# Patient Record
Sex: MALE | Race: BLACK OR AFRICAN AMERICAN | Employment: OTHER | URBAN - METROPOLITAN AREA
[De-identification: names, ages, dates, MRNs, and addresses within clinical notes are randomized per-mention and may not be internally consistent; named-entity substitution may affect disease eponyms.]

---

## 2019-08-23 ENCOUNTER — HOSPITAL ENCOUNTER (EMERGENCY)
Age: 34
Discharge: HOME | End: 2019-08-23

## 2019-08-23 VITALS
WEIGHT: 250 LBS | HEART RATE: 73 BPM | TEMPERATURE: 97.3 F | SYSTOLIC BLOOD PRESSURE: 191 MMHG | OXYGEN SATURATION: 96 % | RESPIRATION RATE: 16 BRPM | HEIGHT: 70 IN | DIASTOLIC BLOOD PRESSURE: 100 MMHG | BODY MASS INDEX: 35.79 KG/M2

## 2019-08-23 DIAGNOSIS — Z20.2 POSSIBLE EXPOSURE TO STD: Primary | ICD-10-CM

## 2019-08-23 LAB
BACTERIA: ABNORMAL /HPF
BILIRUBIN URINE: ABNORMAL
BLOOD, URINE: ABNORMAL
CLARITY: CLEAR
COLOR: YELLOW
GLUCOSE URINE: NEGATIVE MG/DL
KETONES, URINE: NEGATIVE MG/DL
LEUKOCYTE ESTERASE, URINE: NEGATIVE
NITRITE, URINE: NEGATIVE
PH UA: 5.5 (ref 5–9)
PROTEIN UA: NEGATIVE MG/DL
RBC UA: ABNORMAL /HPF (ref 0–2)
SPECIFIC GRAVITY UA: 1.02 (ref 1–1.03)
UROBILINOGEN, URINE: 0.2 E.U./DL
WBC UA: ABNORMAL /HPF (ref 0–5)

## 2019-08-23 PROCEDURE — 96372 THER/PROPH/DIAG INJ SC/IM: CPT

## 2019-08-23 PROCEDURE — 6360000002 HC RX W HCPCS: Performed by: PHYSICIAN ASSISTANT

## 2019-08-23 PROCEDURE — 6370000000 HC RX 637 (ALT 250 FOR IP): Performed by: PHYSICIAN ASSISTANT

## 2019-08-23 PROCEDURE — 87491 CHLMYD TRACH DNA AMP PROBE: CPT

## 2019-08-23 PROCEDURE — 99283 EMERGENCY DEPT VISIT LOW MDM: CPT

## 2019-08-23 PROCEDURE — 81001 URINALYSIS AUTO W/SCOPE: CPT

## 2019-08-23 PROCEDURE — 87591 N.GONORRHOEAE DNA AMP PROB: CPT

## 2019-08-23 RX ORDER — METRONIDAZOLE 500 MG/1
2000 TABLET ORAL ONCE
Status: COMPLETED | OUTPATIENT
Start: 2019-08-23 | End: 2019-08-23

## 2019-08-23 RX ORDER — AZITHROMYCIN 250 MG/1
1000 TABLET, FILM COATED ORAL ONCE
Status: COMPLETED | OUTPATIENT
Start: 2019-08-23 | End: 2019-08-23

## 2019-08-23 RX ORDER — ACYCLOVIR 400 MG/1
400 TABLET ORAL
Qty: 50 TABLET | Refills: 0 | Status: SHIPPED | OUTPATIENT
Start: 2019-08-23 | End: 2019-09-02

## 2019-08-23 RX ORDER — CEFTRIAXONE SODIUM 250 MG/1
250 INJECTION, POWDER, FOR SOLUTION INTRAMUSCULAR; INTRAVENOUS ONCE
Status: COMPLETED | OUTPATIENT
Start: 2019-08-23 | End: 2019-08-23

## 2019-08-23 RX ADMIN — METRONIDAZOLE 2000 MG: 500 TABLET, FILM COATED ORAL at 12:21

## 2019-08-23 RX ADMIN — CEFTRIAXONE SODIUM 250 MG: 250 INJECTION, POWDER, FOR SOLUTION INTRAMUSCULAR; INTRAVENOUS at 12:21

## 2019-08-23 RX ADMIN — AZITHROMYCIN 1000 MG: 250 TABLET, FILM COATED ORAL at 12:20

## 2019-08-23 NOTE — ED PROVIDER NOTES
ectopy, gallops, or rubs. · GI:  AbdomenSoft, nontender, good bowel sounds. No firm or pulsatile mass. · :          Penis: non focal circumcised and lesion erthematous rash noted around the glands penis. Vesicular appearance to one area. .            Scrotum: normal.           Testicle: normal without masses bilateral.  · Integument:  Normal turgor. Warm, dry, without visible rash, unless noted elsewhere. Lymphatics: No lymphangitis or adenopathy noted. · Neurological:  Orientation age-appropriate. Motor functions intact. Lab / Imaging Results   (All laboratory and radiology results have been personally reviewed by myself)  Labs:  Results for orders placed or performed during the hospital encounter of 08/23/19   C.trachomatis N.gonorrhoeae DNA, Urine   Result Value Ref Range    Source Urine    Urinalysis   Result Value Ref Range    Color, UA Yellow Straw/Yellow    Clarity, UA Clear Clear    Glucose, Ur Negative Negative mg/dL    Bilirubin Urine SMALL (A) Negative    Ketones, Urine Negative Negative mg/dL    Specific Gravity, UA 1.025 1.005 - 1.030    Blood, Urine MODERATE (A) Negative    pH, UA 5.5 5.0 - 9.0    Protein, UA Negative Negative mg/dL    Urobilinogen, Urine 0.2 <2.0 E.U./dL    Nitrite, Urine Negative Negative    Leukocyte Esterase, Urine Negative Negative   Microscopic Urinalysis   Result Value Ref Range    WBC, UA 0-1 0 - 5 /HPF    RBC, UA 1-3 0 - 2 /HPF    Bacteria, UA RARE (A) /HPF     Imaging: All Radiology results interpreted by Radiologist unless otherwise noted.   No orders to display     ED Course / Medical Decision Making     Medications   cefTRIAXone (ROCEPHIN) injection 250 mg (250 mg Intramuscular Given 8/23/19 1221)   azithromycin (ZITHROMAX) tablet 1,000 mg (1,000 mg Oral Given 8/23/19 1220)   metroNIDAZOLE (FLAGYL) tablet 2,000 mg (2,000 mg Oral Given 8/23/19 1221)        Consult(s):   None    Procedure(s):   none    Medical Decision Making:    *Plan to obtain cultures and

## 2019-08-28 LAB
C. TRACHOMATIS DNA ,URINE: NEGATIVE
N. GONORRHOEAE DNA, URINE: NEGATIVE
SOURCE: NORMAL

## 2019-10-28 ENCOUNTER — HOSPITAL ENCOUNTER (EMERGENCY)
Age: 34
Discharge: HOME | End: 2019-10-28

## 2019-10-28 VITALS
HEIGHT: 70 IN | DIASTOLIC BLOOD PRESSURE: 100 MMHG | RESPIRATION RATE: 18 BRPM | BODY MASS INDEX: 35.79 KG/M2 | TEMPERATURE: 98 F | OXYGEN SATURATION: 99 % | WEIGHT: 250 LBS | SYSTOLIC BLOOD PRESSURE: 177 MMHG | HEART RATE: 97 BPM

## 2019-10-28 DIAGNOSIS — B35.6 JOCK ITCH: ICD-10-CM

## 2019-10-28 DIAGNOSIS — L30.9 DERMATITIS: Primary | ICD-10-CM

## 2019-10-28 PROCEDURE — 96372 THER/PROPH/DIAG INJ SC/IM: CPT

## 2019-10-28 PROCEDURE — 6370000000 HC RX 637 (ALT 250 FOR IP): Performed by: NURSE PRACTITIONER

## 2019-10-28 PROCEDURE — 99282 EMERGENCY DEPT VISIT SF MDM: CPT

## 2019-10-28 PROCEDURE — 6360000002 HC RX W HCPCS: Performed by: NURSE PRACTITIONER

## 2019-10-28 RX ORDER — METHYLPREDNISOLONE ACETATE 80 MG/ML
80 INJECTION, SUSPENSION INTRA-ARTICULAR; INTRALESIONAL; INTRAMUSCULAR; SOFT TISSUE ONCE
Status: COMPLETED | OUTPATIENT
Start: 2019-10-28 | End: 2019-10-28

## 2019-10-28 RX ORDER — HYDROXYZINE 50 MG/1
25 TABLET, FILM COATED ORAL 3 TIMES DAILY PRN
Qty: 10 TABLET | Refills: 0 | Status: SHIPPED | OUTPATIENT
Start: 2019-10-28 | End: 2019-11-07

## 2019-10-28 RX ORDER — TRIAMCINOLONE ACETONIDE 1 MG/G
CREAM TOPICAL ONCE
Status: COMPLETED | OUTPATIENT
Start: 2019-10-28 | End: 2019-10-28

## 2019-10-28 RX ORDER — NYSTATIN 100000 [USP'U]/G
POWDER TOPICAL
Qty: 30 G | Refills: 0 | Status: SHIPPED | OUTPATIENT
Start: 2019-10-28 | End: 2020-12-23

## 2019-10-28 RX ORDER — HYDROXYZINE HYDROCHLORIDE 50 MG/ML
50 INJECTION, SOLUTION INTRAMUSCULAR ONCE
Status: COMPLETED | OUTPATIENT
Start: 2019-10-28 | End: 2019-10-28

## 2019-10-28 RX ORDER — TRIAMCINOLONE ACETONIDE 5 MG/G
CREAM TOPICAL
Qty: 454 G | Refills: 0 | Status: SHIPPED | OUTPATIENT
Start: 2019-10-28 | End: 2019-11-04

## 2019-10-28 RX ADMIN — HYDROXYZINE HYDROCHLORIDE 50 MG: 50 INJECTION, SOLUTION INTRAMUSCULAR at 22:43

## 2019-10-28 RX ADMIN — TRIAMCINOLONE ACETONIDE: 1 CREAM TOPICAL at 23:14

## 2019-10-28 RX ADMIN — METHYLPREDNISOLONE ACETATE 80 MG: 80 INJECTION, SUSPENSION INTRA-ARTICULAR; INTRALESIONAL; INTRAMUSCULAR; SOFT TISSUE at 23:13

## 2020-03-09 ENCOUNTER — APPOINTMENT (OUTPATIENT)
Dept: CT IMAGING | Age: 35
End: 2020-03-09

## 2020-03-09 ENCOUNTER — HOSPITAL ENCOUNTER (EMERGENCY)
Age: 35
Discharge: HOME | End: 2020-03-09
Attending: EMERGENCY MEDICINE

## 2020-03-09 ENCOUNTER — APPOINTMENT (OUTPATIENT)
Dept: GENERAL RADIOLOGY | Age: 35
End: 2020-03-09

## 2020-03-09 VITALS
HEIGHT: 70 IN | HEART RATE: 91 BPM | RESPIRATION RATE: 18 BRPM | DIASTOLIC BLOOD PRESSURE: 110 MMHG | SYSTOLIC BLOOD PRESSURE: 158 MMHG | BODY MASS INDEX: 35.07 KG/M2 | OXYGEN SATURATION: 97 % | TEMPERATURE: 99.8 F | WEIGHT: 245 LBS

## 2020-03-09 LAB
ALBUMIN SERPL-MCNC: 3.4 G/DL (ref 3.5–5.2)
ALP BLD-CCNC: 87 U/L (ref 40–129)
ALT SERPL-CCNC: 21 U/L (ref 0–40)
ANION GAP SERPL CALCULATED.3IONS-SCNC: 14 MMOL/L (ref 7–16)
AST SERPL-CCNC: 22 U/L (ref 0–39)
BILIRUB SERPL-MCNC: 1 MG/DL (ref 0–1.2)
BUN BLDV-MCNC: 9 MG/DL (ref 6–20)
CALCIUM SERPL-MCNC: 8.8 MG/DL (ref 8.6–10.2)
CHLORIDE BLD-SCNC: 98 MMOL/L (ref 98–107)
CO2: 24 MMOL/L (ref 22–29)
CREAT SERPL-MCNC: 0.8 MG/DL (ref 0.7–1.2)
D DIMER: 620 NG/ML DDU
EKG ATRIAL RATE: 100 BPM
EKG P AXIS: 67 DEGREES
EKG P-R INTERVAL: 142 MS
EKG Q-T INTERVAL: 378 MS
EKG QRS DURATION: 90 MS
EKG QTC CALCULATION (BAZETT): 487 MS
EKG R AXIS: 68 DEGREES
EKG T AXIS: 119 DEGREES
EKG VENTRICULAR RATE: 100 BPM
GFR AFRICAN AMERICAN: >60
GFR NON-AFRICAN AMERICAN: >60 ML/MIN/1.73
GLUCOSE BLD-MCNC: 145 MG/DL (ref 74–99)
HCT VFR BLD CALC: 37.3 % (ref 37–54)
HEMOGLOBIN: 12.4 G/DL (ref 12.5–16.5)
INFLUENZA A BY PCR: NOT DETECTED
INFLUENZA B BY PCR: NOT DETECTED
MCH RBC QN AUTO: 30 PG (ref 26–35)
MCHC RBC AUTO-ENTMCNC: 33.2 % (ref 32–34.5)
MCV RBC AUTO: 90.3 FL (ref 80–99.9)
PDW BLD-RTO: 12.7 FL (ref 11.5–15)
PLATELET # BLD: 377 E9/L (ref 130–450)
PMV BLD AUTO: 9.7 FL (ref 7–12)
POTASSIUM SERPL-SCNC: 3.2 MMOL/L (ref 3.5–5)
PROCALCITONIN: 0.11 NG/ML (ref 0–0.08)
RBC # BLD: 4.13 E12/L (ref 3.8–5.8)
SODIUM BLD-SCNC: 136 MMOL/L (ref 132–146)
TOTAL PROTEIN: 7.8 G/DL (ref 6.4–8.3)
TROPONIN: 0.01 NG/ML (ref 0–0.03)
WBC # BLD: 16 E9/L (ref 4.5–11.5)

## 2020-03-09 PROCEDURE — 99284 EMERGENCY DEPT VISIT MOD MDM: CPT

## 2020-03-09 PROCEDURE — 71275 CT ANGIOGRAPHY CHEST: CPT

## 2020-03-09 PROCEDURE — 2580000003 HC RX 258: Performed by: RADIOLOGY

## 2020-03-09 PROCEDURE — 6360000002 HC RX W HCPCS: Performed by: PHYSICAL MEDICINE & REHABILITATION

## 2020-03-09 PROCEDURE — 93010 ELECTROCARDIOGRAM REPORT: CPT | Performed by: INTERNAL MEDICINE

## 2020-03-09 PROCEDURE — 6370000000 HC RX 637 (ALT 250 FOR IP): Performed by: PHYSICAL MEDICINE & REHABILITATION

## 2020-03-09 PROCEDURE — 6360000004 HC RX CONTRAST MEDICATION: Performed by: RADIOLOGY

## 2020-03-09 PROCEDURE — 87502 INFLUENZA DNA AMP PROBE: CPT

## 2020-03-09 PROCEDURE — 93005 ELECTROCARDIOGRAM TRACING: CPT | Performed by: NURSE PRACTITIONER

## 2020-03-09 PROCEDURE — 85027 COMPLETE CBC AUTOMATED: CPT

## 2020-03-09 PROCEDURE — 85378 FIBRIN DEGRADE SEMIQUANT: CPT

## 2020-03-09 PROCEDURE — 2580000003 HC RX 258: Performed by: PHYSICAL MEDICINE & REHABILITATION

## 2020-03-09 PROCEDURE — 84484 ASSAY OF TROPONIN QUANT: CPT

## 2020-03-09 PROCEDURE — 96365 THER/PROPH/DIAG IV INF INIT: CPT

## 2020-03-09 PROCEDURE — 80053 COMPREHEN METABOLIC PANEL: CPT

## 2020-03-09 PROCEDURE — 84145 PROCALCITONIN (PCT): CPT

## 2020-03-09 PROCEDURE — 96366 THER/PROPH/DIAG IV INF ADDON: CPT

## 2020-03-09 PROCEDURE — 71046 X-RAY EXAM CHEST 2 VIEWS: CPT

## 2020-03-09 PROCEDURE — 6370000000 HC RX 637 (ALT 250 FOR IP): Performed by: NURSE PRACTITIONER

## 2020-03-09 RX ORDER — ALBUTEROL SULFATE 2.5 MG/3ML
2.5 SOLUTION RESPIRATORY (INHALATION) EVERY 4 HOURS PRN
Status: DISCONTINUED | OUTPATIENT
Start: 2020-03-09 | End: 2020-03-09 | Stop reason: HOSPADM

## 2020-03-09 RX ORDER — CEFDINIR 300 MG/1
300 CAPSULE ORAL 2 TIMES DAILY
Qty: 10 CAPSULE | Refills: 0 | Status: SHIPPED | OUTPATIENT
Start: 2020-03-09 | End: 2020-03-14

## 2020-03-09 RX ORDER — SODIUM CHLORIDE 9 MG/ML
INJECTION, SOLUTION INTRAVENOUS ONCE
Status: COMPLETED | OUTPATIENT
Start: 2020-03-09 | End: 2020-03-09

## 2020-03-09 RX ORDER — ACETAMINOPHEN 500 MG
1000 TABLET ORAL ONCE
Status: COMPLETED | OUTPATIENT
Start: 2020-03-09 | End: 2020-03-09

## 2020-03-09 RX ORDER — SODIUM CHLORIDE 0.9 % (FLUSH) 0.9 %
10 SYRINGE (ML) INJECTION ONCE
Status: COMPLETED | OUTPATIENT
Start: 2020-03-09 | End: 2020-03-09

## 2020-03-09 RX ORDER — DOXYCYCLINE HYCLATE 100 MG/1
100 CAPSULE ORAL EVERY 12 HOURS SCHEDULED
Status: DISCONTINUED | OUTPATIENT
Start: 2020-03-09 | End: 2020-03-09 | Stop reason: HOSPADM

## 2020-03-09 RX ORDER — AZITHROMYCIN 500 MG/1
500 TABLET, FILM COATED ORAL DAILY
Qty: 3 TABLET | Refills: 0 | Status: SHIPPED | OUTPATIENT
Start: 2020-03-09 | End: 2020-03-14

## 2020-03-09 RX ORDER — ACETAMINOPHEN 325 MG/1
650 TABLET ORAL ONCE
Status: COMPLETED | OUTPATIENT
Start: 2020-03-09 | End: 2020-03-09

## 2020-03-09 RX ADMIN — ACETAMINOPHEN 650 MG: 325 TABLET ORAL at 16:39

## 2020-03-09 RX ADMIN — CEFTRIAXONE SODIUM 1 G: 1 INJECTION, POWDER, FOR SOLUTION INTRAMUSCULAR; INTRAVENOUS at 17:03

## 2020-03-09 RX ADMIN — Medication 10 ML: at 15:28

## 2020-03-09 RX ADMIN — ACETAMINOPHEN 1000 MG: 500 TABLET ORAL at 13:31

## 2020-03-09 RX ADMIN — SODIUM CHLORIDE: 9 INJECTION, SOLUTION INTRAVENOUS at 16:39

## 2020-03-09 RX ADMIN — IPRATROPIUM BROMIDE 0.5 MG: 0.5 SOLUTION RESPIRATORY (INHALATION) at 17:55

## 2020-03-09 RX ADMIN — IOPAMIDOL 75 ML: 755 INJECTION, SOLUTION INTRAVENOUS at 15:28

## 2020-03-09 ASSESSMENT — ENCOUNTER SYMPTOMS
NAUSEA: 0
BLOOD IN STOOL: 0
VOMITING: 0
ANAL BLEEDING: 0
ABDOMINAL PAIN: 0
DIARRHEA: 1
CONSTIPATION: 0
RESPIRATORY NEGATIVE: 1
RECTAL PAIN: 0
EYES NEGATIVE: 1
FACIAL SWELLING: 0
ABDOMINAL DISTENTION: 0

## 2020-03-09 ASSESSMENT — PAIN DESCRIPTION - ORIENTATION: ORIENTATION: LEFT

## 2020-03-09 ASSESSMENT — PAIN SCALES - GENERAL
PAINLEVEL_OUTOF10: 5
PAINLEVEL_OUTOF10: 5

## 2020-03-09 ASSESSMENT — PAIN DESCRIPTION - LOCATION: LOCATION: BACK

## 2020-03-09 NOTE — LETTER
[facility] Emergency Department  [address]  [place] [address]  Phone: [phone]               March 9, 2020    Patient: Tod Redman   YOB: 1985   Date of Visit: 3/9/2020       To Whom It May Concern:    Anurag Cardona was seen and treated in our emergency department on 3/9/2020. He ma return to work on 3-.       Sincerely,       Lala Orta RN         Signature:__________________________________

## 2020-03-09 NOTE — ED PROVIDER NOTES
Patient presents to the ED with complaints of cough with hemoptysis. Patient stated that he has had a cough for the past 6-8 months. He states that his cough has worsened over the past week and noticed blood-streaked sputum over the past 1-2 days. He does admit to subjective fever and chills for the past 1-2 days. He also admits to having diarrhea. Symptoms not remitted with Nyquil or Excedrin. He denies sick contacts, recent travel, or new pets. He does admit to smoking 1 cigar per day and drinking 2-3 beers per day. He denies previous history of related symptoms. Denies lightheadedness, dizziness, focal motor changes, chest pain, SOB/LOCKE, abdominal pain, n/v, or loss of appetite. The history is provided by the patient and a significant other. No  was used. Review of Systems   Constitutional: Positive for chills, diaphoresis and fever. Negative for activity change, appetite change, fatigue and unexpected weight change. HENT: Negative. Negative for congestion, dental problem, drooling, ear discharge, ear pain, facial swelling and hearing loss. Eyes: Negative. Respiratory: Negative. Cardiovascular: Negative. Gastrointestinal: Positive for diarrhea. Negative for abdominal distention, abdominal pain, anal bleeding, blood in stool, constipation, nausea, rectal pain and vomiting. Endocrine: Negative. Genitourinary: Negative. Musculoskeletal: Negative. Skin: Negative. Neurological: Negative. Hematological: Negative. Psychiatric/Behavioral: Negative. Physical Exam  Constitutional:       Appearance: Normal appearance. He is normal weight. HENT:      Head: Normocephalic and atraumatic. Nose: Nose normal.      Mouth/Throat:      Mouth: Mucous membranes are moist.      Pharynx: Oropharynx is clear. No oropharyngeal exudate or posterior oropharyngeal erythema. Eyes:      Extraocular Movements: Extraocular movements intact. Conjunctiva/sclera: Conjunctivae normal.      Pupils: Pupils are equal, round, and reactive to light. Neck:      Musculoskeletal: Normal range of motion. Cardiovascular:      Rate and Rhythm: Normal rate and regular rhythm. Pulses: Normal pulses. Heart sounds: Normal heart sounds. Pulmonary:      Effort: Pulmonary effort is normal.      Breath sounds: Normal breath sounds. Abdominal:      General: Bowel sounds are normal.      Palpations: Abdomen is soft. There is no mass. Tenderness: There is no abdominal tenderness. There is no guarding or rebound. Hernia: No hernia is present. Musculoskeletal: Normal range of motion. Skin:     General: Skin is warm and dry. Neurological:      General: No focal deficit present. Mental Status: He is alert and oriented to person, place, and time. Psychiatric:         Mood and Affect: Mood normal.         Behavior: Behavior normal.         Thought Content: Thought content normal.         Judgment: Judgment normal.          Procedures     MDM     Symptoms consistent with community-acquired pneumonia based on clinical presentation and laboratory/radiographic findings. CURB-65 score 0. Patient stable for discharge and will send home on empiric antibiotics for CAP coverage. --------------------------------------------- PAST HISTORY ---------------------------------------------  Past Medical History:  has no past medical history on file. Past Surgical History:  has a past surgical history that includes [place] tooth extraction; Abscess Drainage; and ECHO Compl W Dop Color Flow (5/5/2015). Social History:  reports that he has been smoking cigarettes. He has a 2.00 pack-year smoking history. He has never used smokeless tobacco. He reports current alcohol use. He reports that he does not use drugs.     Family History: family history includes Heart Attack in his paternal grandfather; High Blood Pressure in his father and paternal grandmother. The patients home medications have been reviewed. Allergies: Patient has no known allergies.     -------------------------------------------------- RESULTS -------------------------------------------------  Labs:  Results for orders placed or performed during the hospital encounter of 03/09/20   Rapid influenza A/B antigens   Result Value Ref Range    Influenza A by PCR Not Detected Not Detected    Influenza B by PCR Not Detected Not Detected   CBC   Result Value Ref Range    WBC 16.0 (H) 4.5 - 11.5 E9/L    RBC 4.13 3.80 - 5.80 E12/L    Hemoglobin 12.4 (L) 12.5 - 16.5 g/dL    Hematocrit 37.3 37.0 - 54.0 %    MCV 90.3 80.0 - 99.9 fL    MCH 30.0 26.0 - 35.0 pg    MCHC 33.2 32.0 - 34.5 %    RDW 12.7 11.5 - 15.0 fL    Platelets 519 370 - 601 E9/L    MPV 9.7 7.0 - 12.0 fL   Comprehensive Metabolic Panel   Result Value Ref Range    Sodium 136 132 - 146 mmol/L    Potassium 3.2 (L) 3.5 - 5.0 mmol/L    Chloride 98 98 - 107 mmol/L    CO2 24 22 - 29 mmol/L    Anion Gap 14 7 - 16 mmol/L    Glucose 145 (H) 74 - 99 mg/dL    BUN 9 6 - 20 mg/dL    CREATININE 0.8 0.7 - 1.2 mg/dL    GFR Non-African American >60 >=60 mL/min/1.73    GFR African American >60     Calcium 8.8 8.6 - 10.2 mg/dL    Total Protein 7.8 6.4 - 8.3 g/dL    Alb 3.4 (L) 3.5 - 5.2 g/dL    Total Bilirubin 1.0 0.0 - 1.2 mg/dL    Alkaline Phosphatase 87 40 - 129 U/L    ALT 21 0 - 40 U/L    AST 22 0 - 39 U/L   Troponin   Result Value Ref Range    Troponin 0.01 0.00 - 0.03 ng/mL   D-Dimer, Quantitative   Result Value Ref Range    D-Dimer, Quant 620 ng/mL DDU   Procalcitonin   Result Value Ref Range    Procalcitonin 0.11 (H) 0.00 - 0.08 ng/mL   EKG 12 Lead   Result Value Ref Range    Ventricular Rate 100 BPM    Atrial Rate 100 BPM    P-R Interval 142 ms    QRS Duration 90 ms    Q-T Interval 378 ms    QTc Calculation (Bazett) 487 ms    P Axis 67 degrees    R Axis 68 degrees    T Axis 119 degrees       Radiology:  CTA PULMONARY W CONTRAST   Final Result No central pulmonary embolism or aortic dissection. Patchy infiltrates in the left lower lobe with  diffuse groundglass   opacities likely infectious inflammatory process like pneumonia with   reactive mediastinal and hilar adenopathy. 4 mm nonspecific right upper lobe nodule. Consider surveillance   according to Fleischner Society guidelines. XR CHEST STANDARD (2 VW)   Final Result      Findings suggest pneumonia involving the peripheral aspect of left   lower lobe. Short-term follow-up recommended to ensure resolution.          ------------------------- NURSING NOTES AND VITALS REVIEWED ---------------------------  Date / Time Roomed:  3/9/2020  2:12 PM  ED Bed Assignment:  [place] D/    The nursing notes within the ED encounter and vital signs as below have been reviewed. BP (!) 158/110   Pulse 91   Temp 99.8 °F (37.7 °C) (Oral)   Resp 18   Ht 5' 10\" (1.778 m)   Wt 245 lb (111.1 kg)   SpO2 97%   BMI 35.15 kg/m²   Oxygen Saturation Interpretation: Normal      ------------------------------------------ PROGRESS NOTES ------------------------------------------  8:27 PM EDT  I have spoken with the patient and discussed todays results, in addition to providing specific details for the plan of care and counseling regarding the diagnosis and prognosis. Their questions are answered at this time and they are agreeable with the plan. I discussed at length with them reasons for immediate return here for re evaluation. They will followup with their primary care physician by calling their office on Monday.      --------------------------------- ADDITIONAL PROVIDER NOTES ---------------------------------  At this time the patient is without objective evidence of an acute process requiring hospitalization or inpatient management. They have remained hemodynamically stable throughout their entire ED visit and are stable for discharge with outpatient follow-up.      The plan has been discussed in detail and they are aware of the specific conditions for emergent return, as well as the importance of follow-up. Discharge Medication List as of 3/9/2020  5:39 PM      START taking these medications    Details   cefdinir (OMNICEF) 300 MG capsule Take 1 capsule by mouth 2 times daily for 5 days, Disp-10 capsule, R-0Print      azithromycin (ZITHROMAX) 500 MG tablet Take 1 tablet by mouth daily for 5 days, Disp-3 tablet, R-0Print             Diagnosis:  1. Community acquired pneumonia of left lower lobe of lung ([facility] [address]        Disposition:  Patient's disposition: Discharge to home  Patient's condition is stable.           Eladio Echevarria DO  Resident  03/09/20 2028

## 2020-05-26 ENCOUNTER — APPOINTMENT (OUTPATIENT)
Dept: GENERAL RADIOLOGY | Age: 35
End: 2020-05-26

## 2020-05-26 ENCOUNTER — HOSPITAL ENCOUNTER (EMERGENCY)
Age: 35
Discharge: HOME | End: 2020-05-26

## 2020-05-26 VITALS
TEMPERATURE: 97.6 F | HEIGHT: 70 IN | HEART RATE: 110 BPM | BODY MASS INDEX: 35.79 KG/M2 | DIASTOLIC BLOOD PRESSURE: 127 MMHG | WEIGHT: 250 LBS | RESPIRATION RATE: 16 BRPM | OXYGEN SATURATION: 95 % | SYSTOLIC BLOOD PRESSURE: 172 MMHG

## 2020-05-26 PROCEDURE — 99283 EMERGENCY DEPT VISIT LOW MDM: CPT

## 2020-05-26 PROCEDURE — 71101 X-RAY EXAM UNILAT RIBS/CHEST: CPT

## 2020-05-26 RX ORDER — GUAIFENESIN AND CODEINE PHOSPHATE 100; 10 MG/5ML; MG/5ML
5 SOLUTION ORAL 3 TIMES DAILY PRN
Qty: 45 ML | Refills: 0 | Status: SHIPPED | OUTPATIENT
Start: 2020-05-26 | End: 2020-05-29

## 2020-05-26 RX ORDER — PREDNISONE 10 MG/1
TABLET ORAL
Qty: 30 TABLET | Refills: 0 | Status: SHIPPED | OUTPATIENT
Start: 2020-05-26 | End: 2020-06-05

## 2020-05-27 ENCOUNTER — CARE COORDINATION (OUTPATIENT)
Dept: CARE COORDINATION | Age: 35
End: 2020-05-27

## 2020-05-27 NOTE — ED PROVIDER NOTES
[facility]     Department of Emergency Medicine   ED  Provider Note  Admit Date/RoomTime: 5/26/2020  1:40 PM  ED Room: [address] [place] [address]   Chief Complaint   Cough (feels like he \"pulled something\" during a coughing spell a few days ago, left rib area) and Rib Pain (left side)    History of Present Illness   Source of history provided by:  patient. History/Exam Limitations: none. Natasha Orourke is a 37 y.o. old male with a past medical history of: History reviewed. No pertinent past medical history. presents to the emergency department by private vehicle, for sharp left lateral chest pain which occured 3 day(s) prior to arrival.  The complaint occurred as a result of no trauma. Previous injury: no.  Since onset the symptoms have been mild in degree. His symptoms are associated with cough. His pain is aggraveated by chest wall motion and relieved by nothing. He denies any head injury, loss of consciousness, neck pain, chest pain, abdominal pain, extremity injury, numbness or weakness. Tetanus Status: up to date. Patient stated that he had a, \"coughing spell\" three days ago and felt something pop on his left side. He has been having a cough but denies any fevers, fatigue or chills at this time. ROS    Pertinent positives and negatives are stated within HPI, all other systems reviewed and are negative. Past Surgical History:  has a past surgical history that includes [place] tooth extraction; Abscess Drainage; and ECHO Compl W Dop Color Flow (5/5/2015). Social History:  reports that he has been smoking cigarettes. He has a 2.00 pack-year smoking history. He has never used smokeless tobacco. He reports current alcohol use. He reports that he does not use drugs. Family History: family history includes Heart Attack in his paternal grandfather; High Blood Pressure in his father and paternal grandmother. Allergies: Patient has no known allergies.      Physical Exam           ED Triage Vitals   BP Temp Temp src

## 2020-05-27 NOTE — CARE COORDINATION
Patient contacted regarding Mery Moe. Discussed COVID-19 related testing which was not done at this time. Test results were not done. Patient informed of results, if available? n/a    Care Transition Nurse/ Ambulatory Care Manager contacted the patient by telephone to perform post discharge assessment. Verified name and  with patient as identifiers. Provided introduction to self, and explanation of the CTN/ACM role, and reason for call due to risk factors for infection and/or exposure to COVID-19. Symptoms reviewed with patient who verbalized the following symptoms: cough. Called and spoke with pt this morning for ED f/u on 2020 for cough, L rib pain. Pt reports that cough is better, but continues to have L rib pain. Pt states that he did  his new scripts. Pt does not have a pcp and was agreeable for RN to help with a f/u for ED. Via 3-way call with Caroline Abdalla at preservice, pt was set up for an ED f/u visit at [facility] on  at 10:30 am with PO Domínguez. Per Caroline Abdalla, she states after that appointment, they can set him up with a new pcp to establish with. Pt agreeable and appreciative of help. [employer] account activation pending. Pt agreeable to enroll in Loop. Due to no new or worsening symptoms encounter was not routed to provider for escalation. Patient has following risk factors of: asthma and diabetes. CTN/ACM reviewed discharge instructions, medical action plan and red flags such as increased shortness of breath, increasing fever and signs of decompensation with patient who verbalized understanding. Discussed exposure protocols and quarantine with CDC Guidelines What to do if you are sick with coronavirus disease .  Patient was given an opportunity for questions and concerns. The patient agrees to contact the [employer] exposure line [phone], local [facility] department [place] [facility]: ([phone]) and PCP office for questions related to their healthcare.  CTN/ACM provided contact information for future needs. Reviewed and educated patient on any new and changed medications related to discharge diagnosis     Patient/family/caregiver given information for [employer] Loop and agrees to enroll yes  Patient's preferred e-mail: n/a   Patient's preferred phone number: [phone]  Based on Loop alert triggers, patient will be contacted by nurse care manager for worsening symptoms. Pt will be further monitored by COVID Loop Team based on severity of symptoms and risk factors.

## 2020-06-02 ENCOUNTER — CARE COORDINATION (OUTPATIENT)
Dept: CARE COORDINATION | Age: 35
End: 2020-06-02

## 2020-12-22 ENCOUNTER — APPOINTMENT (OUTPATIENT)
Dept: GENERAL RADIOLOGY | Age: 35
DRG: 194 | End: 2020-12-22
Payer: MEDICAID

## 2020-12-22 ENCOUNTER — APPOINTMENT (OUTPATIENT)
Dept: CT IMAGING | Age: 35
DRG: 194 | End: 2020-12-22
Payer: MEDICAID

## 2020-12-22 ENCOUNTER — HOSPITAL ENCOUNTER (INPATIENT)
Age: 35
LOS: 1 days | Discharge: HOME | DRG: 194 | End: 2020-12-24
Attending: EMERGENCY MEDICINE | Admitting: INTERNAL MEDICINE
Payer: MEDICAID

## 2020-12-22 LAB
ALBUMIN SERPL-MCNC: 3.4 G/DL (ref 3.5–5.2)
ALP BLD-CCNC: 89 U/L (ref 40–129)
ALT SERPL-CCNC: 12 U/L (ref 0–40)
ANION GAP SERPL CALCULATED.3IONS-SCNC: 10 MMOL/L (ref 7–16)
AST SERPL-CCNC: 22 U/L (ref 0–39)
BASOPHILS ABSOLUTE: 0.05 E9/L (ref 0–0.2)
BASOPHILS RELATIVE PERCENT: 0.4 % (ref 0–2)
BILIRUB SERPL-MCNC: 0.4 MG/DL (ref 0–1.2)
BILIRUBIN URINE: NEGATIVE
BLOOD, URINE: NORMAL
BUN BLDV-MCNC: 10 MG/DL (ref 6–20)
CALCIUM SERPL-MCNC: 8.6 MG/DL (ref 8.6–10.2)
CHLORIDE BLD-SCNC: 100 MMOL/L (ref 98–107)
CLARITY: CLEAR
CO2: 26 MMOL/L (ref 22–29)
COLOR: YELLOW
CREAT SERPL-MCNC: 1.2 MG/DL (ref 0.7–1.2)
EOSINOPHILS ABSOLUTE: 0.18 E9/L (ref 0.05–0.5)
EOSINOPHILS RELATIVE PERCENT: 1.3 % (ref 0–6)
GFR AFRICAN AMERICAN: >60
GFR NON-AFRICAN AMERICAN: >60 ML/MIN/1.73
GLUCOSE BLD-MCNC: 126 MG/DL (ref 74–99)
GLUCOSE URINE: NEGATIVE MG/DL
HCT VFR BLD CALC: 36.4 % (ref 37–54)
HEMOGLOBIN: 12.2 G/DL (ref 12.5–16.5)
IMMATURE GRANULOCYTES #: 0.03 E9/L
IMMATURE GRANULOCYTES %: 0.2 % (ref 0–5)
KETONES, URINE: NEGATIVE MG/DL
LACTIC ACID: 1.2 MMOL/L (ref 0.5–2.2)
LEUKOCYTE ESTERASE, URINE: NEGATIVE
LYMPHOCYTES ABSOLUTE: 2.53 E9/L (ref 1.5–4)
LYMPHOCYTES RELATIVE PERCENT: 18.6 % (ref 20–42)
MCH RBC QN AUTO: 30.4 PG (ref 26–35)
MCHC RBC AUTO-ENTMCNC: 33.5 % (ref 32–34.5)
MCV RBC AUTO: 90.8 FL (ref 80–99.9)
MONOCYTES ABSOLUTE: 0.73 E9/L (ref 0.1–0.95)
MONOCYTES RELATIVE PERCENT: 5.4 % (ref 2–12)
NEUTROPHILS ABSOLUTE: 10.06 E9/L (ref 1.8–7.3)
NEUTROPHILS RELATIVE PERCENT: 74.1 % (ref 43–80)
NITRITE, URINE: NEGATIVE
PDW BLD-RTO: 16.7 FL (ref 11.5–15)
PH UA: 6.5 (ref 5–9)
PLATELET # BLD: 296 E9/L (ref 130–450)
PMV BLD AUTO: 9.4 FL (ref 7–12)
POTASSIUM REFLEX MAGNESIUM: 3.9 MMOL/L (ref 3.5–5)
PRO-BNP: 3449 PG/ML (ref 0–125)
PROTEIN UA: NEGATIVE MG/DL
RBC # BLD: 4.01 E12/L (ref 3.8–5.8)
SODIUM BLD-SCNC: 136 MMOL/L (ref 132–146)
SPECIFIC GRAVITY UA: 1.01 (ref 1–1.03)
TOTAL PROTEIN: 7.4 G/DL (ref 6.4–8.3)
TROPONIN: <0.01 NG/ML (ref 0–0.03)
UROBILINOGEN, URINE: 0.2 E.U./DL
WBC # BLD: 13.6 E9/L (ref 4.5–11.5)

## 2020-12-22 PROCEDURE — 6360000004 HC RX CONTRAST MEDICATION: Performed by: RADIOLOGY

## 2020-12-22 PROCEDURE — 83605 ASSAY OF LACTIC ACID: CPT

## 2020-12-22 PROCEDURE — 71046 X-RAY EXAM CHEST 2 VIEWS: CPT

## 2020-12-22 PROCEDURE — 71275 CT ANGIOGRAPHY CHEST: CPT

## 2020-12-22 PROCEDURE — 81001 URINALYSIS AUTO W/SCOPE: CPT

## 2020-12-22 PROCEDURE — 6360000002 HC RX W HCPCS: Performed by: NURSE PRACTITIONER

## 2020-12-22 PROCEDURE — 96374 THER/PROPH/DIAG INJ IV PUSH: CPT

## 2020-12-22 PROCEDURE — 83880 ASSAY OF NATRIURETIC PEPTIDE: CPT

## 2020-12-22 PROCEDURE — 96375 TX/PRO/DX INJ NEW DRUG ADDON: CPT

## 2020-12-22 PROCEDURE — 80053 COMPREHEN METABOLIC PANEL: CPT

## 2020-12-22 PROCEDURE — 2500000003 HC RX 250 WO HCPCS: Performed by: NURSE PRACTITIONER

## 2020-12-22 PROCEDURE — 99284 EMERGENCY DEPT VISIT MOD MDM: CPT

## 2020-12-22 PROCEDURE — 2580000003 HC RX 258: Performed by: NURSE PRACTITIONER

## 2020-12-22 PROCEDURE — 84484 ASSAY OF TROPONIN QUANT: CPT

## 2020-12-22 PROCEDURE — 85025 COMPLETE CBC W/AUTO DIFF WBC: CPT

## 2020-12-22 PROCEDURE — 93005 ELECTROCARDIOGRAM TRACING: CPT | Performed by: PHYSICIAN ASSISTANT

## 2020-12-22 RX ORDER — 0.9 % SODIUM CHLORIDE 0.9 %
500 INTRAVENOUS SOLUTION INTRAVENOUS ONCE
Status: COMPLETED | OUTPATIENT
Start: 2020-12-22 | End: 2020-12-22

## 2020-12-22 RX ORDER — BUMETANIDE 0.25 MG/ML
1 INJECTION, SOLUTION INTRAMUSCULAR; INTRAVENOUS ONCE
Status: COMPLETED | OUTPATIENT
Start: 2020-12-22 | End: 2020-12-22

## 2020-12-22 RX ORDER — HYDRALAZINE HYDROCHLORIDE 20 MG/ML
10 INJECTION INTRAMUSCULAR; INTRAVENOUS ONCE
Status: COMPLETED | OUTPATIENT
Start: 2020-12-22 | End: 2020-12-22

## 2020-12-22 RX ADMIN — IOPAMIDOL 75 ML: 755 INJECTION, SOLUTION INTRAVENOUS at 22:45

## 2020-12-22 RX ADMIN — HYDRALAZINE HYDROCHLORIDE 10 MG: 20 INJECTION INTRAMUSCULAR; INTRAVENOUS at 21:52

## 2020-12-22 RX ADMIN — BUMETANIDE 1 MG: 0.25 INJECTION, SOLUTION INTRAMUSCULAR; INTRAVENOUS at 21:52

## 2020-12-22 RX ADMIN — SODIUM CHLORIDE 500 ML: 9 INJECTION, SOLUTION INTRAVENOUS at 21:55

## 2020-12-23 PROBLEM — I50.23 ACUTE ON CHRONIC SYSTOLIC HEART FAILURE (HCC): Status: ACTIVE | Noted: 2020-12-23

## 2020-12-23 LAB
ADENOVIRUS BY PCR: NOT DETECTED
BACTERIA: ABNORMAL /HPF
BORDETELLA PARAPERTUSSIS BY PCR: NOT DETECTED
BORDETELLA PERTUSSIS BY PCR: NOT DETECTED
CHLAMYDOPHILIA PNEUMONIAE BY PCR: NOT DETECTED
CORONAVIRUS 229E BY PCR: NOT DETECTED
CORONAVIRUS HKU1 BY PCR: NOT DETECTED
CORONAVIRUS NL63 BY PCR: NOT DETECTED
CORONAVIRUS OC43 BY PCR: NOT DETECTED
EKG ATRIAL RATE: 109 BPM
EKG P AXIS: 61 DEGREES
EKG P-R INTERVAL: 142 MS
EKG Q-T INTERVAL: 358 MS
EKG QRS DURATION: 90 MS
EKG QTC CALCULATION (BAZETT): 482 MS
EKG R AXIS: 81 DEGREES
EKG T AXIS: 75 DEGREES
EKG VENTRICULAR RATE: 109 BPM
HUMAN METAPNEUMOVIRUS BY PCR: NOT DETECTED
HUMAN RHINOVIRUS/ENTEROVIRUS BY PCR: NOT DETECTED
INFLUENZA A BY PCR: NOT DETECTED
INFLUENZA B BY PCR: NOT DETECTED
LV EF: 33 %
LVEF MODALITY: NORMAL
MYCOPLASMA PNEUMONIAE BY PCR: NOT DETECTED
PARAINFLUENZA VIRUS 1 BY PCR: NOT DETECTED
PARAINFLUENZA VIRUS 2 BY PCR: NOT DETECTED
PARAINFLUENZA VIRUS 3 BY PCR: NOT DETECTED
PARAINFLUENZA VIRUS 4 BY PCR: NOT DETECTED
RBC UA: ABNORMAL /HPF (ref 0–2)
RESPIRATORY SYNCYTIAL VIRUS BY PCR: NOT DETECTED
SARS-COV-2, PCR: NOT DETECTED
WBC UA: ABNORMAL /HPF (ref 0–5)

## 2020-12-23 PROCEDURE — 2060000000 HC ICU INTERMEDIATE R&B

## 2020-12-23 PROCEDURE — 2580000003 HC RX 258: Performed by: INTERNAL MEDICINE

## 2020-12-23 PROCEDURE — 6370000000 HC RX 637 (ALT 250 FOR IP): Performed by: INTERNAL MEDICINE

## 2020-12-23 PROCEDURE — 93306 TTE W/DOPPLER COMPLETE: CPT

## 2020-12-23 PROCEDURE — 2500000003 HC RX 250 WO HCPCS: Performed by: INTERNAL MEDICINE

## 2020-12-23 PROCEDURE — 6370000000 HC RX 637 (ALT 250 FOR IP): Performed by: STUDENT IN AN ORGANIZED HEALTH CARE EDUCATION/TRAINING PROGRAM

## 2020-12-23 PROCEDURE — 6360000002 HC RX W HCPCS: Performed by: INTERNAL MEDICINE

## 2020-12-23 PROCEDURE — 0202U NFCT DS 22 TRGT SARS-COV-2: CPT

## 2020-12-23 PROCEDURE — 94640 AIRWAY INHALATION TREATMENT: CPT

## 2020-12-23 PROCEDURE — 99222 1ST HOSP IP/OBS MODERATE 55: CPT | Performed by: INTERNAL MEDICINE

## 2020-12-23 PROCEDURE — 94664 DEMO&/EVAL PT USE INHALER: CPT

## 2020-12-23 PROCEDURE — 6370000000 HC RX 637 (ALT 250 FOR IP): Performed by: NURSE PRACTITIONER

## 2020-12-23 RX ORDER — ACETAMINOPHEN 650 MG/1
650 SUPPOSITORY RECTAL EVERY 6 HOURS PRN
Status: DISCONTINUED | OUTPATIENT
Start: 2020-12-23 | End: 2020-12-24 | Stop reason: HOSPADM

## 2020-12-23 RX ORDER — HYDRALAZINE HYDROCHLORIDE 20 MG/ML
10 INJECTION INTRAMUSCULAR; INTRAVENOUS EVERY 4 HOURS PRN
Status: CANCELLED | OUTPATIENT
Start: 2020-12-23

## 2020-12-23 RX ORDER — PROMETHAZINE HYDROCHLORIDE 25 MG/1
12.5 TABLET ORAL EVERY 6 HOURS PRN
Status: DISCONTINUED | OUTPATIENT
Start: 2020-12-23 | End: 2020-12-24 | Stop reason: HOSPADM

## 2020-12-23 RX ORDER — ISOSORBIDE MONONITRATE 30 MG/1
60 TABLET, EXTENDED RELEASE ORAL DAILY
Status: DISCONTINUED | OUTPATIENT
Start: 2020-12-23 | End: 2020-12-23

## 2020-12-23 RX ORDER — LOSARTAN POTASSIUM 25 MG/1
25 TABLET ORAL DAILY
Status: DISCONTINUED | OUTPATIENT
Start: 2020-12-23 | End: 2020-12-23

## 2020-12-23 RX ORDER — METOPROLOL SUCCINATE 25 MG/1
25 TABLET, EXTENDED RELEASE ORAL DAILY
Status: DISCONTINUED | OUTPATIENT
Start: 2020-12-23 | End: 2020-12-24

## 2020-12-23 RX ORDER — ONDANSETRON 2 MG/ML
4 INJECTION INTRAMUSCULAR; INTRAVENOUS EVERY 6 HOURS PRN
Status: DISCONTINUED | OUTPATIENT
Start: 2020-12-23 | End: 2020-12-24 | Stop reason: HOSPADM

## 2020-12-23 RX ORDER — HYDRALAZINE HYDROCHLORIDE 20 MG/ML
10 INJECTION INTRAMUSCULAR; INTRAVENOUS EVERY 4 HOURS PRN
Status: DISCONTINUED | OUTPATIENT
Start: 2020-12-23 | End: 2020-12-24 | Stop reason: HOSPADM

## 2020-12-23 RX ORDER — IPRATROPIUM BROMIDE AND ALBUTEROL SULFATE 2.5; .5 MG/3ML; MG/3ML
1 SOLUTION RESPIRATORY (INHALATION) 4 TIMES DAILY
Status: DISCONTINUED | OUTPATIENT
Start: 2020-12-23 | End: 2020-12-24 | Stop reason: HOSPADM

## 2020-12-23 RX ORDER — ACETAMINOPHEN 325 MG/1
650 TABLET ORAL EVERY 6 HOURS PRN
Status: DISCONTINUED | OUTPATIENT
Start: 2020-12-23 | End: 2020-12-24 | Stop reason: HOSPADM

## 2020-12-23 RX ORDER — BUMETANIDE 0.25 MG/ML
2 INJECTION, SOLUTION INTRAMUSCULAR; INTRAVENOUS 2 TIMES DAILY
Status: DISCONTINUED | OUTPATIENT
Start: 2020-12-23 | End: 2020-12-24

## 2020-12-23 RX ORDER — AMLODIPINE BESYLATE 5 MG/1
10 TABLET ORAL DAILY
Status: DISCONTINUED | OUTPATIENT
Start: 2020-12-23 | End: 2020-12-23

## 2020-12-23 RX ORDER — LABETALOL HYDROCHLORIDE 5 MG/ML
10 INJECTION, SOLUTION INTRAVENOUS EVERY 4 HOURS PRN
Status: DISCONTINUED | OUTPATIENT
Start: 2020-12-23 | End: 2020-12-23

## 2020-12-23 RX ORDER — SODIUM CHLORIDE 0.9 % (FLUSH) 0.9 %
10 SYRINGE (ML) INJECTION EVERY 12 HOURS SCHEDULED
Status: DISCONTINUED | OUTPATIENT
Start: 2020-12-23 | End: 2020-12-24 | Stop reason: HOSPADM

## 2020-12-23 RX ORDER — PANTOPRAZOLE SODIUM 40 MG/1
40 TABLET, DELAYED RELEASE ORAL
Status: DISCONTINUED | OUTPATIENT
Start: 2020-12-23 | End: 2020-12-24 | Stop reason: HOSPADM

## 2020-12-23 RX ORDER — CLONIDINE HYDROCHLORIDE 0.1 MG/1
0.1 TABLET ORAL ONCE
Status: COMPLETED | OUTPATIENT
Start: 2020-12-23 | End: 2020-12-23

## 2020-12-23 RX ORDER — SACUBITRIL AND VALSARTAN 24; 26 MG/1; MG/1
1 TABLET, FILM COATED ORAL 2 TIMES DAILY
Status: ON HOLD | COMMUNITY
End: 2020-12-24 | Stop reason: HOSPADM

## 2020-12-23 RX ORDER — DIAPER,BRIEF,INFANT-TODD,DISP
EACH MISCELLANEOUS 2 TIMES DAILY
Status: DISCONTINUED | OUTPATIENT
Start: 2020-12-23 | End: 2020-12-24 | Stop reason: HOSPADM

## 2020-12-23 RX ORDER — METOPROLOL SUCCINATE 50 MG/1
50 TABLET, EXTENDED RELEASE ORAL DAILY
Status: ON HOLD | COMMUNITY
End: 2020-12-24 | Stop reason: HOSPADM

## 2020-12-23 RX ORDER — SODIUM CHLORIDE 0.9 % (FLUSH) 0.9 %
10 SYRINGE (ML) INJECTION PRN
Status: DISCONTINUED | OUTPATIENT
Start: 2020-12-23 | End: 2020-12-24 | Stop reason: HOSPADM

## 2020-12-23 RX ADMIN — BUMETANIDE 2 MG: 0.25 INJECTION, SOLUTION INTRAMUSCULAR; INTRAVENOUS at 20:20

## 2020-12-23 RX ADMIN — SACUBITRIL AND VALSARTAN 1 TABLET: 24; 26 TABLET, FILM COATED ORAL at 22:34

## 2020-12-23 RX ADMIN — CLONIDINE HYDROCHLORIDE 0.1 MG: 0.1 TABLET ORAL at 01:41

## 2020-12-23 RX ADMIN — BUMETANIDE 2 MG: 0.25 INJECTION, SOLUTION INTRAMUSCULAR; INTRAVENOUS at 04:03

## 2020-12-23 RX ADMIN — IPRATROPIUM BROMIDE AND ALBUTEROL SULFATE 1 AMPULE: .5; 3 SOLUTION RESPIRATORY (INHALATION) at 21:03

## 2020-12-23 RX ADMIN — BUMETANIDE 2 MG: 0.25 INJECTION, SOLUTION INTRAMUSCULAR; INTRAVENOUS at 13:01

## 2020-12-23 RX ADMIN — METOPROLOL SUCCINATE 25 MG: 25 TABLET, EXTENDED RELEASE ORAL at 13:01

## 2020-12-23 RX ADMIN — ENOXAPARIN SODIUM 40 MG: 40 INJECTION SUBCUTANEOUS at 13:00

## 2020-12-23 RX ADMIN — HYDRALAZINE HYDROCHLORIDE 10 MG: 20 INJECTION, SOLUTION INTRAMUSCULAR; INTRAVENOUS at 04:14

## 2020-12-23 RX ADMIN — Medication 10 ML: at 21:42

## 2020-12-23 RX ADMIN — PANTOPRAZOLE SODIUM 40 MG: 40 TABLET, DELAYED RELEASE ORAL at 13:01

## 2020-12-23 RX ADMIN — SACUBITRIL AND VALSARTAN 1 TABLET: 24; 26 TABLET, FILM COATED ORAL at 13:01

## 2020-12-23 RX ADMIN — IPRATROPIUM BROMIDE AND ALBUTEROL SULFATE 1 AMPULE: .5; 3 SOLUTION RESPIRATORY (INHALATION) at 09:38

## 2020-12-23 ASSESSMENT — PAIN SCALES - GENERAL: PAINLEVEL_OUTOF10: 0

## 2020-12-23 NOTE — ED NOTES
FIRST PROVIDER CONTACT ASSESSMENT NOTE      Department of Emergency Medicine   ED  First Provider Note   12/22/20  7:45 PM EST    Chief Complaint: Shortness of Breath (Has a history of CHF, has not taken his bumex in months, has been SOB for 2 weeks now. )      History of Present Illness:    Roxane Suero is a 38 y.o. male who presents to the ED by private car for shortness of breath. Patient has known history of CHF and has not been taking his Bumex for the last month. Patient was in [place] and given the medication that has run out since being here the last few weeks. Patient has had worsening shortness of breath for the last 2 weeks. Patient states he has been taking his blood pressure medication and did take it this morning but his blood pressure is elevated today. Focused Screening Exam:  Constitutional:  Alert, appears stated age and is in no distress. *ALLERGIES*     Patient has no known allergies.      ED Triage Vitals [12/22/20 1928]   BP Temp Temp src Pulse Resp SpO2 Height Weight   -- 96 °F (35.6 °C) -- 118 14 95 % -- --        Initial Plan of Care:  Initiate Treatment-Testing, Proceed toTreatment Area When Bed Available for ED Attending/MLP to Continue Care    -----------------END OF FIRST PROVIDER CONTACT ASSESSMENT NOTE--------------  Electronically signed by Carlos Broderick PA-C   DD: 12/22/20       Carlos Broderick PA-C  12/22/20 1946

## 2020-12-23 NOTE — H&P
File       Allergies:  Patient has no known allergies. Social History:   TOBACCO:   reports that he has been smoking cigarettes. He has a 2.00 pack-year smoking history. He has never used smokeless tobacco.  ETOH:   reports current alcohol use. Family History:       Problem Relation Age of Onset    High Blood Pressure Father     High Blood Pressure Paternal Grandmother     Heart Attack Paternal Grandfather        REVIEW OF SYSTEMS:    · Constitutional: No fever, no chills, no change in weight; good appetite  · HEENT: No blurred vision, no ear problems, no sore throat, no rhinorrhea. · Respiratory: cough, no sputum production, no pleuritic chest pain, shortness of breath  · Cardiology: No angina, dyspnea on exertion, no paroxysmal nocturnal dyspnea, on/off orthopnea, no palpitation, on/off leg swelling. · Gastroenterology: No dysphagia, no reflux; no abdominal pain, no nausea or vomiting; no constipation or diarrhea.  No hematochezia   · Genitourinary: No dysuria, no frequency, hesitancy; no hematuria  · Musculoskeletal: no joint pain, no myalgia, no change in range of movement  · Neurology: no focal weakness in extremities, no slurred speech, no double vision, no tingling or numbness sensation  · Endocrinology: no temperature intolerance, no polyphagia, polydipsia or polyuria  · Hematology: no increased bleeding, no bruising, no lymphadenopathy  · Skin: no skin changes noticed by patient  · Psychology: no depressed mood, no suicidal ideation    Physical Exam   · Vitals: BP (!) 192/124   Pulse 95   Temp 97.7 °F (36.5 °C)   Resp 23   Ht 5' 10\" (1.778 m)   Wt 235 lb (106.6 kg)   SpO2 94%   BMI 33.72 kg/m²     · General Appearance: alert and oriented to person, place and time, well developed and well- nourished, in no acute distress  · Skin: warm and dry, no rash or erythema  · Head: normocephalic and atraumatic  · Eyes: pupils equal, round, and reactive to light, extraocular eye movements intact, conjunctivae normal  · ENT: tympanic membrane, external ear and ear canal normal bilaterally, nose without deformity, nasal mucosa and turbinates normal without polyps  · Neck: supple and non-tender without mass, no thyromegaly or thyroid nodules, no cervical lymphadenopathy  · Pulmonary/Chest: clear to auscultation bilaterally- no wheezes, rales or rhonchi, normal air movement, no respiratory distress  · Cardiovascular: normal rate, regular rhythm, normal S1 and S2, no murmurs, rubs, clicks, or gallops, distal pulses intact, no carotid bruits  · Abdomen: soft, non-tender, non-distended, normal bowel sounds, no masses or organomegaly  · Extremities: no cyanosis, clubbing or edema  · Musculoskeletal: normal range of motion, no joint swelling, deformity or tenderness  · Neurologic: reflexes normal and symmetric, no cranial nerve deficit, gait, coordination and speech normal   Labs and Imaging Studies   Basic Labs  Recent Labs     12/22/20 1950      K 3.9      CO2 26   BUN 10   CREATININE 1.2   GLUCOSE 126*   CALCIUM 8.6       Recent Labs     12/22/20 1950   WBC 13.6*   RBC 4.01   HGB 12.2*   HCT 36.4*   MCV 90.8   MCH 30.4   MCHC 33.5   RDW 16.7*      MPV 9.4       CBC:   Lab Results   Component Value Date    WBC 13.6 12/22/2020    RBC 4.01 12/22/2020    HGB 12.2 12/22/2020    HCT 36.4 12/22/2020    MCV 90.8 12/22/2020    RDW 16.7 12/22/2020     12/22/2020     BMP:    Lab Results   Component Value Date     12/22/2020    K 3.9 12/22/2020     12/22/2020    CO2 26 12/22/2020    BUN 10 12/22/2020     HFP:    Lab Results   Component Value Date    PROT 7.4 12/22/2020     Imaging Studies:     Xr Chest (2 Vw)    Result Date: 12/22/2020  EXAMINATION: TWO XRAY VIEWS OF THE CHEST 12/22/2020 8:31 pm COMPARISON: Chest series from March 9, 2020 HISTORY: ORDERING SYSTEM PROVIDED HISTORY: SOB out of water pills , known CHF TECHNOLOGIST PROVIDED HISTORY: Reason for exam:->SOB out of water pills , known CHF What reading provider will be dictating this exam?->CRC FINDINGS: Adequate and symmetric aeration of the lungs. There are no formed consolidations, pleural effusions, or pneumothoraces. Trachea and central mainstem bronchi appear clear. Cardiac silhouette is prominent. New interstitial edema. Osseous and thoracic soft tissue structures demonstrate no acute findings. Cardiomegaly and bilateral interstitial edema. Infection could potentially have this appearance in the right clinical setting RECOMMENDATION: (Recommend upright PA and lateral chest radiographs 6-8 weeks after resolution of patient's symptoms to ensure complete resolution of radiographic findings.)    Cta Chest W Contrast    Result Date: 12/22/2020  EXAMINATION: CTA OF THE CHEST 12/22/2020 10:42 pm TECHNIQUE: CTA of the chest was performed after the administration of intravenous contrast.  Multiplanar reformatted images are provided for review. MIP images are provided for review. Dose modulation, iterative reconstruction, and/or weight based adjustment of the mA/kV was utilized to reduce the radiation dose to as low as reasonably achievable. COMPARISON: None. HISTORY: ORDERING SYSTEM PROVIDED HISTORY: r/o PE TECHNOLOGIST PROVIDED HISTORY: Reason for exam:->r/o PE What reading provider will be dictating this exam?->CRC FINDINGS: Pulmonary Arteries: Pulmonary arteries are adequately opacified for evaluation. No evidence of intraluminal filling defect to suggest pulmonary embolism. Main pulmonary artery is normal in caliber. Mediastinum: Mild mediastinal and bilateral hilar lymphadenopathy. The heart and pericardium demonstrate no acute abnormality. There is no acute abnormality of the thoracic aorta. Lungs/pleura: Mild pulmonary edema. No focal consolidation. No evidence of pleural effusion or pneumothorax. Upper Abdomen: Limited images of the upper abdomen are unremarkable.  Soft Tissues/Bones: No acute bone or soft tissue

## 2020-12-23 NOTE — ED NOTES
Bed: 05  Expected date:   Expected time:   Means of arrival:   Comments:  triage     Tomas Bolden RN  12/22/20 2031

## 2020-12-23 NOTE — ED PROVIDER NOTES
ED Physician   HPI:  12/22/20, Time: 8:54 PM JOY Bose is a 38 y.o. male presenting to the ED for increasing shortness of breath over the last week. Patient reports that he was living in [place] for the last year primarily due to work. In October and November 2020 he was hospitalized with congestive heart failure as well as having an MI. He reports that when he was discharged he was sent with prescriptions and informed to follow-up for continued medical management. He states that he ran out of his meds that he was sent home with from [place] and admits that he never made a follow-up appointment. Patient reports that he should be on Coreg, Bumex, potassium. He states that he has not had any of his meds for the last 3 weeks and now has developed increasing shortness of breath over the last week. Patient reports shortness of breath is more noticeable with ambulation as well as even at rest.  He denies any associated chest pain with this. Denies any abdominal pain as well as no noted nausea, vomiting or diarrhea. Patient also without any back or flank pain. Patient also denies any fevers or illness as well as no unusual lower extremity swelling. Per our medical records patient's last documented 2D echo was from May 2015 which showed a EF of 65% there is also moderate left ventricular hypertrophy noted. And Doppler evidence of stage II diastolic dysfunction. Review of Systems:   A complete review of systems was performed and pertinent positives and negatives are stated within HPI, all other systems reviewed and are negative.          --------------------------------------------- PAST HISTORY ---------------------------------------------  Past Medical History:  has a past medical history of CHF (congestive heart failure) ([address] and Hypertension. Past Surgical History:  has a past surgical history that includes Topping tooth extraction;  Abscess Drainage; and ECHO Compl W Dop Color Flow (5/5/2015). Social History:  reports that he has been smoking cigarettes. He has a 2.00 pack-year smoking history. He has never used smokeless tobacco. He reports current alcohol use. He reports that he does not use drugs. Family History: family history includes Heart Attack in his paternal grandfather; High Blood Pressure in his father and paternal grandmother. The patients home medications have been reviewed. Allergies: Patient has no known allergies.     -------------------------------------------------- RESULTS -------------------------------------------------  All laboratory and radiology results have been personally reviewed by myself   LABS:  Results for orders placed or performed during the hospital encounter of 12/22/20   CBC auto differential   Result Value Ref Range    WBC 13.6 (H) 4.5 - 11.5 E9/L    RBC 4.01 3.80 - 5.80 E12/L    Hemoglobin 12.2 (L) 12.5 - 16.5 g/dL    Hematocrit 36.4 (L) 37.0 - 54.0 %    MCV 90.8 80.0 - 99.9 fL    MCH 30.4 26.0 - 35.0 pg    MCHC 33.5 32.0 - 34.5 %    RDW 16.7 (H) 11.5 - 15.0 fL    Platelets 062 786 - 687 E9/L    MPV 9.4 7.0 - 12.0 fL    Neutrophils % 74.1 43.0 - 80.0 %    Immature Granulocytes % 0.2 0.0 - 5.0 %    Lymphocytes % 18.6 (L) 20.0 - 42.0 %    Monocytes % 5.4 2.0 - 12.0 %    Eosinophils % 1.3 0.0 - 6.0 %    Basophils % 0.4 0.0 - 2.0 %    Neutrophils Absolute 10.06 (H) 1.80 - 7.30 E9/L    Immature Granulocytes # 0.03 E9/L    Lymphocytes Absolute 2.53 1.50 - 4.00 E9/L    Monocytes Absolute 0.73 0.10 - 0.95 E9/L    Eosinophils Absolute 0.18 0.05 - 0.50 E9/L    Basophils Absolute 0.05 0.00 - 0.20 E9/L   Comprehensive Metabolic Panel w/ Reflex to MG   Result Value Ref Range    Sodium 136 132 - 146 mmol/L    Potassium reflex Magnesium 3.9 3.5 - 5.0 mmol/L    Chloride 100 98 - 107 mmol/L    CO2 26 22 - 29 mmol/L    Anion Gap 10 7 - 16 mmol/L    Glucose 126 (H) 74 - 99 mg/dL    BUN 10 6 - 20 mg/dL    CREATININE 1.2 0.7 - 1.2 mg/dL    GFR Non- American >60 >=60 mL/min/1.73    GFR African American >60     Calcium 8.6 8.6 - 10.2 mg/dL    Total Protein 7.4 6.4 - 8.3 g/dL    Alb 3.4 (L) 3.5 - 5.2 g/dL    Total Bilirubin 0.4 0.0 - 1.2 mg/dL    Alkaline Phosphatase 89 40 - 129 U/L    ALT 12 0 - 40 U/L    AST 22 0 - 39 U/L   Brain Natriuretic Peptide   Result Value Ref Range    Pro-BNP 3,449 (H) 0 - 125 pg/mL   Lactic Acid, Plasma   Result Value Ref Range    Lactic Acid 1.2 0.5 - 2.2 mmol/L   Troponin   Result Value Ref Range    Troponin <0.01 0.00 - 0.03 ng/mL   Urinalysis   Result Value Ref Range    Color, UA Yellow Straw/Yellow    Clarity, UA Clear Clear    Glucose, Ur Negative Negative mg/dL    Bilirubin Urine Negative Negative    Ketones, Urine Negative Negative mg/dL    Specific Gravity, UA 1.010 1.005 - 1.030    Blood, Urine TRACE-INTACT Negative    pH, UA 6.5 5.0 - 9.0    Protein, UA Negative Negative mg/dL    Urobilinogen, Urine 0.2 <2.0 E.U./dL    Nitrite, Urine Negative Negative    Leukocyte Esterase, Urine Negative Negative   Microscopic Urinalysis   Result Value Ref Range    WBC, UA NONE 0 - 5 /HPF    RBC, UA 0-1 0 - 2 /HPF    Bacteria, UA RARE (A) None Seen /HPF       RADIOLOGY:  Interpreted by Radiologist.  CTA CHEST W CONTRAST   Final Result   No evidence of pulmonary embolism. Mild pulmonary edema. Mild mediastinal and bilateral hilar lymphadenopathy, nonspecific. XR CHEST (2 VW)   Final Result   Cardiomegaly and bilateral interstitial edema. Infection could potentially   have this appearance in the right clinical setting   RECOMMENDATION:   (Recommend upright PA and lateral chest radiographs 6-8 weeks after   resolution of patient's symptoms to ensure complete resolution of   radiographic findings. )          ------------------------- NURSING NOTES AND VITALS REVIEWED ---------------------------   The nursing notes within the ED encounter and vital signs as below have been reviewed.    BP (!) 182/142   Pulse 112   Temp 96 °F (35.6 °C) Resp 18   Ht 5' 10\" (1.778 m)   Wt 235 lb (106.6 kg)   SpO2 98%   BMI 33.72 kg/m²   Oxygen Saturation Interpretation: Normal      ---------------------------------------------------PHYSICAL EXAM--------------------------------------      Constitutional/General: Alert and oriented x3,  Head: Normocephalic and atraumatic  Eyes: PERRL, EOMI  Mouth: Oropharynx clear, handling secretions, no trismus  Neck: Supple, full ROM,   Pulmonary: Lungs clear to auscultation bilaterally, no wheezes, rales, or rhonchi. Not in respiratory distress  Cardiovascular:  Regular rate and rhythm, no murmurs, gallops, or rubs. 2+ distal pulses  Abdomen: Soft, non tender, softly distended,   Extremities: Moves all extremities x 4. Warm and well perfused  Skin: warm and dry without rash  Neurologic: GCS 15, cranial nerves II through XII grossly intact. No acute neurovascular deficit noted. Speech clear and coherent strength strong and equal bilaterally  Psych: Normal Affect      ------------------------------ ED COURSE/MEDICAL DECISION MAKING----------------------  Medications   cloNIDine (CATAPRES) tablet 0.1 mg (has no administration in time range)   0.9 % sodium chloride bolus (0 mLs Intravenous Stopped 12/22/20 2312)   bumetanide (BUMEX) injection 1 mg (1 mg Intravenous Given 12/22/20 2152)   hydrALAZINE (APRESOLINE) injection 10 mg (10 mg Intravenous Given 12/22/20 2152)   iopamidol (ISOVUE-370) 76 % injection 75 mL (75 mLs Intravenous Given 12/22/20 2245)         ED COURSE:       Medical Decision Making:    Plan will be for labs will also obtain imaging, patient is tachycardic heart rate 110-115, with recent travel will obtain CT of the chest to rule out pulmonary embolism. Twelve-lead EKG interpreted showed heart rate of 109, sinus tachycardia with right atrial enlargement. No acute ST elevation or depression noted. Right axis deviation.   Labs resulted CBC with a white blood cell count of 13.6, will look for source of infection versus inflammatory or stress response. Chemistry panel resulted within unremarkable. proBNP elevated at 3449. Will provide patient with Bumex 1 mg IV x1 dose. Patient also noted to be hypertensive 182/142 will provide him with IV hydralazine 10 mg. Lactic acid level negative, troponin negative. Chest x-ray resulted showing cardiomegaly and bilateral interstitial edema infection could potentially have this appearance. Urinalysis negative. Patient with CAT scan that is negative for pulmonary embolism, it is showing mild pulmonary edema and mild mediastinal and bilateral hilar lymphadenopathy which is nonspecific. Patient was made aware of results and plan for admission. Patient does remain tachycardic, heart rate 105, awaiting repeat blood pressure. Plan will be for admission to telemetry. I did speak with [place] med covering physician as well as the medicine resident they are both agreeable to accept patient to telemetry inpatient. Patient resting comfortably. He is not at all hypoxic. Pulse ox 98% on room air. He did express understanding for admission. Awaiting bed assignment. Counseling: The emergency provider has spoken with the patient and discussed todays results, in addition to providing specific details for the plan of care and counseling regarding the diagnosis and prognosis. Questions are answered at this time and they are agreeable with the plan.      --------------------------------- IMPRESSION AND DISPOSITION ---------------------------------    IMPRESSION  1. Acute on chronic diastolic heart failure ([address]    2. Essential hypertension    3. Acute pulmonary edema (HCC)        DISPOSITION  Disposition: Admit to telemetry  Patient condition is good      NOTE: This report was transcribed using voice recognition software.  Every effort was made to ensure accuracy; however, inadvertent computerized transcription errors may be present     CRIS Veliz CNP  12/23/20 [address]

## 2020-12-23 NOTE — ED NOTES
Messaged pharmacy for bumex which is not in [facility]      Delay in labs and medication administration pt is currently sleeping     Theodore Bell, FARHANA  12/23/20 6725

## 2020-12-24 VITALS
SYSTOLIC BLOOD PRESSURE: 144 MMHG | WEIGHT: 235 LBS | RESPIRATION RATE: 12 BRPM | DIASTOLIC BLOOD PRESSURE: 82 MMHG | HEART RATE: 89 BPM | TEMPERATURE: 97.2 F | OXYGEN SATURATION: 95 % | HEIGHT: 70 IN | BODY MASS INDEX: 33.64 KG/M2

## 2020-12-24 LAB
ANION GAP SERPL CALCULATED.3IONS-SCNC: 13 MMOL/L (ref 7–16)
APTT: 32.4 SEC (ref 24.5–35.1)
BASOPHILS ABSOLUTE: 0.05 E9/L (ref 0–0.2)
BASOPHILS RELATIVE PERCENT: 0.4 % (ref 0–2)
BUN BLDV-MCNC: 12 MG/DL (ref 6–20)
CALCIUM SERPL-MCNC: 9 MG/DL (ref 8.6–10.2)
CHLORIDE BLD-SCNC: 95 MMOL/L (ref 98–107)
CHLORIDE URINE RANDOM: 56 MMOL/L
CHOLESTEROL, TOTAL: 157 MG/DL (ref 0–199)
CO2: 28 MMOL/L (ref 22–29)
CREAT SERPL-MCNC: 1 MG/DL (ref 0.7–1.2)
CREATININE URINE: 86 MG/DL (ref 40–278)
EOSINOPHILS ABSOLUTE: 0.42 E9/L (ref 0.05–0.5)
EOSINOPHILS RELATIVE PERCENT: 3.2 % (ref 0–6)
GFR AFRICAN AMERICAN: >60
GFR NON-AFRICAN AMERICAN: >60 ML/MIN/1.73
GLUCOSE BLD-MCNC: 238 MG/DL (ref 74–99)
HBA1C MFR BLD: 6.8 % (ref 4–5.6)
HCT VFR BLD CALC: 46.5 % (ref 37–54)
HDLC SERPL-MCNC: 32 MG/DL
HEMOGLOBIN: 15.8 G/DL (ref 12.5–16.5)
IMMATURE GRANULOCYTES #: 0.06 E9/L
IMMATURE GRANULOCYTES %: 0.5 % (ref 0–5)
INR BLD: 1.3
LDL CHOLESTEROL CALCULATED: 91 MG/DL (ref 0–99)
LYMPHOCYTES ABSOLUTE: 2.19 E9/L (ref 1.5–4)
LYMPHOCYTES RELATIVE PERCENT: 16.8 % (ref 20–42)
MAGNESIUM: 1.7 MG/DL (ref 1.6–2.6)
MCH RBC QN AUTO: 30.2 PG (ref 26–35)
MCHC RBC AUTO-ENTMCNC: 34 % (ref 32–34.5)
MCV RBC AUTO: 88.7 FL (ref 80–99.9)
METER GLUCOSE: 114 MG/DL (ref 74–99)
MONOCYTES ABSOLUTE: 0.6 E9/L (ref 0.1–0.95)
MONOCYTES RELATIVE PERCENT: 4.6 % (ref 2–12)
NEUTROPHILS ABSOLUTE: 9.74 E9/L (ref 1.8–7.3)
NEUTROPHILS RELATIVE PERCENT: 74.5 % (ref 43–80)
OSMOLALITY URINE: 341 MOSM/KG (ref 300–900)
PDW BLD-RTO: 17.2 FL (ref 11.5–15)
PHOSPHORUS: 3.4 MG/DL (ref 2.5–4.5)
PLATELET # BLD: 348 E9/L (ref 130–450)
PMV BLD AUTO: 9.5 FL (ref 7–12)
POTASSIUM SERPL-SCNC: 3.2 MMOL/L (ref 3.5–5)
POTASSIUM, UR: 28.4 MMOL/L
PRO-BNP: 2043 PG/ML (ref 0–125)
PROTHROMBIN TIME: 14.2 SEC (ref 9.3–12.4)
RBC # BLD: 5.24 E12/L (ref 3.8–5.8)
SODIUM BLD-SCNC: 136 MMOL/L (ref 132–146)
SODIUM URINE: 72 MMOL/L
TRIGL SERPL-MCNC: 170 MG/DL (ref 0–149)
UREA NITROGEN, UR: 377 MG/DL (ref 800–1666)
VLDLC SERPL CALC-MCNC: 34 MG/DL
WBC # BLD: 13.1 E9/L (ref 4.5–11.5)

## 2020-12-24 PROCEDURE — 6360000002 HC RX W HCPCS: Performed by: INTERNAL MEDICINE

## 2020-12-24 PROCEDURE — 84300 ASSAY OF URINE SODIUM: CPT

## 2020-12-24 PROCEDURE — 6370000000 HC RX 637 (ALT 250 FOR IP): Performed by: INTERNAL MEDICINE

## 2020-12-24 PROCEDURE — 2580000003 HC RX 258: Performed by: INTERNAL MEDICINE

## 2020-12-24 PROCEDURE — 80048 BASIC METABOLIC PNL TOTAL CA: CPT

## 2020-12-24 PROCEDURE — 94640 AIRWAY INHALATION TREATMENT: CPT

## 2020-12-24 PROCEDURE — 82962 GLUCOSE BLOOD TEST: CPT

## 2020-12-24 PROCEDURE — 83735 ASSAY OF MAGNESIUM: CPT

## 2020-12-24 PROCEDURE — 36415 COLL VENOUS BLD VENIPUNCTURE: CPT

## 2020-12-24 PROCEDURE — 99232 SBSQ HOSP IP/OBS MODERATE 35: CPT | Performed by: INTERNAL MEDICINE

## 2020-12-24 PROCEDURE — 83036 HEMOGLOBIN GLYCOSYLATED A1C: CPT

## 2020-12-24 PROCEDURE — 6370000000 HC RX 637 (ALT 250 FOR IP): Performed by: STUDENT IN AN ORGANIZED HEALTH CARE EDUCATION/TRAINING PROGRAM

## 2020-12-24 PROCEDURE — 84540 ASSAY OF URINE/UREA-N: CPT

## 2020-12-24 PROCEDURE — 83880 ASSAY OF NATRIURETIC PEPTIDE: CPT

## 2020-12-24 PROCEDURE — 85025 COMPLETE CBC W/AUTO DIFF WBC: CPT

## 2020-12-24 PROCEDURE — 80061 LIPID PANEL: CPT

## 2020-12-24 PROCEDURE — 84100 ASSAY OF PHOSPHORUS: CPT

## 2020-12-24 PROCEDURE — 85730 THROMBOPLASTIN TIME PARTIAL: CPT

## 2020-12-24 PROCEDURE — 85610 PROTHROMBIN TIME: CPT

## 2020-12-24 PROCEDURE — 82436 ASSAY OF URINE CHLORIDE: CPT

## 2020-12-24 PROCEDURE — APPSS60 APP SPLIT SHARED TIME 46-60 MINUTES: Performed by: PHYSICIAN ASSISTANT

## 2020-12-24 PROCEDURE — 83935 ASSAY OF URINE OSMOLALITY: CPT

## 2020-12-24 PROCEDURE — 84133 ASSAY OF URINE POTASSIUM: CPT

## 2020-12-24 PROCEDURE — 99223 1ST HOSP IP/OBS HIGH 75: CPT | Performed by: INTERNAL MEDICINE

## 2020-12-24 PROCEDURE — 2500000003 HC RX 250 WO HCPCS: Performed by: INTERNAL MEDICINE

## 2020-12-24 PROCEDURE — 82570 ASSAY OF URINE CREATININE: CPT

## 2020-12-24 RX ORDER — BUMETANIDE 0.25 MG/ML
1 INJECTION, SOLUTION INTRAMUSCULAR; INTRAVENOUS 2 TIMES DAILY
Status: DISCONTINUED | OUTPATIENT
Start: 2020-12-24 | End: 2020-12-24

## 2020-12-24 RX ORDER — POTASSIUM CHLORIDE 20 MEQ/1
20 TABLET, EXTENDED RELEASE ORAL ONCE
Status: COMPLETED | OUTPATIENT
Start: 2020-12-24 | End: 2020-12-24

## 2020-12-24 RX ORDER — BUMETANIDE 1 MG/1
2 TABLET ORAL 2 TIMES DAILY
Status: DISCONTINUED | OUTPATIENT
Start: 2020-12-24 | End: 2020-12-24 | Stop reason: HOSPADM

## 2020-12-24 RX ORDER — MAGNESIUM SULFATE IN WATER 40 MG/ML
2 INJECTION, SOLUTION INTRAVENOUS ONCE
Status: COMPLETED | OUTPATIENT
Start: 2020-12-24 | End: 2020-12-24

## 2020-12-24 RX ORDER — AMLODIPINE BESYLATE 10 MG/1
10 TABLET ORAL DAILY
Qty: 30 TABLET | Refills: 3 | Status: SHIPPED | OUTPATIENT
Start: 2020-12-25 | End: 2020-12-24

## 2020-12-24 RX ORDER — METOPROLOL SUCCINATE 100 MG/1
100 TABLET, EXTENDED RELEASE ORAL DAILY
Qty: 30 TABLET | Refills: 3 | Status: SHIPPED | OUTPATIENT
Start: 2020-12-25 | End: 2021-02-03 | Stop reason: SDUPTHER

## 2020-12-24 RX ORDER — SPIRONOLACTONE 25 MG/1
25 TABLET ORAL DAILY
Qty: 30 TABLET | Refills: 3 | Status: SHIPPED | OUTPATIENT
Start: 2020-12-24 | End: 2021-02-22 | Stop reason: SDUPTHER

## 2020-12-24 RX ORDER — METOPROLOL SUCCINATE 50 MG/1
50 TABLET, EXTENDED RELEASE ORAL DAILY
Status: DISCONTINUED | OUTPATIENT
Start: 2020-12-25 | End: 2020-12-24

## 2020-12-24 RX ORDER — BUMETANIDE 2 MG/1
2 TABLET ORAL 2 TIMES DAILY
Qty: 30 TABLET | Refills: 3 | Status: SHIPPED | OUTPATIENT
Start: 2020-12-24 | End: 2021-03-26 | Stop reason: SDUPTHER

## 2020-12-24 RX ORDER — POTASSIUM CHLORIDE 20 MEQ/1
20 TABLET, EXTENDED RELEASE ORAL 2 TIMES DAILY WITH MEALS
Status: DISCONTINUED | OUTPATIENT
Start: 2020-12-24 | End: 2020-12-24 | Stop reason: HOSPADM

## 2020-12-24 RX ORDER — NICOTINE POLACRILEX 4 MG
15 LOZENGE BUCCAL PRN
Status: DISCONTINUED | OUTPATIENT
Start: 2020-12-24 | End: 2020-12-24 | Stop reason: HOSPADM

## 2020-12-24 RX ORDER — DEXTROSE MONOHYDRATE 50 MG/ML
100 INJECTION, SOLUTION INTRAVENOUS PRN
Status: DISCONTINUED | OUTPATIENT
Start: 2020-12-24 | End: 2020-12-24 | Stop reason: HOSPADM

## 2020-12-24 RX ORDER — METOPROLOL SUCCINATE 100 MG/1
100 TABLET, EXTENDED RELEASE ORAL DAILY
Status: DISCONTINUED | OUTPATIENT
Start: 2020-12-25 | End: 2020-12-24 | Stop reason: HOSPADM

## 2020-12-24 RX ORDER — POTASSIUM CHLORIDE 7.45 MG/ML
10 INJECTION INTRAVENOUS
Status: DISCONTINUED | OUTPATIENT
Start: 2020-12-24 | End: 2020-12-24

## 2020-12-24 RX ORDER — BUMETANIDE 2 MG/1
2 TABLET ORAL 2 TIMES DAILY
Qty: 30 TABLET | Refills: 3 | Status: SHIPPED | OUTPATIENT
Start: 2020-12-24 | End: 2020-12-24

## 2020-12-24 RX ORDER — AMLODIPINE BESYLATE 10 MG/1
10 TABLET ORAL DAILY
Status: DISCONTINUED | OUTPATIENT
Start: 2020-12-24 | End: 2020-12-24 | Stop reason: HOSPADM

## 2020-12-24 RX ORDER — SPIRONOLACTONE 25 MG/1
25 TABLET ORAL DAILY
Status: DISCONTINUED | OUTPATIENT
Start: 2020-12-24 | End: 2020-12-24 | Stop reason: HOSPADM

## 2020-12-24 RX ORDER — AMLODIPINE BESYLATE 10 MG/1
10 TABLET ORAL DAILY
Qty: 30 TABLET | Refills: 3 | Status: SHIPPED | OUTPATIENT
Start: 2020-12-25 | End: 2021-01-19 | Stop reason: ALTCHOICE

## 2020-12-24 RX ORDER — METOPROLOL SUCCINATE 100 MG/1
100 TABLET, EXTENDED RELEASE ORAL DAILY
Qty: 30 TABLET | Refills: 3 | Status: SHIPPED | OUTPATIENT
Start: 2020-12-25 | End: 2020-12-24

## 2020-12-24 RX ORDER — SPIRONOLACTONE 25 MG/1
25 TABLET ORAL DAILY
Qty: 30 TABLET | Refills: 3 | Status: SHIPPED | OUTPATIENT
Start: 2020-12-24 | End: 2020-12-24

## 2020-12-24 RX ORDER — DEXTROSE MONOHYDRATE 25 G/50ML
12.5 INJECTION, SOLUTION INTRAVENOUS PRN
Status: DISCONTINUED | OUTPATIENT
Start: 2020-12-24 | End: 2020-12-24 | Stop reason: HOSPADM

## 2020-12-24 RX ADMIN — MAGNESIUM SULFATE HEPTAHYDRATE 2 G: 40 INJECTION, SOLUTION INTRAVENOUS at 13:05

## 2020-12-24 RX ADMIN — HYDROCORTISONE: 1 CREAM TOPICAL at 08:14

## 2020-12-24 RX ADMIN — IPRATROPIUM BROMIDE AND ALBUTEROL SULFATE 1 AMPULE: .5; 3 SOLUTION RESPIRATORY (INHALATION) at 08:09

## 2020-12-24 RX ADMIN — METOPROLOL SUCCINATE 25 MG: 25 TABLET, EXTENDED RELEASE ORAL at 08:14

## 2020-12-24 RX ADMIN — SACUBITRIL AND VALSARTAN 1 TABLET: 49; 51 TABLET, FILM COATED ORAL at 16:59

## 2020-12-24 RX ADMIN — Medication 10 ML: at 08:14

## 2020-12-24 RX ADMIN — POTASSIUM CHLORIDE 10 MEQ: 10 INJECTION, SOLUTION INTRAVENOUS at 12:29

## 2020-12-24 RX ADMIN — POTASSIUM CHLORIDE 20 MEQ: 1500 TABLET, EXTENDED RELEASE ORAL at 16:59

## 2020-12-24 RX ADMIN — SPIRONOLACTONE 25 MG: 25 TABLET ORAL at 16:58

## 2020-12-24 RX ADMIN — POTASSIUM CHLORIDE 20 MEQ: 1500 TABLET, EXTENDED RELEASE ORAL at 12:29

## 2020-12-24 RX ADMIN — PANTOPRAZOLE SODIUM 40 MG: 40 TABLET, DELAYED RELEASE ORAL at 06:03

## 2020-12-24 RX ADMIN — IPRATROPIUM BROMIDE AND ALBUTEROL SULFATE 1 AMPULE: .5; 3 SOLUTION RESPIRATORY (INHALATION) at 11:45

## 2020-12-24 RX ADMIN — AMLODIPINE BESYLATE 10 MG: 10 TABLET ORAL at 10:59

## 2020-12-24 RX ADMIN — BUMETANIDE 2 MG: 0.25 INJECTION, SOLUTION INTRAMUSCULAR; INTRAVENOUS at 08:14

## 2020-12-24 RX ADMIN — POTASSIUM CHLORIDE 20 MEQ: 1500 TABLET, EXTENDED RELEASE ORAL at 17:00

## 2020-12-24 RX ADMIN — METFORMIN HYDROCHLORIDE 500 MG: 500 TABLET ORAL at 16:59

## 2020-12-24 RX ADMIN — SACUBITRIL AND VALSARTAN 1 TABLET: 24; 26 TABLET, FILM COATED ORAL at 08:14

## 2020-12-24 RX ADMIN — ENOXAPARIN SODIUM 40 MG: 40 INJECTION SUBCUTANEOUS at 08:14

## 2020-12-24 ASSESSMENT — PAIN SCALES - GENERAL: PAINLEVEL_OUTOF10: 0

## 2020-12-24 NOTE — CONSULTS
Patient seen and examined. Chart, labs, imaging studies, EKG and rhythm strips reviewed. Full consult to follow. We were asked to see the patient for CHF. IMPRESSION:  1. Acute on chronic systolic CHF, in part at least secondary to medical non-compliance. 2. Non-ischemic cardiomyopathy with moderate severe LV dysfunction with EF 30-35% on echo 12/23/2020 (patient states having had cardiac catheterization in [place] in late October/early November 2020 showing patent coronary arteries): records not available. 3. Uncontrolled hypertension. 4. Obesity. 5. Hypokalemia. 6. Diabetes mellitus: HgbA1c 6.8 this admission. PLAN:   1. Change IV bumex to PO.   2. Increase Entresto. 3. Increase Toprol XL. 4. Supplement potassium. 5. Add spironolactone. 6. Monitor BP and uptitrate Entresto as needed. Consider adding hydralazine/isordil combination. 7. May be discharged from cardiology standpoint when okay with others. 8. Cardiology will sign off, please call if needed.     Electronically signed by Shantelle Monteiro MD on 12/24/2020 at 12:14 PM

## 2020-12-24 NOTE — PROGRESS NOTES
B Maira notified of consult for [employer] per [employer].   Pt added to census

## 2020-12-24 NOTE — CARE COORDINATION
Met with patient at bedside to discuss transition of care. He lives independently. No assistive devices. PCP [facility]. Pt plans for home at discharge with no anticipated needs.  CM/SW to follow    Filomena FAY, RN  [address] Case Management  [phone]

## 2020-12-24 NOTE — CONSULTS
Inpatient Cardiology Consultation      Reason for Consult: Acute on chronic HFrEF    Consulting Physician: Dr. Ligia Miles    Requesting Physician: Dr. Marjan Durham    Date of Consultation: 12/24/2020    HISTORY OF PRESENT ILLNESS:   Patient is a 38year old AA male who is not previously known to [facility]. Patient states he has been seen and followed by a cardiologist in [place] up until the past few weeks, when he moved locally to this area. He is being seen in initial consultation is hospital admission by Dr. Ligia Miles for evaluation recommendations regarding acute on chronic HFrEF. Per the patient, back in late October/early November 2020, he was residing in [place] and started having dyspnea more so on exertion. He presented to a hospital in [place] at that time, and was found to have a cardiomyopathy and underwent left heart catheterization which revealed patent coronary arteries per the patient. There are no records from this hospitalization available for review at this time. Patient also has a past medical history of obesity, hypertension, tobacco and alcohol abuse and medical noncompliance. He presented to [facility] on December 22, 2020 due to complaints of shortness of breath. Patient states over the past couple of weeks, he has run out of his medications due to the move and has not been able to take his cardiac medications as a result. Over the past week, he has been noticing worsening dyspnea on exertion with eventual dyspnea at rest.  He also admits to generalized fatigue with exertion. Additionally, patient notes of occasional productive cough and ten pound weight gain over the past couple of weeks. He denies any decreased appetite, nausea, vomiting, diaphoresis, dizziness, palpitations, near-syncope or syncope. He denies chest discomfort, paroxysmal nocturnal dyspnea, orthopnea or peripheral edema. He denies any subjective fever, chills, or any recent known sick contacts.   Patient states he has 12/23/20 0414    bumetanide (BUMEX) injection 2 mg, 2 mg, Intravenous, BID, Argentina Arzola MD, 2 mg at 12/24/20 0814    sodium chloride flush 0.9 % injection 10 mL, 10 mL, Intravenous, 2 times per day, Argentina Arzola MD, 10 mL at 12/24/20 0814    sodium chloride flush 0.9 % injection 10 mL, 10 mL, Intravenous, PRN, Argentina Arzola MD    ipratropium-albuterol (DUONEB) nebulizer solution 1 ampule, 1 ampule, Inhalation, 4x daily, Argentina Arzola MD, 1 ampule at 12/24/20 0809    enoxaparin (LOVENOX) injection 40 mg, 40 mg, Subcutaneous, Daily, Argentina Arzola MD, 40 mg at 12/24/20 0814    promethazine (PHENERGAN) tablet 12.5 mg, 12.5 mg, Oral, Q6H PRN **OR** ondansetron (ZOFRAN) injection 4 mg, 4 mg, Intravenous, Q6H PRN, Argentina Arzola MD    magnesium hydroxide (MILK OF MAGNESIA) 400 MG/5ML suspension 30 mL, 30 mL, Oral, Daily PRN, Argentina Arzola MD    acetaminophen (TYLENOL) tablet 650 mg, 650 mg, Oral, Q6H PRN **OR** acetaminophen (TYLENOL) suppository 650 mg, 650 mg, Rectal, Q6H PRN, Argentina Arzola MD    pantoprazole (PROTONIX) tablet 40 mg, 40 mg, Oral, QAM AC, rAgentina Arzola MD, 40 mg at 12/24/20 0603    Pramoxine-Calamine (CALADRYL) 1-8 % lotion, , Topical, PRN, Argentina Arzola MD    hydrocortisone 1 % cream, , Topical, BID, Argentina Arzola MD, Given at 12/24/20 1881    perflutren lipid microspheres (DEFINITY) injection 1.65 mg, 1.5 mL, Intravenous, ONCE PRN, Argentina Arzola MD    sacubitril-valsartan (ENTRESTO) 24-26 MG per tablet 1 tablet, 1 tablet, Oral, BID, Valeria Lockhart MD, 1 tablet at 12/24/20 9905      ALLERGIES:  Patient has no known allergies. SOCIAL HISTORY:    He states he smokes cigars socially. He does not smoke tobacco cigarettes. He drinks a few beers a couple times per week. He denies former or current illicit drug use. FAMILY HISTORY:   States his paternal grandfather had a myocardial infarction at age 48 and passed away.        REVIEW OF SYSTEMS:     · Constitutional: +generalized fatigue, +weight gain. Denies fevers, chills, night sweats. · HEENT: Denies headaches, nose bleeds, rhinorrhea, sore throat. Denies blurred vision. Denies dysphagia, odynophagia. · Musculoskeletal: Denies falls, pain to BLE with ambulation. Denies muscle weakness. · Neurological: Denies dizziness and lightheadedness, numbness and tingling. Denies focal neurological deficits. · Cardiovascular: Denies chest pain, palpitations, diaphoresis. Denies syncope. Denies PND, orthopnea, peripheral edema. · Respiratory: +shortness of breath, +cough. Denies hemoptysis. · Gastrointestinal: Denies abdominal pain, nausea/vomiting, diarrhea and constipation, black/bloody, and tarry stools. · Genitourinary: Denies dysuria and hematuria. · Hematologic: Denies excessive bruising or bleeding. · Endocrine: Denies excessive thirst. Denies intolerance to hot and cold. PHYSICAL EXAM:   BP (!) 150/93   Pulse 89   Temp 97.2 °F (36.2 °C) (Temporal)   Resp 12   Ht 5' 10\" (1.778 m)   Wt 235 lb (106.6 kg)   SpO2 95%   BMI 33.72 kg/m²   CONST:  Well developed, obese AAM who appears stated age. Awake, alert, cooperative, no apparent distress. HEENT:   Head- Normocephalic, atraumatic. Eyes- Conjunctivae pink, anicteric. Neck-  No stridor, trachea midline, no apparent jugular venous distention. CHEST: Chest symmetrical and non-tender to palpation. No accessory muscle use or intercostal retractions. RESPIRATORY: Lung sounds - clear throughout fields. No wheezing, rales or rhonchi. Diminished. CARDIOVASCULAR:     Heart Inspection- shows no noted pulsations. Heart Palpation- no heaves or thrills. Heart Ausculation- Regular rhythm with fast rate, no apparent murmur. PV: No lower extremity edema. No varicosities. Pedal pulses palpable, no clubbing or cyanosis. ABDOMEN: Soft, non-tender to light palpation. Bowel sounds present. MS: Good muscle strength and tone. No atrophy or abnormal movements. SKIN: Warm and dry.  No statis dermatitis or ulcers. NEURO / PSYCH: Oriented to person, place and time. Speech clear and appropriate. Follows all commands. DATA:    Telemetry: ST with HR in the 100s-110s     Diagnostic:  All diagnostic testing and lab work thus far this admission reviewed in detail. CXR 12/22/2020: Impression:  Cardiomegaly and bilateral interstitial edema.  Infection could potentially  have this appearance in the right clinical setting    Chest CTA 12/22/2020: Impression:  No evidence of pulmonary embolism. Mild pulmonary edema. Mild mediastinal and bilateral hilar lymphadenopathy, nonspecific. Echocardiogram (Dr. Susana Martinez) 12/23/2020   Summary   Severe left ventricle hypertrophy. Moderate global hypokinesis. Estimated left ventricle ejection fraction is 30-35%. Grade I diastolic dysfunction. Non dilated right ventricle with moderate dysfunction. No significant valvular abnormalities. Intake/Output Summary (Last 24 hours) at 12/24/2020 1056  Last data filed at 12/24/2020 0935  Gross per 24 hour   Intake 1300 ml   Output --   Net 1300 ml       Labs:   CBC:   Recent Labs     12/22/20 1950 12/24/20  0954   WBC 13.6* 13.1*   HGB 12.2* 15.8   HCT 36.4* 46.5    348     BMP:   Recent Labs     12/22/20 1950      K 3.9   CO2 26   BUN 10   CREATININE 1.2   LABGLOM >60   CALCIUM 8.6     HgA1c:   Lab Results   Component Value Date    LABA1C 6.8 (H) 12/24/2020     PT/INR:   Recent Labs     12/24/20  0954   PROTIME 14.2*   INR 1.3     APTT:  Recent Labs     12/24/20  0954   APTT 32.4     CARDIAC ENZYMES:  Recent Labs     12/22/20 1950   TROPONINI <0.01     LIVER PROFILE:  Recent Labs     12/22/20 1950   AST 22   ALT 12   LABALBU 3.4*           Assessment and plan to follow as per Dr. Nicole Mejia.     Lauryn Bullock, [address] [place] [address], [place] [id number] Cardiology    Electronically signed by Layo Fernandez PA-C on 12/24/2020 at 10:56 AM       I personally and independently saw and examined patient and reviewed all done pertinent laboratory data, imaging studies, ECGs and rhythm strips. I have reviewed and agree with the APN history and physical exam as documented in the above note. Electronically signed by Yefri Yadav MD on 12/24/2020 at 4:02 PM     We were asked to see the patient for CHF.     IMPRESSION:  1. Acute on chronic systolic CHF, in part at least secondary to medical non-compliance. 2. Non-ischemic cardiomyopathy with moderate severe LV dysfunction with EF 30-35% on echo 12/23/2020 (patient states having had cardiac catheterization in [place] in late October/early November 2020 showing patent coronary arteries): records not available. 3. Uncontrolled hypertension. 4. Obesity. 5. Hypokalemia. 6. Diabetes mellitus: HgbA1c 6.8 this admission.     PLAN:   1. Change IV bumex to PO.   2. Increase Entresto. 3. Increase Toprol XL. 4. Supplement potassium. 5. Add spironolactone. 6. Monitor BP and uptitrate Entresto as needed. Consider adding hydralazine/isordil combination. 7. May be discharged from cardiology standpoint when okay with others.    8. Cardiology will sign off, please call if needed.     Electronically signed by Yefri Yadav MD on 12/24/2020 at 12:14 PM

## 2020-12-24 NOTE — PROGRESS NOTES
[facility]  Internal Medicine Residency / [address]    Attending Physician Statement, covering attending  I have discussed the case, including pertinent history and exam findings with the resident and the team.  I have seen and examined the patient, reviewed meds and pertinent labs and the key elements of the encounter have been performed by me. I agree with the assessment, plan and orders as documented by the resident. Patient here from [place], ran out of meds. , believes he was on bumex once a day, currently on 2 mg bid, would decrease dose given clear lungs and no significant peripheral edema. Could consider increase in dose toprol XL to achieve better BP control, given heart rate 80's to low 100's). Echo reveals decreased EF 30-35%. Cardiology has been consulted. Patient also has HBA1c 6.8%, will need diabetic ed, consider rx. Remainder of medical problems as per resident note.       Alona Lackey  Internal Medicine Residency Faculty

## 2020-12-24 NOTE — PROGRESS NOTES
RN discussed discharged papers, removed heart monitor and removed IV. Patient ride will be here soon. Ok to discharge.

## 2020-12-24 NOTE — DISCHARGE SUMMARY
[address] [facility]  Discharge Summary    PCP: Edin Ortiz MD    Admit Date:12/22/2020  Discharge Date: 12/24/2020    Admission Diagnosis:   1. Acute decompensated heart failure 2/2 medication noncompliance  2. Flash pulmonary edema 2/2 accelerated hypertension 2/2 medication noncompliance  3. RONA vs new baseline creatinine  4. Essential hypertension  5. Bilateral dorsal hand ichthyosis    Discharge Diagnosis:  1. Acute decompensated heart failure 2/2 medication noncompliance--resolved  2. Flash pulmonary edema 2/2 accelerated hypertension 2/2 medication noncompliance--resolved  3. RONA, likely cardiorenal--resolved  4. Essential hypertension--controlled  5. Bilateral dorsal hand ichthyosis--resolving    Hospital Course:   Patient has moved from [place] around [facility], which was a few weeks back. He was being followed up with a cardiologist over there however he has not established care in this area after moving. He has also not been taking any of his medications for the past couple of weeks, at least 3, especially since he moved. He very recently in October/early November 2020 found out (in a hospital in [place]) that he had cardiomyopathy, a dilated heart, with heart catheterization showing patent coronary arteries. Unfortunately no records were available for us to review. He came to us with a chief complaint of worsening shortness of breath for the past 3 weeks. In the ED patient was found to have a proBNP of 3,449, creatinine showed a minor bump of 1.2 (from a baseline of 0.8), also CTA was negative for PE, urinalysis was negative for pyuria. Patient was transferred to the floors for management of acute decompensated heart failure. Patient was started on Bumex 2 mg twice daily, Imdur 60 mg once daily and amlodipine 10 mg once daily. These are the patient's heart failure medications which he has not been taking for the past 3 weeks.   Diuresis was continued with IV Bumex, patient responded very well to it. Cardiology was consulted, per cardiology, IV Bumex was changed to p.o., Entresto dose increased, Toprol XL increased, spironolactone added to the drug regimen. Patient was then advised to establish care with a PCP here in [facility] as well as a cardiologist.  Patient was discharged in stable condition      Significant findings (history and exam, laboratory, radiological, pathology, other tests):   · General Appearance: alert, appears stated age and cooperative  · HEENT:  Head: Normocephalic, no lesions, without obvious abnormality. · Neck: no carotid bruit, no JVD and supple, symmetrical, trachea midline  · Lung: clear to auscultation bilaterally  · Heart: regular rate and rhythm, S1, S2 normal, no murmur, click, rub or gallop  · Abdomen: soft, non-tender; bowel sounds normal; no masses,  no organomegaly  · Extremities:  extremities normal, atraumatic, no cyanosis or edema  · Musculokeletal: No joint swelling, no muscle tenderness. ROM normal in all joints of extremities. · Neurologic: Mental status: Alert, oriented, thought content appropriate, alertness: alert      Pending test results: --    Consults:  1.  Cardiology      Procedures:  --    Condition at discharge: Stable    Disposition: home    Discharge Medications:  Current Discharge Medication List      START taking these medications    Details   metFORMIN (GLUCOPHAGE) 500 MG tablet Take 1 tablet by mouth daily (with breakfast)  Qty: 30 tablet, Refills: 1      amLODIPine (NORVASC) 10 MG tablet Take 1 tablet by mouth daily  Qty: 30 tablet, Refills: 3      sacubitril-valsartan (ENTRESTO) 49-51 MG per tablet Take 1 tablet by mouth 2 times daily  Qty: 60 tablet, Refills: 1      bumetanide (BUMEX) 2 MG tablet Take 1 tablet by mouth 2 times daily  Qty: 30 tablet, Refills: 3      spironolactone (ALDACTONE) 25 MG tablet Take 1 tablet by mouth daily  Qty: 30 tablet, Refills: 3         CONTINUE these medications which have CHANGED    Details   metoprolol succinate (TOPROL XL) 100 MG extended release tablet Take 1 tablet by mouth daily  Qty: 30 tablet, Refills: 3         STOP taking these medications       sacubitril-valsartan (ENTRESTO) 24-26 MG per tablet Comments:   Reason for Stopping:         nystatin (MYCOSTATIN) 352101 UNIT/GM powder Comments:   Reason for Stopping:         beclomethasone (QVAR) 80 MCG/ACT inhaler Comments:   Reason for Stopping:         Fexofenadine HCl (MUCINEX ALLERGY PO) Comments:   Reason for Stopping:         albuterol (PROAIR HFA) 108 (90 BASE) MCG/ACT inhaler Comments:   Reason for Stopping:         hydrochlorothiazide (HYDRODIURIL) 25 MG tablet Comments:   Reason for Stopping:         lisinopril (PRINIVIL;ZESTRIL) 5 MG tablet Comments:   Reason for Stopping:         ibuprofen (IBU) 800 MG tablet Comments:   Reason for Stopping:               Activity: activity as tolerated  Diet: regular diet    Follow-up appointments:   1. [facility], IM clinic, for hospital follow-up, in 1 week  2.  Dr. Julien Awad, cardiologist in 2 weeks    Patient Instructions:   --    Sue Barcenas MD  PGY 1   5:25 PM 12/24/2020

## 2020-12-24 NOTE — PLAN OF CARE
Problem: Breathing Pattern - Ineffective:  Goal: Ability to achieve and maintain a regular respiratory rate will improve  Description: Ability to achieve and maintain a regular respiratory rate will improve  Outcome: Met This Shift     Problem: Fluid Volume:  Goal: Hemodynamic stability will improve  Description: Hemodynamic stability will improve  Outcome: Met This Shift  Goal: Ability to maintain a balanced intake and output will improve  Description: Ability to maintain a balanced intake and output will improve  Outcome: Met This Shift     Problem: Respiratory:  Goal: Respiratory status will improve  Description: Respiratory status will improve  Outcome: Met This Shift

## 2020-12-30 ENCOUNTER — TELEPHONE (OUTPATIENT)
Dept: CARDIOLOGY CLINIC | Age: 35
End: 2020-12-30

## 2020-12-30 NOTE — TELEPHONE ENCOUNTER
Called and left a message requesting a call back to a hospital follow with Smith Srivastava. Will call back Monday.

## 2021-01-04 NOTE — TELEPHONE ENCOUNTER
Called pt and was able schedule and appointment for tomorrow at 10:30 Vitals capture started with the following parameters, Patient=Adult, Interval=5 min, Initial Pressure=140 mmHg, Deflation Rate=5 mmHg, Cuff placed on Right Arm

## 2021-01-05 ENCOUNTER — OFFICE VISIT (OUTPATIENT)
Dept: CARDIOLOGY CLINIC | Age: 36
End: 2021-01-05
Payer: MEDICAID

## 2021-01-05 ENCOUNTER — HOSPITAL ENCOUNTER (OUTPATIENT)
Dept: OTHER | Age: 36
Discharge: HOME | End: 2021-01-05

## 2021-01-05 VITALS
SYSTOLIC BLOOD PRESSURE: 152 MMHG | HEIGHT: 70 IN | RESPIRATION RATE: 18 BRPM | WEIGHT: 249.8 LBS | DIASTOLIC BLOOD PRESSURE: 100 MMHG | BODY MASS INDEX: 35.76 KG/M2 | HEART RATE: 95 BPM

## 2021-01-05 DIAGNOSIS — I50.9 ACUTE HEART FAILURE, UNSPECIFIED HEART FAILURE TYPE (HCC): ICD-10-CM

## 2021-01-05 DIAGNOSIS — I50.20 HFREF (HEART FAILURE WITH REDUCED EJECTION FRACTION) (HCC): Primary | ICD-10-CM

## 2021-01-05 DIAGNOSIS — I42.9 CARDIOMYOPATHY, UNSPECIFIED TYPE (HCC): ICD-10-CM

## 2021-01-05 DIAGNOSIS — G47.33 OSA (OBSTRUCTIVE SLEEP APNEA): ICD-10-CM

## 2021-01-05 PROCEDURE — 93000 ELECTROCARDIOGRAM COMPLETE: CPT | Performed by: INTERNAL MEDICINE

## 2021-01-05 PROCEDURE — 99214 OFFICE O/P EST MOD 30 MIN: CPT | Performed by: NURSE PRACTITIONER

## 2021-01-05 ASSESSMENT — EJECTION FRACTION
EF_VALUE: 30-35%
EF_SOURCE: 2D ECHO

## 2021-01-05 NOTE — PROGRESS NOTES
[facility]  Office Visit         Reason for Visit: Heart Failure    Primary Cardiologist: Dr. Rabia Todd         History of Present Illness:     Mr. Louis Jaramillo is a 38year old male with a PMHx of chronic HFrEF, nonischemic cardiomyopathy, HTN, T2DM, obesity, hx of tobacco and ETOH abuse    He recently presented to the emergency room with complaints of increased shortness of breath, lower extremity edema and an increased weight. Patient was diagnosed with in the last year of nonischemic cardiomyopathy in [place], however recently moved back to the area. After moving back to the area he was noncompliant with all of his cardiac medications resulting in uncontrolled hypertension and acute heart failure. He was IV diuresed and GDMT was reinitiated. He underwent TTE during the hospitalization that demonstrated LVEF 30-35%, moderate RV dysfunction, no significant valvular abnormalities. He presents today in post hospital follow-up, since discharge from the hospital he has not been compliant with his current cardiac medications. He did not bring any of his meds to the office today however is confused on what he should and should not be taking. He is only been taking his bumetanide once daily versus prescribed twice daily. He reports good urinary response however on review of weights his weight is up 14 pounds since discharge from the hospital.    He has chronic dyspnea with exertion, shortness of breath, or decline in overall functional capacity. He reports orthopnea, PND, with probable AMANUEL. Denies nocturnal cough or hemoptysis. He d reports abdominal distention or bloating, denies early satiety, anorexia/change in appetite, unintentional weight loss. He does lower extremity edema. He denies exertional lightheadedness. He denies palpitations, syncope or near syncope. Review of systems is negative for chest pain, pressure, discomfort. When ambulating on an incline, He does not leg claudication.  History is negative for neurological symptoms including transient loss of vision, asymmetric weakness, aphasia, dysphasia, numbness, tingling. Patient Active Problem List    Diagnosis Date Noted    Acute on chronic systolic heart failure ([facility] [address] 12/23/2020    Acute on chronic diastolic heart failure ([facility] [address]     Essential hypertension     Chest pain     DM (diabetes mellitus) ([facility] [address]     Asthma      PAST MEDICAL HISTORY:    1. Obesity. 2. Hypertension. 3. TTE (5/5/2015, Dr. Patrice Mcdonald)  Summary: Ejection fraction is visually estimated at 60%. No regional wall motion abnormalities seen. Moderate left ventricular concentric hypertrophy noted. There is doppler evidence of stage II diastolic dysfunction. Physiologic and/or trace mitral regurgitation is present. 4. Chronic HFrEF. Non-ischemic cardiomyopathy diagnosed around October/November 2020 during hospitalization in [place] with normal coronary arteries on left heart cath per the patient. No records available for review at this time. 5. Medical non-compliance.        Past Medical History:   Diagnosis Date    CHF (congestive heart failure) ([facility] [address]     Hypertension        Past Surgical History:   Procedure Laterality Date    ABSCESS DRAINAGE      buttocks    ECHO COMPL W DOP COLOR FLOW  5/5/2015         WISDOM TOOTH EXTRACTION           No Known Allergies      Outpatient Medications Marked as Taking for the 1/5/21 encounter (Office Visit) with CRIS Cheek CNP   Medication Sig Dispense Refill    metFORMIN (GLUCOPHAGE) 500 MG tablet Take 1 tablet by mouth daily (with breakfast) 30 tablet 1    metoprolol succinate (TOPROL XL) 100 MG extended release tablet Take 1 tablet by mouth daily 30 tablet 3    amLODIPine (NORVASC) 10 MG tablet Take 1 tablet by mouth daily 30 tablet 3    sacubitril-valsartan (ENTRESTO) 49-51 MG per tablet Take 1 tablet by mouth 2 times daily (Patient taking differently: Take 1 tablet by mouth 2 times daily *Pt only taking once daily) 60 tablet 1    bumetanide (BUMEX) 2 MG tablet Take 1 tablet by mouth 2 times daily 30 tablet 3       Guideline directed medical/device therapy:  ARNI/ACE I/ARB: Yes  Beta blocker: Yes  Aldosterone antagonist:  Yes  ICD/CRT-P/-D:  none  QRS interval on recent ECG (personally reviewed/interpreted): <120 ms  Percentage RV pacing (personally reviewed/interpreted): %/NA          Review of Systems:   Cardiac: As per HPI  General: No fever, chills, rigors  Pulmonary: As per HPI  HEENT: No visual disturbances, difficult swallowing  GI: No nausea, vomiting, abdominal pain  : No dysuria or hematuria  Endocrine: No thyroid disease or diabetes  Musculoskeletal: MARIA x 4, no focal motor deficits  Skin: Intact, no rashes  Neuro/Psych: No headache or seizures      Weights: Wt Readings from Last 3 Encounters:   01/05/21 249 lb 12.8 oz (113.3 kg)   12/22/20 235 lb (106.6 kg)   05/26/20 250 lb (113.4 kg)         Physical Examination:     BP (!) 168/100 (Site: Left Upper Arm, Position: Sitting, Cuff Size: Large Adult)   Resp 18   Ht 5' 10\" (1.778 m)   Wt 249 lb 12.8 oz (113.3 kg)   BMI 35.84 kg/m²     CONSTITUTIONAL: Alert and oriented times 3, no acute distress and cooperative to examination with proper mood and affect. SKIN: Skin color, texture, turgor normal. No rashes or lesions. LYMPH: no cervical nodes, no inguinal nodes  HEENT: Head is normocephalic, atraumatic. EOMI, PERRLA. NECK: Supple, symmetrical, trachea midline, no adenopathy, thyroid symmetric, not enlarged and no tenderness, skin normal. +jvd/hjr. CHEST/LUNGS: chest symmetric with normal A/P diameter, normal respiratory rate and rhythm, lungs clear to auscultation without wheezes, rales or rhonchi. No accessory muscle use. Scars None   CARDIOVASCULAR: Heart sounds are normal.  Regular rate and rhythm without murmur, gallop or rub. Normal S1 and S2. Carotid and femoral pulses 2+/4 and equal bilaterally. ABDOMEN: Obese. No and Laparoscopic scar(s) present. diastolic dysfunction. Non dilated right ventricle with moderate dysfunction. No significant valvular abnormalities. EKG  Sinus Rhythm   Negative T-waves       ASSESSMENT:  1. Acute on chronic HFrEF  2. ACC stage C / NYHA class  3. Hypervolemic   4. Nonischemic cardiomyopathy (reported clean cath in [place], will request records). 5. LVEF 30-35%, LVEDD 5.3, LVMI 190  6. Moderate RV dysfunction  7. Uncontrolled hypertension  8. T2DM - hgba1c 6.8  9. Obesity  10. Probable AMANUEL  11. Hx of tobacco and heavy etoh abuse - denies recent use  12. Medical noncompliance         PLAN:  1. When you get we will call you and review medications that you are taking and then adjust the dosing. 2. Then you need to make sure that you are taking your medication every day as prescribed    3. Referral to CHF infusion clinic in 1 week     4. Referral for sleep study    5. Please go to our basement for prescription assistance after your appointment to get coverage for Entresto     6. Follow up in 2 weeks    7. Follow up with Dr. Jamari Yoo in 1 month    8. Weigh yourself daily    -Stay Hydrated    -Diet should sodium restricted to 2 grams    -Again watch your daily weight trends and if you gain water weight please follow below instructions.    -If you gain 3-5 pounds in 2-3 days OR notice that you are retaining fluid in anyway just like you did before then take an extra dose of your water pill (bumetanide/Bumex) every day until you lose the weight or feel better.     -If you notice that you have taken more than 3 extra doses in 1 week then please call and let us know. -If at any time you feel that you are retaining fluid, your medications are not working, or you feel ill in anyway, then please call us for either same day appointment or the next day, and for instructions. Our goal is to keep you out of the emergency room and the hospital and we have ways to do it.  You just need to call us in a timely manner.     -If you become sick for other reasons, and notice that you are not urinating as much, the urine is very dark, you have significant diarrhea or vomiting, then please DO NOT take your water pill and CALL US immediately.       Meche Hillman APRN-CNP  [id number] [facility]

## 2021-01-05 NOTE — Clinical Note
Patient is not aware of his medications or dosing - we need to call him and review medications and make adjustments of GDMT once we know what he actually has at home. Thank you!

## 2021-01-05 NOTE — PATIENT INSTRUCTIONS
1. When you get we will call you and review medications that you are taking and then adjust the dosing. 2. Then you need to make sure that you are taking your medication every day as prescribed    3. Referral to CHF infusion clinic in 1 week     4. Referral for sleep study    5. Please go to our basement for prescription assistance after your appointment to get coverage for Entresto     6. Follow up in 2 weeks    7. Follow up with Dr. Ronnell Acharya in 1 month    8. Weigh yourself daily    -Stay Hydrated    -Diet should sodium restricted to 2 grams    -Again watch your daily weight trends and if you gain water weight please follow below instructions.    -If you gain 3-5 pounds in 2-3 days OR notice that you are retaining fluid in anyway just like you did before then take an extra dose of your water pill (bumetanide/Bumex) every day until you lose the weight or feel better.     -If you notice that you have taken more than 3 extra doses in 1 week then please call and let us know. -If at any time you feel that you are retaining fluid, your medications are not working, or you feel ill in anyway, then please call us for either same day appointment or the next day, and for instructions. Our goal is to keep you out of the emergency room and the hospital and we have ways to do it. You just need to call us in a timely manner.     -If you become sick for other reasons, and notice that you are not urinating as much, the urine is very dark, you have significant diarrhea or vomiting, then please DO NOT take your water pill and CALL US immediately.

## 2021-01-06 ENCOUNTER — TELEPHONE (OUTPATIENT)
Dept: CARDIOLOGY CLINIC | Age: 36
End: 2021-01-06

## 2021-01-06 NOTE — TELEPHONE ENCOUNTER
Patient in office yesterday to see Jose Lazo CNP, in post hosp f/u d/t acute HF. He has been inconsistent with taking his medication and wasn't sure exactly what he was taking at home when asked about his meds. Called patient today to review his meds with him by checking his bottles to find out what he is actually taking at home at the present time. No answer when called. Detailed message left for patient to return call to me regarding his medication.

## 2021-01-12 NOTE — TELEPHONE ENCOUNTER
Pharmacist called back:   Verified meds on profile and fill dates as below:     1. Amlodipine 10mg daily  Last filled 1/8 2021  Script from [address] (from hospital)   2. Bumetanide 2mg tabs  1 tab BID  Filled  1 7 2021       DR Arminda Bravo  (from hospital   3. Entresto:     49/51mg  by DR Aguilar 12 24 2020 (stored in profile, Wenceslao Cueva  never picked up script)  Never filled   24/26mg was sent in by DR Veronique Guillory  [facility])  Nov 3. Never filled , is  on profile stored. .  4. Metoprolol succinate  100mg last filled 12 18 2020  1 tab daily  DR Veronique Guillory [facility])   5. Spironolactone  1 8 2021  Filled 25mg daily (DR Aguilar)     This if from pharmacy. Still need to verify with patient HOW he takes these meds. And where is he getting entresto from? DISCHARGE

## 2021-01-14 ENCOUNTER — HOSPITAL ENCOUNTER (OUTPATIENT)
Dept: OTHER | Age: 36
Setting detail: THERAPIES SERIES
Discharge: HOME | End: 2021-01-14
Payer: MEDICAID

## 2021-01-14 VITALS
HEART RATE: 82 BPM | SYSTOLIC BLOOD PRESSURE: 134 MMHG | BODY MASS INDEX: 35.01 KG/M2 | WEIGHT: 244 LBS | DIASTOLIC BLOOD PRESSURE: 78 MMHG | RESPIRATION RATE: 18 BRPM

## 2021-01-14 DIAGNOSIS — I50.22 CHRONIC SYSTOLIC HEART FAILURE (HCC): ICD-10-CM

## 2021-01-14 DIAGNOSIS — I50.20 HFREF (HEART FAILURE WITH REDUCED EJECTION FRACTION) (HCC): Primary | ICD-10-CM

## 2021-01-14 LAB
ANION GAP SERPL CALCULATED.3IONS-SCNC: 13 MMOL/L (ref 7–16)
BUN BLDV-MCNC: 15 MG/DL (ref 6–20)
CALCIUM SERPL-MCNC: 9.6 MG/DL (ref 8.6–10.2)
CHLORIDE BLD-SCNC: 99 MMOL/L (ref 98–107)
CO2: 26 MMOL/L (ref 22–29)
CREAT SERPL-MCNC: 0.9 MG/DL (ref 0.7–1.2)
GFR AFRICAN AMERICAN: >60
GFR NON-AFRICAN AMERICAN: >60 ML/MIN/1.73
GLUCOSE BLD-MCNC: 133 MG/DL (ref 74–99)
POTASSIUM SERPL-SCNC: 3.6 MMOL/L (ref 3.5–5)
PRO-BNP: 2195 PG/ML (ref 0–125)
SODIUM BLD-SCNC: 138 MMOL/L (ref 132–146)

## 2021-01-14 PROCEDURE — 36415 COLL VENOUS BLD VENIPUNCTURE: CPT

## 2021-01-14 PROCEDURE — 99204 OFFICE O/P NEW MOD 45 MIN: CPT

## 2021-01-14 PROCEDURE — 80048 BASIC METABOLIC PNL TOTAL CA: CPT

## 2021-01-14 PROCEDURE — 83880 ASSAY OF NATRIURETIC PEPTIDE: CPT

## 2021-01-14 RX ORDER — SACUBITRIL AND VALSARTAN 97; 103 MG/1; MG/1
1 TABLET, FILM COATED ORAL 2 TIMES DAILY
Qty: 60 TABLET | Refills: 11 | Status: SHIPPED
Start: 2021-01-14 | End: 2021-01-19 | Stop reason: ALTCHOICE

## 2021-01-14 NOTE — PROGRESS NOTES
Current weight :244lb    Previous weight: First visit    Compliance with low sodium diet: Instructed on low sodium diet, reviewed Caring, For Your Heart, Zone Pamphlet, daily weights, medication reviewed; Medication changes:Reviewed medications with pt. Pt confirmed taking all medications on list.  Gave pt printed out sheet with medications, dosage and frequency ad instructed pt to compare list to medications at home and make sure dosages and frequency are correct. Pt verbalized understanding and will call CHF Clinic with any discrepancies     Response to oral diuretic: very good    Color of urine: pale yellow    Shortness of Breath : denies, breath sounds clear. Edema: trace amt BLE    Keeping Hydrated: yes    Dizziness or lightheadedness: denies. Education given pt verbalized understanding, labs drawn. Pt  was instructed to call primary physician  Or go to a [facility] for rash on hands and chest, pt verbalized understanding.

## 2021-01-14 NOTE — PROGRESS NOTES
[facility] providers instructions on [employer]   [phone] (home)      Has 24-26 mg tabs he will take 4 tabs twice daily. Till the samples are gone. Has another full bottle of them   understands / repeats back : new pill script is for 97-103mg 1 tab twice daily. When picks this up at pharmacy will take as directed.      Lawanda Wagenr RN

## 2021-01-14 NOTE — RESULT ENCOUNTER NOTE
Labs and CHF clinic note reviewed  Vitals at CHF clinic :  /78, Pulse 82    Have him increase Entresto to 97/103 mg BID    Follow up labs at CHF clinic as scheduled in 1 week    Thank you.

## 2021-01-19 ENCOUNTER — HOSPITAL ENCOUNTER (EMERGENCY)
Age: 36
Discharge: HOME | End: 2021-01-19
Payer: MEDICAID

## 2021-01-19 ENCOUNTER — HOSPITAL ENCOUNTER (OUTPATIENT)
Dept: OTHER | Age: 36
Setting detail: THERAPIES SERIES
Discharge: HOME | End: 2021-01-19
Payer: MEDICAID

## 2021-01-19 ENCOUNTER — APPOINTMENT (OUTPATIENT)
Dept: OTHER | Age: 36
End: 2021-01-19
Payer: MEDICAID

## 2021-01-19 ENCOUNTER — OFFICE VISIT (OUTPATIENT)
Dept: CARDIOLOGY CLINIC | Age: 36
End: 2021-01-19
Payer: MEDICAID

## 2021-01-19 VITALS
SYSTOLIC BLOOD PRESSURE: 108 MMHG | OXYGEN SATURATION: 97 % | RESPIRATION RATE: 18 BRPM | BODY MASS INDEX: 35.07 KG/M2 | WEIGHT: 245 LBS | HEIGHT: 70 IN | DIASTOLIC BLOOD PRESSURE: 60 MMHG | HEART RATE: 88 BPM

## 2021-01-19 VITALS
BODY MASS INDEX: 35.15 KG/M2 | HEART RATE: 85 BPM | DIASTOLIC BLOOD PRESSURE: 63 MMHG | WEIGHT: 245 LBS | SYSTOLIC BLOOD PRESSURE: 138 MMHG | RESPIRATION RATE: 18 BRPM

## 2021-01-19 VITALS
RESPIRATION RATE: 16 BRPM | DIASTOLIC BLOOD PRESSURE: 66 MMHG | SYSTOLIC BLOOD PRESSURE: 122 MMHG | TEMPERATURE: 97.9 F | HEART RATE: 98 BPM | OXYGEN SATURATION: 96 %

## 2021-01-19 DIAGNOSIS — I50.22 CHRONIC SYSTOLIC HEART FAILURE (HCC): ICD-10-CM

## 2021-01-19 DIAGNOSIS — L20.9 ATOPIC DERMATITIS, UNSPECIFIED TYPE: Primary | ICD-10-CM

## 2021-01-19 LAB
ANION GAP SERPL CALCULATED.3IONS-SCNC: 13 MMOL/L (ref 7–16)
BUN BLDV-MCNC: 15 MG/DL (ref 6–20)
CALCIUM SERPL-MCNC: 9.4 MG/DL (ref 8.6–10.2)
CHLORIDE BLD-SCNC: 96 MMOL/L (ref 98–107)
CO2: 28 MMOL/L (ref 22–29)
CREAT SERPL-MCNC: 1.1 MG/DL (ref 0.7–1.2)
GFR AFRICAN AMERICAN: >60
GFR NON-AFRICAN AMERICAN: >60 ML/MIN/1.73
GLUCOSE BLD-MCNC: 131 MG/DL (ref 74–99)
POTASSIUM SERPL-SCNC: 3.3 MMOL/L (ref 3.5–5)
PRO-BNP: 968 PG/ML (ref 0–125)
SODIUM BLD-SCNC: 137 MMOL/L (ref 132–146)

## 2021-01-19 PROCEDURE — 80048 BASIC METABOLIC PNL TOTAL CA: CPT

## 2021-01-19 PROCEDURE — 99282 EMERGENCY DEPT VISIT SF MDM: CPT

## 2021-01-19 PROCEDURE — 83880 ASSAY OF NATRIURETIC PEPTIDE: CPT

## 2021-01-19 PROCEDURE — 99213 OFFICE O/P EST LOW 20 MIN: CPT | Performed by: NURSE PRACTITIONER

## 2021-01-19 PROCEDURE — 99214 OFFICE O/P EST MOD 30 MIN: CPT

## 2021-01-19 PROCEDURE — 36415 COLL VENOUS BLD VENIPUNCTURE: CPT

## 2021-01-19 RX ORDER — POTASSIUM CHLORIDE 20 MEQ/1
40 TABLET, EXTENDED RELEASE ORAL DAILY
Qty: 180 TABLET | Refills: 1 | Status: SHIPPED
Start: 2021-01-19 | End: 2021-10-28

## 2021-01-19 RX ORDER — SACUBITRIL AND VALSARTAN 97; 103 MG/1; MG/1
1 TABLET, FILM COATED ORAL 2 TIMES DAILY
Qty: 60 TABLET | Refills: 3 | Status: ON HOLD
Start: 2021-01-19 | End: 2021-09-17 | Stop reason: HOSPADM

## 2021-01-19 RX ORDER — TRIAMCINOLONE ACETONIDE 1 MG/G
CREAM TOPICAL
Qty: 45 G | Refills: 0 | Status: SHIPPED | OUTPATIENT
Start: 2021-01-19 | End: 2021-05-18

## 2021-01-19 NOTE — PROGRESS NOTES
have been reviewed. Any changes in the past medical history, social history or family history have been made and are reflected in the electronic medical record. Patient Active Problem List    Diagnosis Date Noted    Acute on chronic systolic heart failure ([facility] [facility] 12/23/2020    [facility]     Essential hypertension     Chest pain     DM (diabetes mellitus) ([facility]     Asthma      PAST MEDICAL HISTORY:    1. Obesity. 2. Hypertension. 3. TTE (5/5/2015, Dr. Elen Woods)  Summary: Ejection fraction is visually estimated at 60%. No regional wall motion abnormalities seen. Moderate left ventricular concentric hypertrophy noted. There is doppler evidence of stage II diastolic dysfunction. Physiologic and/or trace mitral regurgitation is present. 4. Chronic HFrEF. Non-ischemic cardiomyopathy diagnosed around October/November 2020 during hospitalization in [place] with normal coronary arteries on left heart cath per the patient. No records available for review at this time. 5. Medical non-compliance.        Past Medical History:   Diagnosis Date    [facility]     Hypertension        Past Surgical History:   Procedure Laterality Date    ABSCESS DRAINAGE      buttocks    ECHO COMPL W DOP COLOR FLOW  5/5/2015         WISDOM TOOTH EXTRACTION           No Known Allergies      Outpatient Medications Marked as Taking for the 1/19/21 encounter (Office Visit) with CRIS Gómez - MARBELLA   Medication Sig Dispense Refill    sacubitril-valsartan (ENTRESTO)  MG per tablet Take 1 tablet by mouth 2 times daily (Patient taking differently: Take 1 tablet by mouth 2 times daily *Says he is presently taking 2 tabs of 24-26mg twice daily - says he has not picked up any other dose d/t cost) 60 tablet 11    metFORMIN (GLUCOPHAGE) 500 MG tablet Take 1 tablet by mouth daily (with breakfast) (Patient taking differently: Take 500 mg by mouth daily (with breakfast) *Says he is taking his mother's pill) 30 tablet 1    metoprolol succinate (TOPROL XL) 100 MG extended release tablet Take 1 tablet by mouth daily (Patient taking differently: Take 50 mg by mouth daily ) 30 tablet 3    amLODIPine (NORVASC) 10 MG tablet Take 1 tablet by mouth daily 30 tablet 3    bumetanide (BUMEX) 2 MG tablet Take 1 tablet by mouth 2 times daily 30 tablet 3    spironolactone (ALDACTONE) 25 MG tablet Take 1 tablet by mouth daily 30 tablet 3       Guideline directed medical/device therapy:  ARNI/ACE I/ARB: Yes  Beta blocker: Yes  Aldosterone antagonist:  Yes  ICD/CRT-P/-D:  none  QRS interval on recent ECG (personally reviewed/interpreted): <120 ms  Percentage RV pacing (personally reviewed/interpreted): %/NA          Review of Systems:   Cardiac: As per HPI  General: No fever, chills, rigors  Pulmonary: As per HPI  HEENT: No visual disturbances, difficult swallowing  GI: No nausea, vomiting, abdominal pain  : No dysuria or hematuria  Endocrine: No thyroid disease or diabetes  Musculoskeletal: MARIA x 4, no focal motor deficits  Skin: Intact, no rashes  Neuro/Psych: No headache or seizures      Weights: Wt Readings from Last 3 Encounters:   01/19/21 245 lb (111.1 kg)   01/19/21 245 lb (111.1 kg)   01/14/21 244 lb (110.7 kg)         Physical Examination:     /60   Pulse 88   Resp 18   Ht 5' 10\" (1.778 m)   Wt 245 lb (111.1 kg)   SpO2 97%   BMI 35.15 kg/m²     CONSTITUTIONAL: Alert and oriented times 3, no acute distress and cooperative to examination with proper mood and affect. SKIN: Skin color, texture, turgor normal. No rashes or lesions. LYMPH: no cervical nodes, no inguinal nodes  HEENT: Head is normocephalic, atraumatic. EOMI, PERRLA. NECK: Supple, symmetrical, trachea midline, no adenopathy, thyroid symmetric, not enlarged and no tenderness, skin normal. No jvd/hjr.    CHEST/LUNGS: chest symmetric with normal A/P diameter, normal respiratory rate and rhythm, lungs clear to auscultation without wheezes, rales or rhonchi. No accessory muscle use. Scars None   CARDIOVASCULAR: Heart sounds are normal.  Regular rate and rhythm without murmur, gallop or rub. Normal S1 and S2. . Carotid and femoral pulses 2+/4 and equal bilaterally. ABDOMEN: Obese. No and Laparoscopic scar(s) present. Normal bowel sounds. No bruits. soft, nondistended, no masses or organomegaly. no evidence of hernia. Percussion: Normal without hepatosplenomegally. Tenderness: absent. RECTAL: deferred, not clinically indicated  NEUROLOGIC: There are no focalizing motor or sensory deficits. CN II-XII are grossly intact. Pau [place] EXTREMITIES: no cyanosis, no clubbing and no edema. Warm and well perfused. All the following diagnostics were personally reviewed and interpreted by me. LAB DATA:     12/24/2020 09:54 1/14/2021 13:15 1/19/2021 12:25   Sodium 136 138 137   Potassium 3.2 (L) 3.6 3.3 (L)   Chloride 95 (L) 99 96 (L)   CO2 28 26 28   BUN 12 15 15   Creatinine 1.0 0.9 1.1   Anion Gap 13 13 13   GFR Non- >60 >60 >60   GFR  >60 >60 >60   Magnesium 1.7     Glucose 238 (H) 133 (H) 131 (H)   Calcium 9.0 9.6 9.4   Phosphorus 3.4     Meter Glucose      Pro-BNP 2,043 (H) 2,195 (H) 968 (H)         IMAGING:    CXR (12/22/2020)  FINDINGS:  Adequate and symmetric aeration of the lungs. There are no formed  consolidations, pleural effusions, or pneumothoraces. Trachea and central  mainstem bronchi appear clear.  Cardiac silhouette is prominent.  New  interstitial edema.  Osseous and thoracic soft tissue structures demonstrate  no acute findings. Impression:  Cardiomegaly and bilateral interstitial edema.  Infection could potentially  have this appearance in the right clinical setting    CTA Chest (12/22/2020)  Impression:  No evidence of pulmonary embolism. Mild pulmonary edema. Mild mediastinal and bilateral hilar lymphadenopathy, nonspecific.       CARDIAC TESTING:    TTE (12/23/2020)   Summary   Severe left ventricle hypertrophy. Moderate global hypokinesis. Estimated left ventricle ejection fraction is 30-35%. Grade I diastolic dysfunction. Non dilated right ventricle with moderate dysfunction. No significant valvular abnormalities. EKG  Sinus Rhythm   Negative T-waves       ASSESSMENT:  1. Chronic HFrEF  2. ACC stage C / NYHA class  3. Near euvolemic   4. Nonischemic cardiomyopathy (reported clean cath in [place], will request records). 5. LVEF 30-35%, LVEDD 5.3, LVMI 190  6. Moderate RV dysfunction  7. Hx of uncontrolled hypertension - now controlled   8. T2DM - hgba1c 6.8  9. Obesity  10. Probable AMANUEL - has sleep study scheduled for March 2021  11. Hx of tobacco and heavy etoh abuse - denies recent use  12. Hx of medical noncompliance         PLAN:  1. Stop amlodipine / norvasc    2. Increase Entresto to 97/103 mg twice daily ( given samples of 49/51 mg - take 2 in AM and 2 in PM)    3. Start potassium supplement 40 meq daily     4. Return to CHF infusion clinic as scheduled    5. Once we review this visit will continue to adjust your heart failure medication    6. Please make follow up with our primary care clinic since you do not have a PCP     7. Follow up with prescription assistance in our basement for coverage of Entresto. 8. Follow up with Dr. Harpal Cummings in 1 month    9. Weigh yourself daily    -Stay Hydrated    -Diet should sodium restricted to 2 grams    -Again watch your daily weight trends and if you gain water weight please follow below instructions.    -If you gain 3-5 pounds in 2-3 days OR notice that you are retaining fluid in anyway just like you did before then take an extra dose of your water pill (bumetanide/Bumex) every day until you lose the weight or feel better.     -If you notice that you have taken more than 3 extra doses in 1 week then please call and let us know.     -If at any time you feel that you are retaining fluid, your medications are not working, or you feel ill in anyway, then please call us for either same day appointment or the next day, and for instructions. Our goal is to keep you out of the emergency room and the hospital and we have ways to do it. You just need to call us in a timely manner.     -If you become sick for other reasons, and notice that you are not urinating as much, the urine is very dark, you have significant diarrhea or vomiting, then please DO NOT take your water pill and CALL US immediately.       Jose LYNCH-MARBELLA  [facility]

## 2021-01-19 NOTE — PATIENT INSTRUCTIONS
1. Stop amlodipine / norvasc    2. Increase Entresto to 97/103 mg twice daily ( given samples of 49/51 mg - take 2 in AM and 2 in PM)    3. Start potassium supplement 40 meq daily     4. Return to CHF infusion clinic as scheduled    5. Once we review this visit will continue to adjust your heart failure medication    6. Please make follow up with our primary clinic since you do not have a PCP     7. Follow up with prescription assistance in our basement for coverage of Entresto. 8. Follow up with Dr. Dolores Bragg in 1 month    9. Weigh yourself daily    -Stay Hydrated    -Diet should sodium restricted to 2 grams    -Again watch your daily weight trends and if you gain water weight please follow below instructions.    -If you gain 3-5 pounds in 2-3 days OR notice that you are retaining fluid in anyway just like you did before then take an extra dose of your water pill (bumetanide/Bumex) every day until you lose the weight or feel better.     -If you notice that you have taken more than 3 extra doses in 1 week then please call and let us know. -If at any time you feel that you are retaining fluid, your medications are not working, or you feel ill in anyway, then please call us for either same day appointment or the next day, and for instructions. Our goal is to keep you out of the emergency room and the hospital and we have ways to do it. You just need to call us in a timely manner.     -If you become sick for other reasons, and notice that you are not urinating as much, the urine is very dark, you have significant diarrhea or vomiting, then please DO NOT take your water pill and CALL US immediately.

## 2021-01-19 NOTE — PROGRESS NOTES
Current weight :245lb  Previous weight:244lb on 1/14    Compliance with low sodium diet:States compliance    Medication changes:Increased Entresto to high dose  States that he is taking 2 of the 49/51mg twice daily. Response to oral diuretic: Very good, light colored urine     Shortness of Breath :None  Edema:None    Keeping Hydrated:Yes Drinks about 8-10 water bottles a day    Dizziness or lightheadedness: None, but did felt a little weak one day while going up the stairs. No SOB. No dizziness. Has no c/o. Verbally reviewed medications, does not have bottles. Labs obtained.

## 2021-01-19 NOTE — ED PROVIDER NOTES
[address]  Department of Emergency Medicine   ED  Encounter Note  Admit Date/RoomTime: 2021  4:21 PM  ED Room: ST/ST-2    NAME: Echo Richardson  : 1985  MRN: [id number]     Chief Complaint:  Medication Refill (On triamcinolone cream for chronic rash, out and needs a refill)    History of Present Illness      Echo Richardson is a 38 y.o. old male presents to the emergency department via by private vehicle requesting medication(s) as result of running out of cream for his rash. She states that he has a history of eczema he states that he has run out of his triamcinolone cream.  He denies any worsening rash he also states that he has been out of his Aquaphor. States that hot water makes his symptoms worse and the medications make his symptoms better. He states that he has not seen a dermatologist or family doctor. He is not enrolled in a pain management program. he has associated symptoms of rash. ROS   Pertinent positives and negatives are stated within HPI, all other systems reviewed and are negative. Past Medical History:  has a past medical history of CHF (congestive heart failure) ([facility] [address] and Hypertension. Surgical History:  has a past surgical history that includes [place] tooth extraction; Abscess Drainage; and ECHO Compl W Dop Color Flow (2015). Social History:  reports that he has been smoking cigars. He started smoking about 21 years ago. He has smoked for the past 8.00 years. He has never used smokeless tobacco. He reports current alcohol use. He reports that he does not use drugs. Family History: family history includes Heart Attack in his paternal grandfather; High Blood Pressure in his father and paternal grandmother. Allergies: Patient has no known allergies.     Physical Exam   Oxygen Saturation Interpretation: Normal.        ED Triage Vitals   BP Temp Temp Source Pulse Resp SpO2 Height Weight   21 1624 01/19/21 1619 01/19/21 1649 01/19/21 1619 01/19/21 1621 01/19/21 1619 -- --   122/66 95.9 °F (35.5 °C) Tympanic 98 16 96 %           Constitutional:  Alert, development consistent with age. HEENT:  NC/NT. Airway patent. Neck:  Normal ROM. Supple. Respiratory:  Clear to auscultation and breath sounds equal.    CV: Regular rate and rhythm, normal heart sounds, without pathological murmurs, ectopy, gallops, or rubs. GI:  Abdomen Soft, nontender, good bowel sounds. No firm or pulsatile mass. Integument: Erythema and cracking to the dorsal aspect of the bilateral hands  Lymphatics: No lymphangitis or adenopathy noted. Neurological:  Oriented. Motor functions intact. Lab / Imaging Results   (All laboratory and radiology results have been personally reviewed by myself)  Labs:  No results found for this visit on 01/19/21. Imaging: All Radiology results interpreted by Radiologist unless otherwise noted. No orders to display       ED Course / Medical Decision Making   Medications - No data to display     Consults:   None    Counseling/MDM:   I  have spoken with the patient and discussed todays emergency visit, in addition to providing specific details for the plan of care and counseling regarding the diagnosis and prognosis. He was counseled on the role of the emergency department regarding prescribing medications for chronic conditions including Narcotic and other controlled substances. Based on the presenting complaint and nature of illness, the requested medications will not be provided today in prescription form and he is instructed to contact their prescribing provider for any/all supplemental medications as soon as possible. Questions are answered at this time and is agreeable with the plan. Assessment      1. Atopic dermatitis, unspecified type      Plan   Discharged home.   Patient condition is good    New Medications     Discharge Medication List as of 1/19/2021  4:49 PM      START taking these medications    Details   mineral oil-hydrophilic petrolatum (AQUAPHOR) ointment Apply topically as needed. , Disp-396 g, R-0, Normal      triamcinolone (KENALOG) 0.1 % cream Apply topically 2 times daily as needed for itching DO NOT APPLY TO FACE, Disp-45 g, R-0, Normal           Electronically signed by CRIS Tom CNP   DD: 1/19/21  **This report was transcribed using voice recognition software. Every effort was made to ensure accuracy; however, inadvertent computerized transcription errors may be present.   END OF ED PROVIDER NOTE       CRIS Lauren CNP  01/19/21 [id number]

## 2021-01-20 ENCOUNTER — TELEPHONE (OUTPATIENT)
Dept: OTHER | Facility: CLINIC | Age: 36
End: 2021-01-20

## 2021-01-21 PROBLEM — I50.1 PULMONARY EDEMA CARDIAC CAUSE (HCC): Status: ACTIVE | Noted: 2021-01-21

## 2021-02-02 ENCOUNTER — HOSPITAL ENCOUNTER (OUTPATIENT)
Dept: OTHER | Age: 36
Setting detail: THERAPIES SERIES
Discharge: HOME | End: 2021-02-02
Payer: MEDICARE

## 2021-02-02 VITALS
SYSTOLIC BLOOD PRESSURE: 116 MMHG | DIASTOLIC BLOOD PRESSURE: 57 MMHG | WEIGHT: 244 LBS | HEART RATE: 88 BPM | BODY MASS INDEX: 35.01 KG/M2 | RESPIRATION RATE: 18 BRPM

## 2021-02-02 LAB
ANION GAP SERPL CALCULATED.3IONS-SCNC: 13 MMOL/L (ref 7–16)
BUN BLDV-MCNC: 11 MG/DL (ref 6–20)
CALCIUM SERPL-MCNC: 9 MG/DL (ref 8.6–10.2)
CHLORIDE BLD-SCNC: 99 MMOL/L (ref 98–107)
CO2: 26 MMOL/L (ref 22–29)
CREAT SERPL-MCNC: 0.9 MG/DL (ref 0.7–1.2)
GFR AFRICAN AMERICAN: >60
GFR NON-AFRICAN AMERICAN: >60 ML/MIN/1.73
GLUCOSE BLD-MCNC: 113 MG/DL (ref 74–99)
POTASSIUM SERPL-SCNC: 3.9 MMOL/L (ref 3.5–5)
PRO-BNP: 913 PG/ML (ref 0–125)
SODIUM BLD-SCNC: 138 MMOL/L (ref 132–146)

## 2021-02-02 PROCEDURE — 36415 COLL VENOUS BLD VENIPUNCTURE: CPT

## 2021-02-02 PROCEDURE — 80048 BASIC METABOLIC PNL TOTAL CA: CPT

## 2021-02-02 PROCEDURE — 99214 OFFICE O/P EST MOD 30 MIN: CPT

## 2021-02-02 PROCEDURE — 83880 ASSAY OF NATRIURETIC PEPTIDE: CPT

## 2021-02-02 NOTE — PLAN OF CARE
Problem: Fluid Volume:  Goal: Hemodynamic stability will improve  Description: Hemodynamic stability will improve  2/2/2021 6498 by Anh Evans RN  Outcome: Ongoing  2/2/2021 0649 by Anh Evans RN  Outcome: Ongoing  Goal: Ability to maintain a balanced intake and output will improve  Description: Ability to maintain a balanced intake and output will improve  2/2/2021 0652 by Anh Evans RN  Outcome: Ongoing  2/2/2021 0649 by Anh Evans RN  Outcome: Ongoing     Problem: FLUID AND ELECTROLYTE IMBALANCE  Goal: Fluid and electrolyte balance are achieved/maintained  Outcome: Ongoing

## 2021-02-02 NOTE — PROGRESS NOTES
Current weight : 244lb    Previous weight:245lb on 1-19      Compliance with low sodium diet:States yes    Medication changes: Norvasc stopped, Entresto increased to high dose, and Potassium Chloride started  Reviewed medication bottles. Metoprolol ER Succinate bottle is 50 mg daily, not 100 mg daily as listed on medication list. Rx from Dr Eve Bello MD    Response to oral diuretic: States good     Color of urine: light yellow    Shortness of Breath : Only with exertion    Edema:None    Keeping Hydrated:Yes    Dizziness or lightheadedness: Denies      Labs obtained.

## 2021-02-03 ENCOUNTER — TELEPHONE (OUTPATIENT)
Dept: CARDIOLOGY CLINIC | Age: 36
End: 2021-02-03

## 2021-02-03 DIAGNOSIS — I50.22 CHRONIC SYSTOLIC HEART FAILURE (HCC): Primary | ICD-10-CM

## 2021-02-03 RX ORDER — METOPROLOL SUCCINATE 100 MG/1
100 TABLET, EXTENDED RELEASE ORAL DAILY
Qty: 90 TABLET | Refills: 1 | Status: SHIPPED
Start: 2021-02-03 | End: 2021-02-22 | Stop reason: SDUPTHER

## 2021-02-03 NOTE — RESULT ENCOUNTER NOTE
Labs and CHF clinic note reviewed  Vitals at CHF clinic /57  : Pulse 88    Per CHF clinic note patient taking Toprol 50 mg daily versus reported 100 mg daily     Please have him increase Toprol to 100 mg daily     Has follow up CHF clinic appointment on 2/16/2021 - will continue to monitor labs and vitals closely and titrate GDMT as able. Thank you.

## 2021-02-16 ENCOUNTER — HOSPITAL ENCOUNTER (OUTPATIENT)
Dept: OTHER | Age: 36
Setting detail: THERAPIES SERIES
Discharge: HOME | End: 2021-02-16
Payer: MEDICARE

## 2021-02-22 ENCOUNTER — OFFICE VISIT (OUTPATIENT)
Dept: CARDIOLOGY CLINIC | Age: 36
End: 2021-02-22
Payer: MEDICARE

## 2021-02-22 VITALS
BODY MASS INDEX: 36.31 KG/M2 | HEIGHT: 70 IN | HEART RATE: 82 BPM | DIASTOLIC BLOOD PRESSURE: 82 MMHG | WEIGHT: 253.6 LBS | SYSTOLIC BLOOD PRESSURE: 134 MMHG

## 2021-02-22 DIAGNOSIS — I50.22 CHRONIC SYSTOLIC HEART FAILURE (HCC): Primary | ICD-10-CM

## 2021-02-22 DIAGNOSIS — I50.23 ACUTE ON CHRONIC SYSTOLIC HEART FAILURE (HCC): ICD-10-CM

## 2021-02-22 PROCEDURE — 93000 ELECTROCARDIOGRAM COMPLETE: CPT | Performed by: INTERNAL MEDICINE

## 2021-02-22 PROCEDURE — 99214 OFFICE O/P EST MOD 30 MIN: CPT | Performed by: NURSE PRACTITIONER

## 2021-02-22 RX ORDER — METOPROLOL SUCCINATE 100 MG/1
100 TABLET, EXTENDED RELEASE ORAL EVERY MORNING
Qty: 90 TABLET | Refills: 1 | Status: SHIPPED
Start: 2021-02-22 | End: 2021-07-02 | Stop reason: SDUPTHER

## 2021-02-22 RX ORDER — METOPROLOL SUCCINATE 50 MG/1
50 TABLET, EXTENDED RELEASE ORAL NIGHTLY
Qty: 30 TABLET | Refills: 3 | Status: SHIPPED
Start: 2021-02-22 | End: 2021-07-02 | Stop reason: SDUPTHER

## 2021-02-22 RX ORDER — SPIRONOLACTONE 25 MG/1
25 TABLET ORAL DAILY
Qty: 30 TABLET | Refills: 6 | Status: ON HOLD
Start: 2021-02-22 | End: 2021-09-17 | Stop reason: HOSPADM

## 2021-02-22 NOTE — PROGRESS NOTES
[facility]  Office Visit         Reason for Visit: Heart Failure    Primary Cardiologist: Dr. Ernesto Colunga         History of Present Illness:     Mr. Akash Lujan is a 38year old male with a PMHx of chronic HFrEF, nonischemic cardiomyopathy, HTN, T2DM, obesity, hx of tobacco and ETOH abuse    He presents today in 1 month follow-up, since his last office appointment he denies any urgent care visits or hospitalizations. He has been compliant with all of his current cardiac medications. He also is following with the CHF infusion clinic. He reports good urinary response to oral diuretic with clear yellow urine production. He is staying well-hydrated and attempts to monitor the sodium in his diet. He has failed to establish with PCP since last office appointment and has started taking his mother's Metformin as he ran out of prescription from hospitalization. He denies dyspnea with exertion, shortness of breath, or decline in overall functional capacity. He denies orthopnea, PND, nocturnal cough or hemoptysis. He denies abdominal distention or bloating, early satiety, anorexia/change in appetite, unintentional weight loss. He does lower extremity edema. He denies exertional lightheadedness. He denies palpitations, syncope or near syncope. Review of systems is negative for chest pain, pressure, discomfort. When ambulating on an incline, He does not leg claudication. History is negative for neurological symptoms including transient loss of vision, asymmetric weakness, aphasia, dysphasia, numbness, tingling. Past medical, surgical, family and social histories have been reviewed. Any changes in the past medical history, social history or family history have been made and are reflected in the electronic medical record.          Patient Active Problem List    Diagnosis Date Noted    Pulmonary edema cardiac cause ([address] 01/21/2021    Acute on chronic systolic heart failure ([address] 12/23/2020    Acute on chronic diastolic [facility] failure ([facility]     Essential hypertension     Chest pain     DM (diabetes mellitus) [facility]     Asthma      PAST MEDICAL HISTORY:    1. Obesity. 2. Hypertension. 3. TTE (5/5/2015, Dr. Jones Gutierrez)  Summary: Ejection fraction is visually estimated at 60%. No regional wall motion abnormalities seen. Moderate left ventricular concentric hypertrophy noted. There is doppler evidence of stage II diastolic dysfunction. Physiologic and/or trace mitral regurgitation is present. 4. Chronic HFrEF. Non-ischemic cardiomyopathy diagnosed around October/November 2020 during hospitalization in [place] with normal coronary arteries on left heart cath per the patient. No records available for review at this time. 5. Medical non-compliance. Past Medical History:   Diagnosis Date    [facility]congest[facility] failure) ([facility]     Hypertension        Past Surgical History:   Procedure Laterality Date    ABSCESS DRAINAGE      buttocks    ECHO COMPL W DOP COLOR FLOW  5/5/2015         WISDOM TOOTH EXTRACTION           No Known Allergies      Outpatient Medications Marked as Taking for the 2/22/21 encounter (Office Visit) with CRIS Gerber CNP   Medication Sig Dispense Refill    metoprolol succinate (TOPROL XL) 100 MG extended release tablet Take 1 tablet by mouth daily 90 tablet 1    sacubitril-valsartan (ENTRESTO)  MG per tablet Take 1 tablet by mouth 2 times daily 60 tablet 3    potassium chloride (KLOR-CON M) 20 MEQ extended release tablet Take 2 tablets by mouth daily 180 tablet 1    mineral oil-hydrophilic petrolatum (AQUAPHOR) ointment Apply topically as needed.  396 g 0    triamcinolone (KENALOG) 0.1 % cream Apply topically 2 times daily as needed for itching DO NOT APPLY TO FACE 45 g 0    metFORMIN (GLUCOPHAGE) 500 MG tablet Take 1 tablet by mouth daily (with breakfast) (Patient taking differently: Take 500 mg by mouth daily (with breakfast) *Says he is taking his mother's pill) 30 tablet 1    bumetanide (BUMEX) 2 MG tablet Take 1 tablet by mouth 2 times daily 30 tablet 3    spironolactone (ALDACTONE) 25 MG tablet Take 1 tablet by mouth daily 30 tablet 3       Guideline directed medical/device therapy:  ARNI/ACE I/ARB: Yes  Beta blocker: Yes  Aldosterone antagonist:  Yes  ICD/CRT-P/-D:  none  QRS interval on recent ECG (personally reviewed/interpreted): <120 ms  Percentage RV pacing (personally reviewed/interpreted): %/NA          Review of Systems:   Cardiac: As per HPI  General: No fever, chills, rigors  Pulmonary: As per HPI  HEENT: No visual disturbances, difficult swallowing  GI: No nausea, vomiting, abdominal pain  : No dysuria or hematuria  Endocrine: No thyroid disease or diabetes  Musculoskeletal: MARIA x 4, no focal motor deficits  Skin: Intact, no rashes  Neuro/Psych: No headache or seizures      Weights: Wt Readings from Last 3 Encounters:   02/22/21 253 lb 9.6 oz (115 kg)   02/02/21 244 lb (110.7 kg)   01/19/21 245 lb (111.1 kg)         Physical Examination:     /82   Pulse 82   Ht 5' 10\" (1.778 m)   Wt 253 lb 9.6 oz (115 kg)   BMI 36.39 kg/m²     CONSTITUTIONAL: Alert and oriented times 3, no acute distress and cooperative to examination with proper mood and affect. SKIN: Skin color, texture, turgor normal. No rashes or lesions. LYMPH: no cervical nodes, no inguinal nodes  HEENT: Head is normocephalic, atraumatic. EOMI, PERRLA. NECK: Supple, symmetrical, trachea midline, no adenopathy, thyroid symmetric, not enlarged and no tenderness, skin normal. No jvd/hjr. CHEST/LUNGS: chest symmetric with normal A/P diameter, normal respiratory rate and rhythm, lungs clear to auscultation without wheezes, rales or rhonchi. No accessory muscle use. Scars None   CARDIOVASCULAR: Heart sounds are normal.  Regular rate and rhythm without murmur, gallop or rub. Normal S1 and S2. . Carotid and femoral pulses 2+/4 and equal bilaterally. ABDOMEN: Obese/soft.  No and Laparoscopic scar(s) present. Normal bowel sounds. No bruits. soft, nondistended, no masses or organomegaly. no evidence of hernia. Percussion: Normal without hepatosplenomegally. Tenderness: absent. RECTAL: deferred, not clinically indicated  NEUROLOGIC: There are no focalizing motor or sensory deficits. CN II-XII are grossly intact. EXTREMITIES: no cyanosis, no clubbing. 1+ bilateral lower extremity edema. Warm and well perfused. All the following diagnostics were personally reviewed and interpreted by me. LAB DATA:     1/14/2021 13:15 1/19/2021 12:25 2/2/2021 13:15   Sodium 138 137 138   Potassium 3.6 3.3 (L) 3.9   Chloride 99 96 (L) 99   CO2 26 28 26   BUN 15 15 11   Creatinine 0.9 1.1 0.9   Anion Gap 13 13 13   GFR Non- >60 >60 >60   GFR  >60 >60 >60   Glucose 133 (H) 131 (H) 113 (H)   Calcium 9.6 9.4 9.0   Pro-BNP 2,195 (H) 968 (H) 913 (H)         IMAGING:    CXR (12/22/2020)  FINDINGS:  Adequate and symmetric aeration of the lungs. There are no formed  consolidations, pleural effusions, or pneumothoraces. Trachea and central  mainstem bronchi appear clear.  Cardiac silhouette is prominent.  New  interstitial edema.  Osseous and thoracic soft tissue structures demonstrate  no acute findings. Impression:  Cardiomegaly and bilateral interstitial edema.  Infection could potentially  have this appearance in the right clinical setting    CTA Chest (12/22/2020)  Impression:  No evidence of pulmonary embolism. Mild pulmonary edema. Mild mediastinal and bilateral hilar lymphadenopathy, nonspecific. CARDIAC TESTING:    TTE (12/23/2020)   Summary   Severe left ventricle hypertrophy. Moderate global hypokinesis. Estimated left ventricle ejection fraction is 30-35%. Grade I diastolic dysfunction. Non dilated right ventricle with moderate dysfunction. No significant valvular abnormalities. EKG  Sinus Rhythm   Negative T-waves       ASSESSMENT:  1.  Chronic HFrEF  2. ACC stage C / NYHA class  3. Near euvolemic   4. Nonischemic cardiomyopathy (reported clean cath in [place], records previously requested). 5. LVEF 30-35%, LVEDD 5.3, LVMI 190  6. Moderate RV dysfunction  7. Hx of uncontrolled hypertension - now controlled   8. T2DM - hgba1c 6.8  9. Obesity  10. Probable AMANUEL - has sleep study scheduled for April 2021  11. Hx of tobacco and heavy etoh abuse - denies recent use  12. Hx of medical noncompliance         PLAN:  1. Increase metoprolol succinate ( Toprol) to 100 mg in the morning and 50 mg nightly    2. Continue rest of current cardiac medications     3. Continue to follow with CHF clinic    4. Referral to cardiac rehab    5. Has sleep study scheduled for April 6. Please make appointment with PCP to establish     7. Follow up with Dr. Rupali Fernandez in 3 months with echocardiogram prior to appointment. 8. Weigh yourself daily    -Stay Hydrated    -Diet should sodium restricted to 2 grams    -Again watch your daily weight trends and if you gain water weight please follow below instructions.    -If you gain 3-5 pounds in 2-3 days OR notice that you are retaining fluid in anyway just like you did before then take an extra dose of your water pill (bumetanide/Bumex) every day until you lose the weight or feel better.    -If you notice that you have taken more than 2 extra doses in 1 week then please call and let us know. -If at any time you feel that you are retaining fluid, your medications are not working, or you feel ill in anyway, then please call us for either same day appointment or the next day, and for instructions. Our goal is to keep you out of the emergency room and the hospital and we have ways to do it.  You just need to call us in a timely manner.     -If you become sick for other reasons, and notice that you are not urinating as much, the urine is very dark, you have significant diarrhea or vomiting, then please DO NOT take your water pill and CALL US immediately.         Kika LYNCH-CNP  [facility]

## 2021-02-23 ENCOUNTER — HOSPITAL ENCOUNTER (OUTPATIENT)
Dept: OTHER | Age: 36
Setting detail: THERAPIES SERIES
Discharge: HOME | End: 2021-02-23
Payer: MEDICARE

## 2021-02-23 VITALS
WEIGHT: 251 LBS | RESPIRATION RATE: 18 BRPM | BODY MASS INDEX: 36.01 KG/M2 | DIASTOLIC BLOOD PRESSURE: 65 MMHG | SYSTOLIC BLOOD PRESSURE: 107 MMHG | HEART RATE: 112 BPM

## 2021-02-23 LAB
ANION GAP SERPL CALCULATED.3IONS-SCNC: 14 MMOL/L (ref 7–16)
BUN BLDV-MCNC: 15 MG/DL (ref 6–20)
CALCIUM SERPL-MCNC: 8.8 MG/DL (ref 8.6–10.2)
CHLORIDE BLD-SCNC: 103 MMOL/L (ref 98–107)
CO2: 23 MMOL/L (ref 22–29)
CREAT SERPL-MCNC: 0.8 MG/DL (ref 0.7–1.2)
GFR AFRICAN AMERICAN: >60
GFR NON-AFRICAN AMERICAN: >60 ML/MIN/1.73
GLUCOSE BLD-MCNC: 191 MG/DL (ref 74–99)
POTASSIUM SERPL-SCNC: 4 MMOL/L (ref 3.5–5)
PRO-BNP: 405 PG/ML (ref 0–125)
SODIUM BLD-SCNC: 140 MMOL/L (ref 132–146)

## 2021-02-23 PROCEDURE — 99214 OFFICE O/P EST MOD 30 MIN: CPT

## 2021-02-23 PROCEDURE — 80048 BASIC METABOLIC PNL TOTAL CA: CPT

## 2021-02-23 PROCEDURE — 36415 COLL VENOUS BLD VENIPUNCTURE: CPT

## 2021-02-23 PROCEDURE — 83880 ASSAY OF NATRIURETIC PEPTIDE: CPT

## 2021-02-23 NOTE — PROGRESS NOTES
Current weight : 251lb    Previous weight:244lb on on 2/2/2021    Compliance with low sodium diet: tries to follow closely    Medication changes: Metoprolol increased to 100mg in Am and 50 mg in PM    Response to oral diuretic: very good, pt admits to eating more. Color of urine:pale yellow      Shortness of Breath :denies. Breath sounds clear    Edema:none    Keeping Hydrated: yes, does admit to drinking energy drinks,  Instructed pt on side effects of energy, increasing heart rate, pt verbalized understanding. Dizziness or lightheadedness: denies    Labs Drawn.

## 2021-03-15 DIAGNOSIS — G47.33 OBSTRUCTIVE SLEEP APNEA (ADULT) (PEDIATRIC): Primary | ICD-10-CM

## 2021-03-16 ENCOUNTER — HOSPITAL ENCOUNTER (OUTPATIENT)
Dept: OTHER | Age: 36
Setting detail: THERAPIES SERIES
Discharge: HOME | End: 2021-03-16
Payer: MEDICARE

## 2021-03-26 ENCOUNTER — TELEPHONE (OUTPATIENT)
Dept: CARDIOLOGY CLINIC | Age: 36
End: 2021-03-26

## 2021-03-26 DIAGNOSIS — I50.22 CHRONIC SYSTOLIC HEART FAILURE (HCC): Primary | ICD-10-CM

## 2021-03-26 RX ORDER — BUMETANIDE 2 MG/1
2 TABLET ORAL 2 TIMES DAILY
Qty: 60 TABLET | Refills: 3 | Status: ON HOLD
Start: 2021-03-26 | End: 2021-09-17 | Stop reason: HOSPADM

## 2021-03-26 NOTE — TELEPHONE ENCOUNTER
Spoke with patient    quarantining for 14 days. (had to cancel chf visit0   He Will set f/u after cleared too. Feels ok. Weight stable, making good urine. 247# weight  Denies dizzy/lightheaded  No bp cuff at home. Monitoring for worsening s/s chf and dehydration  Need refill of bumex. escribed refill per SELIN Sorensen CNP       Titration patient to metoprolol  With next labs/ vitals.

## 2021-04-05 ENCOUNTER — HOSPITAL ENCOUNTER (OUTPATIENT)
Dept: SLEEP CENTER | Age: 36
Discharge: HOME | End: 2021-04-05
Payer: MEDICARE

## 2021-04-05 DIAGNOSIS — I50.20 HFREF (HEART FAILURE WITH REDUCED EJECTION FRACTION) (HCC): ICD-10-CM

## 2021-04-05 DIAGNOSIS — G47.33 OSA (OBSTRUCTIVE SLEEP APNEA): ICD-10-CM

## 2021-04-05 PROCEDURE — 95811 POLYSOM 6/>YRS CPAP 4/> PARM: CPT

## 2021-04-06 VITALS
TEMPERATURE: 96.6 F | HEART RATE: 82 BPM | WEIGHT: 260 LBS | BODY MASS INDEX: 37.22 KG/M2 | DIASTOLIC BLOOD PRESSURE: 81 MMHG | SYSTOLIC BLOOD PRESSURE: 132 MMHG | HEIGHT: 70 IN | OXYGEN SATURATION: 98 %

## 2021-04-06 ASSESSMENT — SLEEP AND FATIGUE QUESTIONNAIRES
HOW LIKELY ARE YOU TO NOD OFF OR FALL ASLEEP IN A CAR, WHILE STOPPED FOR A FEW MINUTES IN TRAFFIC: 1
HOW LIKELY ARE YOU TO NOD OFF OR FALL ASLEEP WHEN YOU ARE A PASSENGER IN A CAR FOR AN HOUR WITHOUT A BREAK: 3
HOW LIKELY ARE YOU TO NOD OFF OR FALL ASLEEP WHILE LYING DOWN TO REST IN THE AFTERNOON WHEN CIRCUMSTANCES PERMIT: 0

## 2021-04-08 NOTE — PROGRESS NOTES
Auto-CPAP at 7 to 17 cm of water pressure. 2.  ResMed AirFit F10 mask, size medium with heated humidification. 3.  The patient to be seen in 6 to 10 weeks. 4.  When the patient is seen, if CPAP download is abnormal or if the  patient remains symptomatic, he will be referred back to [address] for a full night of positive airway pressure titration.         Carol Lewis MD  [employer]    D: 04/07/2021 14:51:44       T: 04/07/2021 14:58:50     [email or web address]  Job#: [id number]     Doc#: [id number]    CC:

## 2021-04-23 ENCOUNTER — TELEPHONE (OUTPATIENT)
Dept: CARDIOLOGY | Age: 36
End: 2021-04-23

## 2021-05-18 ENCOUNTER — HOSPITAL ENCOUNTER (EMERGENCY)
Age: 36
Discharge: HOME | End: 2021-05-18
Payer: MEDICARE

## 2021-05-18 VITALS
HEIGHT: 71 IN | TEMPERATURE: 97 F | RESPIRATION RATE: 16 BRPM | WEIGHT: 274 LBS | DIASTOLIC BLOOD PRESSURE: 92 MMHG | SYSTOLIC BLOOD PRESSURE: 168 MMHG | BODY MASS INDEX: 38.36 KG/M2 | HEART RATE: 99 BPM | OXYGEN SATURATION: 97 %

## 2021-05-18 DIAGNOSIS — Z76.0 MEDICATION REFILL: Primary | ICD-10-CM

## 2021-05-18 PROCEDURE — 99282 EMERGENCY DEPT VISIT SF MDM: CPT

## 2021-05-18 RX ORDER — TRIAMCINOLONE ACETONIDE 5 MG/G
CREAM TOPICAL
Qty: 454 G | Refills: 0 | Status: SHIPPED | OUTPATIENT
Start: 2021-05-18 | End: 2021-05-25

## 2021-05-20 ENCOUNTER — HOSPITAL ENCOUNTER (OUTPATIENT)
Dept: CARDIAC REHAB | Age: 36
Setting detail: THERAPIES SERIES
Discharge: HOME | End: 2021-05-20
Payer: MEDICARE

## 2021-05-20 VITALS
BODY MASS INDEX: 39.08 KG/M2 | WEIGHT: 273 LBS | RESPIRATION RATE: 20 BRPM | OXYGEN SATURATION: 96 % | SYSTOLIC BLOOD PRESSURE: 119 MMHG | DIASTOLIC BLOOD PRESSURE: 72 MMHG | HEART RATE: 88 BPM | HEIGHT: 70 IN

## 2021-05-20 PROCEDURE — 93798 PHYS/QHP OP CAR RHAB W/ECG: CPT

## 2021-05-20 ASSESSMENT — PATIENT HEALTH QUESTIONNAIRE - PHQ9
SUM OF ALL RESPONSES TO PHQ QUESTIONS 1-9: 7
SUM OF ALL RESPONSES TO PHQ QUESTIONS 1-9: 7

## 2021-05-20 NOTE — ED PROVIDER NOTES
[address]  Department of Emergency Medicine   ED  Encounter Note  Admit Date/RoomTime: 2021 12:50 PM  ED Room: CHAIR01/    NAME: Monserrat Baum  : 1985  MRN: [id number]     Chief Complaint:  Medication Reaction (has needs meds for his eczema)    History of Present Illness      Monserrat Baum is a 38 y.o. old male presents to the emergency department via by private vehicle requesting medication(s) as result of running out of prescription medication. He has eczema and is out of his triamcinolone ointment. He plans to establish with the internal medicine clinic here. He has no other complaints or concerns. ROS   Pertinent positives and negatives are stated within HPI, all other systems reviewed and are negative. Past Medical History:  has a past medical history of CHF (congestive heart failure) ([address] and Hypertension. Surgical History:  has a past surgical history that includes [place] tooth extraction; Abscess Drainage; and ECHO Compl W Dop Color Flow (2015). Social History:  reports that he has been smoking cigars. He started smoking about 21 years ago. He has smoked for the past 8.00 years. He has never used smokeless tobacco. He reports current alcohol use. He reports that he does not use drugs. Family History: family history includes Heart Attack in his paternal grandfather; High Blood Pressure in his father and paternal grandmother. Allergies: Patient has no known allergies. Physical Exam   Oxygen Saturation Interpretation: Normal.        ED Triage Vitals   BP Temp Temp src Pulse Resp SpO2 Height Weight   21 1246 21 1237 -- 21 1237 21 1237 21 1237 21 1246 21 1246   (!) 168/92 97 °F (36.1 °C)  121 18 95 % 5' 11\" (1.803 m) 274 lb (124.3 kg)         Constitutional:  Alert, development consistent with age. HEENT:  NC/NT. Airway patent. Neck:  Normal ROM. Supple.   Respiratory:  Clear to auscultation and breath sounds equal.    CV: Regular rate and rhythm, normal heart sounds, without pathological murmurs, ectopy, gallops, or rubs. GI:  Abdomen Soft, nontender, good bowel sounds. No firm or pulsatile mass. Integument:  No rashes, erythema present. Lymphatics: No lymphangitis or adenopathy noted. Neurological:  Oriented. Motor functions intact. Lab / Imaging Results   (All laboratory and radiology results have been personally reviewed by myself)  Labs:  No results found for this visit on 05/18/21. Imaging: All Radiology results interpreted by Radiologist unless otherwise noted. No orders to display       ED Course / Medical Decision Making   Medications - No data to display     Consults:   None    Counseling/MDM:   Myself  have spoken with the patient and discussed todays emergency visit, in addition to providing specific details for the plan of care and counseling regarding the diagnosis and prognosis. He was counseled on the role of the emergency department regarding prescribing medications for chronic conditions including Narcotic and other controlled substances. Based on the presenting complaint and nature of illness, the requested medications will be provided today in prescription form and he is instructed to contact their prescribing provider for any/all supplemental medications as soon as possible. Questions are answered at this time and is agreeable with the plan. Assessment      1. Medication refill      Plan   Discharge home. Patient condition is good    New Medications     Discharge Medication List as of 5/18/2021 12:55 PM      START taking these medications    Details   triamcinolone (ARISTOCORT) 0.5 % cream Apply topically to affected areas 2 times daily. , Disp-454 g, R-0, Print           Electronically signed by CRIS Hannon CNP   DD: 5/20/21  **This report was transcribed using voice recognition software.  Every effort was made to ensure accuracy; however,

## 2021-05-26 ENCOUNTER — TELEPHONE (OUTPATIENT)
Dept: OTHER | Age: 36
End: 2021-05-26

## 2021-05-28 ENCOUNTER — HOSPITAL ENCOUNTER (OUTPATIENT)
Dept: CARDIAC REHAB | Age: 36
Setting detail: THERAPIES SERIES
Discharge: HOME | End: 2021-05-28
Payer: MEDICARE

## 2021-05-28 PROCEDURE — 93798 PHYS/QHP OP CAR RHAB W/ECG: CPT

## 2021-06-02 ENCOUNTER — HOSPITAL ENCOUNTER (OUTPATIENT)
Dept: CARDIAC REHAB | Age: 36
Setting detail: THERAPIES SERIES
Discharge: HOME | End: 2021-06-02
Payer: COMMERCIAL

## 2021-06-02 PROCEDURE — 93798 PHYS/QHP OP CAR RHAB W/ECG: CPT

## 2021-06-04 ENCOUNTER — HOSPITAL ENCOUNTER (OUTPATIENT)
Dept: CARDIAC REHAB | Age: 36
Setting detail: THERAPIES SERIES
Discharge: HOME | End: 2021-06-04
Payer: COMMERCIAL

## 2021-06-04 PROCEDURE — 93798 PHYS/QHP OP CAR RHAB W/ECG: CPT

## 2021-06-07 ENCOUNTER — HOSPITAL ENCOUNTER (OUTPATIENT)
Dept: CARDIAC REHAB | Age: 36
Setting detail: THERAPIES SERIES
Discharge: HOME | End: 2021-06-07
Payer: COMMERCIAL

## 2021-06-07 PROCEDURE — 93798 PHYS/QHP OP CAR RHAB W/ECG: CPT

## 2021-06-09 ENCOUNTER — HOSPITAL ENCOUNTER (OUTPATIENT)
Dept: CARDIAC REHAB | Age: 36
Setting detail: THERAPIES SERIES
Discharge: HOME | End: 2021-06-09
Payer: COMMERCIAL

## 2021-06-09 PROCEDURE — 93798 PHYS/QHP OP CAR RHAB W/ECG: CPT

## 2021-06-11 ENCOUNTER — HOSPITAL ENCOUNTER (OUTPATIENT)
Dept: CARDIAC REHAB | Age: 36
Setting detail: THERAPIES SERIES
Discharge: HOME | End: 2021-06-11
Payer: COMMERCIAL

## 2021-06-11 PROCEDURE — 93798 PHYS/QHP OP CAR RHAB W/ECG: CPT

## 2021-06-30 ENCOUNTER — TELEPHONE (OUTPATIENT)
Dept: CARDIOLOGY CLINIC | Age: 36
End: 2021-06-30

## 2021-06-30 NOTE — TELEPHONE ENCOUNTER
CALLED [facility] SEVERAL TIMES BUT THERE IS NO ANSWER AND NO VOICEMAIL.   I LEFT A MESSAGE WITH CHEREYELL MAUZY TO HAVE HIM CALL ASAP TO CONFIRM THE APPT RLL

## 2021-07-02 ENCOUNTER — OFFICE VISIT (OUTPATIENT)
Dept: CARDIOLOGY CLINIC | Age: 36
End: 2021-07-02
Payer: COMMERCIAL

## 2021-07-02 VITALS
HEIGHT: 70 IN | SYSTOLIC BLOOD PRESSURE: 124 MMHG | WEIGHT: 281 LBS | BODY MASS INDEX: 40.23 KG/M2 | DIASTOLIC BLOOD PRESSURE: 68 MMHG | RESPIRATION RATE: 20 BRPM | HEART RATE: 123 BPM

## 2021-07-02 DIAGNOSIS — G47.33 OSA (OBSTRUCTIVE SLEEP APNEA): ICD-10-CM

## 2021-07-02 DIAGNOSIS — Z91.199 MEDICAL NON-COMPLIANCE: ICD-10-CM

## 2021-07-02 DIAGNOSIS — I10 ESSENTIAL HYPERTENSION: ICD-10-CM

## 2021-07-02 DIAGNOSIS — E11.9 TYPE 2 DIABETES MELLITUS WITHOUT COMPLICATION, WITHOUT LONG-TERM CURRENT USE OF INSULIN (HCC): ICD-10-CM

## 2021-07-02 DIAGNOSIS — R00.0 SINUS TACHYCARDIA: ICD-10-CM

## 2021-07-02 DIAGNOSIS — I50.20 HFREF (HEART FAILURE WITH REDUCED EJECTION FRACTION) (HCC): ICD-10-CM

## 2021-07-02 DIAGNOSIS — E66.01 MORBID OBESITY DUE TO EXCESS CALORIES (HCC): ICD-10-CM

## 2021-07-02 DIAGNOSIS — I50.22 CHRONIC SYSTOLIC HEART FAILURE (HCC): Primary | ICD-10-CM

## 2021-07-02 DIAGNOSIS — J45.20 MILD INTERMITTENT ASTHMA WITHOUT COMPLICATION: ICD-10-CM

## 2021-07-02 PROCEDURE — 99214 OFFICE O/P EST MOD 30 MIN: CPT | Performed by: INTERNAL MEDICINE

## 2021-07-02 PROCEDURE — 93000 ELECTROCARDIOGRAM COMPLETE: CPT | Performed by: INTERNAL MEDICINE

## 2021-07-02 RX ORDER — METOPROLOL SUCCINATE 50 MG/1
50 TABLET, EXTENDED RELEASE ORAL NIGHTLY
Qty: 90 TABLET | Refills: 3 | Status: ON HOLD | OUTPATIENT
Start: 2021-07-02 | End: 2021-07-31 | Stop reason: HOSPADM

## 2021-07-02 RX ORDER — TRIAMCINOLONE ACETONIDE 1 MG/G
CREAM TOPICAL
Status: ON HOLD | COMMUNITY
Start: 2021-05-20 | End: 2021-07-31 | Stop reason: HOSPADM

## 2021-07-02 RX ORDER — METOPROLOL SUCCINATE 100 MG/1
100 TABLET, EXTENDED RELEASE ORAL EVERY MORNING
Qty: 90 TABLET | Refills: 3 | Status: ON HOLD | OUTPATIENT
Start: 2021-07-02 | End: 2021-07-31 | Stop reason: HOSPADM

## 2021-07-02 NOTE — PROGRESS NOTES
OFFICE VISIT        PRIMARY CARE PHYSICIAN:    No primary care provider on file. ALLERGIES / SENSITIVITIES:    No Known Allergies       REVIEWED MEDICATIONS:      Current Outpatient Medications:     metoprolol succinate (TOPROL XL) 100 MG extended release tablet, Take 1 tablet by mouth every morning, Disp: 90 tablet, Rfl: 3    metoprolol succinate (TOPROL XL) 50 MG extended release tablet, Take 1 tablet by mouth nightly, Disp: 90 tablet, Rfl: 3    bumetanide (BUMEX) 2 MG tablet, Take 1 tablet by mouth 2 times daily, Disp: 60 tablet, Rfl: 3    spironolactone (ALDACTONE) 25 MG tablet, Take 1 tablet by mouth daily, Disp: 30 tablet, Rfl: 6    sacubitril-valsartan (ENTRESTO)  MG per tablet, Take 1 tablet by mouth 2 times daily, Disp: 60 tablet, Rfl: 3    potassium chloride (KLOR-CON M) 20 MEQ extended release tablet, Take 2 tablets by mouth daily, Disp: 180 tablet, Rfl: 1    metFORMIN (GLUCOPHAGE) 500 MG tablet, Take 1 tablet by mouth daily (with breakfast) (Patient taking differently: Take 500 mg by mouth daily (with breakfast) *Says he is taking his mother's pill), Disp: 30 tablet, Rfl: 1    triamcinolone (KENALOG) 0.1 % cream, , Disp: , Rfl:     mineral oil-hydrophilic petrolatum (AQUAPHOR) ointment, Apply topically as needed. (Patient not taking: Reported on 7/2/2021), Disp: 396 g, Rfl: 0      S: REASON FOR VISIT:    Heart failure with reduced ejection fraction. Mr. Shelton Gaitan is a 26-year-old male with past medical/surgical history as described below. He ran out of his Toprol around 3 weeks ago: He states that he was out of town and could not get refills. He states that he is taking all the rest of his medications as prescribed. He continues to go to Cardiac Rehab 3 times a week. He denies any chest pain. He denies any worsening exertional dyspnea. He denies any orthopnea, PND's, lower extremity swelling, palpitations, dizziness, presyncope or syncope.   Patient had a sleep study on 4/5/2020, which showed severe obstructive sleep apnea. REVIEW OF SYSTEMS:    CONSTITUTIONAL:  Denies fevers, chills, night sweats or fatigue. HEENT:  Denies any unusual headaches. Denies recent changes in hearing or vision. Denies dysphagia, hoarseness, hemoptysis, hematemesis or epistaxis. ENDOCRINE:  Denies polyphagia, polydipsia or polyuria. Denies heat or cold intolerance. MUSCULOSKELETAL:  Denies any significant arthralgias or myalgias. SKIN:  Denies rashes, ulcers or itching. HEME/LYMPH:  Denies any palpable lymph nodes, bleeding or easy bruisability. HEART:  As above. LUNGS:  Denies cough or sputum production. GI: Denies abdominal pain, nausea, vomiting, diarrhea, constipation, rectal bleeding or tarry stools. :  Denies hematuria or dysuria. PSYCHIATRIC:  Denies mood changes, anxiety or depression. NEUROLOGIC:  Denies memory loss, motor weakness, numbness, tingling or tremors. PAST MEDICAL/SURGICAL HISTORY:    1.  Obesity. 2.  Hypertension. 3.  Chronic HFrEF. Nonischemic cardiomyopathy, diagnosed around  during hospitalization in [place] with normal coronary arteries on left heart catheterization per patient. No records available for review. 4.  Echocardiogram done on 2020 reported as showing severe left ventricular hypertrophy with global hypokinesis with an estimated ejection fraction of 30-35% with grade 1 diastolic dysfunction with nondilated right ventricle with moderate dysfunction and with no significant valvular abnormalities. 5.  Diabetes mellitus. 6.  Asthma. 7.  History of abscess drainage from buttocks. 8.  History of wisdom tooth extraction. 9.  Sleep study done on 2021 was reported as showing severe obstructive sleep apnea: Patient was started on CPAP. 10. Medical noncompliance. FAMILY HISTORY:  Paternal grandfather had a myocardial infarction and  at age 48. SOCIAL HISTORY:  Patient smokes a cigar occasionally.   He drinks a few beers a couple times per week:  Has a history of prior heavy alcohol abuse. He denies illicit drug use. O:  COMPLETE PHYSICAL EXAM:      /68 (Site: Right Upper Arm, Position: Sitting)   Pulse 123   Resp 20   Ht 5' 10\" (1.778 m)   Wt 281 lb (127.5 kg)   BMI 40.32 kg/m²      General:  Well-developed, well-nourished, morbidly obese male in no acute distress. HEENT: Normocephalic and atraumatic head. No JVD. No carotid bruits. Carotid upstrokes normal bilaterally. No thyromegaly. Sclerae not icteric. No xanthelasmas. Mucous membranes moist.  Chest:  Symmetrical and nontender. No deformities. Lungs:  Clear to auscultation bilaterally. Heart:  Tachycardic. Normal S1 and S2. No S3 or S4. No murmurs or rubs. Abdomen: Soft, nontender without organomegaly or masses. No bruits. Normal bowel sounds. Extremities: No edema. Pulses palpable. Skin:  normal turgor. No rashes or ulcers noted. Neurologic: Oriented x3. No motor or sensory deficits detected. REVIEW OF DIAGNOSTIC TESTS:    1. EKG done today showed sinus tachycardia with nonspecific T wave changes. ASSESSMENT / DIAGNOSIS:   1. Chronic HFrEF. Appears compensated/euvolemic. 2. Nonischemic cardiomyopathy with moderate-severe LV systolic function. 3. Reported moderate RV dysfunction (no clinical findings for such). 4. Hypertension, controlled. 5. Morbid obesity. 6. Diabetes mellitus. 7. History of tobacco abuse and heavy alcohol abuse, but not recently. 8. Obstructive sleep apnea, on CPAP. 9. Sinus tachycardia, has been off Toprol XL. 10. Medical noncompliance. TREATMENT PLAN:  1. Resume Toprol  mg q am and 50 mg q pm.  2.  Rest of cardiac medications same. 3.  Importance of compliance with medications as prescribed, overemphasized. 4.  Patient strongly advised, in addition to cardiac rehab, walking for exercise and losing weight. 5.  Will check BMP and pro BNP today.    6.

## 2021-07-12 ENCOUNTER — HOSPITAL ENCOUNTER (OUTPATIENT)
Dept: CARDIAC REHAB | Age: 36
Setting detail: THERAPIES SERIES
Discharge: HOME | End: 2021-07-12
Payer: MEDICARE

## 2021-07-12 PROCEDURE — 93798 PHYS/QHP OP CAR RHAB W/ECG: CPT

## 2021-07-14 ENCOUNTER — HOSPITAL ENCOUNTER (OUTPATIENT)
Dept: CARDIAC REHAB | Age: 36
Setting detail: THERAPIES SERIES
Discharge: HOME | End: 2021-07-14
Payer: MEDICARE

## 2021-07-14 PROCEDURE — 93798 PHYS/QHP OP CAR RHAB W/ECG: CPT

## 2021-07-16 ENCOUNTER — HOSPITAL ENCOUNTER (OUTPATIENT)
Dept: CARDIAC REHAB | Age: 36
Setting detail: THERAPIES SERIES
Discharge: HOME | End: 2021-07-16
Payer: MEDICARE

## 2021-07-16 PROCEDURE — 93798 PHYS/QHP OP CAR RHAB W/ECG: CPT

## 2021-07-21 ENCOUNTER — HOSPITAL ENCOUNTER (OUTPATIENT)
Dept: CARDIAC REHAB | Age: 36
Setting detail: THERAPIES SERIES
Discharge: HOME | End: 2021-07-21
Payer: MEDICARE

## 2021-07-21 PROCEDURE — 93798 PHYS/QHP OP CAR RHAB W/ECG: CPT

## 2021-07-28 ENCOUNTER — HOSPITAL ENCOUNTER (INPATIENT)
Age: 36
LOS: 3 days | Discharge: HOME | DRG: 420 | End: 2021-07-31
Attending: EMERGENCY MEDICINE | Admitting: INTERNAL MEDICINE
Payer: MEDICARE

## 2021-07-28 ENCOUNTER — APPOINTMENT (OUTPATIENT)
Dept: GENERAL RADIOLOGY | Age: 36
DRG: 420 | End: 2021-07-28
Payer: MEDICARE

## 2021-07-28 DIAGNOSIS — E11.69 TYPE 2 DIABETES MELLITUS WITH OTHER SPECIFIED COMPLICATION, WITH LONG-TERM CURRENT USE OF INSULIN (HCC): ICD-10-CM

## 2021-07-28 DIAGNOSIS — E87.1 HYPONATREMIA: ICD-10-CM

## 2021-07-28 DIAGNOSIS — E11.00 HYPEROSMOLAR HYPERGLYCEMIC STATE (HHS) (HCC): Primary | ICD-10-CM

## 2021-07-28 DIAGNOSIS — Z79.4 TYPE 2 DIABETES MELLITUS WITH OTHER SPECIFIED COMPLICATION, WITH LONG-TERM CURRENT USE OF INSULIN (HCC): ICD-10-CM

## 2021-07-28 DIAGNOSIS — R73.9 HYPERGLYCEMIA: ICD-10-CM

## 2021-07-28 PROBLEM — I73.89 HHS (HYPOTHENAR HAMMER SYNDROME) (HCC): Status: ACTIVE | Noted: 2021-07-28

## 2021-07-28 LAB
ACETAMINOPHEN LEVEL: <5 MCG/ML (ref 10–30)
ALBUMIN SERPL-MCNC: 3.9 G/DL (ref 3.5–5.2)
ALP BLD-CCNC: 100 U/L (ref 40–129)
ALT SERPL-CCNC: 13 U/L (ref 0–40)
AMPHETAMINE SCREEN, URINE: NOT DETECTED
ANION GAP SERPL CALCULATED.3IONS-SCNC: 14 MMOL/L (ref 7–16)
ANION GAP SERPL CALCULATED.3IONS-SCNC: 2 MMOL/L (ref 7–16)
ANION GAP SERPL CALCULATED.3IONS-SCNC: 8 MMOL/L (ref 7–16)
AST SERPL-CCNC: 11 U/L (ref 0–39)
B.E.: 0.8 MMOL/L (ref -3–3)
BACTERIA: ABNORMAL /HPF
BARBITURATE SCREEN URINE: NOT DETECTED
BASOPHILS ABSOLUTE: 0.04 E9/L (ref 0–0.2)
BASOPHILS RELATIVE PERCENT: 0.5 % (ref 0–2)
BENZODIAZEPINE SCREEN, URINE: NOT DETECTED
BETA-HYDROXYBUTYRATE: 1.16 MMOL/L (ref 0.02–0.27)
BILIRUB SERPL-MCNC: 0.4 MG/DL (ref 0–1.2)
BILIRUBIN URINE: NEGATIVE
BLOOD, URINE: ABNORMAL
BUN BLDV-MCNC: 11 MG/DL (ref 6–20)
BUN BLDV-MCNC: 14 MG/DL (ref 6–20)
BUN BLDV-MCNC: 9 MG/DL (ref 6–20)
CALCIUM SERPL-MCNC: 5.6 MG/DL (ref 8.6–10.2)
CALCIUM SERPL-MCNC: 9.3 MG/DL (ref 8.6–10.2)
CALCIUM SERPL-MCNC: 9.3 MG/DL (ref 8.6–10.2)
CANNABINOID SCREEN URINE: NOT DETECTED
CHLORIDE BLD-SCNC: 100 MMOL/L (ref 98–107)
CHLORIDE BLD-SCNC: 116 MMOL/L (ref 98–107)
CHLORIDE BLD-SCNC: 78 MMOL/L (ref 98–107)
CLARITY: CLEAR
CO2: 18 MMOL/L (ref 22–29)
CO2: 24 MMOL/L (ref 22–29)
CO2: 35 MMOL/L (ref 22–29)
COCAINE METABOLITE SCREEN URINE: NOT DETECTED
COHB: 0.7 % (ref 0–1.5)
COLOR: YELLOW
COMMENT: ABNORMAL
CREAT SERPL-MCNC: 0.6 MG/DL (ref 0.7–1.2)
CREAT SERPL-MCNC: 0.9 MG/DL (ref 0.7–1.2)
CREAT SERPL-MCNC: 1 MG/DL (ref 0.7–1.2)
CRITICAL: ABNORMAL
D DIMER: <200 NG/ML DDU
DATE ANALYZED: ABNORMAL
DATE OF COLLECTION: ABNORMAL
EKG ATRIAL RATE: 92 BPM
EKG P AXIS: 60 DEGREES
EKG P-R INTERVAL: 144 MS
EKG Q-T INTERVAL: 406 MS
EKG QRS DURATION: 96 MS
EKG QTC CALCULATION (BAZETT): 502 MS
EKG R AXIS: 42 DEGREES
EKG T AXIS: 139 DEGREES
EKG VENTRICULAR RATE: 92 BPM
EOSINOPHILS ABSOLUTE: 0.24 E9/L (ref 0.05–0.5)
EOSINOPHILS RELATIVE PERCENT: 2.9 % (ref 0–6)
ETHANOL: <10 MG/DL (ref 0–0.08)
FENTANYL SCREEN, URINE: NOT DETECTED
GFR AFRICAN AMERICAN: >60
GFR NON-AFRICAN AMERICAN: >60 ML/MIN/1.73
GLUCOSE BLD-MCNC: 1234 MG/DL (ref 74–99)
GLUCOSE BLD-MCNC: 290 MG/DL (ref 74–99)
GLUCOSE BLD-MCNC: 311 MG/DL (ref 74–99)
GLUCOSE BLD-MCNC: 737 MG/DL (ref 74–99)
GLUCOSE BLD-MCNC: >700 MG/DL (ref 74–99)
GLUCOSE URINE: >=1000 MG/DL
HCO3: 26.9 MMOL/L (ref 22–26)
HCT VFR BLD CALC: 35 % (ref 37–54)
HEMOGLOBIN: 12.1 G/DL (ref 12.5–16.5)
HHB: 52.7 % (ref 0–5)
IMMATURE GRANULOCYTES #: 0.03 E9/L
IMMATURE GRANULOCYTES %: 0.4 % (ref 0–5)
KETONES, URINE: NEGATIVE MG/DL
LAB: ABNORMAL
LEUKOCYTE ESTERASE, URINE: NEGATIVE
LIPASE: 75 U/L (ref 13–60)
LYMPHOCYTES ABSOLUTE: 1.82 E9/L (ref 1.5–4)
LYMPHOCYTES RELATIVE PERCENT: 22.1 % (ref 20–42)
Lab: ABNORMAL
Lab: NORMAL
MAGNESIUM: 1.5 MG/DL (ref 1.6–2.6)
MAGNESIUM: 3.7 MG/DL (ref 1.6–2.6)
MCH RBC QN AUTO: 31.3 PG (ref 26–35)
MCHC RBC AUTO-ENTMCNC: 34.6 % (ref 32–34.5)
MCV RBC AUTO: 90.4 FL (ref 80–99.9)
METER GLUCOSE: 289 MG/DL (ref 74–99)
METER GLUCOSE: 322 MG/DL (ref 74–99)
METER GLUCOSE: 356 MG/DL (ref 74–99)
METER GLUCOSE: >500 MG/DL (ref 74–99)
METHADONE SCREEN, URINE: NOT DETECTED
METHB: 0.1 % (ref 0–1.5)
MODE: ABNORMAL
MONOCYTES ABSOLUTE: 0.35 E9/L (ref 0.1–0.95)
MONOCYTES RELATIVE PERCENT: 4.3 % (ref 2–12)
NEUTROPHILS ABSOLUTE: 5.75 E9/L (ref 1.8–7.3)
NEUTROPHILS RELATIVE PERCENT: 69.8 % (ref 43–80)
NITRITE, URINE: NEGATIVE
O2 CONTENT: 8.3 ML/DL
O2 SATURATION: 46.9 % (ref 92–98.5)
O2HB: 46.5 % (ref 94–97)
OPERATOR ID: 1741
OPIATE SCREEN URINE: NOT DETECTED
OSMOLALITY: 330 MOSM/KG (ref 285–310)
OXYCODONE URINE: NOT DETECTED
PATIENT TEMP: 37 C
PCO2: 49.1 MMHG (ref 35–45)
PDW BLD-RTO: 12.4 FL (ref 11.5–15)
PERFORMED ON: ABNORMAL
PH BLOOD GAS: 7.36 (ref 7.35–7.45)
PH UA: 5.5 (ref 5–9)
PHENCYCLIDINE SCREEN URINE: NOT DETECTED
PHOSPHORUS: 1.1 MG/DL (ref 2.5–4.5)
PHOSPHORUS: 3.4 MG/DL (ref 2.5–4.5)
PLATELET # BLD: 413 E9/L (ref 130–450)
PMV BLD AUTO: 10.9 FL (ref 7–12)
PO2: 27 MMHG (ref 75–100)
POC CHLORIDE: 94 MMOL/L (ref 100–108)
POC CREATININE: 0.9 MG/DL (ref 0.7–1.2)
POC POTASSIUM: 3.7 MMOL/L (ref 3.5–5)
POC SODIUM: 133 MMOL/L (ref 132–146)
POTASSIUM REFLEX MAGNESIUM: 4.6 MMOL/L (ref 3.5–5)
POTASSIUM SERPL-SCNC: 2.2 MMOL/L (ref 3.5–5)
POTASSIUM SERPL-SCNC: 3.6 MMOL/L (ref 3.5–5)
PRO-BNP: 152 PG/ML (ref 0–125)
PROTEIN UA: NEGATIVE MG/DL
RBC # BLD: 3.87 E12/L (ref 3.8–5.8)
RBC UA: ABNORMAL /HPF (ref 0–2)
SALICYLATE, SERUM: <0.3 MG/DL (ref 0–30)
SARS-COV-2, NAAT: NOT DETECTED
SODIUM BLD-SCNC: 116 MMOL/L (ref 132–146)
SODIUM BLD-SCNC: 137 MMOL/L (ref 132–146)
SODIUM BLD-SCNC: 142 MMOL/L (ref 132–146)
SOURCE, BLOOD GAS: ABNORMAL
SPECIFIC GRAVITY UA: <=1.005 (ref 1–1.03)
THB: 12.7 G/DL (ref 11.5–16.5)
TIME ANALYZED: 1542
TOTAL PROTEIN: 8.3 G/DL (ref 6.4–8.3)
TRICYCLIC ANTIDEPRESSANTS SCREEN SERUM: NEGATIVE NG/ML
TROPONIN, HIGH SENSITIVITY: 21 NG/L (ref 0–11)
UROBILINOGEN, URINE: 0.2 E.U./DL
WBC # BLD: 8.2 E9/L (ref 4.5–11.5)
WBC UA: ABNORMAL /HPF (ref 0–5)

## 2021-07-28 PROCEDURE — 87635 SARS-COV-2 COVID-19 AMP PRB: CPT

## 2021-07-28 PROCEDURE — 84295 ASSAY OF SERUM SODIUM: CPT

## 2021-07-28 PROCEDURE — 99284 EMERGENCY DEPT VISIT MOD MDM: CPT

## 2021-07-28 PROCEDURE — 82947 ASSAY GLUCOSE BLOOD QUANT: CPT

## 2021-07-28 PROCEDURE — 6370000000 HC RX 637 (ALT 250 FOR IP): Performed by: EMERGENCY MEDICINE

## 2021-07-28 PROCEDURE — 82077 ASSAY SPEC XCP UR&BREATH IA: CPT

## 2021-07-28 PROCEDURE — 85025 COMPLETE CBC W/AUTO DIFF WBC: CPT

## 2021-07-28 PROCEDURE — 80053 COMPREHEN METABOLIC PANEL: CPT

## 2021-07-28 PROCEDURE — 83690 ASSAY OF LIPASE: CPT

## 2021-07-28 PROCEDURE — 80307 DRUG TEST PRSMV CHEM ANLYZR: CPT

## 2021-07-28 PROCEDURE — 93010 ELECTROCARDIOGRAM REPORT: CPT | Performed by: INTERNAL MEDICINE

## 2021-07-28 PROCEDURE — 82010 KETONE BODYS QUAN: CPT

## 2021-07-28 PROCEDURE — 82805 BLOOD GASES W/O2 SATURATION: CPT

## 2021-07-28 PROCEDURE — 81001 URINALYSIS AUTO W/SCOPE: CPT

## 2021-07-28 PROCEDURE — 6360000002 HC RX W HCPCS: Performed by: EMERGENCY MEDICINE

## 2021-07-28 PROCEDURE — 71045 X-RAY EXAM CHEST 1 VIEW: CPT

## 2021-07-28 PROCEDURE — 84484 ASSAY OF TROPONIN QUANT: CPT

## 2021-07-28 PROCEDURE — 80048 BASIC METABOLIC PNL TOTAL CA: CPT

## 2021-07-28 PROCEDURE — 83930 ASSAY OF BLOOD OSMOLALITY: CPT

## 2021-07-28 PROCEDURE — 96374 THER/PROPH/DIAG INJ IV PUSH: CPT

## 2021-07-28 PROCEDURE — 2060000000 HC ICU INTERMEDIATE R&B

## 2021-07-28 PROCEDURE — 80179 DRUG ASSAY SALICYLATE: CPT

## 2021-07-28 PROCEDURE — 84100 ASSAY OF PHOSPHORUS: CPT

## 2021-07-28 PROCEDURE — 83735 ASSAY OF MAGNESIUM: CPT

## 2021-07-28 PROCEDURE — 2500000003 HC RX 250 WO HCPCS: Performed by: EMERGENCY MEDICINE

## 2021-07-28 PROCEDURE — 96361 HYDRATE IV INFUSION ADD-ON: CPT

## 2021-07-28 PROCEDURE — 83880 ASSAY OF NATRIURETIC PEPTIDE: CPT

## 2021-07-28 PROCEDURE — 82435 ASSAY OF BLOOD CHLORIDE: CPT

## 2021-07-28 PROCEDURE — 82962 GLUCOSE BLOOD TEST: CPT

## 2021-07-28 PROCEDURE — 2580000003 HC RX 258: Performed by: EMERGENCY MEDICINE

## 2021-07-28 PROCEDURE — 80143 DRUG ASSAY ACETAMINOPHEN: CPT

## 2021-07-28 PROCEDURE — 85378 FIBRIN DEGRADE SEMIQUANT: CPT

## 2021-07-28 PROCEDURE — 82565 ASSAY OF CREATININE: CPT

## 2021-07-28 PROCEDURE — 87088 URINE BACTERIA CULTURE: CPT

## 2021-07-28 PROCEDURE — 93005 ELECTROCARDIOGRAM TRACING: CPT | Performed by: STUDENT IN AN ORGANIZED HEALTH CARE EDUCATION/TRAINING PROGRAM

## 2021-07-28 PROCEDURE — 84132 ASSAY OF SERUM POTASSIUM: CPT

## 2021-07-28 RX ORDER — LORAZEPAM 2 MG/ML
1 INJECTION INTRAMUSCULAR ONCE
Status: DISCONTINUED | OUTPATIENT
Start: 2021-07-28 | End: 2021-07-28

## 2021-07-28 RX ORDER — MAGNESIUM SULFATE 1 G/100ML
1000 INJECTION INTRAVENOUS PRN
Status: DISCONTINUED | OUTPATIENT
Start: 2021-07-28 | End: 2021-07-31

## 2021-07-28 RX ORDER — POTASSIUM CHLORIDE 7.45 MG/ML
10 INJECTION INTRAVENOUS PRN
Status: DISCONTINUED | OUTPATIENT
Start: 2021-07-28 | End: 2021-07-31

## 2021-07-28 RX ORDER — SODIUM CHLORIDE 9 MG/ML
INJECTION, SOLUTION INTRAVENOUS CONTINUOUS
Status: DISCONTINUED | OUTPATIENT
Start: 2021-07-28 | End: 2021-07-29

## 2021-07-28 RX ORDER — 0.9 % SODIUM CHLORIDE 0.9 %
15 INTRAVENOUS SOLUTION INTRAVENOUS ONCE
Status: COMPLETED | OUTPATIENT
Start: 2021-07-28 | End: 2021-07-28

## 2021-07-28 RX ORDER — DEXTROSE MONOHYDRATE 25 G/50ML
12.5 INJECTION, SOLUTION INTRAVENOUS PRN
Status: DISCONTINUED | OUTPATIENT
Start: 2021-07-28 | End: 2021-07-31 | Stop reason: HOSPADM

## 2021-07-28 RX ORDER — DEXTROSE, SODIUM CHLORIDE, AND POTASSIUM CHLORIDE 5; .45; .15 G/100ML; G/100ML; G/100ML
INJECTION INTRAVENOUS CONTINUOUS PRN
Status: DISCONTINUED | OUTPATIENT
Start: 2021-07-28 | End: 2021-07-29

## 2021-07-28 RX ADMIN — SODIUM CHLORIDE 11.61 UNITS/HR: 9 INJECTION, SOLUTION INTRAVENOUS at 15:08

## 2021-07-28 RX ADMIN — POTASSIUM CHLORIDE 10 MEQ: 10 INJECTION, SOLUTION INTRAVENOUS at 20:45

## 2021-07-28 RX ADMIN — SODIUM CHLORIDE 250 ML/HR: 9 INJECTION, SOLUTION INTRAVENOUS at 16:29

## 2021-07-28 RX ADMIN — MAGNESIUM SULFATE HEPTAHYDRATE 1000 MG: 1 INJECTION, SOLUTION INTRAVENOUS at 20:44

## 2021-07-28 RX ADMIN — SODIUM PHOSPHATE, MONOBASIC, MONOHYDRATE AND SODIUM PHOSPHATE, DIBASIC, ANHYDROUS 20 MMOL: 276; 142 INJECTION, SOLUTION INTRAVENOUS at 23:17

## 2021-07-28 RX ADMIN — SODIUM CHLORIDE 1742 ML: 9 INJECTION, SOLUTION INTRAVENOUS at 14:42

## 2021-07-28 ASSESSMENT — ENCOUNTER SYMPTOMS
RHINORRHEA: 0
BACK PAIN: 0
ABDOMINAL PAIN: 0
VOMITING: 0
ANAL BLEEDING: 0
DIARRHEA: 0
SHORTNESS OF BREATH: 0
CONSTIPATION: 0
NAUSEA: 0
COUGH: 0
ABDOMINAL DISTENTION: 0
EYE DISCHARGE: 0
SINUS PRESSURE: 0
CHEST TIGHTNESS: 0
SINUS PAIN: 0

## 2021-07-28 NOTE — Clinical Note
Patient Class: Inpatient [101]   REQUIRED: Diagnosis: HHS (hypothenar hammer syndrome) ([facility] [[id number]]   Estimated Length of Stay: Estimated stay of more than 2 midnights   Telemetry/Cardiac Monitoring Required?: Yes

## 2021-07-28 NOTE — ED PROVIDER NOTES
The history is provided by the patient. Patient is a 38 y.o. male presents with a chief complaint of fatiuge  This has been occurring for a week. Patient states that it gets better with nothing. Patient states that it gets worse with nothing. Patient states that it is severe in severity. Patient presents with chief complaint of fatigue. Patient stated that he has lost 20 pounds over the past week. Patient states that he is lost appetite. Patient stated that he just feels like he has no energy. Patient has a history of heart attack. Patient stated that he has had no fevers or chills. Patient did not get vaccinated. Patient denies any changes in urination or fluid intake. Patient denies any diarrhea but stated that he had one episode of nausea and vomiting prior. Patient is a taking his medications like normal.  Patient denies any fevers, chills, chest pain, shortness of breath, abdominal pain, changes in urinary or bowel habits. Review of Systems   Constitutional: Positive for fatigue. Negative for activity change, appetite change and fever. HENT: Negative for congestion, rhinorrhea, sinus pressure and sinus pain. Eyes: Negative for discharge. Respiratory: Negative for cough, chest tightness and shortness of breath. Cardiovascular: Negative for chest pain and palpitations. Gastrointestinal: Negative for abdominal distention, abdominal pain, anal bleeding, constipation, diarrhea, nausea and vomiting. Endocrine: Negative for polydipsia and polyuria. Genitourinary: Negative for decreased urine volume, difficulty urinating, enuresis, flank pain, frequency and urgency. Musculoskeletal: Negative for arthralgias, back pain and neck stiffness. Skin: Negative for rash and wound. Neurological: Negative for dizziness, weakness, light-headedness and headaches. Psychiatric/Behavioral: Negative for agitation, behavioral problems and confusion.         Physical Exam  Vitals and nursing note reviewed. Constitutional:       General: He is not in acute distress. Appearance: He is well-developed. He is not ill-appearing or toxic-appearing. HENT:      Head: Normocephalic and atraumatic. Right Ear: External ear normal.      Left Ear: External ear normal.      Nose: Nose normal. No congestion or rhinorrhea. Eyes:      Conjunctiva/sclera: Conjunctivae normal.   Cardiovascular:      Rate and Rhythm: Regular rhythm. Tachycardia present. Heart sounds: Normal heart sounds. No murmur heard. Pulmonary:      Effort: Pulmonary effort is normal. No respiratory distress. Breath sounds: Normal breath sounds. No wheezing or rales. Abdominal:      General: Bowel sounds are normal.      Palpations: Abdomen is soft. Tenderness: There is no abdominal tenderness. There is no guarding or rebound. Musculoskeletal:         General: No tenderness or deformity. Cervical back: Normal range of motion and neck supple. Skin:     General: Skin is warm and dry. Findings: Rash present. Neurological:      Mental Status: He is alert and oriented to person, place, and time. Cranial Nerves: No cranial nerve deficit. Coordination: Coordination normal.          Procedures     [facility]     ED Course as of Jul 28 2309 Wed Jul 28, 2021   1320 EKG read and interpreted by me. Rate 92 bpm.  T wave inversions in leads I and lead II. Poor R wave progression. [JM]      ED Course User Index  [JM] Ayana Contreras MD      Patient is a 38 y.o. male presenting with fatigue and weakness. Patient states there is also a 20 pound weight loss. Patient is a history of diabetes. Patient had a history of stents in the past.  Patient had a cardiac work-up. Patient has changes in the precordial leads. Patient cardiac work-up. Patient's troponins elevated. Patient's initial blood sugar was 1234. Anion gap 14. Ph7.35. K 4.6. . Beta hydroxybutyrate was 1.16.   Patient is clinically and HSS. Patient will be admitted to ICU. Patient was seen and evaluated by myself and my attending Dr. Neal Henley and Plan discussed with attending provider, please see attestation for final plan of care. This note was done using dictation software and there may be some grammatical errors associated with this. Eliezer Noland MD     ED Course as of Jul 28 2309   Wed Jul 28, 2021   1320 EKG read and interpreted by me. Rate 92 bpm.  T wave inversions in leads I and lead II. Poor R wave progression. [JM]      ED Course User Index  [JM] Eliezer Noland MD       --------------------------------------------- PAST HISTORY ---------------------------------------------  Past Medical History:  has a past medical history of CAD (coronary artery disease), CHF (congestive heart failure) ([facility] [address] Diabetes mellitus ([facility] [address] Hyperlipidemia, and Hypertension. Past Surgical History:  has a past surgical history that includes [place] tooth extraction; Abscess Drainage; and ECHO Compl W Dop Color Flow (5/5/2015). Social History:  reports that he has quit smoking. His smoking use included cigars and cigarettes. He started smoking about 21 years ago. He quit after 8.00 years of use. He has never used smokeless tobacco. He reports current alcohol use. He reports that he does not use drugs. Family History: family history includes Heart Attack in his paternal grandfather; Heart Disease in his father and mother; High Blood Pressure in his father, mother, and paternal grandmother. The patients home medications have been reviewed. Allergies: Patient has no known allergies.     -------------------------------------------------- RESULTS -------------------------------------------------    Lab  Results for orders placed or performed during the hospital encounter of 07/28/21   COVID-19, Rapid    Specimen: Nasopharyngeal Swab   Result Value Ref Range    SARS-CoV-2, NAAT Not Detected Not Detected CBC Auto Differential   Result Value Ref Range    WBC 8.2 4.5 - 11.5 E9/L    RBC 3.87 3.80 - 5.80 E12/L    Hemoglobin 12.1 (L) 12.5 - 16.5 g/dL    Hematocrit 35.0 (L) 37.0 - 54.0 %    MCV 90.4 80.0 - 99.9 fL    MCH 31.3 26.0 - 35.0 pg    MCHC 34.6 (H) 32.0 - 34.5 %    RDW 12.4 11.5 - 15.0 fL    Platelets 579 665 - 491 E9/L    MPV 10.9 7.0 - 12.0 fL    Neutrophils % 69.8 43.0 - 80.0 %    Immature Granulocytes % 0.4 0.0 - 5.0 %    Lymphocytes % 22.1 20.0 - 42.0 %    Monocytes % 4.3 2.0 - 12.0 %    Eosinophils % 2.9 0.0 - 6.0 %    Basophils % 0.5 0.0 - 2.0 %    Neutrophils Absolute 5.75 1.80 - 7.30 E9/L    Immature Granulocytes # 0.03 E9/L    Lymphocytes Absolute 1.82 1.50 - 4.00 E9/L    Monocytes Absolute 0.35 0.10 - 0.95 E9/L    Eosinophils Absolute 0.24 0.05 - 0.50 E9/L    Basophils Absolute 0.04 0.00 - 0.20 E9/L   Comprehensive Metabolic Panel w/ Reflex to MG   Result Value Ref Range    Sodium 116 (LL) 132 - 146 mmol/L    Potassium reflex Magnesium 4.6 3.5 - 5.0 mmol/L    Chloride 78 (L) 98 - 107 mmol/L    CO2 24 22 - 29 mmol/L    Anion Gap 14 7 - 16 mmol/L    Glucose 1,234 (HH) 74 - 99 mg/dL    BUN 14 6 - 20 mg/dL    CREATININE 1.0 0.7 - 1.2 mg/dL    GFR Non-African American >60 >=60 mL/min/1.73    GFR African American >60     Calcium 9.3 8.6 - 10.2 mg/dL    Total Protein 8.3 6.4 - 8.3 g/dL    Albumin 3.9 3.5 - 5.2 g/dL    Total Bilirubin 0.4 0.0 - 1.2 mg/dL    Alkaline Phosphatase 100 40 - 129 U/L    ALT 13 0 - 40 U/L    AST 11 0 - 39 U/L   Troponin   Result Value Ref Range    Troponin, High Sensitivity 21 (H) 0 - 11 ng/L   Lipase   Result Value Ref Range    Lipase 75 (H) 13 - 60 U/L   Urinalysis, reflex to microscopic   Result Value Ref Range    Color, UA Yellow Straw/Yellow    Clarity, UA Clear Clear    Glucose, Ur >=1000 (A) Negative mg/dL    Bilirubin Urine Negative Negative    Ketones, Urine Negative Negative mg/dL    Specific Gravity, UA <=1.005 1.005 - 1.030    Blood, Urine TRACE-INTACT Negative    pH, UA 5.5 5.0 - 9.0    Protein, UA Negative Negative mg/dL    Urobilinogen, Urine 0.2 <2.0 E.U./dL    Nitrite, Urine Negative Negative    Leukocyte Esterase, Urine Negative Negative   Brain Natriuretic Peptide   Result Value Ref Range    Pro- (H) 0 - 125 pg/mL   D-Dimer, Quantitative   Result Value Ref Range    D-Dimer, Quant <200 ng/mL DDU   Microscopic Urinalysis   Result Value Ref Range    WBC, UA NONE 0 - 5 /HPF    RBC, UA 0-1 0 - 2 /HPF    Bacteria, UA RARE (A) None Seen /HPF   Osmolality, Serum   Result Value Ref Range    Osmolality 330 (H) 285 - 310 mOsm/Kg   Urine Drug Screen   Result Value Ref Range    Amphetamine Screen, Urine NOT DETECTED Negative <1000 ng/mL    Barbiturate Screen, Ur NOT DETECTED Negative < 200 ng/mL    Benzodiazepine Screen, Urine NOT DETECTED Negative < 200 ng/mL    Cannabinoid Scrn, Ur NOT DETECTED Negative < 50ng/mL    Cocaine Metabolite Screen, Urine NOT DETECTED Negative < 300 ng/mL    Opiate Scrn, Ur NOT DETECTED Negative < 300ng/mL    PCP Screen, Urine NOT DETECTED Negative < 25 ng/mL    Methadone Screen, Urine NOT DETECTED Negative <300 ng/mL    Oxycodone Urine NOT DETECTED Negative <100 ng/mL    FENTANYL SCREEN, URINE NOT DETECTED Negative <1 ng/mL    Drug Screen Comment: see below    Serum Drug Screen   Result Value Ref Range    Ethanol Lvl <10 mg/dL    Acetaminophen Level <5.0 (L) 10.0 - 78.5 mcg/mL    Salicylate, Serum <7.3 0.0 - 30.0 mg/dL    TCA Scrn NEGATIVE Cutoff:300 ng/mL   Basic Metabolic Panel   Result Value Ref Range    Sodium 142 132 - 146 mmol/L    Potassium 2.2 (LL) 3.5 - 5.0 mmol/L    Chloride 116 (H) 98 - 107 mmol/L    CO2 18 (L) 22 - 29 mmol/L    Anion Gap 8 7 - 16 mmol/L    Glucose 290 (H) 74 - 99 mg/dL    BUN 9 6 - 20 mg/dL    CREATININE 0.6 (L) 0.7 - 1.2 mg/dL    GFR Non-African American >60 >=60 mL/min/1.73    GFR African American >60     Calcium 5.6 (LL) 8.6 - 10.2 mg/dL   Magnesium   Result Value Ref Range    Magnesium 1.5 (L) 1.6 - 2.6 mg/dL Phosphorus   Result Value Ref Range    Phosphorus 1.1 (L) 2.5 - 4.5 mg/dL   Beta-Hydroxybutyrate   Result Value Ref Range    Beta-Hydroxybutyrate 1.16 (H) 0.02 - 0.27 mmol/L   Blood Gas, Arterial   Result Value Ref Range    Date Analyzed [id number]     Time Analyzed 1542     Source: Blood Arterial     pH, Blood Gas 7.357 7.350 - 7.450    PCO2 49.1 (H) 35.0 - 45.0 mmHg    PO2 27.0 (LL) 75.0 - 100.0 mmHg    HCO3 26.9 (H) 22.0 - 26.0 mmol/L    B.E. 0.8 -3.0 - 3.0 mmol/L    O2 Sat 46.9 (L) 92.0 - 98.5 %    O2Hb 46.5 (L) 94.0 - 97.0 %    COHb 0.7 0.0 - 1.5 %    MetHb 0.1 0.0 - 1.5 %    O2 Content 8.3 mL/dL    HHb 52.7 (H) 0.0 - 5.0 %    tHb (est) 12.7 11.5 - 16.5 g/dL    Mode RA     Comment venous sample, RT to speak to RN for redraw     Date Of Collection      Time Collected      Pt Temp 37.0 C     ID [id number]     Lab [id number]     Critical(s) Notified Handed report to Dr/RN    GLUCOSE, RANDOM   Result Value Ref Range    Glucose 737 (HH) 74 - 99 mg/dL   POCT Glucose   Result Value Ref Range    Meter Glucose >500 (H) 74 - 99 mg/dL   POCT Venous   Result Value Ref Range    POC Sodium 133 132 - 146 mmol/L    POC Potassium 3.7 3.5 - 5.0 mmol/L    POC Chloride 94 (L) 100 - 108 mmol/L    POC Glucose >700 (HH) 74 - 99 mg/dl    POC Creatinine 0.9 0.7 - 1.2 mg/dL    GFR Non-African American >60 >=60 mL/min/1.73    GFR  >60     Performed on SEE BELOW    POCT Glucose   Result Value Ref Range    Meter Glucose 356 (H) 74 - 99 mg/dL   POCT Glucose   Result Value Ref Range    Meter Glucose 322 (H) 74 - 99 mg/dL   POCT Glucose   Result Value Ref Range    Meter Glucose 289 (H) 74 - 99 mg/dL   EKG 12 Lead   Result Value Ref Range    Ventricular Rate 92 BPM    Atrial Rate 92 BPM    P-R Interval 144 ms    QRS Duration 96 ms    Q-T Interval 406 ms    QTc Calculation (Bazett) 502 ms    P Axis 60 degrees    R Axis 42 degrees    T Axis 139 degrees       Radiology  XR CHEST PORTABLE   Final Result   No radiographic evidence of acute cardiopulmonary disease process. ------------------------- NURSING NOTES AND VITALS REVIEWED ---------------------------  Date / Time Roomed:  7/28/2021 12:19 PM  ED Bed Assignment:  JOSUE ORDOÑEZ    The nursing notes within the ED encounter and vital signs as below have been reviewed. Patient Vitals for the past 24 hrs:   BP Temp Pulse Resp SpO2 Height Weight   07/28/21 2013 -- -- 78 15 96 % -- --   07/28/21 1815 119/88 -- 98 18 99 % -- --   07/28/21 1216 (!) 145/98 -- -- 16 -- 5' 10\" (1.778 m) 256 lb (116.1 kg)   07/28/21 1213 -- 97.3 °F (36.3 °C) 118 -- 97 % -- --       Oxygen Saturation Interpretation: Normal      ------------------------------------------ PROGRESS NOTES ------------------------------------------  Re-evaluation(s):   Patients symptoms are improving  Repeat physical examination is improved    . Patients symptoms are improving  Repeat physical examination is improved    I have spoken with the patient and discussed todays results, in addition to providing specific details for the plan of care and counseling regarding the diagnosis and prognosis. Their questions are answered at this time and they are agreeable with the plan.      --------------------------------- ADDITIONAL PROVIDER NOTES ---------------------------------  Consultations:  Spoke with Dr. Kyree Khan,  They will admit this patient, will provide consultation, will come to the ED to evaluate this patient and state that the patient is okay to discharge home. This patient's ED course included: a personal history and physicial examination, re-evaluation prior to disposition, multiple bedside re-evaluations, IV medications, cardiac monitoring, continuous pulse oximetry and complex medical decision making and emergency management    This patient has remained hemodynamically stable and improved during their ED course.     Please note that the withdrawal or failure to initiate urgent interventions for this patient would likely result in a life threatening deterioration or permanent disability. Accordingly this patient received 36 minutes of critical care time, excluding separately billable procedures. Clinical Impression  1. Hyperosmolar hyperglycemic state (HHS) ([facility] [address]    2. Type 2 diabetes mellitus with other specified complication, with long-term current use of insulin ([facility] [address]    3. Hyperglycemia    4. Hyponatremia          Disposition  Patient's disposition: Admit to CCU/ICU  Patient's condition is stable. Rafa Ortega MD  Resident  07/28/21 6088      ATTENDING PROVIDER ATTESTATION:     Charlinerose mary Ambriz presented to the emergency department for evaluation of Fatigue (feeling like hes had no energy for about 1 week. Lost 20lbs in 1 week and just started eating over the last weekend. Loss of appitite. )    I have reviewed and discussed the case, including pertinent history (medical, surgical, family and social) and exam findings with the Resident and the Nurse assigned to Charline Ambriz. I have personally performed and/or participated in the history, exam, medical decision making, and procedures and agree with all pertinent clinical information. I have reviewed my findings and recommendations with Charline Ambriz and members of family present at the time of disposition. Vitals:    07/31/21 0717   BP: 133/87   Pulse: 64   Resp: 16   Temp: 97.7 °F (36.5 °C)   SpO2: 98%         Oxygen Saturation Interpretation: Normal    The cardiac monitor revealed NSR with a heart rate in the 100s as interpreted by me. The cardiac monitor was ordered secondary to the patient's heart rate and to monitor the patient for dysrhythmia. CPT 55630    The patients available past medical records and past encounters were reviewed. MDM:     Dr. Derrell BLANCA am the primary provider of record    Patient presents with fatigue, work-up significant for significant hyperglycemia.   Does not appear to be in diabetic ketoacidosis, pseudohyponatremia. IV fluids initiated, spoke with medicine critical care patient will be admitted    Please note that the withdrawal or failure to initiate urgent interventions for this patient would likely result in a life threatening deterioration or permanent disability. Accordingly this patient received 36 minutes of critical care time, excluding separately billable procedures. My findings/plan: The primary encounter diagnosis was Hyperosmolar hyperglycemic state (HHS) ([facility] [address] Diagnoses of Type 2 diabetes mellitus with other specified complication, with long-term current use of insulin ([facility] [address] Hyperglycemia, and Hyponatremia were also pertinent to this visit.     DO Naz Santos DO  08/02/21 5457

## 2021-07-28 NOTE — H&P
Provider   metoprolol succinate (TOPROL XL) 100 MG extended release tablet Take 1 tablet by mouth every morning 7/2/21  Yes Chriss Quijano MD   metoprolol succinate (TOPROL XL) 50 MG extended release tablet Take 1 tablet by mouth nightly 7/2/21  Yes Chriss Quijano MD   bumetanide (BUMEX) 2 MG tablet Take 1 tablet by mouth 2 times daily 3/26/21  Yes CRIS Paz CNP   spironolactone (ALDACTONE) 25 MG tablet Take 1 tablet by mouth daily 2/22/21  Yes CRIS Paz CNP   sacubitril-valsartan (ENTRESTO)  MG per tablet Take 1 tablet by mouth 2 times daily 1/19/21  Yes CRIS Paz CNP   potassium chloride (KLOR-CON M) 20 MEQ extended release tablet Take 2 tablets by mouth daily 1/19/21  Yes CRIS Paz CNP   metFORMIN (GLUCOPHAGE) 500 MG tablet Take 1 tablet by mouth daily (with breakfast)  Patient taking differently: Take 500 mg by mouth daily (with breakfast) *Says he is taking his mother's pill 12/24/20  Yes Jaclyn Concepcion MD   triamcinolone (KENALOG) 0.1 % cream  5/20/21   Historical Provider, MD   mineral oil-hydrophilic petrolatum (AQUAPHOR) ointment Apply topically as needed. Patient not taking: Reported on 7/2/2021 1/19/21   CRIS De Jesus CNP       Allergies:  Patient has no known allergies. Social History:    RESIDENCE: Private residence  TOBACCO:   reports that he has quit smoking. His smoking use included cigars and cigarettes. He started smoking about 21 years ago. He quit after 8.00 years of use. He has never used smokeless tobacco.  ETOH:   reports current alcohol use. Family History:    As follows:      Problem Relation Age of Onset    High Blood Pressure Father     Heart Disease Father     High Blood Pressure Paternal Grandmother     Heart Attack Paternal Grandfather     High Blood Pressure Mother     Heart Disease Mother        REVIEW OF SYSTEMS:   Pertinent positives as noted in the HPI.  All other systems reviewed and negative. PHYSICAL EXAM:  BP (!) 145/98   Pulse 118   Temp 97.3 °F (36.3 °C)   Resp 16   Ht 5' 10\" (1.778 m)   Wt 256 lb (116.1 kg)   SpO2 97%   BMI 36.73 kg/m²   General appearance: No apparent distress, appears stated age and cooperative. HEENT: Normal cephalic, atraumatic without obvious deformity. Pupils equal, round, and reactive to light. Neck: Supple, with full range of motion. No jugular venous distention. Trachea midline. Respiratory:  Clear to auscultation bilaterally. No apparent distress. Cardiovascular:  Regular rate and rhythm. S1, S2 without murmurs, rubs, or gallops. PV: Brisk capillary refill. +2 pedal and radial pulses bilaterally. No clubbing, cyanosis, edema of bilateral lower extremities. Abdomen: Soft, obese, non-tender, non-distended. +BS  Musculoskeletal: No obvious deformities or erythematous or edematous joints. Skin: Normal skin color. No rashes or lesions. Neurologic:  Neurovascularly intact without any focal sensory/motor deficits. Psychiatric: Calm and cooperative    Reviewed EKG and CXR personally      CBC:   Recent Labs     07/28/21  1312   WBC 8.2   RBC 3.87   HGB 12.1*   HCT 35.0*   MCV 90.4   RDW 12.4        BMP:   Recent Labs     07/28/21  1312   *   K 4.6   CL 78*   CO2 24   BUN 14   CREATININE 1.0     LFT:  Recent Labs     07/28/21  1312   PROT 8.3   ALKPHOS 100   ALT 13   AST 11   BILITOT 0.4   LIPASE 75*     CE:  No results for input(s): Jacoby Quinones in the last 72 hours. PT/INR: No results for input(s): INR, APTT in the last 72 hours. BNP: No results for input(s): BNP in the last 72 hours.   ESR: No results found for: SEDRATE  CRP: No results found for: CRP  D Dimer:   Lab Results   Component Value Date    DDIMER <200 07/28/2021      Folate and B12: No results found for: [id number], No results found for: FOLATE  Lactic Acid:   Lab Results   Component Value Date    LACTA 1.2 12/22/2020     Thyroid Studies: No results found for: TSH, W7SSZDD, D9PZLIS, THYROIDAB    Oupatient labs:  Lab Results   Component Value Date    CHOL 157 12/24/2020    TRIG 170 (H) 12/24/2020    HDL 32 12/24/2020    LDLCALC 91 12/24/2020    INR 1.3 12/24/2020    LABA1C 6.8 (H) 12/24/2020       Urinalysis:    Lab Results   Component Value Date    NITRU Negative 07/28/2021    WBCUA NONE 07/28/2021    BACTERIA RARE 07/28/2021    RBCUA 0-1 07/28/2021    BLOODU TRACE-INTACT 07/28/2021    SPECGRAV <=1.005 07/28/2021    GLUCOSEU >=1000 07/28/2021       Imaging:  XR CHEST PORTABLE    Result Date: 7/28/2021  No radiographic evidence of acute cardiopulmonary disease process. ASSESSMENT:    1. Hyperosmolar hyperglycemic state-reports fatigue and 20 pound weight loss in the last week. Initial serum glucose 1,234, beta hydroxybutyrate 1.16, urine negative for ketones, pH 7.35. Given 12 units of regular insulin prior to being started on insulin drip. Admit to MICU. 2. Pseudohyponatremia-2/2 translocation in the setting of hyperglycemia, corrected serum sodium 143 mmol/L    3. HFrEF 30 to 35% with stage I diastolic dysfunction-followed with the CHF clinic however states he hasn't been there for \"a minute\", sees Dr. Juno Abbasi. On Bumex 2 mg p.o. twice daily as well as 25 mg of spironolactone and Entresto at home. Currently he is quite dry. 4. Hypertension-continue metoprolol XL    5. Obesity- Body mass index is 36.73 kg/m².      6. EtOH abuse    PLAN:    Per MICU      Diet: Diet NPO  Code Status: Prior  Surrogate decision maker confirmed with patient:   Extended Emergency Contact Information  Primary Emergency Contact: Eloy Chavarria  Address: [address] Phone: [phone]  Mobile Phone: [phone]  Relation: Parent    Disposition: []Med/Surg [] Intermediate [x] ICU/CCU  Admit status: [] Observation [x] Inpatient     +++++++++++++++++++++++++++++++++++++++++++++++++  Sia VARGASP  [employer] - [address] [place], [place]  +++++++++++++++++++++++++++++++++++++++++++++++++  NOTE: This report was transcribed using voice recognition software. Every effort was made to ensure accuracy; however, inadvertent computerized transcription errors may be present.

## 2021-07-29 LAB
ANION GAP SERPL CALCULATED.3IONS-SCNC: 10 MMOL/L (ref 7–16)
ANION GAP SERPL CALCULATED.3IONS-SCNC: 11 MMOL/L (ref 7–16)
ANION GAP SERPL CALCULATED.3IONS-SCNC: 12 MMOL/L (ref 7–16)
ANION GAP SERPL CALCULATED.3IONS-SCNC: 12 MMOL/L (ref 7–16)
ANION GAP SERPL CALCULATED.3IONS-SCNC: 7 MMOL/L (ref 7–16)
ANION GAP SERPL CALCULATED.3IONS-SCNC: 8 MMOL/L (ref 7–16)
BUN BLDV-MCNC: 10 MG/DL (ref 6–20)
BUN BLDV-MCNC: 7 MG/DL (ref 6–20)
BUN BLDV-MCNC: 8 MG/DL (ref 6–20)
BUN BLDV-MCNC: 9 MG/DL (ref 6–20)
CALCIUM SERPL-MCNC: 8.6 MG/DL (ref 8.6–10.2)
CALCIUM SERPL-MCNC: 8.6 MG/DL (ref 8.6–10.2)
CALCIUM SERPL-MCNC: 8.7 MG/DL (ref 8.6–10.2)
CALCIUM SERPL-MCNC: 8.7 MG/DL (ref 8.6–10.2)
CALCIUM SERPL-MCNC: 8.9 MG/DL (ref 8.6–10.2)
CALCIUM SERPL-MCNC: 9 MG/DL (ref 8.6–10.2)
CHLORIDE BLD-SCNC: 102 MMOL/L (ref 98–107)
CHLORIDE BLD-SCNC: 103 MMOL/L (ref 98–107)
CHLORIDE BLD-SCNC: 103 MMOL/L (ref 98–107)
CHLORIDE BLD-SCNC: 104 MMOL/L (ref 98–107)
CHLORIDE BLD-SCNC: 104 MMOL/L (ref 98–107)
CHLORIDE BLD-SCNC: 105 MMOL/L (ref 98–107)
CHP ED QC CHECK: NORMAL
CHP ED QC CHECK: YES
CO2: 21 MMOL/L (ref 22–29)
CO2: 23 MMOL/L (ref 22–29)
CO2: 24 MMOL/L (ref 22–29)
CO2: 25 MMOL/L (ref 22–29)
CREAT SERPL-MCNC: 0.8 MG/DL (ref 0.7–1.2)
CREAT SERPL-MCNC: 0.9 MG/DL (ref 0.7–1.2)
GFR AFRICAN AMERICAN: >60
GFR NON-AFRICAN AMERICAN: >60 ML/MIN/1.73
GLUCOSE BLD-MCNC: 142 MG/DL
GLUCOSE BLD-MCNC: 143 MG/DL (ref 74–99)
GLUCOSE BLD-MCNC: 158 MG/DL
GLUCOSE BLD-MCNC: 171 MG/DL
GLUCOSE BLD-MCNC: 182 MG/DL (ref 74–99)
GLUCOSE BLD-MCNC: 187 MG/DL
GLUCOSE BLD-MCNC: 189 MG/DL
GLUCOSE BLD-MCNC: 198 MG/DL
GLUCOSE BLD-MCNC: 199 MG/DL
GLUCOSE BLD-MCNC: 202 MG/DL
GLUCOSE BLD-MCNC: 202 MG/DL
GLUCOSE BLD-MCNC: 203 MG/DL (ref 74–99)
GLUCOSE BLD-MCNC: 205 MG/DL
GLUCOSE BLD-MCNC: 207 MG/DL (ref 74–99)
GLUCOSE BLD-MCNC: 207 MG/DL (ref 74–99)
GLUCOSE BLD-MCNC: 212 MG/DL
GLUCOSE BLD-MCNC: 217 MG/DL
GLUCOSE BLD-MCNC: 244 MG/DL (ref 74–99)
MAGNESIUM: 2.1 MG/DL (ref 1.6–2.6)
MAGNESIUM: 2.2 MG/DL (ref 1.6–2.6)
MAGNESIUM: 2.4 MG/DL (ref 1.6–2.6)
METER GLUCOSE: 139 MG/DL (ref 74–99)
METER GLUCOSE: 142 MG/DL (ref 74–99)
METER GLUCOSE: 158 MG/DL (ref 74–99)
METER GLUCOSE: 164 MG/DL (ref 74–99)
METER GLUCOSE: 171 MG/DL (ref 74–99)
METER GLUCOSE: 185 MG/DL (ref 74–99)
METER GLUCOSE: 187 MG/DL (ref 74–99)
METER GLUCOSE: 189 MG/DL (ref 74–99)
METER GLUCOSE: 198 MG/DL (ref 74–99)
METER GLUCOSE: 198 MG/DL (ref 74–99)
METER GLUCOSE: 199 MG/DL (ref 74–99)
METER GLUCOSE: 202 MG/DL (ref 74–99)
METER GLUCOSE: 202 MG/DL (ref 74–99)
METER GLUCOSE: 203 MG/DL (ref 74–99)
METER GLUCOSE: 204 MG/DL (ref 74–99)
METER GLUCOSE: 205 MG/DL (ref 74–99)
METER GLUCOSE: 212 MG/DL (ref 74–99)
METER GLUCOSE: 217 MG/DL (ref 74–99)
METER GLUCOSE: 247 MG/DL (ref 74–99)
PHOSPHORUS: 2.2 MG/DL (ref 2.5–4.5)
PHOSPHORUS: 2.5 MG/DL (ref 2.5–4.5)
PHOSPHORUS: 2.7 MG/DL (ref 2.5–4.5)
PHOSPHORUS: 2.8 MG/DL (ref 2.5–4.5)
PHOSPHORUS: 3.9 MG/DL (ref 2.5–4.5)
POTASSIUM SERPL-SCNC: 3.3 MMOL/L (ref 3.5–5)
POTASSIUM SERPL-SCNC: 3.4 MMOL/L (ref 3.5–5)
POTASSIUM SERPL-SCNC: 3.5 MMOL/L (ref 3.5–5)
POTASSIUM SERPL-SCNC: 3.5 MMOL/L (ref 3.5–5)
POTASSIUM SERPL-SCNC: 3.7 MMOL/L (ref 3.5–5)
POTASSIUM SERPL-SCNC: 4 MMOL/L (ref 3.5–5)
SODIUM BLD-SCNC: 135 MMOL/L (ref 132–146)
SODIUM BLD-SCNC: 136 MMOL/L (ref 132–146)
SODIUM BLD-SCNC: 136 MMOL/L (ref 132–146)
SODIUM BLD-SCNC: 137 MMOL/L (ref 132–146)
SODIUM BLD-SCNC: 138 MMOL/L (ref 132–146)
SODIUM BLD-SCNC: 138 MMOL/L (ref 132–146)

## 2021-07-29 PROCEDURE — 83735 ASSAY OF MAGNESIUM: CPT

## 2021-07-29 PROCEDURE — 36415 COLL VENOUS BLD VENIPUNCTURE: CPT

## 2021-07-29 PROCEDURE — 6360000002 HC RX W HCPCS: Performed by: EMERGENCY MEDICINE

## 2021-07-29 PROCEDURE — 82962 GLUCOSE BLOOD TEST: CPT

## 2021-07-29 PROCEDURE — 80048 BASIC METABOLIC PNL TOTAL CA: CPT

## 2021-07-29 PROCEDURE — 2060000000 HC ICU INTERMEDIATE R&B

## 2021-07-29 PROCEDURE — 6370000000 HC RX 637 (ALT 250 FOR IP): Performed by: INTERNAL MEDICINE

## 2021-07-29 PROCEDURE — 2580000003 HC RX 258: Performed by: EMERGENCY MEDICINE

## 2021-07-29 PROCEDURE — 2580000003 HC RX 258: Performed by: FAMILY MEDICINE

## 2021-07-29 PROCEDURE — 6370000000 HC RX 637 (ALT 250 FOR IP): Performed by: FAMILY MEDICINE

## 2021-07-29 PROCEDURE — 84100 ASSAY OF PHOSPHORUS: CPT

## 2021-07-29 PROCEDURE — 6360000002 HC RX W HCPCS: Performed by: INTERNAL MEDICINE

## 2021-07-29 PROCEDURE — 2500000003 HC RX 250 WO HCPCS: Performed by: EMERGENCY MEDICINE

## 2021-07-29 PROCEDURE — 83036 HEMOGLOBIN GLYCOSYLATED A1C: CPT

## 2021-07-29 RX ORDER — DEXTROSE MONOHYDRATE 25 G/50ML
12.5 INJECTION, SOLUTION INTRAVENOUS PRN
Status: DISCONTINUED | OUTPATIENT
Start: 2021-07-29 | End: 2021-07-31 | Stop reason: HOSPADM

## 2021-07-29 RX ORDER — PANTOPRAZOLE SODIUM 40 MG/1
40 TABLET, DELAYED RELEASE ORAL
Status: DISCONTINUED | OUTPATIENT
Start: 2021-07-30 | End: 2021-07-31 | Stop reason: HOSPADM

## 2021-07-29 RX ORDER — METOPROLOL SUCCINATE 25 MG/1
100 TABLET, EXTENDED RELEASE ORAL EVERY MORNING
Status: DISCONTINUED | OUTPATIENT
Start: 2021-07-30 | End: 2021-07-31 | Stop reason: HOSPADM

## 2021-07-29 RX ORDER — ACETAMINOPHEN 325 MG/1
650 TABLET ORAL EVERY 4 HOURS PRN
Status: DISCONTINUED | OUTPATIENT
Start: 2021-07-29 | End: 2021-07-31 | Stop reason: HOSPADM

## 2021-07-29 RX ORDER — DEXTROSE MONOHYDRATE 50 MG/ML
100 INJECTION, SOLUTION INTRAVENOUS PRN
Status: DISCONTINUED | OUTPATIENT
Start: 2021-07-29 | End: 2021-07-31 | Stop reason: HOSPADM

## 2021-07-29 RX ORDER — METOPROLOL SUCCINATE 25 MG/1
50 TABLET, EXTENDED RELEASE ORAL NIGHTLY
Status: DISCONTINUED | OUTPATIENT
Start: 2021-07-29 | End: 2021-07-31 | Stop reason: HOSPADM

## 2021-07-29 RX ORDER — SPIRONOLACTONE 25 MG/1
25 TABLET ORAL DAILY
Status: DISCONTINUED | OUTPATIENT
Start: 2021-07-29 | End: 2021-07-31 | Stop reason: HOSPADM

## 2021-07-29 RX ORDER — DOCUSATE SODIUM 100 MG/1
100 CAPSULE, LIQUID FILLED ORAL NIGHTLY
Status: DISCONTINUED | OUTPATIENT
Start: 2021-07-29 | End: 2021-07-31 | Stop reason: HOSPADM

## 2021-07-29 RX ORDER — INSULIN GLARGINE 100 [IU]/ML
10 INJECTION, SOLUTION SUBCUTANEOUS NIGHTLY
Status: DISCONTINUED | OUTPATIENT
Start: 2021-07-29 | End: 2021-07-31 | Stop reason: HOSPADM

## 2021-07-29 RX ORDER — ONDANSETRON 2 MG/ML
4 INJECTION INTRAMUSCULAR; INTRAVENOUS EVERY 6 HOURS PRN
Status: DISCONTINUED | OUTPATIENT
Start: 2021-07-29 | End: 2021-07-31 | Stop reason: HOSPADM

## 2021-07-29 RX ORDER — NICOTINE POLACRILEX 4 MG
15 LOZENGE BUCCAL PRN
Status: DISCONTINUED | OUTPATIENT
Start: 2021-07-29 | End: 2021-07-31 | Stop reason: HOSPADM

## 2021-07-29 RX ORDER — SODIUM CHLORIDE 9 MG/ML
INJECTION, SOLUTION INTRAVENOUS CONTINUOUS
Status: DISCONTINUED | OUTPATIENT
Start: 2021-07-29 | End: 2021-07-31 | Stop reason: HOSPADM

## 2021-07-29 RX ADMIN — POTASSIUM CHLORIDE 10 MEQ: 10 INJECTION, SOLUTION INTRAVENOUS at 17:09

## 2021-07-29 RX ADMIN — SPIRONOLACTONE 25 MG: 25 TABLET ORAL at 21:18

## 2021-07-29 RX ADMIN — DOCUSATE SODIUM 100 MG: 100 CAPSULE ORAL at 21:18

## 2021-07-29 RX ADMIN — METOPROLOL SUCCINATE 50 MG: 25 TABLET, EXTENDED RELEASE ORAL at 21:18

## 2021-07-29 RX ADMIN — POTASSIUM CHLORIDE 10 MEQ: 10 INJECTION, SOLUTION INTRAVENOUS at 13:32

## 2021-07-29 RX ADMIN — POTASSIUM CHLORIDE, DEXTROSE MONOHYDRATE AND SODIUM CHLORIDE: 150; 5; 450 INJECTION, SOLUTION INTRAVENOUS at 02:36

## 2021-07-29 RX ADMIN — POTASSIUM CHLORIDE 10 MEQ: 10 INJECTION, SOLUTION INTRAVENOUS at 08:48

## 2021-07-29 RX ADMIN — INSULIN GLARGINE 10 UNITS: 100 INJECTION, SOLUTION SUBCUTANEOUS at 20:09

## 2021-07-29 RX ADMIN — INSULIN LISPRO 2 UNITS: 100 INJECTION, SOLUTION INTRAVENOUS; SUBCUTANEOUS at 20:09

## 2021-07-29 RX ADMIN — SODIUM PHOSPHATE, MONOBASIC, MONOHYDRATE AND SODIUM PHOSPHATE, DIBASIC, ANHYDROUS 10 MMOL: 276; 142 INJECTION, SOLUTION INTRAVENOUS at 17:09

## 2021-07-29 RX ADMIN — POTASSIUM CHLORIDE 10 MEQ: 10 INJECTION, SOLUTION INTRAVENOUS at 16:16

## 2021-07-29 RX ADMIN — SODIUM PHOSPHATE, MONOBASIC, MONOHYDRATE AND SODIUM PHOSPHATE, DIBASIC, ANHYDROUS 10 MMOL: 276; 142 INJECTION, SOLUTION INTRAVENOUS at 08:50

## 2021-07-29 RX ADMIN — POTASSIUM CHLORIDE 10 MEQ: 10 INJECTION, SOLUTION INTRAVENOUS at 20:15

## 2021-07-29 RX ADMIN — POTASSIUM CHLORIDE 10 MEQ: 10 INJECTION, SOLUTION INTRAVENOUS at 14:49

## 2021-07-29 RX ADMIN — POTASSIUM CHLORIDE 10 MEQ: 10 INJECTION, SOLUTION INTRAVENOUS at 09:59

## 2021-07-29 RX ADMIN — SACUBITRIL AND VALSARTAN 1 TABLET: 97; 103 TABLET, FILM COATED ORAL at 21:18

## 2021-07-29 RX ADMIN — POTASSIUM CHLORIDE 10 MEQ: 10 INJECTION, SOLUTION INTRAVENOUS at 19:10

## 2021-07-29 RX ADMIN — ENOXAPARIN SODIUM 40 MG: 40 INJECTION SUBCUTANEOUS at 21:18

## 2021-07-29 RX ADMIN — POTASSIUM CHLORIDE 10 MEQ: 10 INJECTION, SOLUTION INTRAVENOUS at 12:25

## 2021-07-29 RX ADMIN — SODIUM CHLORIDE: 9 INJECTION, SOLUTION INTRAVENOUS at 19:54

## 2021-07-29 RX ADMIN — POTASSIUM CHLORIDE 10 MEQ: 10 INJECTION, SOLUTION INTRAVENOUS at 00:24

## 2021-07-29 NOTE — ED NOTES
Spoke with patient about placing another IV, patient is refusing at this time.      Isabela Finn RN  07/29/21 6262

## 2021-07-29 NOTE — ED NOTES
Called lab regarding 1830 BMP, Mag, and Phos sent down. Received at [address] per [facility] in lab. Not showing in process. Spoke to  that said one of their machines went down and caused delay. Will run specimen now.       Kenisha Zuniga RN  07/28/21 2010

## 2021-07-29 NOTE — ED NOTES
This RN called lab to cancel previous BMP result because it was drawn from an IV site.  BMP reordered and redrawn at this time     Libby Schilling RN  07/29/21 3093

## 2021-07-29 NOTE — PROGRESS NOTES
Hospitalist Progress Note      PCP: No primary care provider on file. Date of Admission: 7/28/2021    Chief Complaint:  fatigue    Hospital Course: *found to have a blood sugar greater than 1200. He has had a 20lb weight loss in a week. He admits to being non compliant with diabetic meds, metformin, not taking for months. Had polydipsia, polyuria, unable to eat. Subjective: **hungry      Medications:  Reviewed    Infusion Medications    sodium chloride Stopped (07/29/21 0237)    dextrose 5% and 0.45% NaCl with KCl 20 mEq 150 mL/hr at 07/29/21 0236    insulin 6.86 Units/hr (07/29/21 1700)     Scheduled Medications    insulin regular  0.1 Units/kg Intravenous Once     PRN Meds: dextrose, potassium chloride, magnesium sulfate, sodium phosphate IVPB **OR** sodium phosphate IVPB **OR** sodium phosphate IVPB, dextrose 5% and 0.45% NaCl with KCl 20 mEq      Intake/Output Summary (Last 24 hours) at 7/29/2021 1810  Last data filed at 7/29/2021 1049  Gross per 24 hour   Intake 716.66 ml   Output 700 ml   Net 16.66 ml       Exam:    BP (!) 148/81   Pulse 67   Temp 97.3 °F (36.3 °C)   Resp 20   Ht 5' 10\" (1.778 m)   Wt 256 lb (116.1 kg)   SpO2 99%   BMI 36.73 kg/m²           Gen: *well developed  HEENT: NC/AT, moist mucous membranes, no oropharyngeal erythema or exudate  Neck: supple, trachea midline, no anterior cervical or SC LAD  Heart:  Normal s1/s2, RRR, no murmurs, gallops, or rubs. Lungs: cta ** bilaterally, *  Abd: bowel sounds present, soft, nontender, nondistended, no masses  Extrem:  No clubbing, cyanosis,  **no* edema  Skin: pos scaling rashes on arms and legs  Psych: A & O x3  Neuro: grossly intact, moves all four extremities.     Capillary Refill: Brisk,< 3 seconds   Peripheral Pulses: +2 palpable, equal bilaterally               Labs:   Recent Labs     07/28/21  1312   WBC 8.2   HGB 12.1*   HCT 35.0*        Recent Labs     07/29/21  0728 07/29/21  0728 07/29/21  4059 07/29/21  1049 07/29/21  1443      < > 138 137 136   K 5.9*   < > 3.5 3.4* 3.3*      < > 104 102 104   CO2 33*   < > 24 23 25   BUN 8   < > 9 8 7   CREATININE 0.8   < > 0.8 0.8 0.8   CALCIUM 7.9*   < > 8.7 8.9 8.6   PHOS 1.9*  --   --  2.2* 2.5    < > = values in this interval not displayed. Recent Labs     07/28/21  1312   AST 11   ALT 13   BILITOT 0.4   ALKPHOS 100     No results for input(s): INR in the last 72 hours. No results for input(s): Jacek Favre in the last 72 hours. Recent Labs     07/28/21  1312   AST 11   ALT 13   BILITOT 0.4   ALKPHOS 100     No results for input(s): LACTA in the last 72 hours.   No results found for: Saul Barefoot  No results found for: AMMONIA    Assessment:  Hyperosmolar hyperglycemic state  Pseudohyponatremia   HRrEF  HTn   morbid obesity   ETOH abuse      Plan:  *cont toprol   aldactone   entresto   insulin drip      DVT Prophylaxis:lovenox  Diet: Diet NPO  Code Status:full code    PT/OT Eval Status: *na    Dispo - home      Electronically signed by Vlad Louis DO on 7/29/2021 at 6:10 PM [facility]

## 2021-07-30 PROBLEM — E11.00 HYPEROSMOLAR HYPERGLYCEMIC STATE (HHS) (HCC): Status: ACTIVE | Noted: 2021-07-30

## 2021-07-30 LAB
ANION GAP SERPL CALCULATED.3IONS-SCNC: 11 MMOL/L (ref 7–16)
ANION GAP SERPL CALCULATED.3IONS-SCNC: 12 MMOL/L (ref 7–16)
ANION GAP SERPL CALCULATED.3IONS-SCNC: 13 MMOL/L (ref 7–16)
ANION GAP SERPL CALCULATED.3IONS-SCNC: 9 MMOL/L (ref 7–16)
BUN BLDV-MCNC: 11 MG/DL (ref 6–20)
BUN BLDV-MCNC: 12 MG/DL (ref 6–20)
BUN BLDV-MCNC: 7 MG/DL (ref 6–20)
BUN BLDV-MCNC: 8 MG/DL (ref 6–20)
CALCIUM SERPL-MCNC: 8.5 MG/DL (ref 8.6–10.2)
CALCIUM SERPL-MCNC: 8.6 MG/DL (ref 8.6–10.2)
CALCIUM SERPL-MCNC: 8.9 MG/DL (ref 8.6–10.2)
CALCIUM SERPL-MCNC: 9 MG/DL (ref 8.6–10.2)
CHLORIDE BLD-SCNC: 101 MMOL/L (ref 98–107)
CHLORIDE BLD-SCNC: 103 MMOL/L (ref 98–107)
CHLORIDE BLD-SCNC: 103 MMOL/L (ref 98–107)
CHLORIDE BLD-SCNC: 104 MMOL/L (ref 98–107)
CO2: 16 MMOL/L (ref 22–29)
CO2: 19 MMOL/L (ref 22–29)
CO2: 20 MMOL/L (ref 22–29)
CO2: 21 MMOL/L (ref 22–29)
CREAT SERPL-MCNC: 0.8 MG/DL (ref 0.7–1.2)
CREAT SERPL-MCNC: 0.9 MG/DL (ref 0.7–1.2)
GFR AFRICAN AMERICAN: >60
GFR NON-AFRICAN AMERICAN: >60 ML/MIN/1.73
GLUCOSE BLD-MCNC: 217 MG/DL (ref 74–99)
GLUCOSE BLD-MCNC: 217 MG/DL (ref 74–99)
GLUCOSE BLD-MCNC: 262 MG/DL (ref 74–99)
GLUCOSE BLD-MCNC: 386 MG/DL (ref 74–99)
HBA1C MFR BLD: 15.2 % (ref 4–5.6)
MAGNESIUM: 1.9 MG/DL (ref 1.6–2.6)
MAGNESIUM: 2 MG/DL (ref 1.6–2.6)
MAGNESIUM: 2 MG/DL (ref 1.6–2.6)
MAGNESIUM: 2.3 MG/DL (ref 1.6–2.6)
METER GLUCOSE: 214 MG/DL (ref 74–99)
METER GLUCOSE: 228 MG/DL (ref 74–99)
METER GLUCOSE: 325 MG/DL (ref 74–99)
METER GLUCOSE: 414 MG/DL (ref 74–99)
PHOSPHORUS: 2.6 MG/DL (ref 2.5–4.5)
PHOSPHORUS: 2.7 MG/DL (ref 2.5–4.5)
PHOSPHORUS: 2.9 MG/DL (ref 2.5–4.5)
PHOSPHORUS: 3.4 MG/DL (ref 2.5–4.5)
POTASSIUM SERPL-SCNC: 4 MMOL/L (ref 3.5–5)
POTASSIUM SERPL-SCNC: 4.1 MMOL/L (ref 3.5–5)
POTASSIUM SERPL-SCNC: 4.2 MMOL/L (ref 3.5–5)
POTASSIUM SERPL-SCNC: 4.6 MMOL/L (ref 3.5–5)
SODIUM BLD-SCNC: 131 MMOL/L (ref 132–146)
SODIUM BLD-SCNC: 133 MMOL/L (ref 132–146)
SODIUM BLD-SCNC: 134 MMOL/L (ref 132–146)
SODIUM BLD-SCNC: 134 MMOL/L (ref 132–146)
URINE CULTURE, ROUTINE: NORMAL

## 2021-07-30 PROCEDURE — 6360000002 HC RX W HCPCS: Performed by: INTERNAL MEDICINE

## 2021-07-30 PROCEDURE — 84100 ASSAY OF PHOSPHORUS: CPT

## 2021-07-30 PROCEDURE — 6370000000 HC RX 637 (ALT 250 FOR IP): Performed by: FAMILY MEDICINE

## 2021-07-30 PROCEDURE — 36415 COLL VENOUS BLD VENIPUNCTURE: CPT

## 2021-07-30 PROCEDURE — 80048 BASIC METABOLIC PNL TOTAL CA: CPT

## 2021-07-30 PROCEDURE — 2580000003 HC RX 258: Performed by: FAMILY MEDICINE

## 2021-07-30 PROCEDURE — 82962 GLUCOSE BLOOD TEST: CPT

## 2021-07-30 PROCEDURE — 2060000000 HC ICU INTERMEDIATE R&B

## 2021-07-30 PROCEDURE — 6370000000 HC RX 637 (ALT 250 FOR IP): Performed by: INTERNAL MEDICINE

## 2021-07-30 PROCEDURE — 83735 ASSAY OF MAGNESIUM: CPT

## 2021-07-30 RX ORDER — CLOBETASOL PROPIONATE 0.5 MG/G
OINTMENT TOPICAL 2 TIMES DAILY
Status: DISCONTINUED | OUTPATIENT
Start: 2021-07-30 | End: 2021-07-31 | Stop reason: HOSPADM

## 2021-07-30 RX ADMIN — SACUBITRIL AND VALSARTAN 1 TABLET: 97; 103 TABLET, FILM COATED ORAL at 09:16

## 2021-07-30 RX ADMIN — SACUBITRIL AND VALSARTAN 1 TABLET: 97; 103 TABLET, FILM COATED ORAL at 20:55

## 2021-07-30 RX ADMIN — INSULIN GLARGINE 10 UNITS: 100 INJECTION, SOLUTION SUBCUTANEOUS at 20:59

## 2021-07-30 RX ADMIN — INSULIN LISPRO 6 UNITS: 100 INJECTION, SOLUTION INTRAVENOUS; SUBCUTANEOUS at 20:56

## 2021-07-30 RX ADMIN — SODIUM CHLORIDE: 9 INJECTION, SOLUTION INTRAVENOUS at 17:19

## 2021-07-30 RX ADMIN — CLOBETASOL PROPIONATE: 0.5 OINTMENT TOPICAL at 15:02

## 2021-07-30 RX ADMIN — CLOBETASOL PROPIONATE: 0.5 OINTMENT TOPICAL at 20:55

## 2021-07-30 RX ADMIN — DOCUSATE SODIUM 100 MG: 100 CAPSULE ORAL at 20:55

## 2021-07-30 RX ADMIN — METOPROLOL SUCCINATE 100 MG: 25 TABLET, EXTENDED RELEASE ORAL at 09:16

## 2021-07-30 RX ADMIN — ENOXAPARIN SODIUM 40 MG: 40 INJECTION SUBCUTANEOUS at 07:45

## 2021-07-30 RX ADMIN — SPIRONOLACTONE 25 MG: 25 TABLET ORAL at 09:16

## 2021-07-30 RX ADMIN — INSULIN LISPRO 6 UNITS: 100 INJECTION, SOLUTION INTRAVENOUS; SUBCUTANEOUS at 12:31

## 2021-07-30 RX ADMIN — PANTOPRAZOLE SODIUM 40 MG: 40 TABLET, DELAYED RELEASE ORAL at 05:59

## 2021-07-30 RX ADMIN — METOPROLOL SUCCINATE 50 MG: 25 TABLET, EXTENDED RELEASE ORAL at 20:55

## 2021-07-30 RX ADMIN — INSULIN LISPRO 4 UNITS: 100 INJECTION, SOLUTION INTRAVENOUS; SUBCUTANEOUS at 07:46

## 2021-07-30 RX ADMIN — INSULIN LISPRO 18 UNITS: 100 INJECTION, SOLUTION INTRAVENOUS; SUBCUTANEOUS at 17:18

## 2021-07-30 ASSESSMENT — PAIN SCALES - GENERAL
PAINLEVEL_OUTOF10: 0

## 2021-07-30 NOTE — CARE COORDINATION
Transition of Care-Met with patient, he lives with his mother in a two story home, bed/bath on the second story. Patient reports he is independent of ADL's, no hx: DME, HHC or SNF. Patient verbalized he did not have a glucometer-will need script for a glucometer and any diabetic testing supplies he will need at discharge. Several tries made to set patient up with a PCP-left -will encourage patient to call the Physician Appointment line listed on his discharge paperwork , [facility]. Diabetic Educator consulted-to see patient today. Mother will transport home. Patient denied any other needs.     Sherren Heidelberg BSN, RN  [place]   [phone]

## 2021-07-30 NOTE — PROGRESS NOTES
Hospitalist Progress Note      PCP: No primary care provider on file. Date of Admission: 7/28/2021    Chief Complaint: fatigue     Hospital Course: *found to have a blood sugar greater than 1200. He has had a 20lb weight loss in a week. He admits to being non compliant with diabetic meds, metformin, not taking for months. Had polydipsia, polyuria, unable to eat. ** blood sugar improved, ordered diabetic ed *         Subjective:  Psoriasis is itching        Medications:  Reviewed    Infusion Medications    dextrose      sodium chloride 125 mL/hr at 07/29/21 1954     Scheduled Medications    insulin lispro  0-18 Units Subcutaneous TID WC    insulin lispro  0-9 Units Subcutaneous Nightly    clobetasol   Topical BID    metoprolol succinate  100 mg Oral QAM    metoprolol succinate  50 mg Oral Nightly    sacubitril-valsartan  1 tablet Oral BID    spironolactone  25 mg Oral Daily    docusate sodium  100 mg Oral Nightly    pantoprazole  40 mg Oral QAM AC    enoxaparin  40 mg Subcutaneous Daily    insulin glargine  10 Units Subcutaneous Nightly     PRN Meds: glucose, dextrose, glucagon (rDNA), dextrose, ondansetron, acetaminophen, dextrose, potassium chloride, magnesium sulfate, sodium phosphate IVPB **OR** sodium phosphate IVPB **OR** sodium phosphate IVPB      Intake/Output Summary (Last 24 hours) at 7/30/2021 1426  Last data filed at 7/30/2021 1235  Gross per 24 hour   Intake 3340 ml   Output 1450 ml   Net 1890 ml       Exam:    BP (!) 146/93   Pulse 72   Temp 98 °F (36.7 °C)   Resp 18   Ht 5' 10\" (1.778 m)   Wt 256 lb (116.1 kg)   SpO2 98%   BMI 36.73 kg/m²       Gen: *well developed  HEENT: NC/AT, moist mucous membranes, Neck: supple, trachea midline, no anterior cervical or SC LAD  Heart:  Normal s1/s2, RRR, no murmurs, gallops, or rubs.    Lungs: cta ** bilaterally, *  Abd: bowel sounds present, soft, nontender, nondistended, no masses  Extrem:  No clubbing, cyanosis,  **no* edema  Skin: pos scaling rashes on arms and legs/ rash on feet  Psych: A & O x3  Neuro: grossly intact, moves all four extremities. Capillary Refill: Brisk,< 3 seconds   Peripheral Pulses: +2 palpable, equal bilaterally                   Labs:   Recent Labs     07/28/21  1312   WBC 8.2   HGB 12.1*   HCT 35.0*        Recent Labs     07/29/21  2145 07/30/21  0653 07/30/21  1139    133 134   K 4.0 4.6 4.0    104 103   CO2 21* 16* 19*   BUN 7 7 8   CREATININE 0.9 0.8 0.8   CALCIUM 8.6 9.0 8.6   PHOS 3.9 2.7 2.6     Recent Labs     07/28/21  1312   AST 11   ALT 13   BILITOT 0.4   ALKPHOS 100     No results for input(s): INR in the last 72 hours. No results for input(s): Paulino Alexander in the last 72 hours. Recent Labs     07/28/21  1312   AST 11   ALT 13   BILITOT 0.4   ALKPHOS 100     No results for input(s): LACTA in the last 72 hours.   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

## 2021-07-30 NOTE — PROGRESS NOTES
Reason for consult: New Dx    A1C:  15.2%                  Patient states the following concerns/barriers to diabetes self-management:       [x] None       [] Medication cost   [] Food cost/availability          [] Reading  [] Hearing   [] Vision                  [] Work    [] Transportation  [] No insurance    [] Physical limitations    [] Other:              Diabetes survival packet provided to:   [x] Patient  With family present    Information reviewed:   Definition of diabetes   Target glucose ranges/A1C   Self-monitoring of blood glucose   Prevention/symptoms/treatment of hypo-/hyperglycemia   Medication adherence   The plate method/meal planning guidelines   The benefits of exercise and recommendations   Reducing the risk of chronic complications          Patient states the following:     [x]   Has had all signs/symptoms of diabetes    [x] Lost 20 plus pounds without trying               [x] Has no supplies at home    [x] Drinks a lot of water    [x] Does not follow any diet/meal plan  Comment: Patient and family interested in outpatient education. Listened attentively to the education and had several good questions. Does not have any PCP at this time. We will call him after discharge to follow-up. Diabetes medications reviewed (use, purpose, action, side effects):  Chart reviewed. Education provided regarding current basal/bolus regimen of Lantus and Humalog including differences in types of insulin, timing with meals, onset, duration of effect and peak of insulin dose. Hypoglycemia signs, symptoms and treatments reviewed and literature provided. Patient instructed on the preparation and injection of insulin dose via pen method. Patient completed a return demonstration of the proper assembly of pen and injection technique using the injection pillow.   Patient verbalized understanding of site rotation, storage and proper sharps disposal.     Instructed patient to always seek guidance from their PCP for any adjustment. Floor nursing should continue to have patient practice insulin self-injection when insulin dose is due and document in the progress notes or document refusals of insulin self-injection practice. Over 30 minutes of education provided. Evaluation/Plan/Recommendations:   Patient's understanding of diabetes:   [x]Poor   []Fair    []Good   [] Excellent     Outpatient diabetes education is recommended:   [x] Yes  [] No   [] As needed  Patient is interested in outpatient diabetes education:   [x] Yes    [] No    [] Unsure    Recommended:     [] Consult to social work; patient has no insurance or financial hardship      [x] Script for glucometer and supplies (per preference of patient's insurance)               [x] Script for outpatient diabetes education classes from PCP    [x] Carbohydrate-controlled diet    [] Patient prefers/educator recommends insulin pens instead of syringes     (if insulin ordered for home use)    Thank you for this consult.

## 2021-07-31 VITALS
TEMPERATURE: 97.7 F | HEART RATE: 64 BPM | RESPIRATION RATE: 16 BRPM | OXYGEN SATURATION: 98 % | WEIGHT: 256 LBS | BODY MASS INDEX: 36.65 KG/M2 | HEIGHT: 70 IN | DIASTOLIC BLOOD PRESSURE: 87 MMHG | SYSTOLIC BLOOD PRESSURE: 133 MMHG

## 2021-07-31 LAB
ANION GAP SERPL CALCULATED.3IONS-SCNC: 8 MMOL/L (ref 7–16)
BUN BLDV-MCNC: 10 MG/DL (ref 6–20)
CALCIUM SERPL-MCNC: 8.9 MG/DL (ref 8.6–10.2)
CHLORIDE BLD-SCNC: 104 MMOL/L (ref 98–107)
CO2: 26 MMOL/L (ref 22–29)
CREAT SERPL-MCNC: 0.8 MG/DL (ref 0.7–1.2)
GFR AFRICAN AMERICAN: >60
GFR NON-AFRICAN AMERICAN: >60 ML/MIN/1.73
GLUCOSE BLD-MCNC: 258 MG/DL (ref 74–99)
METER GLUCOSE: 218 MG/DL (ref 74–99)
METER GLUCOSE: 335 MG/DL (ref 74–99)
POTASSIUM SERPL-SCNC: 4.3 MMOL/L (ref 3.5–5)
SODIUM BLD-SCNC: 138 MMOL/L (ref 132–146)

## 2021-07-31 PROCEDURE — 82962 GLUCOSE BLOOD TEST: CPT

## 2021-07-31 PROCEDURE — 6370000000 HC RX 637 (ALT 250 FOR IP): Performed by: INTERNAL MEDICINE

## 2021-07-31 PROCEDURE — 80048 BASIC METABOLIC PNL TOTAL CA: CPT

## 2021-07-31 PROCEDURE — 6360000002 HC RX W HCPCS: Performed by: INTERNAL MEDICINE

## 2021-07-31 PROCEDURE — 36415 COLL VENOUS BLD VENIPUNCTURE: CPT

## 2021-07-31 RX ORDER — CLOTRIMAZOLE AND BETAMETHASONE DIPROPIONATE 10; .64 MG/G; MG/G
CREAM TOPICAL
Qty: 60 G | Refills: 1 | Status: ON HOLD
Start: 2021-07-31 | End: 2021-09-17 | Stop reason: HOSPADM

## 2021-07-31 RX ORDER — GLUCOSAMINE HCL/CHONDROITIN SU 500-400 MG
CAPSULE ORAL
Qty: 200 STRIP | Refills: 2 | Status: ON HOLD | OUTPATIENT
Start: 2021-07-31 | End: 2022-04-05

## 2021-07-31 RX ORDER — CLOBETASOL PROPIONATE 0.5 MG/G
OINTMENT TOPICAL
Qty: 60 G | Refills: 1 | Status: SHIPPED | OUTPATIENT
Start: 2021-07-31 | End: 2021-10-04

## 2021-07-31 RX ORDER — METOPROLOL SUCCINATE 50 MG/1
50 TABLET, EXTENDED RELEASE ORAL NIGHTLY
Qty: 30 TABLET | Refills: 3 | Status: ON HOLD
Start: 2021-07-31 | End: 2021-09-17 | Stop reason: HOSPADM

## 2021-07-31 RX ORDER — INSULIN LISPRO 100 [IU]/ML
INJECTION, SOLUTION INTRAVENOUS; SUBCUTANEOUS
Qty: 5 PEN | Refills: 2 | Status: ON HOLD
Start: 2021-07-31 | End: 2021-09-17 | Stop reason: HOSPADM

## 2021-07-31 RX ORDER — METOPROLOL SUCCINATE 100 MG/1
100 TABLET, EXTENDED RELEASE ORAL EVERY MORNING
Qty: 30 TABLET | Refills: 3 | Status: ON HOLD
Start: 2021-08-01 | End: 2021-09-17 | Stop reason: HOSPADM

## 2021-07-31 RX ORDER — INSULIN GLARGINE 100 [IU]/ML
10 INJECTION, SOLUTION SUBCUTANEOUS NIGHTLY
Qty: 5 PEN | Refills: 2 | Status: ON HOLD
Start: 2021-07-31 | End: 2021-09-17 | Stop reason: HOSPADM

## 2021-07-31 RX ORDER — DEXTROSE 4 G
1 TABLET,CHEWABLE ORAL DAILY
Qty: 200 EACH | Refills: 3 | Status: ON HOLD | OUTPATIENT
Start: 2021-07-31 | End: 2022-04-05

## 2021-07-31 RX ADMIN — SPIRONOLACTONE 25 MG: 25 TABLET ORAL at 08:26

## 2021-07-31 RX ADMIN — INSULIN LISPRO 6 UNITS: 100 INJECTION, SOLUTION INTRAVENOUS; SUBCUTANEOUS at 07:18

## 2021-07-31 RX ADMIN — SACUBITRIL AND VALSARTAN 1 TABLET: 97; 103 TABLET, FILM COATED ORAL at 08:26

## 2021-07-31 RX ADMIN — PANTOPRAZOLE SODIUM 40 MG: 40 TABLET, DELAYED RELEASE ORAL at 08:27

## 2021-07-31 RX ADMIN — CLOBETASOL PROPIONATE: 0.5 OINTMENT TOPICAL at 08:27

## 2021-07-31 RX ADMIN — ENOXAPARIN SODIUM 40 MG: 40 INJECTION SUBCUTANEOUS at 08:26

## 2021-07-31 RX ADMIN — METOPROLOL SUCCINATE 100 MG: 25 TABLET, EXTENDED RELEASE ORAL at 08:26

## 2021-07-31 ASSESSMENT — PAIN SCALES - GENERAL: PAINLEVEL_OUTOF10: 0

## 2021-07-31 NOTE — DISCHARGE SUMMARY
Hospital Medicine Discharge Summary    Patient: Charline Ambriz     Gender: male  : 1985   Age: 38 y.o. MRN: [id number]    Code Status: Full Code *    Primary Care Provider: No primary care provider on file. 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  He has had a 20lb weight loss in a week. He admits to being non compliant with diabetic meds, metformin, not taking for months. Had polydipsia, polyuria, unable to eat. ** blood sugar improved, ordered diabetic ed  it was completed. They recommended that he go home on insulin pens rather than vials. All diabetic supplies ordered. Disposition:  Home    Exam:     /87   Pulse 64   Temp 97.7 °F (36.5 °C) (Oral)   Resp 16   Ht 5' 10\" (1.778 m)   Wt 256 lb (116.1 kg)   SpO2 98%   BMI 36.73 kg/m²        Left before he could be seen     Consults:     IP CONSULT TO INTERNAL MEDICINE  IP CONSULT TO DIABETES EDUCATOR    Labs:  For convenience and continuity at follow-up the following most recent labs are provided:    Lab Results   Component Value Date    WBC 8.2 2021    HGB 12.1 2021    HCT 35.0 2021    MCV 90.4 2021     2021     2021    K 4.3 2021    K 4.6 2021     2021    CO2 26 2021    BUN 10 2021    CREATININE 0.8 2021    CALCIUM 8.9 2021    PHOS 3.4 2021    BNP 34 2014    ALKPHOS 100 2021    ALT 13 2021    AST 11 2021    BILITOT 0.4 2021    LABALBU 3.9 2021    LDLCALC 91 2020    TRIG 170 2020     Lab Results   Component Value Date    INR 1.3 12/24/2020       Radiology:  XR CHEST PORTABLE   Final Result   No radiographic evidence of acute cardiopulmonary disease process. Discharge Medications:   Discharge Medication List as of 7/31/2021 10:46 AM      START taking these medications    Details   [employer] Lancets MISC DAILY Starting Sat 7/31/2021, Disp-200 each, R-3, NormalTo check sugar 4 x a day and if feeling ill      blood glucose monitor strips Test **4* times a day & as needed for symptoms of irregular blood glucose. Dispense sufficient amount for indicated testing frequency plus additional to accommodate PRN testing needs. , Disp-200 strip, R-2, Normal      clobetasol (TEMOVATE) 0.05 % ointment Apply topically 2 times daily. , Disp-60 g, R-1, Normal      insulin glargine (LANTUS SOLOSTAR) 100 UNIT/ML injection pen Inject 10 Units into the skin nightly, Disp-5 pen, R-2Normal      Insulin Pen Needle 31G X 8 MM MISC Disp-100 each, R-3, [id number]      insulin lispro, 1 Unit Dial, (HUMALOG KWIKPEN) 100 UNIT/ML SOPN , no insulin, 140-199  3 units, 200-249  6 units, 250-299  9 units,  300-349 12 units, 350-400 15 units,  Greater than 400  18 units. BEDTIME:   no insulin,   140-199  2 units,  200-249  3 units, 250-299  5 units,  200 349   6 units,   350 4 00   7 units, greater than 400   9 units, Disp-5 pen, R-2Normal      clotrimazole-betamethasone (LOTRISONE) 1-0.05 % cream Apply topically 2 times daily. To feet twice a day, Disp-60 g, R-1, Normal           Discharge Medication List as of 7/31/2021 10:46 AM      CONTINUE these medications which have CHANGED    Details   !! metoprolol succinate (TOPROL XL) 100 MG extended release tablet Take 1 tablet by mouth every morning, Disp-30 tablet, R-3Normal      !! metoprolol succinate (TOPROL XL) 50 MG extended release tablet Take 1 tablet by mouth nightly, Disp-30 tablet, R-3Normal       !! - Potential duplicate medications found.  Please discuss with provider. Discharge Medication List as of 7/31/2021 10:46 AM      CONTINUE these medications which have NOT CHANGED    Details   bumetanide (BUMEX) 2 MG tablet Take 1 tablet by mouth 2 times daily, Disp-60 tablet, R-3Normal      spironolactone (ALDACTONE) 25 MG tablet Take 1 tablet by mouth daily, Disp-30 tablet, R-6Normal      sacubitril-valsartan (ENTRESTO)  MG per tablet Take 1 tablet by mouth 2 times daily, Disp-60 tablet, R-3Normal      potassium chloride (KLOR-CON M) 20 MEQ extended release tablet Take 2 tablets by mouth daily, Disp-180 tablet, R-1Normal           Discharge Medication List as of 7/31/2021 10:46 AM      STOP taking these medications       triamcinolone (KENALOG) 0.1 % cream Comments:   Reason for Stopping:         mineral oil-hydrophilic petrolatum (AQUAPHOR) ointment Comments:   Reason for Stopping:         metFORMIN (GLUCOPHAGE) 500 MG tablet Comments:   Reason for Stopping: Follow-up appointments:  1 week    Provider Follow-up:    pmd    Condition at Discharge:  Stable    The patient was NOT seen and examined on day of discharge. HE LEFT BEFORE HE COULD BE SEEN. Time Spent on discharge is 30 minutes  in the examination, evaluation, counseling and review of medications and discharge plan. Signed:    TEO Askew   7/31/2021      Thank you No primary care provider on file. for the opportunity to be involved in this patient's care.  If you have any questions or concerns please feel free to contact me at [phone]

## 2021-07-31 NOTE — PROGRESS NOTES
Hospitalist Progress Note      PCP: No primary care provider on file. Date of Admission: 7/28/2021    Chief Complaint: [facility] Course: *fatigued  *found to have a blood sugar greater than 1200.  He has had a 20lb weight loss in a week. He admits to being non compliant with diabetic meds, metformin, not taking for months. Had polydipsia, polyuria, unable to eat. ** blood sugar improved, ordered diabetic ed  it was completed. They recommended that he go home on insulin pens rather than vials. All diabetic supplies ordered. **     Subjective: * LEFT BEFORE HE COULD BE SEEN      Medications:  Reviewed    Infusion Medications    dextrose      sodium chloride 125 mL/hr at 07/30/21 1719     Scheduled Medications    insulin lispro  0-18 Units Subcutaneous TID WC    insulin lispro  0-9 Units Subcutaneous Nightly    clobetasol   Topical BID    metoprolol succinate  100 mg Oral QAM    metoprolol succinate  50 mg Oral Nightly    sacubitril-valsartan  1 tablet Oral BID    spironolactone  25 mg Oral Daily    docusate sodium  100 mg Oral Nightly    pantoprazole  40 mg Oral QAM AC    enoxaparin  40 mg Subcutaneous Daily    insulin glargine  10 Units Subcutaneous Nightly     PRN Meds: glucose, dextrose, glucagon (rDNA), dextrose, ondansetron, acetaminophen, dextrose      Intake/Output Summary (Last 24 hours) at 7/31/2021 1302  Last data filed at 7/31/2021 0836  Gross per 24 hour   Intake 2240 ml   Output 1800 ml   Net 440 ml       Exam:    /87   Pulse 64   Temp 97.7 °F (36.5 °C) (Oral)   Resp 16   Ht 5' 10\" (1.778 m)   Wt 256 lb (116.1 kg)   SpO2 98%   BMI 36.73 kg/m²                 Labs:   Recent Labs     07/28/21  1312   WBC 8.2   HGB 12.1*   HCT 35.0*        Recent Labs     07/30/21  1139 07/30/21  1139 07/30/21  1859 07/30/21  2250 07/31/21  0458      < > 131* 134 138   K 4.0   < > 4.2 4.1 4.3      < > 101 103 104   CO2 19*   < > 21* 20* 26   BUN 8   < > 11 12 10   CREATININE 0.8   < > 0.9 0.8 0.8   CALCIUM 8.6   < > 8.5* 8.9 8.9   PHOS 2.6  --  2.9 3.4  --     < > = values in this interval not displayed. Recent Labs     07/28/21  1312   AST 11   ALT 13   BILITOT 0.4   ALKPHOS 100     No results for input(s): INR in the last 72 hours. No results for input(s): [place] in the last 72 hours. Recent Labs     07/28/21  1312   AST 11   ALT 13   BILITOT 0.4   ALKPHOS 100     No results for input(s): LACTA in the last 72 hours.   No results found for: Adah Ka  No results found for: AMMONIA    Assessment:  Hyperosmolar hyperglycemic state  Pseudohyponatremia   HRrEF  HTn   morbid obesity   ETOH abuse       Plan:  *DC HOME    Electronically signed by Darvin Whitley DO on 7/31/2021 at 1:02 PM [facility]

## 2021-08-02 LAB
ANION GAP SERPL CALCULATED.3IONS-SCNC: ABNORMAL MMOL/L (ref 7–16)
BUN BLDV-MCNC: ABNORMAL MG/DL (ref 6–20)
CALCIUM SERPL-MCNC: ABNORMAL MG/DL (ref 8.6–10.2)
CHLORIDE BLD-SCNC: ABNORMAL MMOL/L (ref 98–107)
CO2: ABNORMAL MMOL/L (ref 22–29)
CREAT SERPL-MCNC: ABNORMAL MG/DL (ref 0.7–1.2)
GFR AFRICAN AMERICAN: ABNORMAL
GFR NON-AFRICAN AMERICAN: ABNORMAL ML/MIN/1.73
GLUCOSE BLD-MCNC: ABNORMAL MG/DL (ref 74–99)
MAGNESIUM: ABNORMAL MG/DL (ref 1.6–2.6)
PHOSPHORUS: ABNORMAL MG/DL (ref 2.5–4.5)
POTASSIUM SERPL-SCNC: ABNORMAL MMOL/L (ref 3.5–5)
SODIUM BLD-SCNC: ABNORMAL MMOL/L (ref 132–146)

## 2021-08-25 ENCOUNTER — TELEPHONE (OUTPATIENT)
Dept: CARDIAC REHAB | Age: 36
End: 2021-08-25

## 2021-08-25 NOTE — TELEPHONE ENCOUNTER
S/w pt regarding attendance to outpatient cardiac rehab. No call/ no show 7/21/21. Pt s/w staff on 8/11/21 staring he was in the hospital for high blood sugar and planned to return next week. S/w pt today since he has not returned since last call. Pt stated  He has been \"self treating a boil on his leg that has not been good. \" He has not contacted his PCP to seek further treatment. Pt states he plans to return to rehab on Monday 8/30/21 11am. If pt does not return on this date, he will be discharged from the program. Please contact rehab with any questions regarding this patients plan of care.  Thank you for your referral.

## 2021-08-30 ENCOUNTER — HOSPITAL ENCOUNTER (OUTPATIENT)
Dept: CARDIAC REHAB | Age: 36
Setting detail: THERAPIES SERIES
Discharge: HOME | End: 2021-08-30
Payer: MEDICARE

## 2021-08-30 PROCEDURE — 93798 PHYS/QHP OP CAR RHAB W/ECG: CPT

## 2021-09-14 ENCOUNTER — HOSPITAL ENCOUNTER (INPATIENT)
Age: 36
LOS: 5 days | Discharge: HOME HEALTH | DRG: 420 | End: 2021-09-20
Attending: STUDENT IN AN ORGANIZED HEALTH CARE EDUCATION/TRAINING PROGRAM | Admitting: INTERNAL MEDICINE
Payer: MEDICARE

## 2021-09-14 ENCOUNTER — OFFICE VISIT (OUTPATIENT)
Dept: FAMILY MEDICINE CLINIC | Age: 36
End: 2021-09-14
Payer: MEDICARE

## 2021-09-14 VITALS
WEIGHT: 225 LBS | HEIGHT: 70 IN | OXYGEN SATURATION: 99 % | RESPIRATION RATE: 16 BRPM | BODY MASS INDEX: 32.21 KG/M2 | DIASTOLIC BLOOD PRESSURE: 78 MMHG | TEMPERATURE: 97.7 F | HEART RATE: 68 BPM | SYSTOLIC BLOOD PRESSURE: 132 MMHG

## 2021-09-14 DIAGNOSIS — N17.9 AKI (ACUTE KIDNEY INJURY) (HCC): ICD-10-CM

## 2021-09-14 DIAGNOSIS — E11.65 TYPE 2 DIABETES MELLITUS WITH HYPERGLYCEMIA, WITH LONG-TERM CURRENT USE OF INSULIN (HCC): ICD-10-CM

## 2021-09-14 DIAGNOSIS — R42 DIZZINESS: ICD-10-CM

## 2021-09-14 DIAGNOSIS — I73.89 HHS (HYPOTHENAR HAMMER SYNDROME) (HCC): ICD-10-CM

## 2021-09-14 DIAGNOSIS — E11.00 HYPEROSMOLAR HYPERGLYCEMIC STATE (HHS) (HCC): Primary | ICD-10-CM

## 2021-09-14 DIAGNOSIS — E86.0 DEHYDRATION: ICD-10-CM

## 2021-09-14 DIAGNOSIS — R73.9 HYPERGLYCEMIA: Primary | ICD-10-CM

## 2021-09-14 DIAGNOSIS — L02.31 LEFT BUTTOCK ABSCESS: ICD-10-CM

## 2021-09-14 DIAGNOSIS — Z79.4 TYPE 2 DIABETES MELLITUS WITH HYPERGLYCEMIA, WITH LONG-TERM CURRENT USE OF INSULIN (HCC): ICD-10-CM

## 2021-09-14 LAB
ALBUMIN SERPL-MCNC: 4.1 G/DL (ref 3.5–5.2)
ALP BLD-CCNC: 114 U/L (ref 40–129)
ALT SERPL-CCNC: 10 U/L (ref 0–40)
ANION GAP SERPL CALCULATED.3IONS-SCNC: 23 MMOL/L (ref 7–16)
AST SERPL-CCNC: 6 U/L (ref 0–39)
BACTERIA: ABNORMAL /HPF
BASOPHILS ABSOLUTE: 0.05 E9/L (ref 0–0.2)
BASOPHILS RELATIVE PERCENT: 0.3 % (ref 0–2)
BETA-HYDROXYBUTYRATE: >4.5 MMOL/L (ref 0.02–0.27)
BILIRUB SERPL-MCNC: 0.3 MG/DL (ref 0–1.2)
BILIRUBIN URINE: ABNORMAL
BLOOD, URINE: ABNORMAL
BUN BLDV-MCNC: 46 MG/DL (ref 6–20)
CALCIUM SERPL-MCNC: 9.9 MG/DL (ref 8.6–10.2)
CHLORIDE BLD-SCNC: 82 MMOL/L (ref 98–107)
CHP ED QC CHECK: NORMAL
CLARITY: CLEAR
CO2: 16 MMOL/L (ref 22–29)
COLOR: YELLOW
CREAT SERPL-MCNC: 1.8 MG/DL (ref 0.7–1.2)
EOSINOPHILS ABSOLUTE: 0.06 E9/L (ref 0.05–0.5)
EOSINOPHILS RELATIVE PERCENT: 0.4 % (ref 0–6)
GFR AFRICAN AMERICAN: 51
GFR NON-AFRICAN AMERICAN: 51 ML/MIN/1.73
GLUCOSE BLD-MCNC: 694 MG/DL (ref 74–99)
GLUCOSE BLD-MCNC: NORMAL MG/DL
GLUCOSE URINE: >=1000 MG/DL
HCT VFR BLD CALC: 36 % (ref 37–54)
HEMOGLOBIN: 12.1 G/DL (ref 12.5–16.5)
IMMATURE GRANULOCYTES #: 0.05 E9/L
IMMATURE GRANULOCYTES %: 0.3 % (ref 0–5)
KETONES, URINE: 15 MG/DL
LACTIC ACID: 2 MMOL/L (ref 0.5–2.2)
LEUKOCYTE ESTERASE, URINE: NEGATIVE
LYMPHOCYTES ABSOLUTE: 2.37 E9/L (ref 1.5–4)
LYMPHOCYTES RELATIVE PERCENT: 14.5 % (ref 20–42)
MCH RBC QN AUTO: 32 PG (ref 26–35)
MCHC RBC AUTO-ENTMCNC: 33.6 % (ref 32–34.5)
MCV RBC AUTO: 95.2 FL (ref 80–99.9)
METER GLUCOSE: >500 MG/DL (ref 74–99)
METER GLUCOSE: >500 MG/DL (ref 74–99)
MONOCYTES ABSOLUTE: 0.86 E9/L (ref 0.1–0.95)
MONOCYTES RELATIVE PERCENT: 5.3 % (ref 2–12)
NEUTROPHILS ABSOLUTE: 12.91 E9/L (ref 1.8–7.3)
NEUTROPHILS RELATIVE PERCENT: 79.2 % (ref 43–80)
NITRITE, URINE: NEGATIVE
PDW BLD-RTO: 12.7 FL (ref 11.5–15)
PH UA: 5.5 (ref 5–9)
PH VENOUS: 7.31 (ref 7.35–7.45)
PLATELET # BLD: 346 E9/L (ref 130–450)
PMV BLD AUTO: 10.9 FL (ref 7–12)
POTASSIUM SERPL-SCNC: 5.1 MMOL/L (ref 3.5–5)
PROTEIN UA: NEGATIVE MG/DL
RBC # BLD: 3.78 E12/L (ref 3.8–5.8)
RBC UA: ABNORMAL /HPF (ref 0–2)
SODIUM BLD-SCNC: 121 MMOL/L (ref 132–146)
SPECIFIC GRAVITY UA: 1.01 (ref 1–1.03)
TOTAL PROTEIN: 9 G/DL (ref 6.4–8.3)
UROBILINOGEN, URINE: 0.2 E.U./DL
WBC # BLD: 16.3 E9/L (ref 4.5–11.5)
WBC UA: ABNORMAL /HPF (ref 0–5)

## 2021-09-14 PROCEDURE — 99284 EMERGENCY DEPT VISIT MOD MDM: CPT

## 2021-09-14 PROCEDURE — 82800 BLOOD PH: CPT

## 2021-09-14 PROCEDURE — 80053 COMPREHEN METABOLIC PANEL: CPT

## 2021-09-14 PROCEDURE — 1036F TOBACCO NON-USER: CPT | Performed by: PHYSICIAN ASSISTANT

## 2021-09-14 PROCEDURE — 85025 COMPLETE CBC W/AUTO DIFF WBC: CPT

## 2021-09-14 PROCEDURE — G8427 DOCREV CUR MEDS BY ELIG CLIN: HCPCS | Performed by: PHYSICIAN ASSISTANT

## 2021-09-14 PROCEDURE — 82962 GLUCOSE BLOOD TEST: CPT | Performed by: PHYSICIAN ASSISTANT

## 2021-09-14 PROCEDURE — G8417 CALC BMI ABV UP PARAM F/U: HCPCS | Performed by: PHYSICIAN ASSISTANT

## 2021-09-14 PROCEDURE — 99203 OFFICE O/P NEW LOW 30 MIN: CPT | Performed by: PHYSICIAN ASSISTANT

## 2021-09-14 PROCEDURE — 83605 ASSAY OF LACTIC ACID: CPT

## 2021-09-14 PROCEDURE — 83690 ASSAY OF LIPASE: CPT

## 2021-09-14 PROCEDURE — 96361 HYDRATE IV INFUSION ADD-ON: CPT

## 2021-09-14 PROCEDURE — 96376 TX/PRO/DX INJ SAME DRUG ADON: CPT

## 2021-09-14 PROCEDURE — 6370000000 HC RX 637 (ALT 250 FOR IP): Performed by: STUDENT IN AN ORGANIZED HEALTH CARE EDUCATION/TRAINING PROGRAM

## 2021-09-14 PROCEDURE — 82962 GLUCOSE BLOOD TEST: CPT

## 2021-09-14 PROCEDURE — 83880 ASSAY OF NATRIURETIC PEPTIDE: CPT

## 2021-09-14 PROCEDURE — 2580000003 HC RX 258: Performed by: STUDENT IN AN ORGANIZED HEALTH CARE EDUCATION/TRAINING PROGRAM

## 2021-09-14 PROCEDURE — 96374 THER/PROPH/DIAG INJ IV PUSH: CPT

## 2021-09-14 PROCEDURE — 82010 KETONE BODYS QUAN: CPT

## 2021-09-14 PROCEDURE — 81001 URINALYSIS AUTO W/SCOPE: CPT

## 2021-09-14 RX ORDER — DEXTROSE, SODIUM CHLORIDE, AND POTASSIUM CHLORIDE 5; .45; .15 G/100ML; G/100ML; G/100ML
INJECTION INTRAVENOUS CONTINUOUS PRN
Status: DISCONTINUED | OUTPATIENT
Start: 2021-09-14 | End: 2021-09-15

## 2021-09-14 RX ORDER — 0.9 % SODIUM CHLORIDE 0.9 %
1000 INTRAVENOUS SOLUTION INTRAVENOUS ONCE
Status: COMPLETED | OUTPATIENT
Start: 2021-09-14 | End: 2021-09-15

## 2021-09-14 RX ORDER — SODIUM CHLORIDE 9 MG/ML
INJECTION, SOLUTION INTRAVENOUS CONTINUOUS
Status: ACTIVE | OUTPATIENT
Start: 2021-09-15 | End: 2021-09-15

## 2021-09-14 RX ORDER — DEXTROSE MONOHYDRATE 25 G/50ML
12.5 INJECTION, SOLUTION INTRAVENOUS PRN
Status: DISCONTINUED | OUTPATIENT
Start: 2021-09-14 | End: 2021-09-15

## 2021-09-14 RX ORDER — POTASSIUM CHLORIDE 7.45 MG/ML
10 INJECTION INTRAVENOUS PRN
Status: DISCONTINUED | OUTPATIENT
Start: 2021-09-14 | End: 2021-09-15 | Stop reason: SDUPTHER

## 2021-09-14 RX ORDER — MAGNESIUM SULFATE 1 G/100ML
1000 INJECTION INTRAVENOUS PRN
Status: DISCONTINUED | OUTPATIENT
Start: 2021-09-14 | End: 2021-09-15 | Stop reason: SDUPTHER

## 2021-09-14 RX ADMIN — SODIUM CHLORIDE 1000 ML: 9 INJECTION, SOLUTION INTRAVENOUS at 23:56

## 2021-09-14 RX ADMIN — INSULIN HUMAN 10 UNITS: 100 INJECTION, SOLUTION PARENTERAL at 23:52

## 2021-09-14 ASSESSMENT — ENCOUNTER SYMPTOMS
SHORTNESS OF BREATH: 1
BACK PAIN: 0
NAUSEA: 0
EYE REDNESS: 0
DIARRHEA: 0
SINUS PRESSURE: 0
EYE PAIN: 0
WHEEZING: 0
COUGH: 1
ABDOMINAL PAIN: 0
SORE THROAT: 0
VOMITING: 0
EYE DISCHARGE: 0

## 2021-09-14 NOTE — ED NOTES
FIRST PROVIDER CONTACT ASSESSMENT NOTE                                                                                                Department of Emergency Medicine                                                      First Provider Note  21  6:16 PM EDT  NAME: Irena Alcaraz  : 1985  MRN: [id number]    Chief Complaint: Hyperglycemia (sent by urgent care, states just ate adrián)      History of Present Illness:   Irena Alcaraz is a 38 y.o. male who presents to the ED for bilateral hip pain for a couple of months. Went to urgent care but told he might be in DKA and to come to the hospital.       Focused Physical Exam:  VS:    ED Triage Vitals [21 1814]   BP Temp Temp src Pulse Resp SpO2 Height Weight   (!) 96/55 96.7 °F (35.9 °C) -- 100 16 100 % 5' 10\" (1.778 m) 225 lb (102.1 kg)        General: Alert and in no apparent distress. Medical History:  has a past medical history of CAD (coronary artery disease), CHF (congestive heart failure) ([facility] Diabetes mellitus ([facility] Hyperlipidemia, Hyperosmolar hyperglycemic state (HHS) ([facility] and Hypertension. Surgical History:  has a past surgical history that includes [place] tooth extraction; Abscess Drainage; and ECHO Compl W Dop Color Flow (2015). Social History:  reports that he has quit smoking. His smoking use included cigars and cigarettes. He started smoking about 21 years ago. He quit after 8.00 years of use. He has never used smokeless tobacco. He reports current alcohol use. He reports that he does not use drugs. Family History: family history includes Heart Attack in his paternal grandfather; Heart Disease in his father and mother; High Blood Pressure in his father, mother, and paternal grandmother. Allergies: Patient has no known allergies.      Initial Plan of Care:  Initiate Treatment-Testing, Proceed toTreatment Area When Bed Available for ED Attending/MLP to Continue Care    -------------------------------------------------END OF FIRST PROVIDER CONTACT ASSESSMENT NOTE--------------------------------------------------------  Electronically signed by MILEY Taylor   DD: 9/14/21       MILEY Cristina  09/14/21 9220

## 2021-09-14 NOTE — Clinical Note
Patient Class: Inpatient [101]   REQUIRED: Diagnosis: Hyperosmolar hyperglycemic state (HHS) [facility] [[id number]]   Estimated Length of Stay: Estimated stay of more than 2 midnights   Admitting Provider: Ivanna Moyer [[id number]]   Telemetry/Cardiac Monitoring Required?: Yes

## 2021-09-15 ENCOUNTER — APPOINTMENT (OUTPATIENT)
Dept: GENERAL RADIOLOGY | Age: 36
DRG: 420 | End: 2021-09-15
Payer: MEDICARE

## 2021-09-15 PROBLEM — E10.10 DKA, TYPE 1, NOT AT GOAL (HCC): Status: ACTIVE | Noted: 2021-09-15

## 2021-09-15 LAB
ANION GAP SERPL CALCULATED.3IONS-SCNC: 11 MMOL/L (ref 7–16)
ANION GAP SERPL CALCULATED.3IONS-SCNC: 12 MMOL/L (ref 7–16)
ANION GAP SERPL CALCULATED.3IONS-SCNC: 13 MMOL/L (ref 7–16)
ANION GAP SERPL CALCULATED.3IONS-SCNC: 18 MMOL/L (ref 7–16)
BASOPHILS ABSOLUTE: 0.04 E9/L (ref 0–0.2)
BASOPHILS ABSOLUTE: 0.05 E9/L (ref 0–0.2)
BASOPHILS RELATIVE PERCENT: 0.4 % (ref 0–2)
BASOPHILS RELATIVE PERCENT: 0.4 % (ref 0–2)
BUN BLDV-MCNC: 22 MG/DL (ref 6–20)
BUN BLDV-MCNC: 27 MG/DL (ref 6–20)
BUN BLDV-MCNC: 34 MG/DL (ref 6–20)
BUN BLDV-MCNC: 41 MG/DL (ref 6–20)
CALCIUM SERPL-MCNC: 8.1 MG/DL (ref 8.6–10.2)
CALCIUM SERPL-MCNC: 8.4 MG/DL (ref 8.6–10.2)
CALCIUM SERPL-MCNC: 8.7 MG/DL (ref 8.6–10.2)
CALCIUM SERPL-MCNC: 9 MG/DL (ref 8.6–10.2)
CHLORIDE BLD-SCNC: 100 MMOL/L (ref 98–107)
CHLORIDE BLD-SCNC: 102 MMOL/L (ref 98–107)
CHLORIDE BLD-SCNC: 91 MMOL/L (ref 98–107)
CHLORIDE BLD-SCNC: 99 MMOL/L (ref 98–107)
CO2: 19 MMOL/L (ref 22–29)
CO2: 22 MMOL/L (ref 22–29)
CREAT SERPL-MCNC: 0.9 MG/DL (ref 0.7–1.2)
CREAT SERPL-MCNC: 1.1 MG/DL (ref 0.7–1.2)
CREAT SERPL-MCNC: 1.3 MG/DL (ref 0.7–1.2)
CREAT SERPL-MCNC: 1.5 MG/DL (ref 0.7–1.2)
EOSINOPHILS ABSOLUTE: 0.07 E9/L (ref 0.05–0.5)
EOSINOPHILS ABSOLUTE: 0.14 E9/L (ref 0.05–0.5)
EOSINOPHILS RELATIVE PERCENT: 0.5 % (ref 0–6)
EOSINOPHILS RELATIVE PERCENT: 1.2 % (ref 0–6)
GFR AFRICAN AMERICAN: >60
GFR NON-AFRICAN AMERICAN: >60 ML/MIN/1.73
GLUCOSE BLD-MCNC: 188 MG/DL (ref 74–99)
GLUCOSE BLD-MCNC: 234 MG/DL (ref 74–99)
GLUCOSE BLD-MCNC: 316 MG/DL (ref 74–99)
GLUCOSE BLD-MCNC: 530 MG/DL (ref 74–99)
GLUCOSE BLD-MCNC: 608 MG/DL (ref 74–99)
HCT VFR BLD CALC: 30 % (ref 37–54)
HCT VFR BLD CALC: 30.1 % (ref 37–54)
HEMOGLOBIN: 10.1 G/DL (ref 12.5–16.5)
HEMOGLOBIN: 10.4 G/DL (ref 12.5–16.5)
IMMATURE GRANULOCYTES #: 0.04 E9/L
IMMATURE GRANULOCYTES #: 0.06 E9/L
IMMATURE GRANULOCYTES %: 0.4 % (ref 0–5)
IMMATURE GRANULOCYTES %: 0.5 % (ref 0–5)
LACTIC ACID: 1.5 MMOL/L (ref 0.5–2.2)
LIPASE: 80 U/L (ref 13–60)
LYMPHOCYTES ABSOLUTE: 2.38 E9/L (ref 1.5–4)
LYMPHOCYTES ABSOLUTE: 2.57 E9/L (ref 1.5–4)
LYMPHOCYTES RELATIVE PERCENT: 18 % (ref 20–42)
LYMPHOCYTES RELATIVE PERCENT: 22.6 % (ref 20–42)
MAGNESIUM: 2.1 MG/DL (ref 1.6–2.6)
MAGNESIUM: 2.4 MG/DL (ref 1.6–2.6)
MAGNESIUM: 2.5 MG/DL (ref 1.6–2.6)
MAGNESIUM: 2.7 MG/DL (ref 1.6–2.6)
MCH RBC QN AUTO: 31.9 PG (ref 26–35)
MCH RBC QN AUTO: 32.5 PG (ref 26–35)
MCHC RBC AUTO-ENTMCNC: 33.7 % (ref 32–34.5)
MCHC RBC AUTO-ENTMCNC: 34.6 % (ref 32–34.5)
MCV RBC AUTO: 94.1 FL (ref 80–99.9)
MCV RBC AUTO: 94.6 FL (ref 80–99.9)
METER GLUCOSE: 184 MG/DL (ref 74–99)
METER GLUCOSE: 184 MG/DL (ref 74–99)
METER GLUCOSE: 186 MG/DL (ref 74–99)
METER GLUCOSE: 190 MG/DL (ref 74–99)
METER GLUCOSE: 223 MG/DL (ref 74–99)
METER GLUCOSE: 238 MG/DL (ref 74–99)
METER GLUCOSE: 332 MG/DL (ref 74–99)
METER GLUCOSE: 406 MG/DL (ref 74–99)
METER GLUCOSE: 435 MG/DL (ref 74–99)
METER GLUCOSE: >500 MG/DL (ref 74–99)
METER GLUCOSE: >500 MG/DL (ref 74–99)
MONOCYTES ABSOLUTE: 0.71 E9/L (ref 0.1–0.95)
MONOCYTES ABSOLUTE: 0.81 E9/L (ref 0.1–0.95)
MONOCYTES RELATIVE PERCENT: 6.1 % (ref 2–12)
MONOCYTES RELATIVE PERCENT: 6.3 % (ref 2–12)
NEUTROPHILS ABSOLUTE: 7.86 E9/L (ref 1.8–7.3)
NEUTROPHILS ABSOLUTE: 9.83 E9/L (ref 1.8–7.3)
NEUTROPHILS RELATIVE PERCENT: 69.1 % (ref 43–80)
NEUTROPHILS RELATIVE PERCENT: 74.5 % (ref 43–80)
PDW BLD-RTO: 12.6 FL (ref 11.5–15)
PDW BLD-RTO: 12.7 FL (ref 11.5–15)
PHOSPHORUS: 2 MG/DL (ref 2.5–4.5)
PHOSPHORUS: 2.1 MG/DL (ref 2.5–4.5)
PHOSPHORUS: 2.3 MG/DL (ref 2.5–4.5)
PHOSPHORUS: 2.4 MG/DL (ref 2.5–4.5)
PLATELET # BLD: 292 E9/L (ref 130–450)
PLATELET # BLD: 296 E9/L (ref 130–450)
PMV BLD AUTO: 10.8 FL (ref 7–12)
PMV BLD AUTO: 11 FL (ref 7–12)
POTASSIUM REFLEX MAGNESIUM: 3.6 MMOL/L (ref 3.5–5)
POTASSIUM REFLEX MAGNESIUM: 3.8 MMOL/L (ref 3.5–5)
POTASSIUM REFLEX MAGNESIUM: 4 MMOL/L (ref 3.5–5)
POTASSIUM SERPL-SCNC: 4.3 MMOL/L (ref 3.5–5)
PRO-BNP: 331 PG/ML (ref 0–125)
RBC # BLD: 3.17 E12/L (ref 3.8–5.8)
RBC # BLD: 3.2 E12/L (ref 3.8–5.8)
SARS-COV-2, NAAT: NOT DETECTED
SODIUM BLD-SCNC: 128 MMOL/L (ref 132–146)
SODIUM BLD-SCNC: 132 MMOL/L (ref 132–146)
SODIUM BLD-SCNC: 135 MMOL/L (ref 132–146)
SODIUM BLD-SCNC: 136 MMOL/L (ref 132–146)
WBC # BLD: 11.4 E9/L (ref 4.5–11.5)
WBC # BLD: 13.2 E9/L (ref 4.5–11.5)

## 2021-09-15 PROCEDURE — 36415 COLL VENOUS BLD VENIPUNCTURE: CPT

## 2021-09-15 PROCEDURE — 87081 CULTURE SCREEN ONLY: CPT

## 2021-09-15 PROCEDURE — 6370000000 HC RX 637 (ALT 250 FOR IP): Performed by: INTERNAL MEDICINE

## 2021-09-15 PROCEDURE — 71045 X-RAY EXAM CHEST 1 VIEW: CPT

## 2021-09-15 PROCEDURE — 85025 COMPLETE CBC W/AUTO DIFF WBC: CPT

## 2021-09-15 PROCEDURE — 2580000003 HC RX 258: Performed by: INTERNAL MEDICINE

## 2021-09-15 PROCEDURE — 2500000003 HC RX 250 WO HCPCS: Performed by: INTERNAL MEDICINE

## 2021-09-15 PROCEDURE — 84100 ASSAY OF PHOSPHORUS: CPT

## 2021-09-15 PROCEDURE — 83735 ASSAY OF MAGNESIUM: CPT

## 2021-09-15 PROCEDURE — 87635 SARS-COV-2 COVID-19 AMP PRB: CPT

## 2021-09-15 PROCEDURE — 6370000000 HC RX 637 (ALT 250 FOR IP): Performed by: STUDENT IN AN ORGANIZED HEALTH CARE EDUCATION/TRAINING PROGRAM

## 2021-09-15 PROCEDURE — 1200000000 HC SEMI PRIVATE

## 2021-09-15 PROCEDURE — 6360000002 HC RX W HCPCS: Performed by: INTERNAL MEDICINE

## 2021-09-15 PROCEDURE — 99254 IP/OBS CNSLTJ NEW/EST MOD 60: CPT | Performed by: INTERNAL MEDICINE

## 2021-09-15 PROCEDURE — 82947 ASSAY GLUCOSE BLOOD QUANT: CPT

## 2021-09-15 PROCEDURE — 99223 1ST HOSP IP/OBS HIGH 75: CPT | Performed by: INTERNAL MEDICINE

## 2021-09-15 PROCEDURE — 2580000003 HC RX 258: Performed by: STUDENT IN AN ORGANIZED HEALTH CARE EDUCATION/TRAINING PROGRAM

## 2021-09-15 PROCEDURE — 82962 GLUCOSE BLOOD TEST: CPT

## 2021-09-15 PROCEDURE — 80048 BASIC METABOLIC PNL TOTAL CA: CPT

## 2021-09-15 RX ORDER — INSULIN GLARGINE 100 [IU]/ML
15 INJECTION, SOLUTION SUBCUTANEOUS NIGHTLY
Status: DISCONTINUED | OUTPATIENT
Start: 2021-09-15 | End: 2021-09-16

## 2021-09-15 RX ORDER — DEXTROSE, SODIUM CHLORIDE, AND POTASSIUM CHLORIDE 5; .45; .15 G/100ML; G/100ML; G/100ML
INJECTION INTRAVENOUS CONTINUOUS PRN
Status: DISCONTINUED | OUTPATIENT
Start: 2021-09-15 | End: 2021-09-15

## 2021-09-15 RX ORDER — MAGNESIUM SULFATE 1 G/100ML
1000 INJECTION INTRAVENOUS PRN
Status: DISCONTINUED | OUTPATIENT
Start: 2021-09-15 | End: 2021-09-15

## 2021-09-15 RX ORDER — POTASSIUM CHLORIDE 7.45 MG/ML
10 INJECTION INTRAVENOUS PRN
Status: DISCONTINUED | OUTPATIENT
Start: 2021-09-15 | End: 2021-09-15

## 2021-09-15 RX ORDER — SODIUM CHLORIDE 9 MG/ML
INJECTION, SOLUTION INTRAVENOUS CONTINUOUS
Status: DISCONTINUED | OUTPATIENT
Start: 2021-09-15 | End: 2021-09-15

## 2021-09-15 RX ORDER — INSULIN GLARGINE 100 [IU]/ML
30 INJECTION, SOLUTION SUBCUTANEOUS DAILY
Status: DISCONTINUED | OUTPATIENT
Start: 2021-09-15 | End: 2021-09-15

## 2021-09-15 RX ORDER — INSULIN GLARGINE 100 [IU]/ML
30 INJECTION, SOLUTION SUBCUTANEOUS EVERY MORNING
Status: DISCONTINUED | OUTPATIENT
Start: 2021-09-16 | End: 2021-09-16

## 2021-09-15 RX ORDER — DEXTROSE MONOHYDRATE 25 G/50ML
12.5 INJECTION, SOLUTION INTRAVENOUS PRN
Status: DISCONTINUED | OUTPATIENT
Start: 2021-09-15 | End: 2021-09-20 | Stop reason: HOSPADM

## 2021-09-15 RX ORDER — NICOTINE POLACRILEX 4 MG
15 LOZENGE BUCCAL PRN
Status: DISCONTINUED | OUTPATIENT
Start: 2021-09-15 | End: 2021-09-20 | Stop reason: HOSPADM

## 2021-09-15 RX ORDER — DEXTROSE MONOHYDRATE 50 MG/ML
100 INJECTION, SOLUTION INTRAVENOUS PRN
Status: DISCONTINUED | OUTPATIENT
Start: 2021-09-15 | End: 2021-09-20 | Stop reason: HOSPADM

## 2021-09-15 RX ORDER — POLYETHYLENE GLYCOL 3350 17 G/17G
17 POWDER, FOR SOLUTION ORAL DAILY PRN
Status: DISCONTINUED | OUTPATIENT
Start: 2021-09-15 | End: 2021-09-20 | Stop reason: HOSPADM

## 2021-09-15 RX ORDER — 0.9 % SODIUM CHLORIDE 0.9 %
15 INTRAVENOUS SOLUTION INTRAVENOUS ONCE
Status: COMPLETED | OUTPATIENT
Start: 2021-09-15 | End: 2021-09-15

## 2021-09-15 RX ORDER — DEXTROSE MONOHYDRATE 25 G/50ML
12.5 INJECTION, SOLUTION INTRAVENOUS PRN
Status: DISCONTINUED | OUTPATIENT
Start: 2021-09-15 | End: 2021-09-15

## 2021-09-15 RX ADMIN — INSULIN LISPRO 2 UNITS: 100 INJECTION, SOLUTION INTRAVENOUS; SUBCUTANEOUS at 12:43

## 2021-09-15 RX ADMIN — POTASSIUM CHLORIDE 10 MEQ: 7.46 INJECTION, SOLUTION INTRAVENOUS at 07:28

## 2021-09-15 RX ADMIN — SODIUM CHLORIDE 0.1 UNITS/KG/HR: 9 INJECTION, SOLUTION INTRAVENOUS at 02:42

## 2021-09-15 RX ADMIN — SODIUM CHLORIDE: 9 INJECTION, SOLUTION INTRAVENOUS at 02:45

## 2021-09-15 RX ADMIN — POTASSIUM CHLORIDE 10 MEQ: 7.46 INJECTION, SOLUTION INTRAVENOUS at 09:45

## 2021-09-15 RX ADMIN — SODIUM CHLORIDE 1000 ML: 9 INJECTION, SOLUTION INTRAVENOUS at 00:31

## 2021-09-15 RX ADMIN — ENOXAPARIN SODIUM 40 MG: 40 INJECTION SUBCUTANEOUS at 08:30

## 2021-09-15 RX ADMIN — SODIUM CHLORIDE 1532 ML: 9 INJECTION, SOLUTION INTRAVENOUS at 04:11

## 2021-09-15 RX ADMIN — SODIUM PHOSPHATE, MONOBASIC, MONOHYDRATE 15 MMOL: 276; 142 INJECTION, SOLUTION INTRAVENOUS at 07:35

## 2021-09-15 RX ADMIN — INSULIN LISPRO 12 UNITS: 100 INJECTION, SOLUTION INTRAVENOUS; SUBCUTANEOUS at 17:48

## 2021-09-15 RX ADMIN — INSULIN GLARGINE 30 UNITS: 100 INJECTION, SOLUTION SUBCUTANEOUS at 12:43

## 2021-09-15 RX ADMIN — POTASSIUM CHLORIDE 10 MEQ: 7.46 INJECTION, SOLUTION INTRAVENOUS at 08:39

## 2021-09-15 RX ADMIN — POTASSIUM CHLORIDE, DEXTROSE MONOHYDRATE AND SODIUM CHLORIDE: 150; 5; 450 INJECTION, SOLUTION INTRAVENOUS at 05:50

## 2021-09-15 ASSESSMENT — PAIN SCALES - GENERAL
PAINLEVEL_OUTOF10: 0

## 2021-09-15 NOTE — H&P
Hospital Medicine  History and Physical    Patient:  Ana Paula Magallanes  MRN: [id number]    CHIEF COMPLAINT:    Chief Complaint   Patient presents with    Hyperglycemia     sent by urgent care, states just ate mcdonalds       Primary Care Physician: No primary care provider on file. HISTORY OF PRESENT ILLNESS:   The patient is a 38 y.o. male with history of diabetes mellitus/noncompliance and multiple admissions for DKA who presented to the hospital complaining of generalized fatigue/lightheadedness with the patient was found to be in DKA/elevated glucose, the patient was started on IV fluids and insulin drip and hospitalist service was called to admit the patient for further evaluation and management, on my, the patient was in the bed did not seem to be in distress he is watching TV sitting that he is taking his medications however the records reporting the patient's compliant with medications, his vital signs here in the hospital were on the soft side, the patient is having mild leukocytosis/RONA on top of his DKA    Past Medical History:      Diagnosis Date    CAD (coronary artery disease)     [facility]     Diabetes mellitus [facility]     Hyperlipidemia     Hyperosmolar hyperglycemic state (HHS) ([facility] 7/30/2021    Hypertension        Past Surgical History:      Procedure Laterality Date    ABSCESS DRAINAGE      buttocks    ECHO COMPL W DOP COLOR FLOW  5/5/2015         WISDOM TOOTH EXTRACTION         Medications Prior to Admission:    Prior to Admission medications    Medication Sig Start Date End Date Taking? Authorizing Provider   [facility] Lancets MISC 1 each by Does not apply route daily To check sugar 4 x a day and if feeling ill 7/31/21   Ann Flores, DO   blood glucose monitor strips Test **4* times a day & as needed for symptoms of irregular blood glucose. Dispense sufficient amount for indicated testing frequency plus additional to accommodate PRN testing needs.  7/31/21 Delvis Wong,    metoprolol succinate (TOPROL XL) 100 MG extended release tablet Take 1 tablet by mouth every morning 8/1/21   Fabrizio De La O DO   metoprolol succinate (TOPROL XL) 50 MG extended release tablet Take 1 tablet by mouth nightly 7/31/21   Fabrizio De La O DO   clobetasol (TEMOVATE) 0.05 % ointment Apply topically 2 times daily. 7/31/21   Fabrizio De La O DO   insulin glargine (LANTUS SOLOSTAR) 100 UNIT/ML injection pen Inject 10 Units into the skin nightly 7/31/21   Fabrizio De La O DO   Insulin Pen Needle 31G X 8 MM MISC 200 7/31/21   Fabrizio De La O DO   insulin lispro, 1 Unit Dial, (HUMALOG KWIKPEN) 100 UNIT/ML SOPN , no insulin, 140-199  3 units, 200-249  6 units, 250-299  9 units,  300-349 12 units, 350-400 15 units,  Greater than 400  18 units. BEDTIME:   no insulin,   140-199  2 units,  200-249  3 units, 250-299  5 units,  200 349   6 units,   350 400   7 units, greater than 400   9 units 7/31/21   Fabrizio De La O DO   clotrimazole-betamethasone (LOTRISONE) 1-0.05 % cream Apply topically 2 times daily. To feet twice a day 7/31/21   Delvis Wong DO   bumetanide [facility]) 2 MG tablet Take 1 tablet by mouth 2 times daily 3/26/21   CRIS Davila CNP   spironolactone (ALDACTONE) 25 MG tablet Take 1 tablet by mouth daily 2/22/21   CRIS Davila CNP   sacubitril-valsartan (ENTRESTO)  MG per tablet Take 1 tablet by mouth 2 times daily 1/19/21   CRIS Davila CNP   potassium chloride (KLOR-CON M) 20 MEQ extended release tablet Take 2 tablets by mouth daily 1/19/21   CRIS Davila CNP       Allergies:  Patient has no known allergies. Social History:   TOBACCO:   reports that he has quit smoking. His smoking use included cigars and cigarettes. He started smoking about 21 years ago. He quit after 8.00 years of use. He has never used smokeless tobacco.  ETOH:   reports current alcohol use.     Family History: Problem Relation Age of Onset    High Blood Pressure Father     Heart Disease Father     High Blood Pressure Paternal Grandmother     Heart Attack Paternal Grandfather     High Blood Pressure Mother     Heart Disease Mother        REVIEW OF SYSTEMS:  Ten systems reviewed and negative except for as mentioned in the HPI  Review of Systems     Physical Exam:      Vitals: BP (!) 94/41   Pulse 88   Temp 96.7 °F (35.9 °C)   Resp 16   Ht 5' 10\" (1.778 m)   Wt 225 lb (102.1 kg)   SpO2 100%   BMI 32.28 kg/m²     Physical Exam     General appearance: alert, cooperative and no distress. Obese  Mental Status: oriented to person, place and time and normal affect  Lungs: clear to auscultation bilaterally, normal effort  Heart: regular rate and rhythm, no murmur  Abdomen: soft, nontender, nondistended, bowel sounds present, no masses  Extremities: no edema or redness  Skin: no gross lesions, rashes    Recent Labs     09/14/21  2138 09/15/21  0138   WBC 16.3* 13.2*   HGB 12.1* 10.4*    296     Recent Labs     09/14/21  2138 09/15/21  0138   * 128*   K 5.1* 4.3   CL 82* 91*   CO2 16* 19*   BUN 46* 41*   CREATININE 1.8* 1.5*   GLUCOSE 694* 530*   AST 6  --    ALT 10  --    BILITOT 0.3  --    ALKPHOS 114  --      Troponin T: No results for input(s): TROPONINI in the last 72 hours. ABGs: No results found for: PHART, PO2ART, EVI0WSU  INR: No results for input(s): INR in the last 72 hours. URINALYSIS:  Recent Labs     09/14/21 2138   COLORU Yellow   PHUR 5.5   WBCUA NONE   RBCUA 0-1   BACTERIA RARE*   CLARITYU Clear   SPECGRAV 1.010   LEUKOCYTESUR Negative   UROBILINOGEN 0.2   BILIRUBINUR SMALL*   BLOODU SMALL*   GLUCOSEU >=1000*     -----------------------------------------------------------------   No results found.         Assessment and Plan     *DKA  *High anion gap metabolic acidosis secondary to above  *Pseudohyponatremia secondary to hyperglycemia  *Acute kidney injury  *Leukocytoses    -Admit to

## 2021-09-15 NOTE — CONSULTS
[id number] [address]                                  CONSULTATION    PATIENT NAME: Rosibel Ordonez                      :        1985  MED REC NO:   [id number]                            ROOM:       0201  ACCOUNT NO:   [de-identified]                           ADMIT DATE: 2021  PROVIDER:     Bhanu Ordaz MD    CONSULT DATE:  09/15/2021    CRITICAL CARE CONSULTATION    REQUESTING PROVIDER:  Dr. Anay Nunez. REASON FOR CONSULTATION:  ICU admission. IMPRESSION AND PLAN:  1. DKA, which is resolving. We will get one more BMP and then  transition him to subcutaneous insulin and let him eat. 2.  RONA, which is improving with hydration. 3.  Active smoking of a quarter of a pack of cigarettes a day. Chest  x-ray was reviewed and is clear, but he has lost 80 pounds, so there is  some concern that something else is going on, but we will defer this to  Medicine. 4.  Obstructive sleep apnea with noncompliance with CPAP. He is  supposed to be on auto-titration per review of Dr. Jong Ibanez sleep study  report. He does not sound like he is terribly compliant with it. HISTORY OF PRESENT ILLNESS:  A 57-year-old male with diabetes and  congestive heart failure, on Entresto and spironolactone, who has been  having balance issues for the past month with hip issues as well. For  that reason, he went to the Urgent Care. On the way, he had not taken  his meds yesterday and also went to [facility]. He was found to be  markedly hyperglycemic, so he was referred to the emergency department  where he was found to be in DKA. He also had acute kidney injury. He  had no nausea, vomiting, increased thirst, or increased urination. He  had no chest pain, cough, or shortness of breath at that time. He is  admitted to the ICU on an insulin infusion and is now being seen for  further evaluation.     PAST MEDICAL HISTORY:  Includes CHF, hypertension, hyperlipidemia,  obstructive sleep apnea, and congestive heart failure. PAST SURGICAL HISTORY:  His only surgery was excision of a boil. FAMILY HISTORY:  His mother has diabetes as well. SOCIAL HISTORY:  He smokes a quarter of a pack or so of cigarettes a  day. He worked as an . He drinks alcohol. It is not  clear how often, but it sounds like a fair bit. CURRENT MEDICATIONS:  Insulin drip and nothing else. Prior to  admission, he was on metoprolol 100 every morning and 50 at night,  Lantus 10 units nightly, Bumex 2 mg b.i.d., spironolactone 25 daily,  Entresto 97/103 b.i.d., and potassium 40 mEq daily. ALLERGIES:  No reported drug allergies. REVIEW OF SYSTEMS:  Reveals no fevers. He has lost 80 pounds in the  last several months unintentionally. He wears eye glasses. No hearing  difficulty. No chest pain. No cough, shortness of breath, nausea,  vomiting, diarrhea, or urinary complaints. Review of systems is  otherwise negative except for hip pain and gait instability. PHYSICAL EXAMINATION:  GENERAL:  He is in no acute distress. VITAL SIGNS:  Temperature 98.2, pulse 66, respiratory rate was 18, blood  pressure 122/76, pulse ox 100%. SKIN:  No rashes. HEENT:  Eyes:  Clear sclerae. Teeth, palate, and gums normal.  NECK:  Without masses or adenopathy. CHEST:  Symmetric. There was no accessory muscle use. HEART:  Regular. LUNGS:  Clear. ABDOMEN:  Soft and nontender. EXTREMITIES:  Showed clubbing, but no cyanosis or lymphedema. NEUROLOGICAL:  He was alert and appropriate. IMAGING DATA:  Chest x-ray from 09/15 was reviewed and was clear. White  count 11.4, hemoglobin 10.1, platelets 026,548. Sodium 135, potassium  3.8, chloride 100, bicarb 22, anion gap 13. BUN is down to 34 and  creatinine is down from 1.8 to 1.3. Glucose is 234. COVID was  negative. Old records were reviewed.     EKG showed normal sinus rhythm; cannot rule out an inferior MI, age  indeterminate; T-wave abnormalities suggestive of lateral ischemia.         Justice Gallardo MD    D: 09/15/2021 8:56:08       T: 09/15/2021 8:59:04     [email or web address]  Job#: [id number]     Doc#: [id number]    CC:

## 2021-09-15 NOTE — ED PROVIDER NOTES
Is a 24-year-old male presenting emergency department for hyperglycemia. He was sent by an urgent care, states he had some abdominal today and that the reason why his blood sugar was high. He denies any polyuria or polydipsia, denies any nausea, vomiting, diarrhea. Is short of breath at baseline, says he has CHF, and says he is on medications for this. Appears he is on spironolactone and Entresto. He has a prior history of HHS as well in the past.  Per the patient high blood sugars have been ongoing for today, associated with feeling of fatigue, nothing makes it better, nothing makes it worse, was gradual onset, has been persistent throughout the day, moderate in severity. Review of Systems   Constitutional: Positive for fatigue. Negative for chills and fever. HENT: Negative for ear pain, sinus pressure and sore throat. Eyes: Negative for pain, discharge and redness. Respiratory: Positive for cough (Baseline shortness of breath and cough says is unchanged) and shortness of breath. Negative for wheezing. Cardiovascular: Negative for chest pain. Gastrointestinal: Negative for abdominal pain, diarrhea, nausea and vomiting. Endocrine: Negative for polydipsia and polyuria. Genitourinary: Negative for dysuria and frequency. Musculoskeletal: Negative for arthralgias and back pain. Skin: Negative for rash and wound. Neurological: Negative for weakness and headaches. Hematological: Negative for adenopathy. All other systems reviewed and are negative. Physical Exam  Vitals and nursing note reviewed. Constitutional:       Appearance: He is well-developed. He is obese. HENT:      Head: Normocephalic and atraumatic. Eyes:      Conjunctiva/sclera: Conjunctivae normal.   Cardiovascular:      Rate and Rhythm: Regular rhythm. Tachycardia present. Heart sounds: Normal heart sounds. No murmur heard.      Pulmonary:      Effort: Pulmonary effort is normal. No respiratory distress. Breath sounds: Normal breath sounds. No wheezing or rales. Abdominal:      General: Bowel sounds are normal.      Palpations: Abdomen is soft. Tenderness: There is no abdominal tenderness. There is no guarding or rebound. Musculoskeletal:         General: No tenderness or deformity. Cervical back: Normal range of motion and neck supple. Right lower leg: No edema. Left lower leg: No edema. Skin:     General: Skin is warm and dry. Neurological:      Mental Status: He is alert and oriented to person, place, and time. Cranial Nerves: No cranial nerve deficit. Coordination: Coordination normal.          Procedures     MDM     ED Course as of Sep 15 0310   Tue Sep 14, 2021   2234 Patient hyperglycemic with borderline blood pressure, will give fluids, also appears to have significant RONA, will check response to fluids as well as a bolus of insulin    [JG]   Wed Sep 15, 2021   0204 Patient blood pressure remains borderline, but tachycardia improved, may related to patient's beta-blocker usage    [JG]   0214 We will put patient on insulin drip as repeat gap is 18, and glucose is still very elevated at 530    [JG]   0228 Spoke to Hospitalist, accepted patient for admission    [JG]   0309 Patient was accepted for admission to the ICU    [JG]   0309 Patient presenting emergency department for hyperglycemia, was relatively asymptomatic, just that he did not feel well after he had Qureshi's. Was found to be in HHS as he had an elevated anion gap, hyperglycemia, elevated beta hydroxybutyrate, but a relatively normal venous pH. He was given fluids, as well as a bolus of insulin with improvement in his vital signs, but his anion gap did not close, was 18 on repeat, with a blood sugar of 530, start on insulin drip, admitted to ICU for further work-up and management.     [JG]      ED Course User Index  [JG] Hemanth Garces MD    Patient presenting emergency department for hyperglycemia, was relatively asymptomatic, just that he did not feel well after he had Qureshi's. Was found to be in HHS as he had an elevated anion gap, hyperglycemia, elevated beta hydroxybutyrate, but a relatively normal venous pH. He was given fluids, as well as a bolus of insulin with improvement in his vital signs, but his anion gap did not close, was 18 on repeat, with a blood sugar of 530, start on insulin drip, admitted to ICU for further work-up and management. ED Course as of Sep 15 0310   Tue Sep 14, 2021   2234 Patient hyperglycemic with borderline blood pressure, will give fluids, also appears to have significant RONA, will check response to fluids as well as a bolus of insulin    [JG]   Wed Sep 15, 2021   0204 Patient blood pressure remains borderline, but tachycardia improved, may related to patient's beta-blocker usage    [JG]   0214 We will put patient on insulin drip as repeat gap is 18, and glucose is still very elevated at 530    [JG]   0228 Spoke to Hospitalist, accepted patient for admission    [JG]   0309 Patient was accepted for admission to the ICU    [JG]   0309 Patient presenting emergency department for hyperglycemia, was relatively asymptomatic, just that he did not feel well after he had Qureshi's. Was found to be in HHS as he had an elevated anion gap, hyperglycemia, elevated beta hydroxybutyrate, but a relatively normal venous pH. He was given fluids, as well as a bolus of insulin with improvement in his vital signs, but his anion gap did not close, was 18 on repeat, with a blood sugar of 530, start on insulin drip, admitted to ICU for further work-up and management.     [JG]      ED Course User Index  [JG] Bismark Samaniego MD       --------------------------------------------- PAST HISTORY ---------------------------------------------  Past Medical History:  has a past medical history of CAD (coronary artery disease), CHF (congestive heart failure) ([facility] Diabetes mellitus ([facility] Hyperlipidemia, Hyperosmolar hyperglycemic state (HHS) ([facility] and Hypertension. Past Surgical History:  has a past surgical history that includes [place] tooth extraction; Abscess Drainage; and ECHO Compl W Dop Color Flow (5/5/2015). Social History:  reports that he has quit smoking. His smoking use included cigars and cigarettes. He started smoking about 21 years ago. He quit after 8.00 years of use. He has never used smokeless tobacco. He reports current alcohol use. He reports that he does not use drugs. Family History: family history includes Heart Attack in his paternal grandfather; Heart Disease in his father and mother; High Blood Pressure in his father, mother, and paternal grandmother. The patients home medications have been reviewed. Allergies: Patient has no known allergies.     -------------------------------------------------- RESULTS -------------------------------------------------    Lab  Results for orders placed or performed during the hospital encounter of 09/14/21   CBC Auto Differential   Result Value Ref Range    WBC 16.3 (H) 4.5 - 11.5 E9/L    RBC 3.78 (L) 3.80 - 5.80 E12/L    Hemoglobin 12.1 (L) 12.5 - 16.5 g/dL    Hematocrit 36.0 (L) 37.0 - 54.0 %    MCV 95.2 80.0 - 99.9 fL    MCH 32.0 26.0 - 35.0 pg    MCHC 33.6 32.0 - 34.5 %    RDW 12.7 11.5 - 15.0 fL    Platelets 777 170 - 764 E9/L    MPV 10.9 7.0 - 12.0 fL    Neutrophils % 79.2 43.0 - 80.0 %    Immature Granulocytes % 0.3 0.0 - 5.0 %    Lymphocytes % 14.5 (L) 20.0 - 42.0 %    Monocytes % 5.3 2.0 - 12.0 %    Eosinophils % 0.4 0.0 - 6.0 %    Basophils % 0.3 0.0 - 2.0 %    Neutrophils Absolute 12.91 (H) 1.80 - 7.30 E9/L    Immature Granulocytes # 0.05 E9/L    Lymphocytes Absolute 2.37 1.50 - 4.00 E9/L    Monocytes Absolute 0.86 0.10 - 0.95 E9/L    Eosinophils Absolute 0.06 0.05 - 0.50 E9/L    Basophils Absolute 0.05 0.00 - 0.20 E9/L   Comprehensive Metabolic Panel   Result Value Ref Range    Sodium 121 (L) 132 - 146 mmol/L Potassium 5.1 (H) 3.5 - 5.0 mmol/L    Chloride 82 (L) 98 - 107 mmol/L    CO2 16 (L) 22 - 29 mmol/L    Anion Gap 23 (H) 7 - 16 mmol/L    Glucose 694 (HH) 74 - 99 mg/dL    BUN 46 (H) 6 - 20 mg/dL    CREATININE 1.8 (H) 0.7 - 1.2 mg/dL    GFR Non-African American 51 >=60 mL/min/1.73    GFR African American 51     Calcium 9.9 8.6 - 10.2 mg/dL    Total Protein 9.0 (H) 6.4 - 8.3 g/dL    Albumin 4.1 3.5 - 5.2 g/dL    Total Bilirubin 0.3 0.0 - 1.2 mg/dL    Alkaline Phosphatase 114 40 - 129 U/L    ALT 10 0 - 40 U/L    AST 6 0 - 39 U/L   Lactic Acid, Plasma   Result Value Ref Range    Lactic Acid 2.0 0.5 - 2.2 mmol/L   URINALYSIS   Result Value Ref Range    Color, UA Yellow Straw/Yellow    Clarity, UA Clear Clear    Glucose, Ur >=1000 (A) Negative mg/dL    Bilirubin Urine SMALL (A) Negative    Ketones, Urine 15 (A) Negative mg/dL    Specific Gravity, UA 1.010 1.005 - 1.030    Blood, Urine SMALL (A) Negative    pH, UA 5.5 5.0 - 9.0    Protein, UA Negative Negative mg/dL    Urobilinogen, Urine 0.2 <2.0 E.U./dL    Nitrite, Urine Negative Negative    Leukocyte Esterase, Urine Negative Negative   Beta-Hydroxybutyrate   Result Value Ref Range    Beta-Hydroxybutyrate >4.50 (H) 0.02 - 0.27 mmol/L   PH, VENOUS   Result Value Ref Range    pH, Vitaliy 7.31 (L) 7.35 - 7.45   Microscopic Urinalysis   Result Value Ref Range    WBC, UA NONE 0 - 5 /HPF    RBC, UA 0-1 0 - 2 /HPF    Bacteria, UA RARE (A) None Seen /HPF   Magnesium   Result Value Ref Range    Magnesium 2.7 (H) 1.6 - 2.6 mg/dL   Phosphorus   Result Value Ref Range    Phosphorus 2.3 (L) 2.5 - 4.5 mg/dL   Lipase   Result Value Ref Range    Lipase 80 (H) 13 - 60 U/L   Lactic acid, plasma   Result Value Ref Range    Lactic Acid 1.5 0.5 - 2.2 mmol/L   CBC auto differential   Result Value Ref Range    WBC 13.2 (H) 4.5 - 11.5 E9/L    RBC 3.20 (L) 3.80 - 5.80 E12/L    Hemoglobin 10.4 (L) 12.5 - 16.5 g/dL    Hematocrit 30.1 (L) 37.0 - 54.0 %    MCV 94.1 80.0 - 99.9 fL    MCH 32.5 26.0 - 35.0 pg    MCHC 34.6 (H) 32.0 - 34.5 %    RDW 12.6 11.5 - 15.0 fL    Platelets 262 322 - 964 E9/L    MPV 11.0 7.0 - 12.0 fL    Neutrophils % 74.5 43.0 - 80.0 %    Immature Granulocytes % 0.5 0.0 - 5.0 %    Lymphocytes % 18.0 (L) 20.0 - 42.0 %    Monocytes % 6.1 2.0 - 12.0 %    Eosinophils % 0.5 0.0 - 6.0 %    Basophils % 0.4 0.0 - 2.0 %    Neutrophils Absolute 9.83 (H) 1.80 - 7.30 E9/L    Immature Granulocytes # 0.06 E9/L    Lymphocytes Absolute 2.38 1.50 - 4.00 E9/L    Monocytes Absolute 0.81 0.10 - 0.95 E9/L    Eosinophils Absolute 0.07 0.05 - 0.50 E9/L    Basophils Absolute 0.05 0.00 - 0.20 E9/L   Brain Natriuretic Peptide   Result Value Ref Range    Pro- (H) 0 - 125 pg/mL   Basic Metabolic Panel   Result Value Ref Range    Sodium 128 (L) 132 - 146 mmol/L    Potassium 4.3 3.5 - 5.0 mmol/L    Chloride 91 (L) 98 - 107 mmol/L    CO2 19 (L) 22 - 29 mmol/L    Anion Gap 18 (H) 7 - 16 mmol/L    Glucose 530 (HH) 74 - 99 mg/dL    BUN 41 (H) 6 - 20 mg/dL    CREATININE 1.5 (H) 0.7 - 1.2 mg/dL    GFR Non-African American >60 >=60 mL/min/1.73    GFR African American >60     Calcium 8.7 8.6 - 10.2 mg/dL   POCT Glucose   Result Value Ref Range    Meter Glucose >500 (H) 74 - 99 mg/dL   POCT Glucose   Result Value Ref Range    Meter Glucose >500 (H) 74 - 99 mg/dL   POCT Glucose   Result Value Ref Range    Meter Glucose >500 (H) 74 - 99 mg/dL       Radiology  XR CHEST PORTABLE    (Results Pending)           ------------------------- NURSING NOTES AND VITALS REVIEWED ---------------------------  Date / Time Roomed:  9/14/2021 10:20 PM  ED Bed Assignment:  27/27    The nursing notes within the ED encounter and vital signs as below have been reviewed.    Patient Vitals for the past 24 hrs:   BP Temp Pulse Resp SpO2 Height Weight   09/15/21 0145 (!) 94/41 -- 88 16 100 % -- --   09/14/21 1814 (!) 96/55 96.7 °F (35.9 °C) 100 16 100 % 5' 10\" (1.778 m) 225 lb (102.1 kg)       Oxygen Saturation Interpretation: Normal      ------------------------------------------ PROGRESS NOTES ------------------------------------------      I have spoken with the patient and discussed todays results, in addition to providing specific details for the plan of care and counseling regarding the diagnosis and prognosis. Their questions are answered at this time and they are agreeable with the plan.      --------------------------------- ADDITIONAL PROVIDER NOTES ---------------------------------  Consultations:  Spoke with Dr. Nori Lo  They will admit this patient and will provide consultation. This patient's ED course included: a personal history and physicial examination, re-evaluation prior to disposition, multiple bedside re-evaluations, IV medications, cardiac monitoring, continuous pulse oximetry and complex medical decision making and emergency management    This patient has remained hemodynamically stable and been closely monitored during their ED course. Please note that the withdrawal or failure to initiate urgent interventions for this patient would likely result in a life threatening deterioration or permanent disability. Accordingly this patient received 36 minutes of critical care time, excluding separately billable procedures. Clinical Impression  1. Hyperosmolar hyperglycemic state (HHS) ([facility]    2. Dehydration    3. RONA (acute kidney injury) ([facility]          Disposition  Patient's disposition: Admit to CCU/ICU  Patient's condition is stable.            Edgar Booker MD  Resident  09/15/21 [id number]

## 2021-09-15 NOTE — PROGRESS NOTES
Patient admitted from ED room 27 to ICU room 201, with the following belongings cell phone, cell phone , glasses, sandals,pants, shirt and wallet , placed on monitor, patient oriented to room and unit visiting hours. Patient guide at bedside, reviewed patient rights and responsibilities. MRSA nasal swab obtained. Bed alarm on. Call light within reach.

## 2021-09-15 NOTE — ED NOTES
Lab spoke with this RN for critical value 693 glucose, I notified Bettina TRENT of abnormal lab, no verbal orders given at this time.      Andrea Jorge RN  09/14/21 [id number]

## 2021-09-15 NOTE — PROGRESS NOTES
Limited brief communication -    Patient was admitted this morning by the night physician. Seen this morning felt better, reports ice used to cold wanted more blankets. No major concerns per nurse, waiting for repeat labs, possible transfer out of ICU. 1.  DKA with high anion gap metabolic acidosis- on ICU DKA protocol, following BMP, possible transfer out of ICU with subcutaneous insulin, advancing diet. We will follow up cultures in the setting of leukocytosis that could be reactive/dehydration related. Holding off antibiotics. Consulted endocrinology    2. RONA - likely due to underlying dehydration, on IV hydration. 3.  Tobacco abuse     4. Documented weight loss- 80 pounds - chest x-ray unremarkable will get CT abdominal and pelvis once more stable. Endocrinology on board -in the setting of fatigue, weight loss, autoimmune association, patient might benefit for work-up for an adrenal insufficiency, will leave up to endocrinology. 5.  AMANUEL with history of noncompliance with CPAP    6. Pseudohyponatremia secondary to problem 1, monitor. 7.  Acute on chronic systolic heart failure - holding Entresto, spironolactone, Bumex. Due to RONA.     DVT prophylaxis -Lovenox

## 2021-09-15 NOTE — ED PROVIDER NOTES
ATTENDING PROVIDER ATTESTATION:     Nicol Dawkins presented to the emergency department for evaluation of Hyperglycemia (sent by urgent care, states just ate [employer])  . The patient was initially evaluated by the Medical Resident. Please see their note for further details, HPI, and ED course. I have reviewed & discussed the patient's case in details, including pertinent history & exam findings, with the Medical Resident and have personally participated in and/or performed the history, physical examination, medical decision-making, and procedure(s). I agree with all pertinent clinical information and any changes or corrections are noted below. I have reviewed my findings and recommendations with the assigned Medical Resident, patient, and family member(s) present at the time of disposition. I have performed a history and physical examination of this patient and directly supervised the resident caring for this patient. I have directly supervised any procedures performed by the resident and was present for the procedure including all critical portions of the procedure(s).     --------------------hospitals------------------      Nicol Dawkins is a 38 y.o. male presenting to the ED for hyperglycemia concern for DKA/HHNK. Seen at urgent care today and sent him as blood glucose was reading high. Patient states that it always reads high on his home glucometer, but that he thought this was because of his battery. Patient is noncompliant with his medications. He seems to have very poor insight into his health conditions. He states that \"high\" on his blood glucose is better than usual, because it has been as high as 1200. He states he has chronic fatigue that is exacerbated recently by helping a friend work on his house. Polyuria. No c/c/r, no CP/SOB. No abd pain.      Review of Systems:   A complete review of systems was performed and pertinent positives and negatives are stated within HPI, all other systems reviewed and are negative. I have reviewed the past medical history, past surgical history, social history, and family history    ---------------------------------------------------PHYSICAL EXAM--------------------------------------    Constitutional/General: Alert and oriented x3  Head: Normocephalic and atraumatic  Eyes: PERRL, EOMI, sclera non icteric  ENT: Oropharynx clear, handling secretions, no trismus  Neck: Supple, full ROM, no stridor, no meningismus  Respiratory: Lungs clear to auscultation bilaterally, no wheezes, rales, or rhonchi  Cardiovascular: RRR, no R/G/M, 2+ peripheral pulses  Chest: No chest wall tenderness, equal chest rise  Gastrointestinal:  Abdomen Soft, Non tender, Non distended. No rebound, guarding, or rigidity. No pulsatile masses. Musculoskeletal: Extremities warm and well perfused, moving all extremities  Skin: skin warm and dry. No rashes. Neurologic: No focal deficits, strength and sensation grossly intact   Psychiatric: Normal Affect, behavior normal      -------------------------------------------------- RESULTS -------------------------------------------------  I have personally reviewed all laboratory and imaging results for this patient. RADIOLOGY:  Imaging interpreted by Radiologist unless otherwise specified  XR CHEST PORTABLE   Final Result   No acute abnormality identified.               ------------------------- NURSING NOTES AND VITALS REVIEWED ---------------------------  The nursing notes within the ED encounter and vital signs as below have been reviewed by myself  /61   Pulse 86   Temp 98.3 °F (36.8 °C) (Oral)   Resp 18   Ht 5' 10\" (1.778 m)   Wt 226 lb (102.5 kg)   SpO2 98%   BMI 32.43 kg/m²      The patients available past medical records and past encounters were reviewed.         ------------------------------ ED COURSE/MEDICAL DECISION MAKING----------------------    ED Course as of Sep 16 1233   Tue Sep 14, 2021   5628 Patient hyperglycemic with borderline blood pressure, will give fluids, also appears to have significant RONA, will check response to fluids as well as a bolus of insulin    [JG]   Wed Sep 15, 2021   0204 Patient blood pressure remains borderline, but tachycardia improved, may related to patient's beta-blocker usage    [JG]   0214 We will put patient on insulin drip as repeat gap is 18, and glucose is still very elevated at 530    [JG]   0228 Spoke to Hospitalist, accepted patient for admission    [JG]   0309 Patient was accepted for admission to the ICU    [JG]   0309 Patient presenting emergency department for hyperglycemia, was relatively asymptomatic, just that he did not feel well after he had Qureshi's. Was found to be in HHS as he had an elevated anion gap, hyperglycemia, elevated beta hydroxybutyrate, but a relatively normal venous pH. He was given fluids, as well as a bolus of insulin with improvement in his vital signs, but his anion gap did not close, was 18 on repeat, with a blood sugar of 530, start on insulin drip, admitted to ICU for further work-up and management. [JG]      ED Course User Index  [JG] Keon Martínez MD       Medical Decision Makinyear old M with history of uncontrolled DM p/w hyperglycemia likely representing HHNK. Will require fluids, insulin. Will monitor respiratory status, as patient has reduced ejection fraction, but still believe that fluids benefit far outweighs risk in this patient. Disposition will depend on repeat blood work after fluids and insulin. Re-Evaluation:   Continues to be well appearing. No SOB/CP. This patient's ED course as detailed above reviewed by me      ED Counseling: This emergency provider has spoken with the patient and any family present to discuss clinical status, results, plan of care, diagnosis and prognosis as able to be determined at this time.  Any questions were answered and patient and/or family/POA are agreeable with the plan.      This report was transcribed using voice recognition software. Every effort was made to ensure accuracy; however, transcription errors may be present.        --------------------------------- IMPRESSION AND DISPOSITION ---------------------------------    IMPRESSION  1. Hyperosmolar hyperglycemic state (HHS) ([facility]    2. Dehydration    3. RONA (acute kidney injury) ([facility]        DISPOSITION  Disposition: Admit to CCU/ICU  Patient condition is stable      This report was transcribed using voice recognition software. Every effort was made to ensure accuracy; however, transcription errors may be present.            Cynthia Roy MD  09/16/21 [id number]

## 2021-09-15 NOTE — CARE COORDINATION
Pt admit ICU for DKA. Pt noncompliant diabetic. Met with pt about diagnosis and discharge plan of care. Pt lives with parents, 2 story home bed and bath on 1st floor. Pt discharged in July for same diagnosis. According to pt, he never filled script for glucometer. Stated he had one at home. Will given script again so he can fill his own glucometer. Stated he does get insulin pens, however, pt does not have PCP or endocrinologist. He will need appt for both. Will place preservice line on discharge instructions.  Will continue to follow-mjo

## 2021-09-16 LAB
ALBUMIN SERPL-MCNC: 3.2 G/DL (ref 3.5–5.2)
ALP BLD-CCNC: 80 U/L (ref 40–129)
ALT SERPL-CCNC: 8 U/L (ref 0–40)
ANION GAP SERPL CALCULATED.3IONS-SCNC: 15 MMOL/L (ref 7–16)
AST SERPL-CCNC: 15 U/L (ref 0–39)
BASOPHILS ABSOLUTE: 0.05 E9/L (ref 0–0.2)
BASOPHILS RELATIVE PERCENT: 0.5 % (ref 0–2)
BILIRUB SERPL-MCNC: <0.2 MG/DL (ref 0–1.2)
BUN BLDV-MCNC: 19 MG/DL (ref 6–20)
CALCIUM SERPL-MCNC: 9.1 MG/DL (ref 8.6–10.2)
CHLORIDE BLD-SCNC: 92 MMOL/L (ref 98–107)
CO2: 18 MMOL/L (ref 22–29)
CREAT SERPL-MCNC: 0.9 MG/DL (ref 0.7–1.2)
EOSINOPHILS ABSOLUTE: 0.26 E9/L (ref 0.05–0.5)
EOSINOPHILS RELATIVE PERCENT: 2.5 % (ref 0–6)
GFR AFRICAN AMERICAN: >60
GFR NON-AFRICAN AMERICAN: >60 ML/MIN/1.73
GLUCOSE BLD-MCNC: 488 MG/DL (ref 74–99)
HCT VFR BLD CALC: 30.1 % (ref 37–54)
HEMOGLOBIN: 10.2 G/DL (ref 12.5–16.5)
IMMATURE GRANULOCYTES #: 0.03 E9/L
IMMATURE GRANULOCYTES %: 0.3 % (ref 0–5)
LYMPHOCYTES ABSOLUTE: 2.45 E9/L (ref 1.5–4)
LYMPHOCYTES RELATIVE PERCENT: 23.2 % (ref 20–42)
MCH RBC QN AUTO: 32 PG (ref 26–35)
MCHC RBC AUTO-ENTMCNC: 33.9 % (ref 32–34.5)
MCV RBC AUTO: 94.4 FL (ref 80–99.9)
METER GLUCOSE: 207 MG/DL (ref 74–99)
METER GLUCOSE: 236 MG/DL (ref 74–99)
METER GLUCOSE: 237 MG/DL (ref 74–99)
METER GLUCOSE: 263 MG/DL (ref 74–99)
METER GLUCOSE: 295 MG/DL (ref 74–99)
METER GLUCOSE: 321 MG/DL (ref 74–99)
MONOCYTES ABSOLUTE: 0.65 E9/L (ref 0.1–0.95)
MONOCYTES RELATIVE PERCENT: 6.2 % (ref 2–12)
MRSA CULTURE ONLY: NORMAL
NEUTROPHILS ABSOLUTE: 7.12 E9/L (ref 1.8–7.3)
NEUTROPHILS RELATIVE PERCENT: 67.3 % (ref 43–80)
PDW BLD-RTO: 12.7 FL (ref 11.5–15)
PLATELET # BLD: 299 E9/L (ref 130–450)
PMV BLD AUTO: 10.7 FL (ref 7–12)
POTASSIUM SERPL-SCNC: 4.3 MMOL/L (ref 3.5–5)
RBC # BLD: 3.19 E12/L (ref 3.8–5.8)
SODIUM BLD-SCNC: 125 MMOL/L (ref 132–146)
TOTAL PROTEIN: 7.2 G/DL (ref 6.4–8.3)
WBC # BLD: 10.6 E9/L (ref 4.5–11.5)

## 2021-09-16 PROCEDURE — 6360000002 HC RX W HCPCS: Performed by: INTERNAL MEDICINE

## 2021-09-16 PROCEDURE — 82962 GLUCOSE BLOOD TEST: CPT

## 2021-09-16 PROCEDURE — 80053 COMPREHEN METABOLIC PANEL: CPT

## 2021-09-16 PROCEDURE — 6370000000 HC RX 637 (ALT 250 FOR IP): Performed by: INTERNAL MEDICINE

## 2021-09-16 PROCEDURE — 36415 COLL VENOUS BLD VENIPUNCTURE: CPT

## 2021-09-16 PROCEDURE — 99232 SBSQ HOSP IP/OBS MODERATE 35: CPT | Performed by: INTERNAL MEDICINE

## 2021-09-16 PROCEDURE — 85025 COMPLETE CBC W/AUTO DIFF WBC: CPT

## 2021-09-16 PROCEDURE — 99233 SBSQ HOSP IP/OBS HIGH 50: CPT | Performed by: FAMILY MEDICINE

## 2021-09-16 PROCEDURE — 1200000000 HC SEMI PRIVATE

## 2021-09-16 RX ORDER — INSULIN GLARGINE 100 [IU]/ML
25 INJECTION, SOLUTION SUBCUTANEOUS 2 TIMES DAILY
Status: DISCONTINUED | OUTPATIENT
Start: 2021-09-16 | End: 2021-09-17

## 2021-09-16 RX ADMIN — INSULIN LISPRO 15 UNITS: 100 INJECTION, SOLUTION INTRAVENOUS; SUBCUTANEOUS at 18:11

## 2021-09-16 RX ADMIN — INSULIN GLARGINE 15 UNITS: 100 INJECTION, SOLUTION SUBCUTANEOUS at 00:28

## 2021-09-16 RX ADMIN — INSULIN HUMAN 20 UNITS: 100 INJECTION, SOLUTION PARENTERAL at 03:28

## 2021-09-16 RX ADMIN — INSULIN LISPRO 4 UNITS: 100 INJECTION, SOLUTION INTRAVENOUS; SUBCUTANEOUS at 18:11

## 2021-09-16 RX ADMIN — ENOXAPARIN SODIUM 40 MG: 40 INJECTION SUBCUTANEOUS at 09:10

## 2021-09-16 RX ADMIN — INSULIN GLARGINE 25 UNITS: 100 INJECTION, SOLUTION SUBCUTANEOUS at 21:49

## 2021-09-16 RX ADMIN — INSULIN GLARGINE 25 UNITS: 100 INJECTION, SOLUTION SUBCUTANEOUS at 09:11

## 2021-09-16 RX ADMIN — INSULIN LISPRO 6 UNITS: 100 INJECTION, SOLUTION INTRAVENOUS; SUBCUTANEOUS at 09:10

## 2021-09-16 RX ADMIN — INSULIN LISPRO 6 UNITS: 100 INJECTION, SOLUTION INTRAVENOUS; SUBCUTANEOUS at 12:16

## 2021-09-16 RX ADMIN — INSULIN HUMAN 30 UNITS: 100 INJECTION, SOLUTION PARENTERAL at 00:54

## 2021-09-16 RX ADMIN — INSULIN LISPRO 15 UNITS: 100 INJECTION, SOLUTION INTRAVENOUS; SUBCUTANEOUS at 12:17

## 2021-09-16 RX ADMIN — INSULIN LISPRO 15 UNITS: 100 INJECTION, SOLUTION INTRAVENOUS; SUBCUTANEOUS at 09:10

## 2021-09-16 RX ADMIN — INSULIN LISPRO 12 UNITS: 100 INJECTION, SOLUTION INTRAVENOUS; SUBCUTANEOUS at 00:27

## 2021-09-16 RX ADMIN — INSULIN LISPRO 4 UNITS: 100 INJECTION, SOLUTION INTRAVENOUS; SUBCUTANEOUS at 21:49

## 2021-09-16 ASSESSMENT — PAIN SCALES - GENERAL: PAINLEVEL_OUTOF10: 0

## 2021-09-16 NOTE — CARE COORDINATION
Updated plan of care. Pt transfer from ICU. Spoke with pt about discharge planning. Instructed pt of importance to make PCP appt. Also, discussed with pt to keep appt with Dr Lorenza Dodd on 9/28 for diabetic follow-up.  Make sure pt has scripts for all diabetic needs-o

## 2021-09-16 NOTE — CONSULTS
ENDOCRINOLOGY INITIAL CONSULTATION NOTE      Date of admission: 9/14/2021  Date of service: 9/15/2021  Admitting physician: Cristhian Daniels MD   Primary Care Physician: No primary care provider on file. Consultant physician: Harry Leyva MD     Reason for the consultation:  Uncontrolled DM    History of Present Illness: The history is provided by the patient. Accuracy of the patient data is excellent    Vince Client is a very pleasant 38 y.o. old male with PMH of poorly controlled diabetes mellitus/noncompliance and multiple admissions for DKA  admitted to [facility] on [facility]/14/2021 because of generalized fatigue/lightheadedness with the patient was found to be in DKA, endocrine service was consulted for diabetes management. The patient was admitted to ICU and started on insulin drip then transitioned to  insuli and transitioned to medical floor     Prior to admission  The patient was diagnosed with DM few months go. Prior to admission patient was on lantus 10U daily, Humalog sliding scale. Patient has had no hypoglycemic episodes. Patient has not been eating consistent carbohydrate meals. He wasn't checking BG at all.  The patient denied macrovascular or microvascular complications   Lab Results   Component Value Date    LABA1C 15.2 07/29/2021       Inpatient diet:   Carb Restricted diet     Point of care glucose monitoring   (Independently reviewed)   Recent Labs     09/15/21  0533 09/15/21  0641 09/15/21  0818 09/15/21  0947 09/15/21  1109 09/15/21  1240 09/15/21  1442 09/15/21  1746   GLUMET 238* 223* 186* 184* 184* 190* 332* 435*       Past medical history:   Past Medical History:   Diagnosis Date    CAD (coronary artery disease)     [facility]     Diabetes mellitus ([facility]     Hyperlipidemia     Hyperosmolar hyperglycemic state (HHS) ([facility] 7/30/2021    Hypertension        Past surgical history:  Past Surgical History:   Procedure Laterality Date    ABSCESS DRAINAGE      buttocks    ECHO COMPL W DOP COLOR FLOW  5/5/2015         WISDOM TOOTH EXTRACTION         Social history:   Tobacco:   reports that he has quit smoking. His smoking use included cigars and cigarettes. He started smoking about 21 years ago. He quit after 8.00 years of use. He has never used smokeless tobacco.  Alcohol:   reports current alcohol use. Drugs:   reports no history of drug use. Family history:    Family History   Problem Relation Age of Onset    High Blood Pressure Father     Heart Disease Father     High Blood Pressure Paternal Grandmother     Heart Attack Paternal Grandfather     High Blood Pressure Mother     Heart Disease Mother        Allergy and drug reactions:   No Known Allergies    Scheduled Meds:   enoxaparin  40 mg SubCUTAneous Daily    insulin lispro  0-12 Units SubCUTAneous TID WC    insulin lispro  0-6 Units SubCUTAneous Nightly    insulin glargine  15 Units SubCUTAneous Nightly    [START ON 9/16/2021] insulin lispro  15 Units SubCUTAneous TID WC    [START ON 9/16/2021] insulin glargine  30 Units SubCUTAneous QAM     PRN Meds:   glucose, 15 g, PRN  glucagon (rDNA), 1 mg, PRN  dextrose, 100 mL/hr, PRN  dextrose, 12.5 g, PRN  polyethylene glycol, 17 g, Daily PRN      Continuous Infusions:   dextrose         Review of Systems  All systems reviewed. All negative except for symptoms mentioned in HPI     OBJECTIVE    /61   Pulse 86   Temp 98.3 °F (36.8 °C) (Oral)   Resp 18   Ht 5' 10\" (1.778 m)   Wt 226 lb (102.5 kg)   SpO2 98%   BMI 32.43 kg/m²     Intake/Output Summary (Last 24 hours) at 9/15/2021 2000  Last data filed at 9/15/2021 1600  Gross per 24 hour   Intake 4010 ml   Output 3400 ml   Net 610 ml       Physical examination:  General: awake alert, oriented x3  HEENT: normocephalic non traumatic, no exophthalmos   Neck: supple, No thyroid tenderness,  Pulm: good equal air entry no added sounds  CVS: S1 + S2  Abd: soft lax, no tenderness  Skin: warm, no lesions, no rash.  No open wounds, no ulcers   Neuro: CN intact, sensation decreased bilateral , muscle power normal  Psych: normal mood, and affect    Review of Laboratory Data:  I personally reviewed the following labs:   Recent Labs     09/14/21  2138 09/15/21  0138 09/15/21  0535   WBC 16.3* 13.2* 11.4   RBC 3.78* 3.20* 3.17*   HGB 12.1* 10.4* 10.1*   HCT 36.0* 30.1* 30.0*   MCV 95.2 94.1 94.6   MCH 32.0 32.5 31.9   MCHC 33.6 34.6* 33.7   RDW 12.7 12.6 12.7    296 292   MPV 10.9 11.0 10.8     Recent Labs     09/14/21  2138 09/15/21  0138 09/15/21  0535 09/15/21  0943 09/15/21  1445   *   < > 135 136 132   K 5.1*   < > 3.8 4.0 3.6   CL 82*   < > 100 102 99   CO2 16*   < > 22 22 22   BUN 46*   < > 34* 27* 22*   CREATININE 1.8*   < > 1.3* 1.1 0.9   GLUCOSE 694*   < > 234* 188* 316*   CALCIUM 9.9   < > 9.0 8.4* 8.1*   PROT 9.0*  --   --   --   --    LABALBU 4.1  --   --   --   --    BILITOT 0.3  --   --   --   --    ALKPHOS 114  --   --   --   --    AST 6  --   --   --   --    ALT 10  --   --   --   --     < > = values in this interval not displayed. Beta-Hydroxybutyrate   Date Value Ref Range Status   09/14/2021 >4.50 (H) 0.02 - 0.27 mmol/L Final   07/28/2021 1.16 (H) 0.02 - 0.27 mmol/L Final     Lab Results   Component Value Date    LABA1C 15.2 07/29/2021    LABA1C 6.8 12/24/2020    LABA1C 6.5 05/05/2015     No results found for: TSH, T4FREE, J0LFNNH, FT3, M0VXFYE, TSI, TPOABS, THGAB  Lab Results   Component Value Date    LABA1C 15.2 07/29/2021    GLUCOSE 316 09/15/2021    LABCREA 86 12/24/2020     Lab Results   Component Value Date    TRIG 170 12/24/2020    HDL 32 12/24/2020    LDLCALC 91 12/24/2020    CHOL 157 12/24/2020       Blood culture   No results found for: [facility]    Radiology:  XR CHEST PORTABLE   Final Result   No acute abnormality identified.              Medical Records/Labs/Images review:   I personally reviewed and summarized previous records   All labs and imaging were reviewed independently     ASSESSMENT & PLAN   Lalo John, a 38 y.o.-old male seen today for inpatient diabetes management     Diabetes Mellitus   · Patient's diabetes is uncontrolled, A1c 15.2%  · For now, will change diabetes regimen to:  · Lantus 25U BID  · Humalog 15U with meals   · High dose sliding scale   · Continue glucose check with meals and at bedtime   · Will titrate insulin dose based on the blood glucose trend & insulin requirement  · Pt will follow with us after discharge. Endocrine follow up visit, Tuesday 9/28 at 1:30PM    RONA   · On IVF    The above issues were reviewed with the patient who understood and agreed with the plan. Thank you for allowing us to participate in the care of this patient. Please do not hesitate to contact us with any additional questions. Josy Phan MD  Endocrinologist, [place] [address] [facility]   [address] [place] [id number]   Phone: [phone]  Fax: [phone]  --------------------------------------------  An electronic signature was used to authenticate this note.  Nilo Looney MD on 9/15/2021 at 8:00 PM

## 2021-09-16 NOTE — PROGRESS NOTES
ENDOCRINOLOGY PROGRESS NOTE      Date of admission: 9/14/2021  Date of service: 9/16/2021  Admitting physician: Leander Urena MD   Primary Care Physician: No primary care provider on file. Consultant physician: Yoly Craig MD     Reason for the consultation:  Uncontrolled DM    History of Present Illness: The history is provided by the patient. Accuracy of the patient data is excellent    Marilee Avendano is a very pleasant 38 y.o. old male with PMH of poorly controlled diabetes mellitus/noncompliance and multiple admissions for DKA  admitted to [facility] on 9/14/2021 because of generalized fatigue/lightheadedness with the patient was found to be in DKA, endocrine service was consulted for diabetes management. Subjective   Seen and examined, no acute issues overnight     Inpatient diet:   Carb Restricted diet     Point of care glucose monitoring   (Independently reviewed)   Recent Labs     09/15/21  1442 09/15/21  1746 09/15/21  2217 09/16/21  0204 09/16/21  0419 09/16/21  0800 09/16/21  1143 09/16/21  1600   GLUMET 332* 435* >500* 321* 236* 295* 263* 207*     Scheduled Meds:   insulin glargine  25 Units SubCUTAneous BID    enoxaparin  40 mg SubCUTAneous Daily    insulin lispro  15 Units SubCUTAneous TID WC    insulin lispro  0-12 Units SubCUTAneous 4x Daily AC & HS     PRN Meds:   glucose, 15 g, PRN  glucagon (rDNA), 1 mg, PRN  dextrose, 100 mL/hr, PRN  dextrose, 12.5 g, PRN  polyethylene glycol, 17 g, Daily PRN      Continuous Infusions:   dextrose         Review of Systems  All systems reviewed.  All negative except for symptoms mentioned in HPI     OBJECTIVE    /61   Pulse 86   Temp 98.3 °F (36.8 °C) (Oral)   Resp 18   Ht 5' 10\" (1.778 m)   Wt 226 lb (102.5 kg)   SpO2 98%   BMI 32.43 kg/m²     Intake/Output Summary (Last 24 hours) at 9/16/2021 2030  Last data filed at 9/16/2021 0230  Gross per 24 hour   Intake 700 ml   Output --   Net 700 ml       Physical examination:  General: awake alert, oriented x3  HEENT: normocephalic non traumatic, no exophthalmos   Neck: supple, No thyroid tenderness,  Pulm: good equal air entry no added sounds  CVS: S1 + S2  Abd: soft lax, no tenderness  Skin: warm, no lesions, no rash. No open wounds, no ulcers   Neuro: CN intact, sensation decreased bilateral , muscle power normal  Psych: normal mood, and affect    Review of Laboratory Data:  I personally reviewed the following labs:   Recent Labs     09/15/21  0138 09/15/21  0535 09/16/21  0800   WBC 13.2* 11.4 10.6   RBC 3.20* 3.17* 3.19*   HGB 10.4* 10.1* 10.2*   HCT 30.1* 30.0* 30.1*   MCV 94.1 94.6 94.4   MCH 32.5 31.9 32.0   MCHC 34.6* 33.7 33.9   RDW 12.6 12.7 12.7    292 299   MPV 11.0 10.8 10.7     Recent Labs     09/14/21  2138 09/15/21  0138 09/15/21  0943 09/15/21  0943 09/15/21  1445 09/15/21  2310 09/16/21  0105   *   < > 136  --  132  --  125*   K 5.1*   < > 4.0  --  3.6  --  4.3   CL 82*   < > 102  --  99  --  92*   CO2 16*   < > 22  --  22  --  18*   BUN 46*   < > 27*  --  22*  --  19   CREATININE 1.8*   < > 1.1  --  0.9  --  0.9   GLUCOSE 694*   < > 188*   < > 316* 608* 488*   CALCIUM 9.9   < > 8.4*  --  8.1*  --  9.1   PROT 9.0*  --   --   --   --   --  7.2   LABALBU 4.1  --   --   --   --   --  3.2*   BILITOT 0.3  --   --   --   --   --  <0.2   ALKPHOS 114  --   --   --   --   --  80   AST 6  --   --   --   --   --  15   ALT 10  --   --   --   --   --  8    < > = values in this interval not displayed.      Beta-Hydroxybutyrate   Date Value Ref Range Status   09/14/2021 >4.50 (H) 0.02 - 0.27 mmol/L Final   07/28/2021 1.16 (H) 0.02 - 0.27 mmol/L Final     Lab Results   Component Value Date    LABA1C 15.2 07/29/2021    LABA1C 6.8 12/24/2020    LABA1C 6.5 05/05/2015     No results found for: TSH, T4FREE, A7FKXPZ, FT3, C7MDSCE, TSI, TPOABS, THGAB  Lab Results   Component Value Date    LABA1C 15.2 07/29/2021    GLUCOSE 488 09/16/2021    LABCREA 86 12/24/2020     Lab Results   Component Value Date TRIG 170 12/24/2020    HDL 32 12/24/2020    LDLCALC 91 12/24/2020    CHOL 157 12/24/2020       Blood culture   No results found for: [facility]    Radiology:  XR CHEST PORTABLE   Final Result   No acute abnormality identified. Medical Records/Labs/Images review:   I personally reviewed and summarized previous records   All labs and imaging were reviewed independently     Jennifer Mallory, a 38 y.o.-old male seen today for inpatient diabetes management     Diabetes Mellitus   · Patient's diabetes is uncontrolled, A1c 15.2%  · will change diabetes regimen to:  · Lantus 30 U BID  · Humalog 20 U with meals   · High dose sliding scale   · Continue glucose check with meals and at bedtime   · Will titrate insulin dose based on the blood glucose trend & insulin requirement  · Pt will follow with us after discharge. Endocrine follow up visit, Tuesday 9/28 at 1:30PM    RONA   · Improved with hydration     The above issues were reviewed with the patient who understood and agreed with the plan. Thank you for allowing us to participate in the care of this patient. Please do not hesitate to contact us with any additional questions. Tracey Rodriguez MD  Endocrinologist, [facility]   [address] [place] [address]   Phone: [phone]  Fax: [phone]  --------------------------------------------  An electronic signature was used to authenticate this note.  Christiano Swartz MD on 9/16/2021 at 8:30 PM

## 2021-09-16 NOTE — PROGRESS NOTES
[facility] Progress Note    Admitting Date and Time: 9/14/2021 10:20 PM  Admit Dx: Dehydration [E86.0]  RONA (acute kidney injury) ([facility] [address] [N17.9]  DKA, type 1, not at goal [facility]) [E10.10]  Hyperosmolar hyperglycemic state (HHS) ([facility] [address] [E11.00, E11.65]    Subjective:  Patient is being followed for Dehydration [E86.0]  RONA (acute kidney injury) ([facility] [address] [N17.9]  DKA, type 1, not at goal [facility]) [E10.10]  Hyperosmolar hyperglycemic state (HHS) ([facility] [address] [E11.00, E11.65]     Pt feels does not cook and hard to follow diet. Sometimes eats late. Realizes needs to change. Dietician and Diabetic educator consults. He fingersticks for blood sugars. Interested in a CGM. Reports numbness in feet. Due for annual eye and foot check. Per RN: sugars have been in the 200s today. ROS: denies fever, chills, cp, sob, n/v, HA unless stated above.       insulin glargine  25 Units SubCUTAneous BID    enoxaparin  40 mg SubCUTAneous Daily    insulin lispro  15 Units SubCUTAneous TID WC    insulin lispro  0-12 Units SubCUTAneous 4x Daily AC & HS     glucose, 15 g, PRN  glucagon (rDNA), 1 mg, PRN  dextrose, 100 mL/hr, PRN  dextrose, 12.5 g, PRN  polyethylene glycol, 17 g, Daily PRN    Objective:    /61   Pulse 86   Temp 98.3 °F (36.8 °C) (Oral)   Resp 18   Ht 5' 10\" (1.778 m)   Wt 226 lb (102.5 kg)   SpO2 98%   BMI 32.43 kg/m²     General Appearance: alert and oriented to person, place and time and in no acute distress  Skin: warm and dry  Head: normocephalic and atraumatic  Eyes: pupils equal, round, and reactive to light, extraocular eye movements intact, conjunctivae normal  Neck: neck supple and non tender without mass   Pulmonary/Chest: clear to auscultation bilaterally- no wheezes, rales or rhonchi, normal air movement, no respiratory distress  Cardiovascular: normal rate, normal S1 and S2 and no carotid bruits  Abdomen: soft, non-tender, non-distended, normal bowel sounds, no masses or organomegaly  Extremities: no cyanosis, no clubbing and no edema  Neurologic: no cranial nerve deficit and speech normal        Recent Labs     09/15/21  0943 09/15/21  0943 09/15/21  1445 09/15/21  2310 09/16/21  0105     --  132  --  125*   K 4.0  --  3.6  --  4.3     --  99  --  92*   CO2 22  --  22  --  18*   BUN 27*  --  22*  --  19   CREATININE 1.1  --  0.9  --  0.9   GLUCOSE 188*   < > 316* 608* 488*   CALCIUM 8.4*  --  8.1*  --  9.1    < > = values in this interval not displayed. Recent Labs     09/15/21  0138 09/15/21  0535 09/16/21  0800   WBC 13.2* 11.4 10.6   RBC 3.20* 3.17* 3.19*   HGB 10.4* 10.1* 10.2*   HCT 30.1* 30.0* 30.1*   MCV 94.1 94.6 94.4   MCH 32.5 31.9 32.0   MCHC 34.6* 33.7 33.9   RDW 12.6 12.7 12.7    292 299   MPV 11.0 10.8 10.7       Radiology:   XR CHEST PORTABLE    Result Date: 9/15/2021  EXAMINATION: ONE XRAY VIEW OF THE CHEST 9/15/2021 2:30 am COMPARISON: 07/28/2021 HISTORY: ORDERING SYSTEM PROVIDED HISTORY: SOB TECHNOLOGIST PROVIDED HISTORY: Reason for exam:->SOB FINDINGS: There is no cardiomegaly or vascular congestion. There is no infiltrate, pleural effusion or pneumothorax. Lungs are clear. No acute abnormality identified. Assessment:    Active Problems:    Hyperosmolar hyperglycemic state (HHS) ([facility] [address]    DKA, type 1, not at goal [facility])  Resolved Problems:    * No resolved hospital problems. *      Plan:  1. Uncontrolled DM - Endocrinology consult. HgbA1C 15.2%. He HgbA1c in 12/2020 was 6.8%. Lantus 25 U bid. Humalog 15 U with meals and high dose SS. Glucose POCT. Hypoglycemia protocol. Will need Eye exam as an outpatient. CGM would be helpful for better glucose control. Patient has the ability as his previous A1Cs were at goal.  2.  Weight loss - reports 80 lbs over the summer. Most likely from poorly controlled DM. Check TSH. 3.  Paresthesias of feet - Podiatry consult ordered. Will need outpatient follow up.     NOTE: This report was transcribed using voice recognition software. Every effort was made to ensure accuracy; however, inadvertent computerized transcription errors may be present.     Electronically signed by Mary Garvin MD on 9/16/2021 at 3:35 PM

## 2021-09-16 NOTE — PLAN OF CARE
Problem: Discharge Planning:  Goal: Participates in care planning  Description: Participates in care planning  Outcome: Ongoing  Goal: Discharged to appropriate level of care  Description: Discharged to appropriate level of care  Outcome: Ongoing     Problem: Activity Intolerance:  Goal: Ability to tolerate increased activity will improve  Description: Ability to tolerate increased activity will improve  Outcome: Ongoing     Problem: Anxiety/Stress:  Goal: Level of anxiety will decrease  Description: Level of anxiety will decrease  Outcome: Ongoing     Problem:  Bowel Function - Altered:  Goal: Bowel elimination is within specified parameters  Description: Bowel elimination is within specified parameters  Outcome: Ongoing     Problem: Fluid Volume - Deficit:  Goal: Absence of fluid volume deficit signs and symptoms  Description: Absence of fluid volume deficit signs and symptoms  Outcome: Ongoing  Goal: Electrolytes within specified parameters  Description: Electrolytes within specified parameters  Outcome: Ongoing     Problem: Mental Status - Impaired:  Goal: Absence of continued neurological deterioration signs and symptoms  Description: Absence of continued neurological deterioration signs and symptoms  Outcome: Ongoing  Goal: Absence of physical injury  Description: Absence of physical injury  Outcome: Ongoing  Goal: Mental status will be restored to baseline  Description: Mental status will be restored to baseline  Outcome: Ongoing     Problem: Mobility - Impaired:  Goal: Mobility will improve to maximum level  Description: Mobility will improve to maximum level  Outcome: Ongoing     Problem: Nutrition Deficit:  Goal: Ability to achieve adequate nutritional intake will improve  Description: Ability to achieve adequate nutritional intake will improve  Outcome: Ongoing     Problem: Pain:  Goal: Pain level will decrease  Description: Pain level will decrease  Outcome: Ongoing  Goal: Ability to notify healthcare provider of pain before it becomes unmanageable or unbearable will improve  Description: Ability to notify healthcare provider of pain before it becomes unmanageable or unbearable will improve  Outcome: Ongoing  Goal: Control of acute pain  Description: Control of acute pain  Outcome: Ongoing  Goal: Control of chronic pain  Description: Control of chronic pain  Outcome: Ongoing     Problem: Serum Glucose Level - Abnormal:  Goal: Ability to maintain appropriate glucose levels will improve to within specified parameters  Description: Ability to maintain appropriate glucose levels will improve to within specified parameters  Outcome: Ongoing  Goal: Ability to maintain appropriate glucose levels will improve  Description: Ability to maintain appropriate glucose levels will improve  Outcome: Ongoing     Problem: Skin Integrity - Impaired:  Goal: Will show no infection signs and symptoms  Description: Will show no infection signs and symptoms  Outcome: Ongoing  Goal: Absence of new skin breakdown  Description: Absence of new skin breakdown  Outcome: Ongoing     Problem: Sleep Pattern Disturbance:  Goal: Appears well-rested  Description: Appears well-rested  Outcome: Ongoing     Problem: Falls - Risk of:  Goal: Absence of physical injury  Description: Absence of physical injury  Outcome: Ongoing  Goal: Will remain free from falls  Description: Will remain free from falls  Outcome: Ongoing     Problem: Sensory Perception - Impaired:  Goal: Ability to maintain a stable neurologic state will improve  Description: Ability to maintain a stable neurologic state will improve  Outcome: Ongoing

## 2021-09-17 ENCOUNTER — APPOINTMENT (OUTPATIENT)
Dept: GENERAL RADIOLOGY | Age: 36
DRG: 420 | End: 2021-09-17
Payer: MEDICARE

## 2021-09-17 LAB
ALBUMIN SERPL-MCNC: 3.2 G/DL (ref 3.5–5.2)
ALP BLD-CCNC: 78 U/L (ref 40–129)
ALT SERPL-CCNC: 6 U/L (ref 0–40)
ANION GAP SERPL CALCULATED.3IONS-SCNC: 13 MMOL/L (ref 7–16)
AST SERPL-CCNC: 10 U/L (ref 0–39)
BASOPHILS ABSOLUTE: 0.04 E9/L (ref 0–0.2)
BASOPHILS RELATIVE PERCENT: 0.4 % (ref 0–2)
BILIRUB SERPL-MCNC: 0.3 MG/DL (ref 0–1.2)
BUN BLDV-MCNC: 10 MG/DL (ref 6–20)
CALCIUM SERPL-MCNC: 9.4 MG/DL (ref 8.6–10.2)
CHLORIDE BLD-SCNC: 97 MMOL/L (ref 98–107)
CHOLESTEROL, TOTAL: 243 MG/DL (ref 0–199)
CO2: 23 MMOL/L (ref 22–29)
CREAT SERPL-MCNC: 0.9 MG/DL (ref 0.7–1.2)
EOSINOPHILS ABSOLUTE: 0.23 E9/L (ref 0.05–0.5)
EOSINOPHILS RELATIVE PERCENT: 2.6 % (ref 0–6)
GFR AFRICAN AMERICAN: >60
GFR NON-AFRICAN AMERICAN: >60 ML/MIN/1.73
GLUCOSE BLD-MCNC: 215 MG/DL (ref 74–99)
HCT VFR BLD CALC: 29.7 % (ref 37–54)
HDLC SERPL-MCNC: 25 MG/DL
HEMOGLOBIN: 10.1 G/DL (ref 12.5–16.5)
IMMATURE GRANULOCYTES #: 0.03 E9/L
IMMATURE GRANULOCYTES %: 0.3 % (ref 0–5)
LDL CHOLESTEROL CALCULATED: ABNORMAL MG/DL (ref 0–99)
LYMPHOCYTES ABSOLUTE: 2.36 E9/L (ref 1.5–4)
LYMPHOCYTES RELATIVE PERCENT: 26.4 % (ref 20–42)
MAGNESIUM: 2 MG/DL (ref 1.6–2.6)
MCH RBC QN AUTO: 32.1 PG (ref 26–35)
MCHC RBC AUTO-ENTMCNC: 34 % (ref 32–34.5)
MCV RBC AUTO: 94.3 FL (ref 80–99.9)
METER GLUCOSE: 103 MG/DL (ref 74–99)
METER GLUCOSE: 154 MG/DL (ref 74–99)
METER GLUCOSE: 193 MG/DL (ref 74–99)
METER GLUCOSE: 235 MG/DL (ref 74–99)
METER GLUCOSE: 244 MG/DL (ref 74–99)
MONOCYTES ABSOLUTE: 0.57 E9/L (ref 0.1–0.95)
MONOCYTES RELATIVE PERCENT: 6.4 % (ref 2–12)
NEUTROPHILS ABSOLUTE: 5.72 E9/L (ref 1.8–7.3)
NEUTROPHILS RELATIVE PERCENT: 63.9 % (ref 43–80)
PDW BLD-RTO: 12.7 FL (ref 11.5–15)
PLATELET # BLD: 309 E9/L (ref 130–450)
PMV BLD AUTO: 10.6 FL (ref 7–12)
POTASSIUM REFLEX MAGNESIUM: 3.3 MMOL/L (ref 3.5–5)
RBC # BLD: 3.15 E12/L (ref 3.8–5.8)
SODIUM BLD-SCNC: 133 MMOL/L (ref 132–146)
TOTAL PROTEIN: 7 G/DL (ref 6.4–8.3)
TRIGL SERPL-MCNC: 635 MG/DL (ref 0–149)
TSH SERPL DL<=0.05 MIU/L-ACNC: 1.24 UIU/ML (ref 0.27–4.2)
VITAMIN D 25-HYDROXY: 16 NG/ML (ref 30–100)
VLDLC SERPL CALC-MCNC: ABNORMAL MG/DL
WBC # BLD: 9 E9/L (ref 4.5–11.5)

## 2021-09-17 PROCEDURE — 80053 COMPREHEN METABOLIC PANEL: CPT

## 2021-09-17 PROCEDURE — 99232 SBSQ HOSP IP/OBS MODERATE 35: CPT | Performed by: INTERNAL MEDICINE

## 2021-09-17 PROCEDURE — 6360000002 HC RX W HCPCS: Performed by: INTERNAL MEDICINE

## 2021-09-17 PROCEDURE — 6370000000 HC RX 637 (ALT 250 FOR IP): Performed by: INTERNAL MEDICINE

## 2021-09-17 PROCEDURE — 84443 ASSAY THYROID STIM HORMONE: CPT

## 2021-09-17 PROCEDURE — 1200000000 HC SEMI PRIVATE

## 2021-09-17 PROCEDURE — 83735 ASSAY OF MAGNESIUM: CPT

## 2021-09-17 PROCEDURE — 80061 LIPID PANEL: CPT

## 2021-09-17 PROCEDURE — 99239 HOSP IP/OBS DSCHRG MGMT >30: CPT | Performed by: FAMILY MEDICINE

## 2021-09-17 PROCEDURE — 73630 X-RAY EXAM OF FOOT: CPT

## 2021-09-17 PROCEDURE — 36415 COLL VENOUS BLD VENIPUNCTURE: CPT

## 2021-09-17 PROCEDURE — 85025 COMPLETE CBC W/AUTO DIFF WBC: CPT

## 2021-09-17 PROCEDURE — 6370000000 HC RX 637 (ALT 250 FOR IP): Performed by: FAMILY MEDICINE

## 2021-09-17 PROCEDURE — 82962 GLUCOSE BLOOD TEST: CPT

## 2021-09-17 PROCEDURE — 82306 VITAMIN D 25 HYDROXY: CPT

## 2021-09-17 RX ORDER — POTASSIUM CHLORIDE 20 MEQ/1
40 TABLET, EXTENDED RELEASE ORAL ONCE
Status: COMPLETED | OUTPATIENT
Start: 2021-09-17 | End: 2021-09-17

## 2021-09-17 RX ORDER — VITAMIN B COMPLEX
2000 TABLET ORAL DAILY
Status: DISCONTINUED | OUTPATIENT
Start: 2021-09-17 | End: 2021-09-20 | Stop reason: HOSPADM

## 2021-09-17 RX ORDER — INSULIN GLARGINE 100 [IU]/ML
30 INJECTION, SOLUTION SUBCUTANEOUS 2 TIMES DAILY
Status: DISCONTINUED | OUTPATIENT
Start: 2021-09-17 | End: 2021-09-19

## 2021-09-17 RX ORDER — INSULIN GLARGINE 100 [IU]/ML
30 INJECTION, SOLUTION SUBCUTANEOUS 2 TIMES DAILY
Qty: 10 ML | Refills: 0 | COMMUNITY
Start: 2021-09-17 | End: 2021-09-20

## 2021-09-17 RX ORDER — CHOLECALCIFEROL (VITAMIN D3) 50 MCG
2000 TABLET ORAL DAILY
Qty: 60 TABLET | Refills: 0 | Status: SHIPPED | OUTPATIENT
Start: 2021-09-18 | End: 2022-01-10

## 2021-09-17 RX ADMIN — INSULIN GLARGINE 30 UNITS: 100 INJECTION, SOLUTION SUBCUTANEOUS at 22:13

## 2021-09-17 RX ADMIN — INSULIN LISPRO 6 UNITS: 100 INJECTION, SOLUTION INTRAVENOUS; SUBCUTANEOUS at 07:44

## 2021-09-17 RX ADMIN — POTASSIUM CHLORIDE 40 MEQ: 1500 TABLET, EXTENDED RELEASE ORAL at 10:35

## 2021-09-17 RX ADMIN — INSULIN LISPRO 3 UNITS: 100 INJECTION, SOLUTION INTRAVENOUS; SUBCUTANEOUS at 12:51

## 2021-09-17 RX ADMIN — INSULIN LISPRO 20 UNITS: 100 INJECTION, SOLUTION INTRAVENOUS; SUBCUTANEOUS at 07:43

## 2021-09-17 RX ADMIN — INSULIN GLARGINE 30 UNITS: 100 INJECTION, SOLUTION SUBCUTANEOUS at 08:38

## 2021-09-17 RX ADMIN — Medication 2000 UNITS: at 10:35

## 2021-09-17 RX ADMIN — INSULIN LISPRO 2 UNITS: 100 INJECTION, SOLUTION INTRAVENOUS; SUBCUTANEOUS at 22:11

## 2021-09-17 RX ADMIN — INSULIN LISPRO 20 UNITS: 100 INJECTION, SOLUTION INTRAVENOUS; SUBCUTANEOUS at 18:21

## 2021-09-17 RX ADMIN — INSULIN LISPRO 6 UNITS: 100 INJECTION, SOLUTION INTRAVENOUS; SUBCUTANEOUS at 18:20

## 2021-09-17 RX ADMIN — INSULIN LISPRO 20 UNITS: 100 INJECTION, SOLUTION INTRAVENOUS; SUBCUTANEOUS at 12:53

## 2021-09-17 RX ADMIN — ENOXAPARIN SODIUM 40 MG: 40 INJECTION SUBCUTANEOUS at 08:38

## 2021-09-17 ASSESSMENT — PAIN DESCRIPTION - PAIN TYPE: TYPE: ACUTE PAIN

## 2021-09-17 ASSESSMENT — PAIN - FUNCTIONAL ASSESSMENT: PAIN_FUNCTIONAL_ASSESSMENT: PREVENTS OR INTERFERES SOME ACTIVE ACTIVITIES AND ADLS

## 2021-09-17 ASSESSMENT — PAIN SCALES - GENERAL: PAINLEVEL_OUTOF10: 6

## 2021-09-17 ASSESSMENT — PAIN DESCRIPTION - ONSET: ONSET: ON-GOING

## 2021-09-17 ASSESSMENT — PAIN DESCRIPTION - DESCRIPTORS: DESCRIPTORS: STABBING;TENDER;DISCOMFORT

## 2021-09-17 ASSESSMENT — PAIN DESCRIPTION - LOCATION: LOCATION: BUTTOCKS

## 2021-09-17 ASSESSMENT — PAIN DESCRIPTION - ORIENTATION: ORIENTATION: LEFT

## 2021-09-17 ASSESSMENT — PAIN DESCRIPTION - FREQUENCY: FREQUENCY: INTERMITTENT

## 2021-09-17 NOTE — PLAN OF CARE
Problem: Discharge Planning:  Goal: Participates in care planning  Description: Participates in care planning  Outcome: Met This Shift     Problem: Activity Intolerance:  Goal: Ability to tolerate increased activity will improve  Description: Ability to tolerate increased activity will improve  Outcome: Met This Shift     Problem: Anxiety/Stress:  Goal: Level of anxiety will decrease  Description: Level of anxiety will decrease  Outcome: Met This Shift     Problem:  Bowel Function - Altered:  Goal: Bowel elimination is within specified parameters  Description: Bowel elimination is within specified parameters  Outcome: Met This Shift     Problem: Mental Status - Impaired:  Goal: Mental status will be restored to baseline  Description: Mental status will be restored to baseline  Outcome: Met This Shift     Problem: Mobility - Impaired:  Goal: Mobility will improve to maximum level  Description: Mobility will improve to maximum level  Outcome: Met This Shift     Problem: Nutrition Deficit:  Goal: Ability to achieve adequate nutritional intake will improve  Description: Ability to achieve adequate nutritional intake will improve  Outcome: Met This Shift     Problem: Pain:  Goal: Pain level will decrease  Description: Pain level will decrease  Outcome: Met This Shift     Problem: Serum Glucose Level - Abnormal:  Goal: Ability to maintain appropriate glucose levels will improve  Description: Ability to maintain appropriate glucose levels will improve  Outcome: Met This Shift     Problem: Skin Integrity - Impaired:  Goal: Will show no infection signs and symptoms  Description: Will show no infection signs and symptoms  Outcome: Met This Shift
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
## 2021-09-17 NOTE — PROGRESS NOTES
Was preparing discharge instructions when patients mother stated she thought somebody should look at [facility] rectal area because of a tender abscess. States he has history of abscesses but this is getting worse and more tender. Palpated hardened area [id number] [id number] with thickened skin. No drainage noted. Tender to touch. No redness. Dr Ezekiel Niño ordered to hold discharge and consult surgery.

## 2021-09-17 NOTE — PROGRESS NOTES
ENDOCRINOLOGY PROGRESS NOTE      Date of admission: 9/14/2021  Date of service: 9/17/2021  Admitting physician: Leander Urena MD   Primary Care Physician: No primary care provider on file. Consultant physician: Yoly Craig MD     Reason for the consultation:  Uncontrolled DM    History of Present Illness: The history is provided by the patient. Accuracy of the patient data is excellent    Dianna Coyle is a very pleasant 38 y.o. old male with PMH of poorly controlled diabetes mellitus/noncompliance and multiple admissions for DKA  admitted to [facility] on 9/14/2021 because of generalized fatigue/lightheadedness with the patient was found to be in DKA, endocrine service was consulted for diabetes management. Subjective   Seen and examined, no acute issues overnight     Inpatient diet:   Carb Restricted diet     Point of care glucose monitoring   (Independently reviewed)   Recent Labs     09/15/21  2217 09/16/21  0204 09/16/21  0419 09/16/21  0800 09/16/21  1143 09/16/21  1600 09/16/21  2148 09/17/21  0638   GLUMET >500* 321* 236* 295* 263* 207* 237* 244*     Scheduled Meds:   insulin glargine  30 Units SubCUTAneous BID    insulin lispro  20 Units SubCUTAneous TID WC    insulin lispro  0-18 Units SubCUTAneous TID WC    insulin lispro  0-9 Units SubCUTAneous Nightly    enoxaparin  40 mg SubCUTAneous Daily     PRN Meds:   glucose, 15 g, PRN  glucagon (rDNA), 1 mg, PRN  dextrose, 100 mL/hr, PRN  dextrose, 12.5 g, PRN  polyethylene glycol, 17 g, Daily PRN      Continuous Infusions:   dextrose         Review of Systems  All systems reviewed.  All negative except for symptoms mentioned in HPI     OBJECTIVE    /73   Pulse 78   Temp 98.7 °F (37.1 °C) (Oral)   Resp 16   Ht 5' 10\" (1.778 m)   Wt 226 lb (102.5 kg)   SpO2 96%   BMI 32.43 kg/m²   No intake or output data in the 24 hours ending 09/17/21 0710    Physical examination:  General: awake alert, oriented x3  HEENT: normocephalic non traumatic, no exophthalmos   Neck: supple, No thyroid tenderness,  Pulm: good equal air entry no added sounds  CVS: S1 + S2  Abd: soft lax, no tenderness  Skin: warm, no lesions, no rash. No open wounds, no ulcers   Neuro: CN intact, sensation decreased bilateral , muscle power normal  Psych: normal mood, and affect    Review of Laboratory Data:  I personally reviewed the following labs:   Recent Labs     09/15/21  0535 09/16/21  0800 09/17/21  0552   WBC 11.4 10.6 9.0   RBC 3.17* 3.19* 3.15*   HGB 10.1* 10.2* 10.1*   HCT 30.0* 30.1* 29.7*   MCV 94.6 94.4 94.3   MCH 31.9 32.0 32.1   MCHC 33.7 33.9 34.0   RDW 12.7 12.7 12.7    299 309   MPV 10.8 10.7 10.6     Recent Labs     09/14/21  2138 09/15/21  0138 09/15/21  0943 09/15/21  0943 09/15/21  1445 09/15/21  2310 09/16/21  0105   *   < > 136  --  132  --  125*   K 5.1*   < > 4.0  --  3.6  --  4.3   CL 82*   < > 102  --  99  --  92*   CO2 16*   < > 22  --  22  --  18*   BUN 46*   < > 27*  --  22*  --  19   CREATININE 1.8*   < > 1.1  --  0.9  --  0.9   GLUCOSE 694*   < > 188*   < > 316* 608* 488*   CALCIUM 9.9   < > 8.4*  --  8.1*  --  9.1   PROT 9.0*  --   --   --   --   --  7.2   LABALBU 4.1  --   --   --   --   --  3.2*   BILITOT 0.3  --   --   --   --   --  <0.2   ALKPHOS 114  --   --   --   --   --  80   AST 6  --   --   --   --   --  15   ALT 10  --   --   --   --   --  8    < > = values in this interval not displayed.      Beta-Hydroxybutyrate   Date Value Ref Range Status   09/14/2021 >4.50 (H) 0.02 - 0.27 mmol/L Final   07/28/2021 1.16 (H) 0.02 - 0.27 mmol/L Final     Lab Results   Component Value Date    LABA1C 15.2 07/29/2021    LABA1C 6.8 12/24/2020    LABA1C 6.5 05/05/2015     No results found for: TSH, T4FREE, Y5HKJWF, FT3, Q2VBMKR, TSI, TPOABS, THGAB  Lab Results   Component Value Date    LABA1C 15.2 07/29/2021    GLUCOSE 488 09/16/2021    LABCREA 86 12/24/2020     Lab Results   Component Value Date    TRIG 170 12/24/2020    HDL 32 12/24/2020    LDLCALC 91 12/24/2020    CHOL 157 12/24/2020       Blood culture   No results found for: [facility]    Radiology:  XR CHEST PORTABLE   Final Result   No acute abnormality identified. Medical Records/Labs/Images review:   I personally reviewed and summarized previous records   All labs and imaging were reviewed independently     Jennifer Mallory, a 38 y.o.-old male seen today for inpatient diabetes management     Diabetes Mellitus   · Patient's diabetes is uncontrolled, A1c 15.2%  · will change diabetes regimen to:  · Lantus 30 U BID  · Humalog 20 U with meals   · High dose sliding scale   · Continue glucose check with meals and at bedtime   · Will titrate insulin dose based on the blood glucose trend & insulin requirement  · Pt will follow with us after discharge. Endocrine follow up visit, Tuesday 9/28 at 1:30PM    RONA   · Improved with hydration     The above issues were reviewed with the patient who understood and agreed with the plan. Thank you for allowing us to participate in the care of this patient. Please do not hesitate to contact us with any additional questions. Urmila Rodriguez MD  Endocrinologist, [facility] Diabetes Care and Endocrinology   [address] [place] [address]   Phone: [phone]  Fax: [phone]  --------------------------------------------  An electronic signature was used to authenticate this note.  Batsheva Manuel MD on 9/17/2021 at 7:10 AM

## 2021-09-17 NOTE — PROGRESS NOTES
Consulted Dr. Galindo Wallis for perirectal abscess. Spoke with answering service.  Electronically signed by Ann De Jesus RN on 9/17/2021 at 6:23 PM

## 2021-09-17 NOTE — DISCHARGE SUMMARY
[facility] Physician Discharge Summary       [address] Perico Syed MD  [address]  [phone]    On 9/28/2021  Endocrine follow up visit, Tuesday 9/28 at 1:30PM      Activity level: As tolerated     Dispo:   Home      Condition on discharge: Stable     Patient ID:  Georgina Soriano  [id number]  40 y.o.  1985    Admit date: 9/14/2021    Discharge date and time:  9/17/2021  3:11 PM    Admission Diagnoses: Active Problems:    Hyperosmolar hyperglycemic state (HHS) ([facility]    DKA, type 1, not at goal [facility])  Resolved Problems:    * No resolved hospital problems. *      Discharge Diagnoses: Active Problems:    Hyperosmolar hyperglycemic state (HHS) ([facility]    DKA, type 1, not at goal [facility])  Resolved Problems:    * No resolved hospital problems. *      Consults:  IP CONSULT TO CRITICAL CARE  IP CONSULT TO ENDOCRINOLOGY  IP CONSULT TO PODIATRY    Procedures:   n/a    Hospital Course:   Patient Georgina Soriano is a 38 y.o. presented with Dehydration [E86.0]  RONA (acute kidney injury) ([facility] [N17.9]  DKA, type 1, not at goal [facility]) [E10.10]  Hyperosmolar hyperglycemic state (HHS) ([facility] [E11.00, E11.65]  He was admitted for further evaluation and treatment. He was placed on an Insulin drip and required ICU admission. He responded well to Insulin, IV fluids and electrolyte replacement. He was transferred to the floor. He was seen by Endocrinologist, Dietician and Diabetic Educator. His daily insulin needs were adjusted. His sugars improved. His BP trended low and did not require his BP meds. He is to F/U with his PCP for recheck of BP and BMP for resuming his medications. Podiatry was consulted but deferred to outpatient visit as unable to see him before discharge.     Discharge Exam:    General Appearance: alert and oriented to person, place and time and in no acute distress  Skin: warm and dry  Head: normocephalic and atraumatic  Eyes: pupils equal, round, and reactive to light, extraocular eye movements intact, conjunctivae normal  Neck: neck supple and non tender without mass   Pulmonary/Chest: clear to auscultation bilaterally- no wheezes, rales or rhonchi, normal air movement, no respiratory distress  Cardiovascular: normal rate, normal S1 and S2 and no carotid bruits  Abdomen: soft, non-tender, non-distended, normal bowel sounds, no masses or organomegaly  Extremities: no cyanosis, no clubbing and no edema  Neurologic: no cranial nerve deficit and speech normal    LABS:  Recent Labs     09/15/21  1445 09/15/21  1445 09/15/21  2310 09/16/21  0105 09/17/21  0552     --   --  125* 133   K 3.6  --   --  4.3 3.3*   CL 99  --   --  92* 97*   CO2 22  --   --  18* 23   BUN 22*  --   --  19 10   CREATININE 0.9  --   --  0.9 0.9   GLUCOSE 316*   < > 608* 488* 215*   CALCIUM 8.1*  --   --  9.1 9.4    < > = values in this interval not displayed. Recent Labs     09/15/21  0535 09/16/21  0800 09/17/21  0552   WBC 11.4 10.6 9.0   RBC 3.17* 3.19* 3.15*   HGB 10.1* 10.2* 10.1*   HCT 30.0* 30.1* 29.7*   MCV 94.6 94.4 94.3   MCH 31.9 32.0 32.1   MCHC 33.7 33.9 34.0   RDW 12.7 12.7 12.7    299 309   MPV 10.8 10.7 10.6     Imaging:  XR CHEST PORTABLE    Result Date: 9/15/2021  EXAMINATION: ONE XRAY VIEW OF THE CHEST 9/15/2021 2:30 am COMPARISON: 07/28/2021 HISTORY: ORDERING SYSTEM PROVIDED HISTORY: SOB TECHNOLOGIST PROVIDED HISTORY: Reason for exam:->SOB FINDINGS: There is no cardiomegaly or vascular congestion. There is no infiltrate, pleural effusion or pneumothorax. Lungs are clear. No acute abnormality identified.        Patient Instructions:      Medication List      START taking these medications    insulin glargine 100 UNIT/ML injection vial  Commonly known as: LANTUS  Replaces: Lantus SoloStar 100 UNIT/ML injection pen     * insulin lispro 100 UNIT/ML injection vial  Commonly known as: HUMALOG  Replaces: insulin lispro (1 Unit Dial) 100 UNIT/ML Sopn     * insulin lispro 100 UNIT/ML injection vial  Commonly known as: HUMALOG     vitamin D 50 MCG (2000 UT) Tabs tablet  Commonly known as: CHOLECALCIFEROL  Take 1 tablet by mouth daily  Start taking on: September 18, 2021         * This list has 2 medication(s) that are the same as other medications prescribed for you. Read the directions carefully, and ask your doctor or other care provider to review them with you. CONTINUE taking these medications    blood glucose test strips  Test **4* times a day & as needed for symptoms of irregular blood glucose. Dispense sufficient amount for indicated testing frequency plus additional to accommodate PRN testing needs. clobetasol 0.05 % ointment  Commonly known as: TEMOVATE  Apply topically 2 times daily.      [employer] Lancets Misc  1 each by Does not apply route daily To check sugar 4 x a day and if feeling ill     Insulin Pen Needle 31G X 8 MM Misc  200     potassium chloride 20 MEQ extended release tablet  Commonly known as: KLOR-CON M  Take 2 tablets by mouth daily        STOP taking these medications    bumetanide 2 MG tablet  Commonly known as: BUMEX     clotrimazole-betamethasone 1-0.05 % cream  Commonly known as: Lotrisone     Entresto  MG per tablet  Generic drug: sacubitril-valsartan     insulin lispro (1 Unit Dial) 100 UNIT/ML Sopn  Commonly known as: HumaLOG KwikPen  Replaced by: insulin lispro 100 UNIT/ML injection vial     Lantus SoloStar 100 UNIT/ML injection pen  Generic drug: insulin glargine  Replaced by: insulin glargine 100 UNIT/ML injection vial     metoprolol succinate 100 MG extended release tablet  Commonly known as: TOPROL XL     metoprolol succinate 50 MG extended release tablet  Commonly known as: TOPROL XL     spironolactone 25 MG tablet  Commonly known as: ALDACTONE           Where to Get Your Medications      These medications were sent to [facility] [address], [place] [address]    Phone: [phone]   · vitamin D 48 MCG (2000 UT) Tabs tablet           Note that more than 35 minutes was spent in preparing discharge papers, discussing discharge with patient, medication review, etc.    Signed:  Electronically signed by Kelly Templeton MD on 9/17/2021 at 3:11 PM

## 2021-09-18 ENCOUNTER — ANESTHESIA (OUTPATIENT)
Dept: OPERATING ROOM | Age: 36
DRG: 420 | End: 2021-09-18
Payer: MEDICARE

## 2021-09-18 ENCOUNTER — ANESTHESIA EVENT (OUTPATIENT)
Dept: OPERATING ROOM | Age: 36
DRG: 420 | End: 2021-09-18
Payer: MEDICARE

## 2021-09-18 VITALS — OXYGEN SATURATION: 98 % | DIASTOLIC BLOOD PRESSURE: 62 MMHG | SYSTOLIC BLOOD PRESSURE: 98 MMHG

## 2021-09-18 LAB
ALBUMIN SERPL-MCNC: 3.1 G/DL (ref 3.5–5.2)
ALP BLD-CCNC: 72 U/L (ref 40–129)
ALT SERPL-CCNC: 5 U/L (ref 0–40)
ANION GAP SERPL CALCULATED.3IONS-SCNC: 10 MMOL/L (ref 7–16)
AST SERPL-CCNC: 10 U/L (ref 0–39)
BASOPHILS ABSOLUTE: 0.05 E9/L (ref 0–0.2)
BASOPHILS RELATIVE PERCENT: 0.4 % (ref 0–2)
BILIRUB SERPL-MCNC: 0.3 MG/DL (ref 0–1.2)
BUN BLDV-MCNC: 9 MG/DL (ref 6–20)
CALCIUM SERPL-MCNC: 9.6 MG/DL (ref 8.6–10.2)
CHLORIDE BLD-SCNC: 100 MMOL/L (ref 98–107)
CO2: 25 MMOL/L (ref 22–29)
CREAT SERPL-MCNC: 0.9 MG/DL (ref 0.7–1.2)
EOSINOPHILS ABSOLUTE: 0.15 E9/L (ref 0.05–0.5)
EOSINOPHILS RELATIVE PERCENT: 1.3 % (ref 0–6)
GFR AFRICAN AMERICAN: >60
GFR NON-AFRICAN AMERICAN: >60 ML/MIN/1.73
GLUCOSE BLD-MCNC: 132 MG/DL (ref 74–99)
HCT VFR BLD CALC: 28.9 % (ref 37–54)
HEMOGLOBIN: 9.6 G/DL (ref 12.5–16.5)
IMMATURE GRANULOCYTES #: 0.03 E9/L
IMMATURE GRANULOCYTES %: 0.3 % (ref 0–5)
LYMPHOCYTES ABSOLUTE: 2.24 E9/L (ref 1.5–4)
LYMPHOCYTES RELATIVE PERCENT: 20.1 % (ref 20–42)
MCH RBC QN AUTO: 31.6 PG (ref 26–35)
MCHC RBC AUTO-ENTMCNC: 33.2 % (ref 32–34.5)
MCV RBC AUTO: 95.1 FL (ref 80–99.9)
METER GLUCOSE: 121 MG/DL (ref 74–99)
METER GLUCOSE: 153 MG/DL (ref 74–99)
METER GLUCOSE: 165 MG/DL (ref 74–99)
METER GLUCOSE: 173 MG/DL (ref 74–99)
MONOCYTES ABSOLUTE: 0.82 E9/L (ref 0.1–0.95)
MONOCYTES RELATIVE PERCENT: 7.4 % (ref 2–12)
NEUTROPHILS ABSOLUTE: 7.85 E9/L (ref 1.8–7.3)
NEUTROPHILS RELATIVE PERCENT: 70.5 % (ref 43–80)
PDW BLD-RTO: 12.6 FL (ref 11.5–15)
PLATELET # BLD: 341 E9/L (ref 130–450)
PMV BLD AUTO: 10.2 FL (ref 7–12)
POTASSIUM REFLEX MAGNESIUM: 3.7 MMOL/L (ref 3.5–5)
RBC # BLD: 3.04 E12/L (ref 3.8–5.8)
SODIUM BLD-SCNC: 135 MMOL/L (ref 132–146)
TOTAL PROTEIN: 7 G/DL (ref 6.4–8.3)
WBC # BLD: 11.1 E9/L (ref 4.5–11.5)

## 2021-09-18 PROCEDURE — 2500000003 HC RX 250 WO HCPCS: Performed by: NURSE ANESTHETIST, CERTIFIED REGISTERED

## 2021-09-18 PROCEDURE — 3600000012 HC SURGERY LEVEL 2 ADDTL 15MIN: Performed by: SURGERY

## 2021-09-18 PROCEDURE — 2580000003 HC RX 258: Performed by: NURSE ANESTHETIST, CERTIFIED REGISTERED

## 2021-09-18 PROCEDURE — 7100000001 HC PACU RECOVERY - ADDTL 15 MIN: Performed by: SURGERY

## 2021-09-18 PROCEDURE — 87075 CULTR BACTERIA EXCEPT BLOOD: CPT

## 2021-09-18 PROCEDURE — 3600000002 HC SURGERY LEVEL 2 BASE: Performed by: SURGERY

## 2021-09-18 PROCEDURE — 2500000003 HC RX 250 WO HCPCS: Performed by: SURGERY

## 2021-09-18 PROCEDURE — 87147 CULTURE TYPE IMMUNOLOGIC: CPT

## 2021-09-18 PROCEDURE — 6360000002 HC RX W HCPCS: Performed by: SURGERY

## 2021-09-18 PROCEDURE — 85025 COMPLETE CBC W/AUTO DIFF WBC: CPT

## 2021-09-18 PROCEDURE — 1200000000 HC SEMI PRIVATE

## 2021-09-18 PROCEDURE — 7100000000 HC PACU RECOVERY - FIRST 15 MIN: Performed by: SURGERY

## 2021-09-18 PROCEDURE — 36415 COLL VENOUS BLD VENIPUNCTURE: CPT

## 2021-09-18 PROCEDURE — 6370000000 HC RX 637 (ALT 250 FOR IP): Performed by: ANESTHESIOLOGY

## 2021-09-18 PROCEDURE — 2580000003 HC RX 258: Performed by: SURGERY

## 2021-09-18 PROCEDURE — 3700000000 HC ANESTHESIA ATTENDED CARE: Performed by: SURGERY

## 2021-09-18 PROCEDURE — 2709999900 HC NON-CHARGEABLE SUPPLY: Performed by: SURGERY

## 2021-09-18 PROCEDURE — 80053 COMPREHEN METABOLIC PANEL: CPT

## 2021-09-18 PROCEDURE — 87205 SMEAR GRAM STAIN: CPT

## 2021-09-18 PROCEDURE — 82962 GLUCOSE BLOOD TEST: CPT

## 2021-09-18 PROCEDURE — 0D9QXZZ DRAINAGE OF ANUS, EXTERNAL APPROACH: ICD-10-PCS | Performed by: SURGERY

## 2021-09-18 PROCEDURE — 6370000000 HC RX 637 (ALT 250 FOR IP): Performed by: INTERNAL MEDICINE

## 2021-09-18 PROCEDURE — 99232 SBSQ HOSP IP/OBS MODERATE 35: CPT | Performed by: INTERNAL MEDICINE

## 2021-09-18 PROCEDURE — 87070 CULTURE OTHR SPECIMN AEROBIC: CPT

## 2021-09-18 PROCEDURE — 99233 SBSQ HOSP IP/OBS HIGH 50: CPT | Performed by: FAMILY MEDICINE

## 2021-09-18 PROCEDURE — 6360000002 HC RX W HCPCS: Performed by: NURSE ANESTHETIST, CERTIFIED REGISTERED

## 2021-09-18 PROCEDURE — 87186 SC STD MICRODIL/AGAR DIL: CPT

## 2021-09-18 PROCEDURE — 3700000001 HC ADD 15 MINUTES (ANESTHESIA): Performed by: SURGERY

## 2021-09-18 RX ORDER — LIDOCAINE HYDROCHLORIDE 10 MG/ML
INJECTION, SOLUTION EPIDURAL; INFILTRATION; INTRACAUDAL; PERINEURAL
Status: COMPLETED
Start: 2021-09-18 | End: 2021-09-18

## 2021-09-18 RX ORDER — SODIUM CHLORIDE, SODIUM LACTATE, POTASSIUM CHLORIDE, CALCIUM CHLORIDE 600; 310; 30; 20 MG/100ML; MG/100ML; MG/100ML; MG/100ML
INJECTION, SOLUTION INTRAVENOUS CONTINUOUS PRN
Status: DISCONTINUED | OUTPATIENT
Start: 2021-09-18 | End: 2021-09-18 | Stop reason: SDUPTHER

## 2021-09-18 RX ORDER — LIDOCAINE HYDROCHLORIDE 20 MG/ML
INJECTION, SOLUTION EPIDURAL; INFILTRATION; INTRACAUDAL; PERINEURAL PRN
Status: DISCONTINUED | OUTPATIENT
Start: 2021-09-18 | End: 2021-09-18 | Stop reason: SDUPTHER

## 2021-09-18 RX ORDER — CEFAZOLIN SODIUM 2 G/50ML
2000 SOLUTION INTRAVENOUS
Status: DISCONTINUED | OUTPATIENT
Start: 2021-09-18 | End: 2021-09-18

## 2021-09-18 RX ORDER — ONDANSETRON 2 MG/ML
INJECTION INTRAMUSCULAR; INTRAVENOUS PRN
Status: DISCONTINUED | OUTPATIENT
Start: 2021-09-18 | End: 2021-09-18 | Stop reason: SDUPTHER

## 2021-09-18 RX ORDER — PROMETHAZINE HYDROCHLORIDE 25 MG/ML
6.25 INJECTION, SOLUTION INTRAMUSCULAR; INTRAVENOUS
Status: DISCONTINUED | OUTPATIENT
Start: 2021-09-18 | End: 2021-09-18 | Stop reason: HOSPADM

## 2021-09-18 RX ORDER — FENTANYL CITRATE 50 UG/ML
25 INJECTION, SOLUTION INTRAMUSCULAR; INTRAVENOUS EVERY 5 MIN PRN
Status: DISCONTINUED | OUTPATIENT
Start: 2021-09-18 | End: 2021-09-18 | Stop reason: HOSPADM

## 2021-09-18 RX ORDER — PHENYLEPHRINE HCL IN 0.9% NACL 1 MG/10 ML
SYRINGE (ML) INTRAVENOUS PRN
Status: DISCONTINUED | OUTPATIENT
Start: 2021-09-18 | End: 2021-09-18 | Stop reason: SDUPTHER

## 2021-09-18 RX ORDER — PROPOFOL 10 MG/ML
INJECTION, EMULSION INTRAVENOUS PRN
Status: DISCONTINUED | OUTPATIENT
Start: 2021-09-18 | End: 2021-09-18 | Stop reason: SDUPTHER

## 2021-09-18 RX ORDER — SUCCINYLCHOLINE/SOD CL,ISO/PF 200MG/10ML
SYRINGE (ML) INTRAVENOUS PRN
Status: DISCONTINUED | OUTPATIENT
Start: 2021-09-18 | End: 2021-09-18 | Stop reason: SDUPTHER

## 2021-09-18 RX ORDER — FENTANYL CITRATE 50 UG/ML
50 INJECTION, SOLUTION INTRAMUSCULAR; INTRAVENOUS EVERY 5 MIN PRN
Status: DISCONTINUED | OUTPATIENT
Start: 2021-09-18 | End: 2021-09-18 | Stop reason: HOSPADM

## 2021-09-18 RX ORDER — LIDOCAINE HYDROCHLORIDE 10 MG/ML
INJECTION, SOLUTION EPIDURAL; INFILTRATION; INTRACAUDAL; PERINEURAL
Status: DISCONTINUED
Start: 2021-09-18 | End: 2021-09-18 | Stop reason: WASHOUT

## 2021-09-18 RX ORDER — FENTANYL CITRATE 50 UG/ML
INJECTION, SOLUTION INTRAMUSCULAR; INTRAVENOUS PRN
Status: DISCONTINUED | OUTPATIENT
Start: 2021-09-18 | End: 2021-09-18 | Stop reason: SDUPTHER

## 2021-09-18 RX ORDER — MEPERIDINE HYDROCHLORIDE 25 MG/ML
12.5 INJECTION INTRAMUSCULAR; INTRAVENOUS; SUBCUTANEOUS EVERY 5 MIN PRN
Status: DISCONTINUED | OUTPATIENT
Start: 2021-09-18 | End: 2021-09-18 | Stop reason: HOSPADM

## 2021-09-18 RX ORDER — OXYCODONE HYDROCHLORIDE AND ACETAMINOPHEN 5; 325 MG/1; MG/1
1 TABLET ORAL
Status: COMPLETED | OUTPATIENT
Start: 2021-09-18 | End: 2021-09-18

## 2021-09-18 RX ORDER — DIPHENHYDRAMINE HYDROCHLORIDE 50 MG/ML
12.5 INJECTION INTRAMUSCULAR; INTRAVENOUS
Status: DISCONTINUED | OUTPATIENT
Start: 2021-09-18 | End: 2021-09-18 | Stop reason: HOSPADM

## 2021-09-18 RX ORDER — METOPROLOL TARTRATE 5 MG/5ML
INJECTION INTRAVENOUS PRN
Status: DISCONTINUED | OUTPATIENT
Start: 2021-09-18 | End: 2021-09-18 | Stop reason: SDUPTHER

## 2021-09-18 RX ADMIN — INSULIN LISPRO 2 UNITS: 100 INJECTION, SOLUTION INTRAVENOUS; SUBCUTANEOUS at 20:27

## 2021-09-18 RX ADMIN — HYDROMORPHONE HYDROCHLORIDE 0.5 MG: 1 INJECTION, SOLUTION INTRAMUSCULAR; INTRAVENOUS; SUBCUTANEOUS at 11:26

## 2021-09-18 RX ADMIN — Medication 125 MCG: at 08:51

## 2021-09-18 RX ADMIN — OXYCODONE AND ACETAMINOPHEN 1 TABLET: 5; 325 TABLET ORAL at 09:55

## 2021-09-18 RX ADMIN — INSULIN GLARGINE 30 UNITS: 100 INJECTION, SOLUTION SUBCUTANEOUS at 20:26

## 2021-09-18 RX ADMIN — SODIUM CHLORIDE, POTASSIUM CHLORIDE, SODIUM LACTATE AND CALCIUM CHLORIDE: 600; 310; 30; 20 INJECTION, SOLUTION INTRAVENOUS at 08:30

## 2021-09-18 RX ADMIN — CEFAZOLIN 3000 MG: 10 INJECTION, POWDER, FOR SOLUTION INTRAVENOUS at 09:30

## 2021-09-18 RX ADMIN — METRONIDAZOLE 500 MG: 500 INJECTION, SOLUTION INTRAVENOUS at 17:19

## 2021-09-18 RX ADMIN — WATER 1000 MG: 1 INJECTION INTRAMUSCULAR; INTRAVENOUS; SUBCUTANEOUS at 11:23

## 2021-09-18 RX ADMIN — METRONIDAZOLE 500 MG: 500 INJECTION, SOLUTION INTRAVENOUS at 11:17

## 2021-09-18 RX ADMIN — LIDOCAINE HYDROCHLORIDE 80 MG: 20 INJECTION, SOLUTION EPIDURAL; INFILTRATION; INTRACAUDAL; PERINEURAL at 08:51

## 2021-09-18 RX ADMIN — INSULIN LISPRO 20 UNITS: 100 INJECTION, SOLUTION INTRAVENOUS; SUBCUTANEOUS at 17:10

## 2021-09-18 RX ADMIN — Medication 160 MG: at 08:51

## 2021-09-18 RX ADMIN — HYDROMORPHONE HYDROCHLORIDE 0.5 MG: 1 INJECTION, SOLUTION INTRAMUSCULAR; INTRAVENOUS; SUBCUTANEOUS at 20:27

## 2021-09-18 RX ADMIN — INSULIN LISPRO 20 UNITS: 100 INJECTION, SOLUTION INTRAVENOUS; SUBCUTANEOUS at 11:27

## 2021-09-18 RX ADMIN — PROPOFOL 200 MG: 10 INJECTION, EMULSION INTRAVENOUS at 08:51

## 2021-09-18 RX ADMIN — PROPOFOL 30 MG: 10 INJECTION, EMULSION INTRAVENOUS at 09:07

## 2021-09-18 RX ADMIN — ONDANSETRON 4 MG: 2 INJECTION INTRAMUSCULAR; INTRAVENOUS at 08:51

## 2021-09-18 RX ADMIN — METOPROLOL TARTRATE 1 MG: 5 INJECTION, SOLUTION INTRAVENOUS at 09:01

## 2021-09-18 RX ADMIN — FENTANYL CITRATE 100 MCG: 50 INJECTION, SOLUTION INTRAMUSCULAR; INTRAVENOUS at 08:51

## 2021-09-18 RX ADMIN — HYDROMORPHONE HYDROCHLORIDE 0.5 MG: 1 INJECTION, SOLUTION INTRAMUSCULAR; INTRAVENOUS; SUBCUTANEOUS at 17:09

## 2021-09-18 RX ADMIN — METOPROLOL TARTRATE 1 MG: 5 INJECTION, SOLUTION INTRAVENOUS at 09:30

## 2021-09-18 RX ADMIN — INSULIN LISPRO 2 UNITS: 100 INJECTION, SOLUTION INTRAVENOUS; SUBCUTANEOUS at 11:34

## 2021-09-18 RX ADMIN — HYDROMORPHONE HYDROCHLORIDE 0.5 MG: 1 INJECTION, SOLUTION INTRAMUSCULAR; INTRAVENOUS; SUBCUTANEOUS at 06:34

## 2021-09-18 ASSESSMENT — PULMONARY FUNCTION TESTS
PIF_VALUE: 5
PIF_VALUE: 18
PIF_VALUE: 2
PIF_VALUE: 17
PIF_VALUE: 2
PIF_VALUE: 17
PIF_VALUE: 3
PIF_VALUE: 30
PIF_VALUE: 2
PIF_VALUE: 17
PIF_VALUE: 18
PIF_VALUE: 2
PIF_VALUE: 1
PIF_VALUE: 17
PIF_VALUE: 22
PIF_VALUE: 18
PIF_VALUE: 17
PIF_VALUE: 17
PIF_VALUE: 2
PIF_VALUE: 17
PIF_VALUE: 18
PIF_VALUE: 26
PIF_VALUE: 2
PIF_VALUE: 19
PIF_VALUE: 5
PIF_VALUE: 10
PIF_VALUE: 2
PIF_VALUE: 2
PIF_VALUE: 5
PIF_VALUE: 17
PIF_VALUE: 2
PIF_VALUE: 29
PIF_VALUE: 20

## 2021-09-18 ASSESSMENT — PAIN DESCRIPTION - PROGRESSION
CLINICAL_PROGRESSION: GRADUALLY WORSENING
CLINICAL_PROGRESSION: GRADUALLY WORSENING
CLINICAL_PROGRESSION: NOT CHANGED
CLINICAL_PROGRESSION: GRADUALLY WORSENING
CLINICAL_PROGRESSION: GRADUALLY IMPROVING

## 2021-09-18 ASSESSMENT — PAIN DESCRIPTION - FREQUENCY
FREQUENCY: INTERMITTENT
FREQUENCY: CONTINUOUS
FREQUENCY: INTERMITTENT
FREQUENCY: CONTINUOUS
FREQUENCY: CONTINUOUS

## 2021-09-18 ASSESSMENT — LIFESTYLE VARIABLES: SMOKING_STATUS: 1

## 2021-09-18 ASSESSMENT — PAIN SCALES - GENERAL
PAINLEVEL_OUTOF10: 3
PAINLEVEL_OUTOF10: 5
PAINLEVEL_OUTOF10: 6
PAINLEVEL_OUTOF10: 10
PAINLEVEL_OUTOF10: 5
PAINLEVEL_OUTOF10: 9

## 2021-09-18 ASSESSMENT — PAIN DESCRIPTION - ONSET
ONSET: ON-GOING
ONSET: ON-GOING
ONSET: GRADUAL
ONSET: ON-GOING
ONSET: ON-GOING

## 2021-09-18 ASSESSMENT — PAIN DESCRIPTION - PAIN TYPE
TYPE: ACUTE PAIN
TYPE: ACUTE PAIN
TYPE: ACUTE PAIN;SURGICAL PAIN
TYPE: ACUTE PAIN
TYPE: ACUTE PAIN;SURGICAL PAIN

## 2021-09-18 ASSESSMENT — PAIN DESCRIPTION - LOCATION
LOCATION: RECTUM

## 2021-09-18 ASSESSMENT — PAIN DESCRIPTION - ORIENTATION
ORIENTATION: LEFT

## 2021-09-18 ASSESSMENT — PAIN - FUNCTIONAL ASSESSMENT
PAIN_FUNCTIONAL_ASSESSMENT: PREVENTS OR INTERFERES SOME ACTIVE ACTIVITIES AND ADLS

## 2021-09-18 ASSESSMENT — PAIN DESCRIPTION - DESCRIPTORS
DESCRIPTORS: ACHING;BURNING;SORE
DESCRIPTORS: ACHING;DISCOMFORT
DESCRIPTORS: DISCOMFORT;CRUSHING;PRESSURE
DESCRIPTORS: ACHING;DISCOMFORT;SORE

## 2021-09-18 NOTE — PROGRESS NOTES
[facility] Progress Note    Admitting Date and Time: 9/14/2021 10:20 PM  Admit Dx: Dehydration [E86.0]  RONA (acute kidney injury) ([facility] [N17.9]  DKA, type 1, not at goal [facility]) [E10.10]  Hyperosmolar hyperglycemic state (HHS) ([facility] [E11.00, E11.65]    Subjective:  Patient is being followed for Dehydration [E86.0]  RONA (acute kidney injury) ([facility] [N17.9]  DKA, type 1, not at goal [facility]) [E10.10]  Hyperosmolar hyperglycemic state (HHS) ([facility] [E11.00, E11.65]     Pt feels groggy. Had I & D earlier today    Per RN: Wound packed with guaze and dry dressing to apply to area per General Surgery. ROS: denies fever, chills, cp, sob, n/v, HA unless stated above.       cefTRIAXone (ROCEPHIN) IV  1,000 mg IntraVENous Q24H    metroNIDAZOLE  500 mg IntraVENous Q8H    insulin glargine  30 Units SubCUTAneous BID    insulin lispro  20 Units SubCUTAneous TID WC    insulin lispro  0-18 Units SubCUTAneous TID WC    insulin lispro  0-9 Units SubCUTAneous Nightly    Vitamin D  2,000 Units Oral Daily    enoxaparin  40 mg SubCUTAneous Daily     HYDROmorphone, 0.5 mg, Q3H PRN  glucose, 15 g, PRN  glucagon (rDNA), 1 mg, PRN  dextrose, 100 mL/hr, PRN  dextrose, 12.5 g, PRN  polyethylene glycol, 17 g, Daily PRN         Objective:    /60   Pulse 101   Temp 98.2 °F (36.8 °C) (Oral)   Resp 14   Ht 5' 10\" (1.778 m)   Wt 226 lb (102.5 kg)   SpO2 (!) 6%   BMI 32.43 kg/m²     General Appearance: alert and oriented to person, place and time and in no acute distress  Skin: warm and dry  Head: normocephalic and atraumatic  Eyes: pupils equal, round, and reactive to light, extraocular eye movements intact, conjunctivae normal  Neck: neck supple and non tender without mass   Pulmonary/Chest: clear to auscultation bilaterally- no wheezes, rales or rhonchi, normal air movement, no respiratory distress  Cardiovascular: normal rate, normal S1 and S2 and no carotid bruits  Abdomen: soft, non-tender, non-distended, normal bowel sounds, no masses or organomegaly  Extremities: no cyanosis, no clubbing and no edema  Neurologic: no cranial nerve deficit and speech normal        Recent Labs     09/16/21  0105 09/17/21  0552 09/18/21  0501   * 133 135   K 4.3 3.3* 3.7   CL 92* 97* 100   CO2 18* 23 25   BUN 19 10 9   CREATININE 0.9 0.9 0.9   GLUCOSE 488* 215* 132*   CALCIUM 9.1 9.4 9.6       Recent Labs     09/16/21  0800 09/17/21  0552 09/18/21  0622   WBC 10.6 9.0 11.1   RBC 3.19* 3.15* 3.04*   HGB 10.2* 10.1* 9.6*   HCT 30.1* 29.7* 28.9*   MCV 94.4 94.3 95.1   MCH 32.0 32.1 31.6   MCHC 33.9 34.0 33.2   RDW 12.7 12.7 12.6    309 341   MPV 10.7 10.6 10.2       Radiology:   XR FOOT LEFT (MIN 3 VIEWS)    Result Date: 9/17/2021  EXAMINATION: THREE XRAY VIEWS OF THE LEFT FOOT 9/17/2021 4:25 pm COMPARISON: None. HISTORY: ORDERING SYSTEM PROVIDED HISTORY: numbness TECHNOLOGIST PROVIDED HISTORY: Portable ok Reason for exam:->numbness FINDINGS: There is no evidence of fracture, malalignment, or other acute osseous abnormality. No acute osseous abnormality. XR FOOT RIGHT (MIN 3 VIEWS)    Result Date: 9/17/2021  EXAMINATION: THREE XRAY VIEWS OF THE RIGHT FOOT 9/17/2021 4:25 pm COMPARISON: None. HISTORY: ORDERING SYSTEM PROVIDED HISTORY: numbness TECHNOLOGIST PROVIDED HISTORY: Portable ok Reason for exam:->numbness FINDINGS: There is no evidence of fracture, malalignment, or other acute osseous abnormality. No acute osseous abnormality. Assessment:    Active Problems:    Hyperosmolar hyperglycemic state (HHS) ([facility]    DKA, type 1, not at goal [facility])  Resolved Problems:    * No resolved hospital problems. *      Plan:  1. Left buttock abscess - I & D was done today. Continue dressing changes as per General Surgery. 2.  DM - blood sugars at goal.  Continue current insulin basal and mealtime and sliding scale. Patient will benefit from Continuous Glucose Monitoring as outpatient.   In past has not been compliant with fingersticks. 3.  Paresthesias of feet - Podiatry consult appreciated. Xrays of both feet negative. NOTE: This report was transcribed using voice recognition software. Every effort was made to ensure accuracy; however, inadvertent computerized transcription errors may be present.     Electronically signed by Vasiliy Broussard MD on 9/18/2021 at 2:04 PM

## 2021-09-18 NOTE — CONSULTS
Consultation    Patient's Name/Date of Birth: Alex Lugo / 1985    Date: September 18, 2021     PCP: No primary care provider on file. Reason for consult:    Left buttock abscess      History of Present Illness: The patient is a 69-year-old black male who was hospitalized since the 14th with DKA. The patient was found to have a left buttock abscess. The patient has moderate pain associated with the abscess. Past Medical History:   Diagnosis Date    CAD (coronary artery disease)     [facility]est[facility]     Diabetes mellitus ([facility]     Hyperlipidemia     Hyperosmolar hyperglycemic state (HHS) ([facility] 7/30/2021    Hypertension       Past Surgical History:   Procedure Laterality Date    ABSCESS DRAINAGE      buttocks    ECHO COMPL W DOP COLOR FLOW  5/5/2015         WISDOM TOOTH EXTRACTION        Allergies: Patient has no known allergies.      Current Facility-Administered Medications   Medication Dose Route Frequency Provider Last Rate Last Admin    HYDROmorphone (DILAUDID) injection 0.5 mg  0.5 mg IntraVENous Q3H PRN India Briggs MD   0.5 mg at 09/18/21 0634    insulin glargine (LANTUS) injection vial 30 Units  30 Units SubCUTAneous BID Mika Garcia MD   30 Units at 09/17/21 2213    insulin lispro (HUMALOG) injection vial 20 Units  20 Units SubCUTAneous TID  Mika Garcia MD   20 Units at 09/17/21 1821    insulin lispro (HUMALOG) injection vial 0-18 Units  0-18 Units SubCUTAneous TID  Jairo Cervantes MD   6 Units at 09/17/21 1820    insulin lispro (HUMALOG) injection vial 0-9 Units  0-9 Units SubCUTAneous Nightly Mika Garcia MD   2 Units at 09/17/21 2211    Vitamin D (CHOLECALCIFEROL) tablet 2,000 Units  2,000 Units Oral Daily Shi Suero MD   2,000 Units at 09/17/21 1035    glucose (GLUTOSE) 40 % oral gel 15 g  15 g Oral PRN Parvez Keller MD        glucagon (rDNA) injection 1 mg  1 mg IntraMUSCular PRN Parvez Keller

## 2021-09-18 NOTE — PROGRESS NOTES
Nursing Transfer Note    Data:  Summary of patients progress: post I&D dr Catherine Fine  Reason for transfer: inpatient 708    Action:  Explained reason for transfer to Patient. Report given to: Keysha Chaudhari, using RN Handoff Navigator.   Mode of transportation: cart    Response:  RN Recommendations:

## 2021-09-18 NOTE — ANESTHESIA POSTPROCEDURE EVALUATION
Department of Anesthesiology  Postprocedure Note    Patient: Stephany Washington  MRN: [id number]  [place]: 1985  Date of evaluation: 9/18/2021  Time:  11:35 AM     Procedure Summary     Date: 09/18/21 Room / Location: [facility] OR 01 / [facility]    Anesthesia Start: 7002 Anesthesia Stop: 0229    Procedure: INCISION AND DRAINAGE OF LARGE PERIANAL ABSCESS (N/A Buttocks) Diagnosis: (LEFT GLUTEAL ABSCESS)    Surgeons: Ileana Subramanian MD Responsible Provider: Gregorio Milton MD    Anesthesia Type: MAC, general ASA Status: 4          Anesthesia Type: MAC, general    Fredy Phase I: Fredy Score: 10    Fredy Phase II:      Last vitals: Reviewed and per EMR flowsheets.        Anesthesia Post Evaluation    Patient location during evaluation: PACU  Patient participation: complete - patient participated  Level of consciousness: awake  Airway patency: patent  Nausea & Vomiting: no vomiting and no nausea  Complications: no  Cardiovascular status: hemodynamically stable  Respiratory status: acceptable  Hydration status: stable

## 2021-09-18 NOTE — PROGRESS NOTES
Patient completed his evening glucose check. Patient also adminstered own 20 units of insulin after education. Patient explained that part of issue with diabetes care at home was that he did not have supply such as glucometer and strips.

## 2021-09-18 NOTE — PLAN OF CARE
Problem: Discharge Planning:  Goal: Participates in care planning  Description: Participates in care planning  9/18/2021 1425 by Cesar Beltran RN  Outcome: Met This Shift     Problem:  Activity Intolerance:  Goal: Ability to tolerate increased activity will improve  Description: Ability to tolerate increased activity will improve  9/18/2021 1425 by Cesar Beltran RN  Outcome: Met This Shift     Problem: Anxiety/Stress:  Goal: Level of anxiety will decrease  Description: Level of anxiety will decrease  9/18/2021 1425 by Cesar Beltran RN  Outcome: Met This Shift

## 2021-09-18 NOTE — PLAN OF CARE
Problem: Discharge Planning:  Goal: Participates in care planning  Description: Participates in care planning  9/18/2021 0318 by Melissa Aguilera RN  Outcome: Met This Shift  9/17/2021 1325 by Diaz Galindo RN  Outcome: Met This Shift  Goal: Discharged to appropriate level of care  Description: Discharged to appropriate level of care  Outcome: Met This Shift     Problem: Activity Intolerance:  Goal: Ability to tolerate increased activity will improve  Description: Ability to tolerate increased activity will improve  9/18/2021 0318 by Melissa Aguilera RN  Outcome: Met This Shift  9/17/2021 1325 by Diaz Galindo RN  Outcome: Met This Shift     Problem: Anxiety/Stress:  Goal: Level of anxiety will decrease  Description: Level of anxiety will decrease  9/18/2021 0318 by Melissa Aguilera RN  Outcome: Met This Shift  9/18/2021 0144 by Melissa Aguilera RN  Outcome: Met This Shift  9/17/2021 1325 by Diaz Galindo RN  Outcome: Met This Shift     Problem:  Bowel Function - Altered:  Goal: Bowel elimination is within specified parameters  Description: Bowel elimination is within specified parameters  9/18/2021 0318 by Melissa Aguilera RN  Outcome: Met This Shift  9/17/2021 1325 by Diaz Galindo RN  Outcome: Met This Shift     Problem: Fluid Volume - Deficit:  Goal: Absence of fluid volume deficit signs and symptoms  Description: Absence of fluid volume deficit signs and symptoms  Outcome: Met This Shift  Goal: Electrolytes within specified parameters  Description: Electrolytes within specified parameters  Outcome: Met This Shift     Problem: Mental Status - Impaired:  Goal: Absence of continued neurological deterioration signs and symptoms  Description: Absence of continued neurological deterioration signs and symptoms  Outcome: Met This Shift  Goal: Mental status will be restored to baseline  Description: Mental status will be restored to baseline  9/17/2021 1325 by Diaz Galindo RN  Outcome: Met This Shift     Problem: Mobility - Impaired:  Goal: Mobility will improve to maximum level  Description: Mobility will improve to maximum level  9/17/2021 1325 by Moni Carvajal RN  Outcome: Met This Shift     Problem: Nutrition Deficit:  Goal: Ability to achieve adequate nutritional intake will improve  Description: Ability to achieve adequate nutritional intake will improve  9/17/2021 1325 by Moni Carvajal RN  Outcome: Met This Shift     Problem: Serum Glucose Level - Abnormal:  Goal: Ability to maintain appropriate glucose levels will improve to within specified parameters  Description: Ability to maintain appropriate glucose levels will improve to within specified parameters  Outcome: Met This Shift  Goal: Ability to maintain appropriate glucose levels will improve  Description: Ability to maintain appropriate glucose levels will improve  9/17/2021 1325 by Moni Carvajal RN  Outcome: Met This Shift     Problem: Skin Integrity - Impaired:  Goal: Will show no infection signs and symptoms  Description: Will show no infection signs and symptoms  9/18/2021 0144 by Hardik Soria RN  Outcome: Met This Shift  9/17/2021 1325 by Moni Carvajal RN  Outcome: Met This Shift

## 2021-09-19 LAB
ALBUMIN SERPL-MCNC: 3 G/DL (ref 3.5–5.2)
ALP BLD-CCNC: 70 U/L (ref 40–129)
ALT SERPL-CCNC: 6 U/L (ref 0–40)
ANION GAP SERPL CALCULATED.3IONS-SCNC: 10 MMOL/L (ref 7–16)
AST SERPL-CCNC: 10 U/L (ref 0–39)
BASOPHILS ABSOLUTE: 0.03 E9/L (ref 0–0.2)
BASOPHILS RELATIVE PERCENT: 0.3 % (ref 0–2)
BILIRUB SERPL-MCNC: 0.3 MG/DL (ref 0–1.2)
BUN BLDV-MCNC: 8 MG/DL (ref 6–20)
CALCIUM SERPL-MCNC: 8.9 MG/DL (ref 8.6–10.2)
CHLORIDE BLD-SCNC: 97 MMOL/L (ref 98–107)
CO2: 25 MMOL/L (ref 22–29)
CREAT SERPL-MCNC: 0.9 MG/DL (ref 0.7–1.2)
EOSINOPHILS ABSOLUTE: 0.19 E9/L (ref 0.05–0.5)
EOSINOPHILS RELATIVE PERCENT: 2 % (ref 0–6)
GFR AFRICAN AMERICAN: >60
GFR NON-AFRICAN AMERICAN: >60 ML/MIN/1.73
GLUCOSE BLD-MCNC: 221 MG/DL (ref 74–99)
GRAM STAIN ORDERABLE: NORMAL
HCT VFR BLD CALC: 28.3 % (ref 37–54)
HEMOGLOBIN: 9.2 G/DL (ref 12.5–16.5)
IMMATURE GRANULOCYTES #: 0.03 E9/L
IMMATURE GRANULOCYTES %: 0.3 % (ref 0–5)
LYMPHOCYTES ABSOLUTE: 1.84 E9/L (ref 1.5–4)
LYMPHOCYTES RELATIVE PERCENT: 19.8 % (ref 20–42)
MAGNESIUM: 2 MG/DL (ref 1.6–2.6)
MCH RBC QN AUTO: 31.5 PG (ref 26–35)
MCHC RBC AUTO-ENTMCNC: 32.5 % (ref 32–34.5)
MCV RBC AUTO: 96.9 FL (ref 80–99.9)
METER GLUCOSE: 131 MG/DL (ref 74–99)
METER GLUCOSE: 182 MG/DL (ref 74–99)
METER GLUCOSE: 196 MG/DL (ref 74–99)
METER GLUCOSE: 214 MG/DL (ref 74–99)
MONOCYTES ABSOLUTE: 0.65 E9/L (ref 0.1–0.95)
MONOCYTES RELATIVE PERCENT: 7 % (ref 2–12)
NEUTROPHILS ABSOLUTE: 6.53 E9/L (ref 1.8–7.3)
NEUTROPHILS RELATIVE PERCENT: 70.6 % (ref 43–80)
PDW BLD-RTO: 12.7 FL (ref 11.5–15)
PLATELET # BLD: 347 E9/L (ref 130–450)
PMV BLD AUTO: 9.9 FL (ref 7–12)
POTASSIUM REFLEX MAGNESIUM: 3.5 MMOL/L (ref 3.5–5)
RBC # BLD: 2.92 E12/L (ref 3.8–5.8)
SODIUM BLD-SCNC: 132 MMOL/L (ref 132–146)
TOTAL PROTEIN: 6.6 G/DL (ref 6.4–8.3)
WBC # BLD: 9.3 E9/L (ref 4.5–11.5)

## 2021-09-19 PROCEDURE — 83735 ASSAY OF MAGNESIUM: CPT

## 2021-09-19 PROCEDURE — 82962 GLUCOSE BLOOD TEST: CPT

## 2021-09-19 PROCEDURE — 99233 SBSQ HOSP IP/OBS HIGH 50: CPT | Performed by: FAMILY MEDICINE

## 2021-09-19 PROCEDURE — 6370000000 HC RX 637 (ALT 250 FOR IP): Performed by: FAMILY MEDICINE

## 2021-09-19 PROCEDURE — 85025 COMPLETE CBC W/AUTO DIFF WBC: CPT

## 2021-09-19 PROCEDURE — 6360000002 HC RX W HCPCS: Performed by: SURGERY

## 2021-09-19 PROCEDURE — 6370000000 HC RX 637 (ALT 250 FOR IP): Performed by: INTERNAL MEDICINE

## 2021-09-19 PROCEDURE — 6360000002 HC RX W HCPCS: Performed by: INTERNAL MEDICINE

## 2021-09-19 PROCEDURE — 36415 COLL VENOUS BLD VENIPUNCTURE: CPT

## 2021-09-19 PROCEDURE — 2500000003 HC RX 250 WO HCPCS: Performed by: SURGERY

## 2021-09-19 PROCEDURE — 1200000000 HC SEMI PRIVATE

## 2021-09-19 PROCEDURE — 2580000003 HC RX 258: Performed by: SURGERY

## 2021-09-19 PROCEDURE — 80053 COMPREHEN METABOLIC PANEL: CPT

## 2021-09-19 PROCEDURE — 99232 SBSQ HOSP IP/OBS MODERATE 35: CPT | Performed by: INTERNAL MEDICINE

## 2021-09-19 RX ORDER — TRIAMCINOLONE ACETONIDE 1 MG/G
CREAM TOPICAL 2 TIMES DAILY
Status: DISCONTINUED | OUTPATIENT
Start: 2021-09-19 | End: 2021-09-20 | Stop reason: HOSPADM

## 2021-09-19 RX ORDER — FENTANYL CITRATE 50 UG/ML
50 INJECTION, SOLUTION INTRAMUSCULAR; INTRAVENOUS
Status: COMPLETED | OUTPATIENT
Start: 2021-09-20 | End: 2021-09-20

## 2021-09-19 RX ORDER — TRIAMCINOLONE ACETONIDE 1 MG/G
CREAM TOPICAL PRN
COMMUNITY
End: 2021-10-27 | Stop reason: SDUPTHER

## 2021-09-19 RX ORDER — INSULIN GLARGINE 100 [IU]/ML
20 INJECTION, SOLUTION SUBCUTANEOUS 2 TIMES DAILY
Status: DISCONTINUED | OUTPATIENT
Start: 2021-09-19 | End: 2021-09-20 | Stop reason: HOSPADM

## 2021-09-19 RX ORDER — HYDROCODONE BITARTRATE AND ACETAMINOPHEN 7.5; 325 MG/1; MG/1
1 TABLET ORAL EVERY 6 HOURS PRN
Status: DISCONTINUED | OUTPATIENT
Start: 2021-09-19 | End: 2021-09-20 | Stop reason: HOSPADM

## 2021-09-19 RX ORDER — DOCUSATE SODIUM 100 MG/1
200 CAPSULE, LIQUID FILLED ORAL DAILY
Status: DISCONTINUED | OUTPATIENT
Start: 2021-09-19 | End: 2021-09-20 | Stop reason: HOSPADM

## 2021-09-19 RX ADMIN — INSULIN LISPRO 20 UNITS: 100 INJECTION, SOLUTION INTRAVENOUS; SUBCUTANEOUS at 07:48

## 2021-09-19 RX ADMIN — Medication 2000 UNITS: at 09:16

## 2021-09-19 RX ADMIN — POLYETHYLENE GLYCOL 3350 17 G: 17 POWDER, FOR SOLUTION ORAL at 09:18

## 2021-09-19 RX ADMIN — METRONIDAZOLE 500 MG: 500 INJECTION, SOLUTION INTRAVENOUS at 17:10

## 2021-09-19 RX ADMIN — WATER 1000 MG: 1 INJECTION INTRAMUSCULAR; INTRAVENOUS; SUBCUTANEOUS at 09:16

## 2021-09-19 RX ADMIN — METRONIDAZOLE 500 MG: 500 INJECTION, SOLUTION INTRAVENOUS at 09:20

## 2021-09-19 RX ADMIN — HYDROMORPHONE HYDROCHLORIDE 0.5 MG: 1 INJECTION, SOLUTION INTRAMUSCULAR; INTRAVENOUS; SUBCUTANEOUS at 06:38

## 2021-09-19 RX ADMIN — INSULIN LISPRO 15 UNITS: 100 INJECTION, SOLUTION INTRAVENOUS; SUBCUTANEOUS at 17:03

## 2021-09-19 RX ADMIN — HYDROMORPHONE HYDROCHLORIDE 0.5 MG: 1 INJECTION, SOLUTION INTRAMUSCULAR; INTRAVENOUS; SUBCUTANEOUS at 00:10

## 2021-09-19 RX ADMIN — INSULIN LISPRO 15 UNITS: 100 INJECTION, SOLUTION INTRAVENOUS; SUBCUTANEOUS at 11:54

## 2021-09-19 RX ADMIN — DOCUSATE SODIUM 200 MG: 100 CAPSULE ORAL at 15:50

## 2021-09-19 RX ADMIN — METRONIDAZOLE 500 MG: 500 INJECTION, SOLUTION INTRAVENOUS at 01:27

## 2021-09-19 RX ADMIN — INSULIN LISPRO 3 UNITS: 100 INJECTION, SOLUTION INTRAVENOUS; SUBCUTANEOUS at 07:50

## 2021-09-19 RX ADMIN — INSULIN GLARGINE 20 UNITS: 100 INJECTION, SOLUTION SUBCUTANEOUS at 21:29

## 2021-09-19 RX ADMIN — HYDROMORPHONE HYDROCHLORIDE 0.5 MG: 1 INJECTION, SOLUTION INTRAMUSCULAR; INTRAVENOUS; SUBCUTANEOUS at 15:44

## 2021-09-19 RX ADMIN — HYDROMORPHONE HYDROCHLORIDE 0.5 MG: 1 INJECTION, SOLUTION INTRAMUSCULAR; INTRAVENOUS; SUBCUTANEOUS at 10:38

## 2021-09-19 RX ADMIN — INSULIN GLARGINE 20 UNITS: 100 INJECTION, SOLUTION SUBCUTANEOUS at 09:20

## 2021-09-19 RX ADMIN — HYDROCODONE BITARTRATE AND ACETAMINOPHEN 1 TABLET: 7.5; 325 TABLET ORAL at 18:43

## 2021-09-19 RX ADMIN — INSULIN LISPRO 3 UNITS: 100 INJECTION, SOLUTION INTRAVENOUS; SUBCUTANEOUS at 17:09

## 2021-09-19 RX ADMIN — ENOXAPARIN SODIUM 40 MG: 40 INJECTION SUBCUTANEOUS at 09:19

## 2021-09-19 RX ADMIN — INSULIN LISPRO 6 UNITS: 100 INJECTION, SOLUTION INTRAVENOUS; SUBCUTANEOUS at 11:51

## 2021-09-19 ASSESSMENT — PAIN DESCRIPTION - PROGRESSION
CLINICAL_PROGRESSION: NOT CHANGED
CLINICAL_PROGRESSION: GRADUALLY WORSENING
CLINICAL_PROGRESSION: GRADUALLY WORSENING
CLINICAL_PROGRESSION: NOT CHANGED

## 2021-09-19 ASSESSMENT — PAIN DESCRIPTION - LOCATION
LOCATION: RECTUM

## 2021-09-19 ASSESSMENT — PAIN DESCRIPTION - ONSET
ONSET: ON-GOING
ONSET: GRADUAL
ONSET: ON-GOING
ONSET: ON-GOING

## 2021-09-19 ASSESSMENT — PAIN SCALES - GENERAL
PAINLEVEL_OUTOF10: 0
PAINLEVEL_OUTOF10: 0
PAINLEVEL_OUTOF10: 9
PAINLEVEL_OUTOF10: 9
PAINLEVEL_OUTOF10: 10
PAINLEVEL_OUTOF10: 8
PAINLEVEL_OUTOF10: 0
PAINLEVEL_OUTOF10: 0
PAINLEVEL_OUTOF10: 9

## 2021-09-19 ASSESSMENT — PAIN DESCRIPTION - PAIN TYPE
TYPE: ACUTE PAIN;SURGICAL PAIN
TYPE: ACUTE PAIN
TYPE: SURGICAL PAIN;ACUTE PAIN
TYPE: ACUTE PAIN;SURGICAL PAIN
TYPE: ACUTE PAIN
TYPE: ACUTE PAIN

## 2021-09-19 ASSESSMENT — PAIN DESCRIPTION - ORIENTATION
ORIENTATION: LEFT

## 2021-09-19 ASSESSMENT — PAIN DESCRIPTION - FREQUENCY
FREQUENCY: CONTINUOUS
FREQUENCY: INTERMITTENT
FREQUENCY: INTERMITTENT
FREQUENCY: CONTINUOUS

## 2021-09-19 ASSESSMENT — PAIN - FUNCTIONAL ASSESSMENT
PAIN_FUNCTIONAL_ASSESSMENT: PREVENTS OR INTERFERES SOME ACTIVE ACTIVITIES AND ADLS

## 2021-09-19 ASSESSMENT — PAIN DESCRIPTION - DESCRIPTORS
DESCRIPTORS: ACHING;DISCOMFORT
DESCRIPTORS: ACHING;BURNING;SORE
DESCRIPTORS: ACHING;DISCOMFORT;SORE
DESCRIPTORS: DISCOMFORT;CRAMPING;BURNING
DESCRIPTORS: ACHING;DISCOMFORT
DESCRIPTORS: ACHING;DISCOMFORT;JABBING

## 2021-09-19 NOTE — PROGRESS NOTES
[facility] Progress Note    Admitting Date and Time: 9/14/2021 10:20 PM  Admit Dx: Dehydration [E86.0]  RONA (acute kidney injury) ([facility] [N17.9]  DKA, type 1, not at goal [facility]) [E10.10]  Hyperosmolar hyperglycemic state (HHS) ([facility] [E11.00, E11.65]    Subjective:  Patient is being followed for Dehydration [E86.0]  RONA (acute kidney injury) ([facility] [N17.9]  DKA, type 1, not at goal [facility]) [E10.10]  Hyperosmolar hyperglycemic state (HHS) ([facility] [E11.00, E11.65]     Pt not had a bowel movement yet. Aware that packing is going to be changed tomorrow. Transitioning to oral pain med. Per RN: Is on MiraLAX. Colace added. Oral pain pill added. To DC IV pain meds after receives now. ROS: denies fever, chills, cp, sob, n/v, HA unless stated above.       insulin glargine  20 Units SubCUTAneous BID    insulin lispro  15 Units SubCUTAneous TID WC    docusate sodium  200 mg Oral Daily    cefTRIAXone (ROCEPHIN) IV  1,000 mg IntraVENous Q24H    metroNIDAZOLE  500 mg IntraVENous Q8H    insulin lispro  0-18 Units SubCUTAneous TID WC    insulin lispro  0-9 Units SubCUTAneous Nightly    Vitamin D  2,000 Units Oral Daily    enoxaparin  40 mg SubCUTAneous Daily     HYDROcodone-acetaminophen, 1 tablet, Q6H PRN  HYDROmorphone, 0.5 mg, Q3H PRN  glucose, 15 g, PRN  glucagon (rDNA), 1 mg, PRN  dextrose, 100 mL/hr, PRN  dextrose, 12.5 g, PRN  polyethylene glycol, 17 g, Daily PRN         Objective:    /74   Pulse 94   Temp 98.7 °F (37.1 °C) (Oral)   Resp 16   Ht 5' 10\" (1.778 m)   Wt 226 lb (102.5 kg)   SpO2 100%   BMI 32.43 kg/m²     General Appearance: alert and oriented to person, place and time and in no acute distress  Skin: warm and dry  Head: normocephalic and atraumatic  Eyes: pupils equal, round, and reactive to light, extraocular eye movements intact, conjunctivae normal  Neck: neck supple and non tender without mass   Pulmonary/Chest: clear to auscultation bilaterally- no wheezes, rales or rhonchi, normal air movement, no respiratory distress  Cardiovascular: normal rate, normal S1 and S2 and no carotid bruits  Abdomen: soft, non-tender, non-distended, normal bowel sounds, no masses or organomegaly  Extremities: no cyanosis, no clubbing and no edema  Neurologic: no cranial nerve deficit and speech normal        Recent Labs     09/17/21  0552 09/18/21  0501 09/19/21  0245    135 132   K 3.3* 3.7 3.5   CL 97* 100 97*   CO2 23 25 25   BUN 10 9 8   CREATININE 0.9 0.9 0.9   GLUCOSE 215* 132* 221*   CALCIUM 9.4 9.6 8.9       Recent Labs     09/17/21  0552 09/18/21  0622 09/19/21  0245   WBC 9.0 11.1 9.3   RBC 3.15* 3.04* 2.92*   HGB 10.1* 9.6* 9.2*   HCT 29.7* 28.9* 28.3*   MCV 94.3 95.1 96.9   MCH 32.1 31.6 31.5   MCHC 34.0 33.2 32.5   RDW 12.7 12.6 12.7    341 347   MPV 10.6 10.2 9.9       Radiology: XR FOOT LEFT (MIN 3 VIEWS)    Result Date: 9/17/2021  EXAMINATION: THREE XRAY VIEWS OF THE LEFT FOOT 9/17/2021 4:25 pm COMPARISON: None. HISTORY: ORDERING SYSTEM PROVIDED HISTORY: numbness TECHNOLOGIST PROVIDED HISTORY: Portable ok Reason for exam:->numbness FINDINGS: There is no evidence of fracture, malalignment, or other acute osseous abnormality. No acute osseous abnormality. XR FOOT RIGHT (MIN 3 VIEWS)    Result Date: 9/17/2021  EXAMINATION: THREE XRAY VIEWS OF THE RIGHT FOOT 9/17/2021 4:25 pm COMPARISON: None. HISTORY: ORDERING SYSTEM PROVIDED HISTORY: numbness TECHNOLOGIST PROVIDED HISTORY: Portable ok Reason for exam:->numbness FINDINGS: There is no evidence of fracture, malalignment, or other acute osseous abnormality. No acute osseous abnormality. Assessment:    Active Problems:    Hyperosmolar hyperglycemic state (HHS) ([facility]    DKA, type 1, not at goal [facility])  Left buttock abscess  Paresthesias of the feet    Resolved Problems:    * No resolved hospital problems. *      Plan:  1. Left buttock abscess - Continue dressing changes as per General Surgery.   Packing to be removed tomorrow. MiraLAX and Colace. Transition from IV pain meds to oral.  May need home health to assist with dressing changes. 2.  DM - blood sugars at goal.  Continue current insulin basal and mealtime and sliding scale. Patient will benefit from Continuous Glucose Monitoring as outpatient. In past has not been compliant with fingersticks. 3.  Paresthesias of feet - Podiatry consult appreciated. Xrays of both feet negative. NOTE: This report was transcribed using voice recognition software. Every effort was made to ensure accuracy; however, inadvertent computerized transcription errors may be present.     Electronically signed by Bry Richardson MD on 9/19/2021 at 3:42 PM

## 2021-09-19 NOTE — PLAN OF CARE
Problem: Discharge Planning:  Goal: Participates in care planning  Description: Participates in care planning  9/18/2021 2217 by Jean Fernandez RN  Outcome: Ongoing  9/18/2021 1425 by Baldomero Luevano RN  Outcome: Met This Shift     Problem:  Activity Intolerance:  Goal: Ability to tolerate increased activity will improve  Description: Ability to tolerate increased activity will improve  9/18/2021 2217 by Jean Fernandez RN  Outcome: Ongoing  9/18/2021 1425 by Baldomero Luevano RN  Outcome: Met This Shift     Problem: Anxiety/Stress:  Goal: Level of anxiety will decrease  Description: Level of anxiety will decrease  9/18/2021 2217 by Jean Fernandez RN  Outcome: Ongoing  9/18/2021 1425 by Baldomero Luevano RN  Outcome: Met This Shift

## 2021-09-19 NOTE — PROGRESS NOTES
Patient is progressively improveing with ACHS checks. He is doing own glucometer tests, drawing up insulin and administering. Patient is very motivated.

## 2021-09-19 NOTE — PROGRESS NOTES
ENDOCRINOLOGY PROGRESS NOTE      Date of admission: 9/14/2021  Date of service: 9/19/2021  Admitting physician: Brown Sanchez MD   Primary Care Physician: No primary care provider on file. Consultant physician: Robinson Simmons MD     Reason for the consultation:  Uncontrolled DM    History of Present Illness: The history is provided by the patient. Accuracy of the patient data is excellent    Oliverio Nova is a very pleasant 38 y.o. old male with PMH of poorly controlled diabetes mellitus/noncompliance and multiple admissions for DKA  admitted to [facility] on 9/14/2021 because of generalized fatigue/lightheadedness with the patient was found to be in DKA, endocrine service was consulted for diabetes management. Subjective   Seen and examined, BG improving      Inpatient diet:   Carb Restricted diet     Point of care glucose monitoring   (Independently reviewed)   Recent Labs     09/17/21  1614 09/17/21  1804 09/17/21  2126 09/18/21  0635 09/18/21  1123 09/18/21  1703 09/18/21  2024 09/19/21  0642   GLUMET 103* 235* 193* 153* 165* 121* 173* 196*     Scheduled Meds:   insulin glargine  20 Units SubCUTAneous BID    insulin lispro  15 Units SubCUTAneous TID WC    cefTRIAXone (ROCEPHIN) IV  1,000 mg IntraVENous Q24H    metroNIDAZOLE  500 mg IntraVENous Q8H    insulin lispro  0-18 Units SubCUTAneous TID WC    insulin lispro  0-9 Units SubCUTAneous Nightly    Vitamin D  2,000 Units Oral Daily    enoxaparin  40 mg SubCUTAneous Daily     PRN Meds:   HYDROmorphone, 0.5 mg, Q3H PRN  glucose, 15 g, PRN  glucagon (rDNA), 1 mg, PRN  dextrose, 100 mL/hr, PRN  dextrose, 12.5 g, PRN  polyethylene glycol, 17 g, Daily PRN      Continuous Infusions:   dextrose         Review of Systems  All systems reviewed.  All negative except for symptoms mentioned in HPI     OBJECTIVE    /74   Pulse 94   Temp 98.7 °F (37.1 °C) (Oral)   Resp 16   Ht 5' 10\" (1.778 m)   Wt 226 lb (102.5 kg)   SpO2 100%   BMI 32.43 kg/m² Intake/Output Summary (Last 24 hours) at 9/19/2021 0907  Last data filed at 9/19/2021 4731  Gross per 24 hour   Intake 1230 ml   Output 1200 ml   Net 30 ml       Physical examination:  General: awake alert, oriented x3  HEENT: normocephalic non traumatic, no exophthalmos   Neck: supple, No thyroid tenderness,  Pulm: good equal air entry no added sounds  CVS: S1 + S2  Abd: soft lax, no tenderness  Skin: warm, no lesions, no rash.  No open wounds, no ulcers   Neuro: CN intact, sensation decreased bilateral , muscle power normal  Psych: normal mood, and affect    Review of Laboratory Data:  I personally reviewed the following labs:   Recent Labs     09/17/21  0552 09/18/21  0622 09/19/21  0245   WBC 9.0 11.1 9.3   RBC 3.15* 3.04* 2.92*   HGB 10.1* 9.6* 9.2*   HCT 29.7* 28.9* 28.3*   MCV 94.3 95.1 96.9   MCH 32.1 31.6 31.5   MCHC 34.0 33.2 32.5   RDW 12.7 12.6 12.7    341 347   MPV 10.6 10.2 9.9     Recent Labs     09/17/21  0552 09/18/21  0501 09/19/21  0245    135 132   K 3.3* 3.7 3.5   CL 97* 100 97*   CO2 23 25 25   BUN 10 9 8   CREATININE 0.9 0.9 0.9   GLUCOSE 215* 132* 221*   CALCIUM 9.4 9.6 8.9   PROT 7.0 7.0 6.6   LABALBU 3.2* 3.1* 3.0*   BILITOT 0.3 0.3 0.3   ALKPHOS 78 72 70   AST 10 10 10   ALT 6 5 6     Beta-Hydroxybutyrate   Date Value Ref Range Status   09/14/2021 >4.50 (H) 0.02 - 0.27 mmol/L Final   07/28/2021 1.16 (H) 0.02 - 0.27 mmol/L Final     Lab Results   Component Value Date    LABA1C 15.2 07/29/2021    LABA1C 6.8 12/24/2020    LABA1C 6.5 05/05/2015     Lab Results   Component Value Date/Time    TSH 1.240 09/17/2021 05:52 AM     Lab Results   Component Value Date    LABA1C 15.2 07/29/2021    GLUCOSE 221 09/19/2021    LABCREA 86 12/24/2020     Lab Results   Component Value Date    TRIG 635 09/17/2021    HDL 25 09/17/2021    LDLCALC - 09/17/2021    CHOL 243 09/17/2021       Blood culture   No results found for: [facility]    Radiology:  XR FOOT RIGHT (MIN 3 VIEWS)   Final Result   No acute

## 2021-09-19 NOTE — PROGRESS NOTES
Progress note: The patient is afebrile with a slight hypotension. The patient states that his pain is improved. On examination:    The dressing is intact with some discharge. White blood cell count is 9.3    Assessment:    Status post incision and drainage of a left buttock abscess. Plan:    Remove packing tomorrow morning.   Continue Astrid Blanc MD

## 2021-09-19 NOTE — PLAN OF CARE
Problem: Discharge Planning:  Goal: Participates in care planning  Description: Participates in care planning  Outcome: Met This Shift     Problem:  Activity Intolerance:  Goal: Ability to tolerate increased activity will improve  Description: Ability to tolerate increased activity will improve  Outcome: Met This Shift     Problem: Fluid Volume - Deficit:  Goal: Absence of fluid volume deficit signs and symptoms  Description: Absence of fluid volume deficit signs and symptoms  Outcome: Met This Shift     Problem: Nutrition Deficit:  Goal: Ability to achieve adequate nutritional intake will improve  Description: Ability to achieve adequate nutritional intake will improve  Outcome: Met This Shift

## 2021-09-19 NOTE — PROGRESS NOTES
ENDOCRINOLOGY PROGRESS NOTE      Date of admission: 9/14/2021  Date of service: 9/18/2021  Admitting physician: Ezra Garcia MD   Primary Care Physician: No primary care provider on file. Consultant physician: Adin Pendleton MD     Reason for the consultation:  Uncontrolled DM    History of Present Illness: The history is provided by the patient. Accuracy of the patient data is excellent    Bhumika Mckenzie is a very pleasant 38 y.o. old male with PMH of poorly controlled diabetes mellitus/noncompliance and multiple admissions for DKA  admitted to [address] [place] [address] on 9/14/2021 because of generalized fatigue/lightheadedness with the patient was found to be in DKA, endocrine service was consulted for diabetes management. Subjective   Seen and examined, no acute issues overnight     Inpatient diet:   Carb Restricted diet     Point of care glucose monitoring   (Independently reviewed)   Recent Labs     09/17/21  1043 09/17/21  1614 09/17/21  1804 09/17/21  2126 09/18/21  0635 09/18/21  1123 09/18/21  1703 09/18/21 2024   GLUMET 154* 103* 235* 193* 153* 165* 121* 173*     Scheduled Meds:   cefTRIAXone (ROCEPHIN) IV  1,000 mg IntraVENous Q24H    metroNIDAZOLE  500 mg IntraVENous Q8H    insulin glargine  30 Units SubCUTAneous BID    insulin lispro  20 Units SubCUTAneous TID WC    insulin lispro  0-18 Units SubCUTAneous TID WC    insulin lispro  0-9 Units SubCUTAneous Nightly    Vitamin D  2,000 Units Oral Daily    enoxaparin  40 mg SubCUTAneous Daily     PRN Meds:   HYDROmorphone, 0.5 mg, Q3H PRN  glucose, 15 g, PRN  glucagon (rDNA), 1 mg, PRN  dextrose, 100 mL/hr, PRN  dextrose, 12.5 g, PRN  polyethylene glycol, 17 g, Daily PRN      Continuous Infusions:   dextrose         Review of Systems  All systems reviewed.  All negative except for symptoms mentioned in HPI     OBJECTIVE    BP (!) 102/59   Pulse 84   Temp 98.2 °F (36.8 °C) (Oral)   Resp 16   Ht 5' 10\" (1.778 m)   Wt 226 lb (102.5 kg)   SpO2 98%   BMI 32.43 kg/m²     Intake/Output Summary (Last 24 hours) at 9/18/2021 2203  Last data filed at 9/18/2021 2036  Gross per 24 hour   Intake 650 ml   Output 850 ml   Net -200 ml       Physical examination:  General: awake alert, oriented x3  HEENT: normocephalic non traumatic, no exophthalmos   Neck: supple, No thyroid tenderness,  Pulm: good equal air entry no added sounds  CVS: S1 + S2  Abd: soft lax, no tenderness  Skin: warm, no lesions, no rash.  No open wounds, no ulcers   Neuro: CN intact, sensation decreased bilateral , muscle power normal  Psych: normal mood, and affect    Review of Laboratory Data:  I personally reviewed the following labs:   Recent Labs     09/16/21  0800 09/17/21  0552 09/18/21  0622   WBC 10.6 9.0 11.1   RBC 3.19* 3.15* 3.04*   HGB 10.2* 10.1* 9.6*   HCT 30.1* 29.7* 28.9*   MCV 94.4 94.3 95.1   MCH 32.0 32.1 31.6   MCHC 33.9 34.0 33.2   RDW 12.7 12.7 12.6    309 341   MPV 10.7 10.6 10.2     Recent Labs     09/16/21  0105 09/17/21  0552 09/18/21  0501   * 133 135   K 4.3 3.3* 3.7   CL 92* 97* 100   CO2 18* 23 25   BUN 19 10 9   CREATININE 0.9 0.9 0.9   GLUCOSE 488* 215* 132*   CALCIUM 9.1 9.4 9.6   PROT 7.2 7.0 7.0   LABALBU 3.2* 3.2* 3.1*   BILITOT <0.2 0.3 0.3   ALKPHOS 80 78 72   AST 15 10 10   ALT 8 6 5     Beta-Hydroxybutyrate   Date Value Ref Range Status   09/14/2021 >4.50 (H) 0.02 - 0.27 mmol/L Final   07/28/2021 1.16 (H) 0.02 - 0.27 mmol/L Final     Lab Results   Component Value Date    LABA1C 15.2 07/29/2021    LABA1C 6.8 12/24/2020    LABA1C 6.5 05/05/2015     Lab Results   Component Value Date/Time    TSH 1.240 09/17/2021 05:52 AM     Lab Results   Component Value Date    LABA1C 15.2 07/29/2021    GLUCOSE 132 09/18/2021    LABCREA 86 12/24/2020     Lab Results   Component Value Date    TRIG 635 09/17/2021    HDL 25 09/17/2021    LDLCALC - 09/17/2021    CHOL 243 09/17/2021       Blood culture   No results found for: [facility]    Radiology:  XR FOOT RIGHT (MIN 3 VIEWS)   Final Result No acute osseous abnormality. XR FOOT LEFT (MIN 3 VIEWS)   Final Result   No acute osseous abnormality. XR CHEST PORTABLE   Final Result   No acute abnormality identified. Medical Records/Labs/Images review:   I personally reviewed and summarized previous records   All labs and imaging were reviewed independently     Jennifer Mallory, a 38 y.o.-old male seen today for inpatient diabetes management     Diabetes Mellitus   · Patient's diabetes is uncontrolled, A1c 15.2%  · will change diabetes regimen to:  · Lantus 30 U BID  · Humalog 20 U with meals   · High dose sliding scale   · Continue glucose check with meals and at bedtime   · Will titrate insulin dose based on the blood glucose trend & insulin requirement  · Pt will follow with us after discharge. Endocrine follow up visit, Tuesday 9/28 at 1:30PM    RONA   · Improved with hydration     The above issues were reviewed with the patient who understood and agreed with the plan. Thank you for allowing us to participate in the care of this patient. Please do not hesitate to contact us with any additional questions. Leah Viveros MD  Endocrinologist, [facility] Diabetes Care and Endocrinology   [address] [place] [address]   Phone: [phone]  Fax: [phone]  --------------------------------------------  An electronic signature was used to authenticate this note.  Nasra Pedro MD on 9/18/2021 at 10:03 PM

## 2021-09-20 VITALS
TEMPERATURE: 98.6 F | DIASTOLIC BLOOD PRESSURE: 74 MMHG | WEIGHT: 226 LBS | HEART RATE: 80 BPM | OXYGEN SATURATION: 97 % | SYSTOLIC BLOOD PRESSURE: 127 MMHG | HEIGHT: 70 IN | RESPIRATION RATE: 18 BRPM | BODY MASS INDEX: 32.35 KG/M2

## 2021-09-20 LAB
ANAEROBIC CULTURE: NORMAL
BASOPHILS ABSOLUTE: 0.03 E9/L (ref 0–0.2)
BASOPHILS RELATIVE PERCENT: 0.4 % (ref 0–2)
EOSINOPHILS ABSOLUTE: 0.33 E9/L (ref 0.05–0.5)
EOSINOPHILS RELATIVE PERCENT: 4.6 % (ref 0–6)
HCT VFR BLD CALC: 26.2 % (ref 37–54)
HEMOGLOBIN: 8.6 G/DL (ref 12.5–16.5)
IMMATURE GRANULOCYTES #: 0.02 E9/L
IMMATURE GRANULOCYTES %: 0.3 % (ref 0–5)
LYMPHOCYTES ABSOLUTE: 2.16 E9/L (ref 1.5–4)
LYMPHOCYTES RELATIVE PERCENT: 30.3 % (ref 20–42)
MCH RBC QN AUTO: 31.7 PG (ref 26–35)
MCHC RBC AUTO-ENTMCNC: 32.8 % (ref 32–34.5)
MCV RBC AUTO: 96.7 FL (ref 80–99.9)
METER GLUCOSE: 139 MG/DL (ref 74–99)
METER GLUCOSE: 158 MG/DL (ref 74–99)
METER GLUCOSE: 174 MG/DL (ref 74–99)
MONOCYTES ABSOLUTE: 0.76 E9/L (ref 0.1–0.95)
MONOCYTES RELATIVE PERCENT: 10.7 % (ref 2–12)
NEUTROPHILS ABSOLUTE: 3.83 E9/L (ref 1.8–7.3)
NEUTROPHILS RELATIVE PERCENT: 53.7 % (ref 43–80)
PDW BLD-RTO: 12.6 FL (ref 11.5–15)
PLATELET # BLD: 355 E9/L (ref 130–450)
PMV BLD AUTO: 9.5 FL (ref 7–12)
RBC # BLD: 2.71 E12/L (ref 3.8–5.8)
WBC # BLD: 7.1 E9/L (ref 4.5–11.5)

## 2021-09-20 PROCEDURE — 36415 COLL VENOUS BLD VENIPUNCTURE: CPT

## 2021-09-20 PROCEDURE — 2500000003 HC RX 250 WO HCPCS: Performed by: SURGERY

## 2021-09-20 PROCEDURE — 6360000002 HC RX W HCPCS: Performed by: INTERNAL MEDICINE

## 2021-09-20 PROCEDURE — 85025 COMPLETE CBC W/AUTO DIFF WBC: CPT

## 2021-09-20 PROCEDURE — 99239 HOSP IP/OBS DSCHRG MGMT >30: CPT | Performed by: FAMILY MEDICINE

## 2021-09-20 PROCEDURE — 6370000000 HC RX 637 (ALT 250 FOR IP): Performed by: INTERNAL MEDICINE

## 2021-09-20 PROCEDURE — 6360000002 HC RX W HCPCS: Performed by: SURGERY

## 2021-09-20 PROCEDURE — 6370000000 HC RX 637 (ALT 250 FOR IP): Performed by: STUDENT IN AN ORGANIZED HEALTH CARE EDUCATION/TRAINING PROGRAM

## 2021-09-20 PROCEDURE — 6370000000 HC RX 637 (ALT 250 FOR IP): Performed by: FAMILY MEDICINE

## 2021-09-20 PROCEDURE — 82962 GLUCOSE BLOOD TEST: CPT

## 2021-09-20 RX ORDER — LANCETS 30 GAUGE
EACH MISCELLANEOUS
Qty: 300 EACH | Refills: 3 | Status: ON HOLD | OUTPATIENT
Start: 2021-09-20 | End: 2022-04-05

## 2021-09-20 RX ORDER — HYDROCODONE BITARTRATE AND ACETAMINOPHEN 7.5; 325 MG/1; MG/1
1 TABLET ORAL EVERY 6 HOURS PRN
Qty: 8 TABLET | Refills: 0 | Status: SHIPPED | OUTPATIENT
Start: 2021-09-20 | End: 2021-09-22

## 2021-09-20 RX ORDER — INSULIN LISPRO 100 [IU]/ML
INJECTION, SOLUTION INTRAVENOUS; SUBCUTANEOUS
Qty: 15 PEN | Refills: 3 | Status: SHIPPED | OUTPATIENT
Start: 2021-09-20

## 2021-09-20 RX ORDER — DOCUSATE SODIUM 100 MG/1
200 CAPSULE, LIQUID FILLED ORAL DAILY
Qty: 30 CAPSULE | Refills: 0 | Status: SHIPPED | OUTPATIENT
Start: 2021-09-21 | End: 2021-10-04 | Stop reason: ALTCHOICE

## 2021-09-20 RX ORDER — METRONIDAZOLE 500 MG/1
500 TABLET ORAL EVERY 8 HOURS SCHEDULED
Status: DISCONTINUED | OUTPATIENT
Start: 2021-09-20 | End: 2021-09-20 | Stop reason: HOSPADM

## 2021-09-20 RX ORDER — INSULIN GLARGINE 100 [IU]/ML
20 INJECTION, SOLUTION SUBCUTANEOUS 2 TIMES DAILY
Qty: 10 PEN | Refills: 3 | Status: SHIPPED | OUTPATIENT
Start: 2021-09-20 | End: 2021-12-01 | Stop reason: SDUPTHER

## 2021-09-20 RX ORDER — CEFDINIR 300 MG/1
300 CAPSULE ORAL EVERY 12 HOURS SCHEDULED
Status: DISCONTINUED | OUTPATIENT
Start: 2021-09-20 | End: 2021-09-20 | Stop reason: HOSPADM

## 2021-09-20 RX ORDER — PEN NEEDLE, DIABETIC 32 GX 1/4"
NEEDLE, DISPOSABLE MISCELLANEOUS
Qty: 250 EACH | Refills: 5 | Status: ON HOLD | OUTPATIENT
Start: 2021-09-20 | End: 2022-04-05

## 2021-09-20 RX ORDER — INSULIN GLARGINE 100 [IU]/ML
30 INJECTION, SOLUTION SUBCUTANEOUS 2 TIMES DAILY
Qty: 10 ML | Refills: 0 | Status: SHIPPED | OUTPATIENT
Start: 2021-09-20 | End: 2021-09-20 | Stop reason: HOSPADM

## 2021-09-20 RX ORDER — METRONIDAZOLE 500 MG/1
500 TABLET ORAL EVERY 8 HOURS SCHEDULED
Qty: 21 TABLET | Refills: 0 | Status: SHIPPED | OUTPATIENT
Start: 2021-09-20 | End: 2021-09-27

## 2021-09-20 RX ORDER — GREEN TEA/HOODIA GORDONII 315-12.5MG
1 CAPSULE ORAL DAILY
Qty: 30 TABLET | Refills: 0 | Status: SHIPPED | OUTPATIENT
Start: 2021-09-20 | End: 2021-10-04

## 2021-09-20 RX ORDER — CEFDINIR 300 MG/1
300 CAPSULE ORAL EVERY 12 HOURS SCHEDULED
Qty: 14 CAPSULE | Refills: 0 | Status: SHIPPED | OUTPATIENT
Start: 2021-09-20 | End: 2021-09-27

## 2021-09-20 RX ADMIN — HYDROCODONE BITARTRATE AND ACETAMINOPHEN 1 TABLET: 7.5; 325 TABLET ORAL at 14:54

## 2021-09-20 RX ADMIN — Medication 2000 UNITS: at 07:47

## 2021-09-20 RX ADMIN — INSULIN GLARGINE 20 UNITS: 100 INJECTION, SOLUTION SUBCUTANEOUS at 07:46

## 2021-09-20 RX ADMIN — HYDROCODONE BITARTRATE AND ACETAMINOPHEN 1 TABLET: 7.5; 325 TABLET ORAL at 01:28

## 2021-09-20 RX ADMIN — ENOXAPARIN SODIUM 40 MG: 40 INJECTION SUBCUTANEOUS at 07:47

## 2021-09-20 RX ADMIN — INSULIN LISPRO 3 UNITS: 100 INJECTION, SOLUTION INTRAVENOUS; SUBCUTANEOUS at 11:35

## 2021-09-20 RX ADMIN — FENTANYL CITRATE 50 MCG: 50 INJECTION, SOLUTION INTRAMUSCULAR; INTRAVENOUS at 05:16

## 2021-09-20 RX ADMIN — CEFDINIR 300 MG: 300 CAPSULE ORAL at 08:39

## 2021-09-20 RX ADMIN — INSULIN LISPRO 3 UNITS: 100 INJECTION, SOLUTION INTRAVENOUS; SUBCUTANEOUS at 07:48

## 2021-09-20 RX ADMIN — INSULIN LISPRO 15 UNITS: 100 INJECTION, SOLUTION INTRAVENOUS; SUBCUTANEOUS at 11:37

## 2021-09-20 RX ADMIN — INSULIN LISPRO 15 UNITS: 100 INJECTION, SOLUTION INTRAVENOUS; SUBCUTANEOUS at 07:46

## 2021-09-20 RX ADMIN — DOCUSATE SODIUM 200 MG: 100 CAPSULE ORAL at 07:47

## 2021-09-20 RX ADMIN — METRONIDAZOLE 500 MG: 500 TABLET ORAL at 08:39

## 2021-09-20 RX ADMIN — TRIAMCINOLONE ACETONIDE: 1 CREAM TOPICAL at 07:47

## 2021-09-20 RX ADMIN — METRONIDAZOLE 500 MG: 500 TABLET ORAL at 14:54

## 2021-09-20 RX ADMIN — HYDROCODONE BITARTRATE AND ACETAMINOPHEN 1 TABLET: 7.5; 325 TABLET ORAL at 07:47

## 2021-09-20 RX ADMIN — METRONIDAZOLE 500 MG: 500 INJECTION, SOLUTION INTRAVENOUS at 01:29

## 2021-09-20 ASSESSMENT — PAIN DESCRIPTION - ORIENTATION
ORIENTATION: LEFT

## 2021-09-20 ASSESSMENT — PAIN - FUNCTIONAL ASSESSMENT
PAIN_FUNCTIONAL_ASSESSMENT: PREVENTS OR INTERFERES SOME ACTIVE ACTIVITIES AND ADLS

## 2021-09-20 ASSESSMENT — PAIN DESCRIPTION - LOCATION
LOCATION: RECTUM
LOCATION: BUTTOCKS
LOCATION: RECTUM
LOCATION: BUTTOCKS

## 2021-09-20 ASSESSMENT — PAIN DESCRIPTION - ONSET
ONSET: GRADUAL
ONSET: GRADUAL
ONSET: ON-GOING
ONSET: ON-GOING

## 2021-09-20 ASSESSMENT — PAIN SCALES - GENERAL
PAINLEVEL_OUTOF10: 9
PAINLEVEL_OUTOF10: 5
PAINLEVEL_OUTOF10: 0
PAINLEVEL_OUTOF10: 5
PAINLEVEL_OUTOF10: 0
PAINLEVEL_OUTOF10: 7
PAINLEVEL_OUTOF10: 7
PAINLEVEL_OUTOF10: 0

## 2021-09-20 ASSESSMENT — PAIN DESCRIPTION - DESCRIPTORS
DESCRIPTORS: DISCOMFORT;SORE;TENDER;THROBBING
DESCRIPTORS: ACHING;DISCOMFORT
DESCRIPTORS: ACHING;DISCOMFORT
DESCRIPTORS: DISCOMFORT;SORE;TENDER;THROBBING

## 2021-09-20 ASSESSMENT — PAIN DESCRIPTION - FREQUENCY
FREQUENCY: INTERMITTENT
FREQUENCY: CONTINUOUS
FREQUENCY: CONTINUOUS
FREQUENCY: INTERMITTENT

## 2021-09-20 ASSESSMENT — PAIN DESCRIPTION - PROGRESSION
CLINICAL_PROGRESSION: GRADUALLY WORSENING

## 2021-09-20 ASSESSMENT — PAIN DESCRIPTION - PAIN TYPE
TYPE: ACUTE PAIN;SURGICAL PAIN

## 2021-09-20 NOTE — PROGRESS NOTES
Patient states he will have someone to help with his daily wound packing changes on the opposite days that [facility] will be available to come. He was given supplies and educated on the proper way to remove and pack his wound. Patient asked what he would be given for pain and I explained that he was ordered Norco Q6 prn. No further questions or concerns at this time. Will continue to monitor.

## 2021-09-20 NOTE — DISCHARGE SUMMARY
[facility] Physician Discharge Summary       Manny Doran MD  [address] [facility] [address] [facility]  [phone]    On 9/28/2021  Endocrine follow up visit, Tuesday 9/28 at 1:30PM    Logan Cronin MD  [address] [place] [address]  [phone]    Schedule an appointment as soon as possible for a visit in 2 weeks  post-op follow-up    [address] [place] [address]  [phone]            Activity level: As tolerated     Dispo: Home    Condition on discharge: Stable     Patient ID:  Jannet Aragon  [id number]  23 y.o.  1985    Admit date: 9/14/2021    Discharge date and time:  9/20/2021  12:59 PM    Admission Diagnoses: Active Problems:    Hyperosmolar hyperglycemic state (HHS) ([facility]    DKA, type 1, not at goal [facility])  Resolved Problems:    * No resolved hospital problems. *      Discharge Diagnoses: Active Problems:    Hyperosmolar hyperglycemic state (HHS) ([address]    DKA, type 1, not at goal [facility])  Resolved Problems:    * No resolved hospital problems. *      Consults:  IP CONSULT TO CRITICAL CARE  IP CONSULT TO ENDOCRINOLOGY  IP CONSULT TO PODIATRY  IP CONSULT TO GENERAL SURGERY  IP CONSULT TO HOME CARE NEEDS  IP CONSULT TO DIABETES EDUCATOR  IP CONSULT TO DIETITIAN    Procedures: 9/18/2021-I&D of a large perianal abscess    Hospital Course:   Patient Jannet Aragon is a 38 y.o. presented with Dehydration [E86.0]  RONA (acute kidney injury) ([facility] [N17.9]  DKA, type 1, not at goal [facility]) [E10.10]  Hyperosmolar hyperglycemic state (HHS) ([address] [E11.00, E11.65]  Patient was admitted to the ICU for treatment of hyperosmolar hyperglycemic state. He was seen by the intensivist.  He responded well to the insulin drip, IV hydration and electrolyte repletion. He transferred to the floor and had adjustment of his insulin regimen, diabetic teaching, Endocrinology consult and dietician consult.   He was found with a left buttock/perineal MCH 31.6 31.5 31.7   MCHC 33.2 32.5 32.8   RDW 12.6 12.7 12.6    347 355   MPV 10.2 9.9 9.5       No results for input(s): POCGLU in the last 72 hours. Imaging:  XR CHEST PORTABLE    Result Date: 9/15/2021  EXAMINATION: ONE XRAY VIEW OF THE CHEST 9/15/2021 2:30 am COMPARISON: 07/28/2021 HISTORY: ORDERING SYSTEM PROVIDED HISTORY: SOB TECHNOLOGIST PROVIDED HISTORY: Reason for exam:->SOB FINDINGS: There is no cardiomegaly or vascular congestion. There is no infiltrate, pleural effusion or pneumothorax. Lungs are clear. No acute abnormality identified. Patient Instructions:      Medication List      START taking these medications    cefdinir 300 MG capsule  Commonly known as: OMNICEF  Take 1 capsule by mouth every 12 hours for 14 doses     docusate sodium 100 MG capsule  Commonly known as: COLACE  Take 2 capsules by mouth daily  Start taking on: September 21, 2021     HYDROcodone-acetaminophen 7.5-325 MG per tablet  Commonly known as: NORCO  Take 1 tablet by mouth every 6 hours as needed for Pain for up to 2 days. insulin glargine 100 UNIT/ML injection vial  Commonly known as: LANTUS  Inject 30 Units into the skin 2 times daily  Replaces: Lantus SoloStar 100 UNIT/ML injection pen     * insulin lispro 100 UNIT/ML injection vial  Commonly known as: HUMALOG  Replaces: insulin lispro (1 Unit Dial) 100 UNIT/ML Sopn     * insulin lispro 100 UNIT/ML injection vial  Commonly known as: HUMALOG  Inject 20 Units into the skin 3 times daily (with meals)     metroNIDAZOLE 500 MG tablet  Commonly known as: FLAGYL  Take 1 tablet by mouth every 8 hours for 21 doses     Probiotic Acidophilus Tabs  Take 1 tablet by mouth daily     vitamin D 50 MCG (2000 UT) Tabs tablet  Commonly known as: CHOLECALCIFEROL  Take 1 tablet by mouth daily         * This list has 2 medication(s) that are the same as other medications prescribed for you.  Read the directions carefully, and ask your doctor or other care provider to review them with you. CONTINUE taking these medications    blood glucose test strips  Test **4* times a day & as needed for symptoms of irregular blood glucose. Dispense sufficient amount for indicated testing frequency plus additional to accommodate PRN testing needs. clobetasol 0.05 % ointment  Commonly known as: TEMOVATE  Apply topically 2 times daily.      [employer] Lancets Misc  1 each by Does not apply route daily To check sugar 4 x a day and if feeling ill     Insulin Pen Needle 31G X 8 MM Misc  200     potassium chloride 20 MEQ extended release tablet  Commonly known as: KLOR-CON M  Take 2 tablets by mouth daily     triamcinolone 0.1 % cream  Commonly known as: KENALOG        STOP taking these medications    bumetanide 2 MG tablet  Commonly known as: BUMEX     clotrimazole-betamethasone 1-0.05 % cream  Commonly known as: Lotrisone     Entresto  MG per tablet  Generic drug: sacubitril-valsartan     insulin lispro (1 Unit Dial) 100 UNIT/ML Sopn  Commonly known as: HumaLOG KwikPen  Replaced by: insulin lispro 100 UNIT/ML injection vial     Lantus SoloStar 100 UNIT/ML injection pen  Generic drug: insulin glargine  Replaced by: insulin glargine 100 UNIT/ML injection vial     metoprolol succinate 100 MG extended release tablet  Commonly known as: TOPROL XL     metoprolol succinate 50 MG extended release tablet  Commonly known as: TOPROL XL     spironolactone 25 MG tablet  Commonly known as: ALDACTONE           Where to Get Your Medications      These medications were sent to [address] [place] [address]    Phone: [phone]   · vitamin D 50 MCG (2000 UT) Tabs tablet     These medications were sent to [place] [address] #[id number] [address] [facility]  [address]    Phone: [phone]   · cefdinir 300 MG capsule  · docusate sodium 100 MG capsule  · insulin glargine 100 UNIT/ML injection vial  · insulin lispro 100 UNIT/ML injection vial  · metroNIDAZOLE 500 MG tablet  · Probiotic Acidophilus Tabs     You can get these medications from any pharmacy    Bring a paper prescription for each of these medications  · HYDROcodone-acetaminophen 7.5-325 MG per tablet           Note that more than 40 minutes was spent in preparing discharge papers, discussing discharge with patient, medication review, etc.    Signed:  Electronically signed by Ladi Brandon MD on 9/20/2021 at 12:59 PM

## 2021-09-20 NOTE — CARE COORDINATION
Social Work:    Reviewed chart notes and received an update from [employer] this a.m. Chelo Avalos is expected to discharge home with daily packing/dressing changes. He has no PCP, however, surgery agrees to follow. Social service met with Chelo Avalos and advised him about [address] orders and unlikely ability to have [address] come daily for packing changes. Social work inquired as to whether or not anyone would be available to learn the dressing changes, if this is permitted, and we also discussed possible O. P. wound care. Chelo Avalos advised that he has limited help at home and would prefer O. P. wound care at [facility] if possible. Social service left a voice message at the wound care clinic. Await call back.     Electronically signed by DOROTHY Abdullahi on 9/20/2021 at 11:04 AM

## 2021-09-20 NOTE — PROGRESS NOTES
Patient educated on D/C instructions. Patient was educated on the proper way to take Humalog each meal per Dr. Fox Nolan with 15 units straight + additional sliding scale dose (given to patient). Patient also aware of f/u appts and to f/u with PCP or cardiologist in regards to BP medications. No further questions or concerns at this time. Patient given pain prescription and is aware that all other medications are at [address]. Patient prefers to walk out instead of waiting for transport.

## 2021-09-20 NOTE — PROGRESS NOTES
CLINICAL PHARMACY NOTE: MEDS TO BEDS    Total # of Prescriptions Filled: 1   The following medications were delivered to the patient:  · Vitamin d 2000 units    Additional Documentation:

## 2021-09-20 NOTE — PROGRESS NOTES
education. Evaluation/Plan/Recommendations:   Patient's understanding of diabetes:   [x]Poor   []Fair    []Good   [] Excellent     Outpatient diabetes education is recommended:   [x] Yes  [] No   [] As needed  Patient is interested in outpatient diabetes education:   [] Yes    [] No    [] Unsure    Recommended:     [] Consult to social work; patient has no insurance or financial hardship      [x] Script for glucometer and supplies (per preference of patient's insurance)               [x] Script for outpatient diabetes education classes from PCP    [x] Carbohydrate-controlled diet    [x] Patient prefers/educator recommends insulin pens instead of syringes     (if insulin ordered for home use)    Thank you for this consult.

## 2021-09-20 NOTE — CARE COORDINATION
Spoke with Dr. Daryle Port. General surgery, Dr. Michelle Francisco will follow for [facility].

## 2021-09-20 NOTE — PLAN OF CARE
Problem: Discharge Planning:  Goal: Participates in care planning  Description: Participates in care planning  9/19/2021 2210 by Mercedes Lam RN  Outcome: Ongoing  9/19/2021 1712 by Johnson Duque RN  Outcome: Met This Shift  Goal: Discharged to appropriate level of care  Description: Discharged to appropriate level of care  9/19/2021 1712 by Johnson Duque RN  Outcome: Met This Shift     Problem:  Activity Intolerance:  Goal: Ability to tolerate increased activity will improve  Description: Ability to tolerate increased activity will improve  9/19/2021 2210 by Mercedes Lam RN  Outcome: Ongoing  9/19/2021 1712 by Johnson Duque RN  Outcome: Met This Shift     Problem: Anxiety/Stress:  Goal: Level of anxiety will decrease  Description: Level of anxiety will decrease  Outcome: Ongoing

## 2021-09-20 NOTE — CARE COORDINATION
Social Work:    Social service spoke with the RN at the O. P. wound care center and they do not do daily dressing/packing changes. Social service called a referral to [facility] who accepted. Kylie advised that they can only get approval to see Arti Veloz 1-3 x's a week. Arti Veloz is aware and will have help at home to assist with dressing/packing changes where [facility] is not available.     Electronically signed by DOROTHY Ornelas on 9/20/2021 at 11:23 AM

## 2021-09-20 NOTE — PROGRESS NOTES
Confirmed wound care orders on DC with gen surg resident. Wound will need to be re-packed daily with daily dsg changes.

## 2021-09-20 NOTE — PLAN OF CARE
Problem: Discharge Planning:  Goal: Participates in care planning  Description: Participates in care planning  9/20/2021 0954 by Florentino Guevara, RN  Outcome: Met This Shift     Problem: Discharge Planning:  Goal: Discharged to appropriate level of care  Description: Discharged to appropriate level of care  Outcome: Met This Shift     Problem:  Activity Intolerance:  Goal: Ability to tolerate increased activity will improve  Description: Ability to tolerate increased activity will improve  9/20/2021 0954 by Florentino Guevara RN  Outcome: Met This Shift     Problem: Anxiety/Stress:  Goal: Level of anxiety will decrease  Description: Level of anxiety will decrease  9/19/2021 2210 by Danielle Gomez, RN  Outcome: Ongoing

## 2021-09-22 DIAGNOSIS — I50.22 CHRONIC SYSTOLIC HEART FAILURE (HCC): ICD-10-CM

## 2021-09-22 LAB
CULTURE SURGICAL: ABNORMAL
CULTURE SURGICAL: ABNORMAL
ORGANISM: ABNORMAL

## 2021-09-22 RX ORDER — BUMETANIDE 2 MG/1
TABLET ORAL
Qty: 60 TABLET | Refills: 3 | Status: SHIPPED
Start: 2021-09-22 | End: 2021-09-30 | Stop reason: DRUGHIGH

## 2021-09-24 DIAGNOSIS — I50.22 CHRONIC SYSTOLIC HEART FAILURE (HCC): Primary | ICD-10-CM

## 2021-09-24 NOTE — TELEPHONE ENCOUNTER
[facility] FOR DIABETIC ISSUES. WHILE IN THE HOSPITAL THEY DISCONTINUED THE FOLLOWING MEDICATIONS:  BUMEX 2MG  ENTRESTO   METOPROLOL SUCCINATE 100 QD  METOPROLOL SUCCINATE 50 QD  ALDACTONE 25 MG.     THE SOONEST I CAN GET HIM IN FOR A POST HOSPITAL VISIT IS ON 10/4/21 WITH CRISTELA [address] [facility] [address]    PLEASE ADVISE

## 2021-09-24 NOTE — TELEPHONE ENCOUNTER
Thank you for the update  Hospitalization records reviewed    Please refer him to the CHF clinic early next week since he is unable to get into the office until 10/4  Will review vitals and labs and then slowly reintroduce his cardiac medications    Thank you

## 2021-09-24 NOTE — TELEPHONE ENCOUNTER
LM for chf clinic regarding referral and need to be seen early next week      [address] 68 [phone] (home)   Updated on plan of care. He is agreeable.

## 2021-09-28 ENCOUNTER — HOSPITAL ENCOUNTER (OUTPATIENT)
Dept: OTHER | Age: 36
Setting detail: THERAPIES SERIES
Discharge: HOME | End: 2021-09-28
Payer: MEDICARE

## 2021-09-28 VITALS
RESPIRATION RATE: 16 BRPM | DIASTOLIC BLOOD PRESSURE: 96 MMHG | HEIGHT: 70 IN | BODY MASS INDEX: 34.43 KG/M2 | SYSTOLIC BLOOD PRESSURE: 150 MMHG | HEART RATE: 74 BPM | WEIGHT: 240.5 LBS

## 2021-09-28 LAB
ANION GAP SERPL CALCULATED.3IONS-SCNC: 6 MMOL/L (ref 7–16)
BUN BLDV-MCNC: 9 MG/DL (ref 6–20)
CALCIUM SERPL-MCNC: 9.3 MG/DL (ref 8.6–10.2)
CHLORIDE BLD-SCNC: 102 MMOL/L (ref 98–107)
CO2: 27 MMOL/L (ref 22–29)
CREAT SERPL-MCNC: 0.9 MG/DL (ref 0.7–1.2)
GFR AFRICAN AMERICAN: >60
GFR NON-AFRICAN AMERICAN: >60 ML/MIN/1.73
GLUCOSE BLD-MCNC: 340 MG/DL (ref 74–99)
POTASSIUM SERPL-SCNC: 3.9 MMOL/L (ref 3.5–5)
PRO-BNP: 567 PG/ML (ref 0–125)
SODIUM BLD-SCNC: 135 MMOL/L (ref 132–146)

## 2021-09-28 PROCEDURE — 36415 COLL VENOUS BLD VENIPUNCTURE: CPT

## 2021-09-28 PROCEDURE — 99204 OFFICE O/P NEW MOD 45 MIN: CPT

## 2021-09-28 PROCEDURE — 83880 ASSAY OF NATRIURETIC PEPTIDE: CPT

## 2021-09-28 PROCEDURE — 80048 BASIC METABOLIC PNL TOTAL CA: CPT

## 2021-09-28 NOTE — PROGRESS NOTES
[id number]   1985            Referring Provider: [facility]  Primary Care Physician: None  Cardiologist: Luis Pleitez  Nephrologist: no        History of Present Illness:     Charline Ambriz is a 38 y.o. male with a history of HFrEF, most recent EF 30-35% 12/22/20. Patient Story:    He does not  have dyspnea with exertion, shortness of breath, or decline in overall functional capacity. He does not have orthopnea, PND, nocturnal cough or hemoptysis. He does not have abdominal distention or bloating, early satiety, anorexia/change in appetite. He does not has a good urinary response to  oral diuretic. He does not have  lower extremity edema. He denies lightheadedness, dizziness. He denies palpitations, syncope or near syncope. He does not complain of chest pain, pressure, discomfort.          No Known Allergies      No outpatient medications have been marked as taking for the 9/28/21 encounter [facility] Encounter) with [facility] ROOM 3.           Guideline directed medical:  ARNI/ACE I/ARB: No  Beta blocker:  No  Aldosterone antagonist:  No        Physical Examination:     BP (!) 150/96   Pulse 74   Resp 16   Ht 5' 10\" (1.778 m)   Wt 240 lb 8 oz (109.1 kg)   BMI 34.51 kg/m²     Assessment  Charting Type: Reassessment    Neurological  Level of Consciousness: Alert (0)  Orientation Level: Oriented X4              Respiratory  Respiratory Pattern: Regular  Respiratory Depth: Normal  Respiratory Quality/Effort: Unlabored  Chest Assessment: Chest expansion symmetrical  L Breath Sounds: Clear  R Breath Sounds: Clear                   Rhythm Interpretation  Pulse: 74         Gastrointestinal  Abdominal (WDL): Within Defined Limits               Peripheral Vascular  Peripheral Vascular (WDL): Within Defined Limits  Edema: None                                                 Pulse: 74                   LAB DATA:    BNP  No results for input(s): BNP in the last 72 hours.    proBNP  No results for input(s): PROBNP in the last 72 hours. BMP  No results for input(s): NA, K, CL, CO2, BUN, CREATININE, GLUCOSE, CALCIUM in the last 72 hours. WEIGHTS:  Wt Readings from Last 3 Encounters:   09/28/21 240 lb 8 oz (109.1 kg)   09/15/21 226 lb (102.5 kg)   09/14/21 225 lb (102.1 kg)         TELEMETRY:  Cardiac Regularity: Regular  Cardiac Rhythm/Interpretation: NSR        ASSESSMENT:  Shi Lux is evolemic with stable weights     Interventions completed this visit:  IV diuretics given no  Lab work obtained yes, BMP BNP   Reviewed continue current medications that patient as prescribed answered any questions   Educated on signs and symptoms of HF  Educated on low sodium diet    PLAN:  Scheduled to follow up in CHF clinic on October 5,2021  Given clinic phone number and aware of signs and symptoms to call with any HF change in symptoms. States that he took Metoprolol 50 mg, Entresto high dose ( 97/103) , once yesterday afternoon. Prior to medications being d/mikie 1 week ago , he was ordered metoprolol 100 mg A.M, 50 mg P.M. and Entresto 97/103 twice daily. Also states he has been taking bumex 2 mg twice daily for the last few days.

## 2021-09-30 ENCOUNTER — TELEPHONE (OUTPATIENT)
Dept: CARDIOLOGY CLINIC | Age: 36
End: 2021-09-30

## 2021-09-30 ENCOUNTER — OFFICE VISIT (OUTPATIENT)
Dept: ENDOCRINOLOGY | Age: 36
End: 2021-09-30
Payer: MEDICARE

## 2021-09-30 VITALS
DIASTOLIC BLOOD PRESSURE: 80 MMHG | WEIGHT: 239 LBS | HEIGHT: 70 IN | HEART RATE: 70 BPM | OXYGEN SATURATION: 97 % | BODY MASS INDEX: 34.22 KG/M2 | SYSTOLIC BLOOD PRESSURE: 127 MMHG

## 2021-09-30 DIAGNOSIS — Z79.899 MEDICATION DOSE CHANGED: ICD-10-CM

## 2021-09-30 DIAGNOSIS — E55.9 VITAMIN D DEFICIENCY: ICD-10-CM

## 2021-09-30 DIAGNOSIS — Z91.199 HISTORY OF NONCOMPLIANCE WITH MEDICAL TREATMENT: ICD-10-CM

## 2021-09-30 DIAGNOSIS — Z79.4 TYPE 2 DIABETES MELLITUS WITH HYPERGLYCEMIA, WITH LONG-TERM CURRENT USE OF INSULIN (HCC): Primary | ICD-10-CM

## 2021-09-30 DIAGNOSIS — E78.2 MIXED HYPERLIPIDEMIA: ICD-10-CM

## 2021-09-30 DIAGNOSIS — E66.09 CLASS 1 OBESITY DUE TO EXCESS CALORIES WITHOUT SERIOUS COMORBIDITY WITH BODY MASS INDEX (BMI) OF 34.0 TO 34.9 IN ADULT: ICD-10-CM

## 2021-09-30 DIAGNOSIS — I50.22 CHRONIC SYSTOLIC HEART FAILURE (HCC): Primary | ICD-10-CM

## 2021-09-30 DIAGNOSIS — E11.65 TYPE 2 DIABETES MELLITUS WITH HYPERGLYCEMIA, WITH LONG-TERM CURRENT USE OF INSULIN (HCC): Primary | ICD-10-CM

## 2021-09-30 LAB — HBA1C MFR BLD: 15 %

## 2021-09-30 PROCEDURE — 1111F DSCHRG MED/CURRENT MED MERGE: CPT | Performed by: CLINICAL NURSE SPECIALIST

## 2021-09-30 PROCEDURE — 2022F DILAT RTA XM EVC RTNOPTHY: CPT | Performed by: CLINICAL NURSE SPECIALIST

## 2021-09-30 PROCEDURE — G8427 DOCREV CUR MEDS BY ELIG CLIN: HCPCS | Performed by: CLINICAL NURSE SPECIALIST

## 2021-09-30 PROCEDURE — G8417 CALC BMI ABV UP PARAM F/U: HCPCS | Performed by: CLINICAL NURSE SPECIALIST

## 2021-09-30 PROCEDURE — 99214 OFFICE O/P EST MOD 30 MIN: CPT | Performed by: CLINICAL NURSE SPECIALIST

## 2021-09-30 PROCEDURE — 3046F HEMOGLOBIN A1C LEVEL >9.0%: CPT | Performed by: CLINICAL NURSE SPECIALIST

## 2021-09-30 PROCEDURE — 83036 HEMOGLOBIN GLYCOSYLATED A1C: CPT | Performed by: CLINICAL NURSE SPECIALIST

## 2021-09-30 PROCEDURE — 1036F TOBACCO NON-USER: CPT | Performed by: CLINICAL NURSE SPECIALIST

## 2021-09-30 RX ORDER — FENOFIBRATE 48 MG/1
48 TABLET, COATED ORAL DAILY
Qty: 30 TABLET | Refills: 5 | Status: SHIPPED
Start: 2021-09-30 | End: 2021-10-04 | Stop reason: ALTCHOICE

## 2021-09-30 RX ORDER — BUMETANIDE 2 MG/1
2 TABLET ORAL SEE ADMIN INSTRUCTIONS
Qty: 60 TABLET | Refills: 3 | Status: SHIPPED | OUTPATIENT
Start: 2021-09-30 | End: 2021-10-27 | Stop reason: SDUPTHER

## 2021-09-30 RX ORDER — METOPROLOL SUCCINATE 50 MG/1
50 TABLET, EXTENDED RELEASE ORAL 2 TIMES DAILY
Qty: 60 TABLET | Refills: 3 | Status: SHIPPED
Start: 2021-09-30 | End: 2021-10-04 | Stop reason: SDUPTHER

## 2021-09-30 NOTE — RESULT ENCOUNTER NOTE
Labs and CHF clinic note reviewedPatient was recently hospitalized and upon discharge, discharge of all of his cardiac medications. Vitals at the CHF clinic: /96   Pulse 74      Per CHF clinic \"He took Metoprolol 50 mg, Entresto high dose ( 97/103) , once yesterday afternoon. Prior to medications being d/mikie 1 week ago , he was ordered metoprolol 100 mg A.M, 50 mg P.M. and Entresto 97/103 twice daily. Also states he has been taking bumex 2 mg twice daily for the last few days. \"    Have him restart:  Metoprolol succinate 50 mg twice daily  Moderate dose Entresto twice daily(have him cut his 97/103 and half)  Change Bumex to 2 mg once daily, he can use an extra dose in the afternoon if needed    Follow-up at CHF clinic in 1 week (please make it prior to the appointment with me so we can adjust meds at that time)    Thank you

## 2021-09-30 NOTE — TELEPHONE ENCOUNTER
Notified CHF clinic of provider instructions. James Reza with provider instructions also    I have reviewed the provider's instructions with the patient, answering all questions to his satisfaction.   Electronically signed by Chris Castellanos RN on 9/30/2021 at 12:32 PM

## 2021-09-30 NOTE — TELEPHONE ENCOUNTER
----- Message from CRIS Weaver CNP sent at 9/30/2021 12:06 PM EDT -----  Labs and CHF clinic note reviewedPatient was recently hospitalized and upon discharge, discharge of all of his cardiac medications. Vitals at the CHF clinic: /96   Pulse 74      Per CHF clinic \"He took Metoprolol 50 mg, Entresto high dose ( 97/103) , once yesterday afternoon. Prior to medications being d/mikie 1 week ago , he was ordered metoprolol 100 mg A.M, 50 mg P.M. and Entresto 97/103 twice daily. Also states he has been taking bumex 2 mg twice daily for the last few days. \"    Have him restart:  Metoprolol succinate 50 mg twice daily  Moderate dose Entresto twice daily(have him cut his 97/103 and half)  Change Bumex to 2 mg once daily, he can use an extra dose in the afternoon if needed    Follow-up at CHF clinic in 1 week (please make it prior to the appointment with me so we can adjust meds at that time)    Thank you

## 2021-09-30 NOTE — PROGRESS NOTES
[address] [facility] Department of Endocrinology Diabetes and Metabolism   [address] [facility] [address]   Phone: [phone]  Fax: [phone]    Date of Service: 9/30/2021    Primary Care Physician: No primary care provider on file. Referring physician: No ref. provider found  Provider: Florentino BRADLEY    Reason for the visit:  Type 2 DM     History of Present Illness: The history is provided by the patient. No  was used. Accuracy of the patient data is excellent. Marjorie Morales is a very pleasant 38 y.o. male seen today for diabetes management     Marjorie Morales was diagnosed with diabetes per hba1c back in 2015  and currently on Lantus pen 20 units BID and Humalog 20 units TID with meals plus high dose ISS   Was previously on metformin in the past but stopped after DKA admissino   The patient has been checking blood sugar 1-2 times per day.     Usually 200's   Misses doses occasionally       Most recent A1c results summarized below  Lab Results   Component Value Date    LABA1C 15 09/30/2021    LABA1C 15.2 07/29/2021    LABA1C 6.8 12/24/2020       Patient has had no hypoglycemic episodes    The patient has been mindful of what has been eating and following diabetic diet as encouraged    I reviewed current medications and the patient has no issues with diabetes medications   The patient is due for an eye exam.  no h/o diabetic retinopathy  The patient seeing podiatrist every   And also performs  own feet care  Microvascular complications:  No Retinopathy, Nephropathy or  + Neuropathy   Macrovascular complications: no CAD, PVD, or Stroke    The patient refuses Flushot and pneumonia vaccine     PAST MEDICAL HISTORY   Past Medical History:   Diagnosis Date    CAD (coronary artery disease)     CHF (congestive heart failure) ([address]     [facility]     Hyperlipidemia     Hyperosmolar hyperglycemic state (HHS) ([facility] 7/30/2021    Hypertension        PAST SURGICAL HISTORY   Past Surgical History:   Procedure Laterality Date    ABDOMEN SURGERY N/A 9/18/2021    INCISION AND DRAINAGE OF LARGE PERIANAL ABSCESS performed by Leonid Gutiérrez MD at [address]      buttocks    ECHO COMPL W DOP COLOR FLOW  5/5/2015         WISDOM TOOTH EXTRACTION         SOCIAL HISTORY   Tobacco:   reports that he has quit smoking. His smoking use included cigars and cigarettes. He started smoking about 21 years ago. He quit after 8.00 years of use. He has never used smokeless tobacco.  Alcohol:   reports current alcohol use. Drugs:   reports no history of drug use. FAMILY HISTORY   Family History   Problem Relation Age of Onset    High Blood Pressure Father     Heart Disease Father     High Blood Pressure Paternal Grandmother     Heart Attack Paternal Grandfather     High Blood Pressure Mother     Heart Disease Mother        ALLERGIES AND DRUG REACTIONS   No Known Allergies    CURRENT MEDICATIONS   Current Outpatient Medications   Medication Sig Dispense Refill    metoprolol succinate (TOPROL XL) 50 MG extended release tablet Take 1 tablet by mouth 2 times daily 60 tablet 3    sacubitril-valsartan (ENTRESTO) 49-51 MG per tablet Take 1 tablet by mouth 2 times daily 60 tablet 3    bumetanide (BUMEX) 2 MG tablet Take 1 tablet by mouth See Admin Instructions Take 1 tab 8am daily. On days you gain weight more than 2 lbs or have worse swelling or short of breath then take a 2pm dose that day. 60 tablet 3    fenofibrate (TRICOR) 48 MG tablet Take 1 tablet by mouth daily 30 tablet 5    insulin glargine (LANTUS SOLOSTAR) 100 UNIT/ML injection pen Inject 20 Units into the skin 2 times daily 10 pen 3    insulin lispro, 1 Unit Dial, (HUMALOG KWIKPEN) 100 UNIT/ML SOPN Inject 15 units with meals + following sliding scale. -200 add 3U, -250 add 6U, -300 add 9U, -350 add 12U, -400 add 15U, BS over 400 add 18U.  MAX 75U/day 15 pen 3    potassium chloride (KLOR-CON M) 20 MEQ extended release tablet Take 2 tablets by mouth daily 180 tablet 1    docusate sodium (COLACE) 100 MG capsule Take 2 capsules by mouth daily 30 capsule 0    Probiotic Acidophilus (FLORANEX) TABS Take 1 tablet by mouth daily 30 tablet 0    Insulin Pen Needle (BD PEN NEEDLE MICRO U/F) 32G X 6 MM MISC Uses with insulin 4 times a day 250 each 5    Lancets MISC Test 4 times/day before meals and at bedtime and as needed for symptoms of irregular blood glucose. 300 each 3    triamcinolone (KENALOG) 0.1 % cream Apply topically as needed Apply topically 2 times daily.  Vitamin D (CHOLECALCIFEROL) 50 MCG (2000 UT) TABS tablet Take 1 tablet by mouth daily 60 tablet 0    CVS Lancets MISC 1 each by Does not apply route daily To check sugar 4 x a day and if feeling ill 200 each 3    blood glucose monitor strips Test **4* times a day & as needed for symptoms of irregular blood glucose. Dispense sufficient amount for indicated testing frequency plus additional to accommodate PRN testing needs. 200 strip 2    clobetasol (TEMOVATE) 0.05 % ointment Apply topically 2 times daily. 60 g 1     No current facility-administered medications for this visit. Review of Systems  Constitutional: No fever, no chills, no diaphoresis, no generalized weakness. HEENT: No blurred vision, No sore throat, no ear pain, no hair loss  Neck: denied any neck swelling, difficulty swallowing,   Cardio-pulmonary: No CP, SOB or palpitation, No orthopnea or PND. No cough or wheezing. GI: No N/V/D, no constipation, No abdominal pain, no melena or hematochezia   : Denied any dysuria, hematuria, flank pain, discharge, or incontinence. Skin: denied any rash, ulcer, Hirsute, or hyperpigmentation. MSK: denied any joint deformity, joint pain/swelling, muscle pain, or back pain.   Neuro: no numbness, no tingling, no weakness, _    OBJECTIVE    /80   Pulse 70   Ht 5' 10\" (1.778 m)   Wt 239 lb (108.4 kg)   SpO2 97%   BMI 34.29 kg/m²   BP Readings from Last 4 Encounters:   09/30/21 127/80   09/28/21 (!) 150/96   09/20/21 127/74   09/18/21 98/62     Wt Readings from Last 6 Encounters:   09/30/21 239 lb (108.4 kg)   09/28/21 240 lb 8 oz (109.1 kg)   09/15/21 226 lb (102.5 kg)   09/14/21 225 lb (102.1 kg)   07/28/21 256 lb (116.1 kg)   07/02/21 281 lb (127.5 kg)       Physical examination:  General: awake alert, oriented x3, no abnormal position or movements. HEENT: normocephalic non-traumatic, no exophthalmos   Neck: supple, no LN enlargement, no thyromegaly, no thyroid tenderness, no JVD. Pulm: Clear equal air entry no added sounds, no wheezing or rhonchi    CVS: S1 + S2, no murmur, no heave. Dorsalis pedis pulse palpable   Abd: soft lax, no tenderness, no organomegaly, audible bowel sounds. Skin: warm, no lesions, no rash.  No callus, no Ulcers, No acanthosis nigricans  Musculoskeletal: No back tenderness, no kyphosis/scoliosis    Neuro: CN intact, Monofilament sensation decreased bilateral , muscle power normal  Psych: normal mood, and affect      Review of Laboratory Data:  I personally reviewed the following lab:  Lab Results   Component Value Date/Time    WBC 7.1 09/20/2021 03:00 AM    RBC 2.71 (L) 09/20/2021 03:00 AM    HGB 8.6 (L) 09/20/2021 03:00 AM    HCT 26.2 (L) 09/20/2021 03:00 AM    MCV 96.7 09/20/2021 03:00 AM    MCH 31.7 09/20/2021 03:00 AM    MCHC 32.8 09/20/2021 03:00 AM    RDW 12.6 09/20/2021 03:00 AM     09/20/2021 03:00 AM    MPV 9.5 09/20/2021 03:00 AM      Lab Results   Component Value Date/Time     09/28/2021 08:30 AM    K 3.9 09/28/2021 08:30 AM    K 3.5 09/19/2021 02:45 AM    CO2 27 09/28/2021 08:30 AM    BUN 9 09/28/2021 08:30 AM    CREATININE 0.9 09/28/2021 08:30 AM    CALCIUM 9.3 09/28/2021 08:30 AM    LABGLOM >60 09/28/2021 08:30 AM    GFRAA >60 09/28/2021 08:30 AM      Lab Results   Component Value Date/Time    TSH 1.240 09/17/2021 05:52 AM     Lab Results Component Value Date    LABA1C 15 09/30/2021    GLUCOSE 340 09/28/2021    LABCREA 86 12/24/2020     Lab Results   Component Value Date    LABA1C 15 09/30/2021    LABA1C 15.2 07/29/2021    LABA1C 6.8 12/24/2020     Lab Results   Component Value Date    TRIG 635 09/17/2021    HDL 25 09/17/2021    LDLCALC - 09/17/2021    CHOL 243 09/17/2021     Lab Results   Component Value Date    VITD25 16 09/17/2021       ASSESSMENT & RECOMMENDATIONS   Johan Norman, a 38 y.o.-old male seen in for the following issues       Assessment:      Diagnosis Orders   1. Type 2 diabetes mellitus with hyperglycemia, with long-term current use of insulin ([facility])  HM DIABETES FOOT EXAM   2. Mixed hyperlipidemia     3. Class 1 obesity due to excess calories without serious comorbidity with body mass index (BMI) of 34.0 to 34.9 in adult     4. Vitamin D deficiency     5. History of noncompliance with medical treatment         Plan:     1. Type 2 diabetes mellitus with hyperglycemia, with long-term current use of insulin ([facility]   · Patient's diabetes is uncontrolled. · Patient only checking blood sugars 1-2 times per day  · Patient did not bring meter or blood glucose log with him at today's visit  · Hemoglobin A1c greater than 15  · I suspect noncompliance  · Unfortunately I do not have data to make adjustments at today's visit  · We will continue insulin doses as above  · We will check C-peptide, fasting glucose, and gisele antibody  · The patient was advised to check blood sugars 4 times a day before meals and at bedtime and send BS readings to our office in a week. · Further recommendations will be made at that time.   · Discussed with patient A1c and blood sugar goals   · Optimal blood sugars: 100-140 pre-prandial, < 180 peak post-prandial  · The patient counseled about the complications of uncontrolled diabetes   · Patient was counselled about the importance of self-blood glucose monitoring and eating consistent carb diet to avoid blood sugar fluctuations   · Discussed lifestyle changes including diet and exercise with patient; recommended 150 minutes of moderate intensity exercise per week. 2. Mixed hyperlipidemia   Last triglycerides elevated. Most likely related to elevated blood sugars. Will start fenofibrate 48 mg nightly. May stop once glucose levels are better controlled and lipids are reevaluated   3. Class 1 obesity due to excess calories without serious comorbidity with body mass index (BMI) of 34.0 to 34.9 in adult   Discussed lifestyle changes including diet and exercise with patient in depth. Also discussed with patient cardiovascular risk associated with obesity   4. Vitamin D deficiency   Continue vitamin D supplementation   5. History of noncompliance with medical treatment   Counseled on compliance. Counseled on the importance of taking insulin as prescribed. I personally spent greater than 30 minutes reviewing external notes from PCP and other patient's care team providers, and personally interpreted labs associated with the above diagnosis. I also ordered labs to further assess and manage the above addressed medical conditions. Return in about 3 months (around 12/30/2021). The above issues were reviewed with the patient who understood and agreed with the plan. Thank you for allowing us to participate in the care of this patient. Please do not hesitate to contact us with any additional questions. [facility] and Endocrinology   [address] [place] [id number]   Phone: [phone]  Fax: [phone]  --------------------------------------------  An electronic signature was used to authenticate this note.  [facility] on 9/30/2021 at 2:04 PM

## 2021-10-04 ENCOUNTER — HOSPITAL ENCOUNTER (OUTPATIENT)
Dept: OTHER | Age: 36
Setting detail: THERAPIES SERIES
Discharge: HOME | End: 2021-10-04
Payer: MEDICARE

## 2021-10-04 ENCOUNTER — OFFICE VISIT (OUTPATIENT)
Dept: CARDIOLOGY CLINIC | Age: 36
End: 2021-10-04
Payer: MEDICARE

## 2021-10-04 VITALS
HEART RATE: 86 BPM | BODY MASS INDEX: 34.15 KG/M2 | SYSTOLIC BLOOD PRESSURE: 146 MMHG | RESPIRATION RATE: 18 BRPM | WEIGHT: 238 LBS | OXYGEN SATURATION: 97 % | DIASTOLIC BLOOD PRESSURE: 86 MMHG

## 2021-10-04 VITALS
BODY MASS INDEX: 34.13 KG/M2 | SYSTOLIC BLOOD PRESSURE: 140 MMHG | RESPIRATION RATE: 20 BRPM | HEART RATE: 91 BPM | WEIGHT: 238.4 LBS | OXYGEN SATURATION: 95 % | DIASTOLIC BLOOD PRESSURE: 90 MMHG | HEIGHT: 70 IN

## 2021-10-04 DIAGNOSIS — I10 ESSENTIAL HYPERTENSION: ICD-10-CM

## 2021-10-04 DIAGNOSIS — Z79.899 MEDICATION DOSE CHANGED: ICD-10-CM

## 2021-10-04 DIAGNOSIS — I50.22 CHRONIC SYSTOLIC HEART FAILURE (HCC): Primary | ICD-10-CM

## 2021-10-04 LAB
ANION GAP SERPL CALCULATED.3IONS-SCNC: 12 MMOL/L (ref 7–16)
BUN BLDV-MCNC: 11 MG/DL (ref 6–20)
CALCIUM SERPL-MCNC: 10.3 MG/DL (ref 8.6–10.2)
CHLORIDE BLD-SCNC: 97 MMOL/L (ref 98–107)
CO2: 28 MMOL/L (ref 22–29)
CREAT SERPL-MCNC: 0.9 MG/DL (ref 0.7–1.2)
GFR AFRICAN AMERICAN: >60
GFR NON-AFRICAN AMERICAN: >60 ML/MIN/1.73
GLUCOSE BLD-MCNC: 370 MG/DL (ref 74–99)
POTASSIUM SERPL-SCNC: 4.1 MMOL/L (ref 3.5–5)
PRO-BNP: 833 PG/ML (ref 0–125)
SODIUM BLD-SCNC: 137 MMOL/L (ref 132–146)

## 2021-10-04 PROCEDURE — 1036F TOBACCO NON-USER: CPT | Performed by: NURSE PRACTITIONER

## 2021-10-04 PROCEDURE — 93000 ELECTROCARDIOGRAM COMPLETE: CPT | Performed by: INTERNAL MEDICINE

## 2021-10-04 PROCEDURE — 99214 OFFICE O/P EST MOD 30 MIN: CPT

## 2021-10-04 PROCEDURE — 99214 OFFICE O/P EST MOD 30 MIN: CPT | Performed by: NURSE PRACTITIONER

## 2021-10-04 PROCEDURE — G8417 CALC BMI ABV UP PARAM F/U: HCPCS | Performed by: NURSE PRACTITIONER

## 2021-10-04 PROCEDURE — 36415 COLL VENOUS BLD VENIPUNCTURE: CPT

## 2021-10-04 PROCEDURE — G8427 DOCREV CUR MEDS BY ELIG CLIN: HCPCS | Performed by: NURSE PRACTITIONER

## 2021-10-04 PROCEDURE — 1111F DSCHRG MED/CURRENT MED MERGE: CPT | Performed by: NURSE PRACTITIONER

## 2021-10-04 PROCEDURE — 83880 ASSAY OF NATRIURETIC PEPTIDE: CPT

## 2021-10-04 PROCEDURE — 80048 BASIC METABOLIC PNL TOTAL CA: CPT

## 2021-10-04 PROCEDURE — G8484 FLU IMMUNIZE NO ADMIN: HCPCS | Performed by: NURSE PRACTITIONER

## 2021-10-04 RX ORDER — METOPROLOL SUCCINATE 100 MG/1
100 TABLET, EXTENDED RELEASE ORAL 2 TIMES DAILY
Qty: 60 TABLET | Refills: 3 | Status: ON HOLD
Start: 2021-10-04 | End: 2021-10-20 | Stop reason: HOSPADM

## 2021-10-04 RX ORDER — SPIRONOLACTONE 25 MG/1
25 TABLET ORAL DAILY
COMMUNITY
End: 2021-11-29

## 2021-10-04 NOTE — PROGRESS NOTES
[address]   1985            Referring Provider: Anderson Mahmood  Primary Care Physician: None  Cardiologist: Radu Dozier  Nephrologist: no        History of Present Illness:     Nurys Cason is a 38 y.o. male with a history of HFrEF, most recent EF 30-35% 12/22/20. Patient Story:    He does have dyspnea with exertion, shortness of breath, or decline in overall functional capacity. He does not have orthopnea, PND, nocturnal cough or hemoptysis. He does not have abdominal distention or bloating, early satiety, anorexia/change in appetite. He does have a good urinary response to  oral diuretic. He does not have  lower extremity edema. He denies lightheadedness, dizziness. He denies palpitations, syncope or near syncope. He does not complain of chest pain, pressure, discomfort. No Known Allergies      Outpatient Medications Marked as Taking for the 10/4/21 encounter [facility] Encounter) with [facility] ROOM 3   Medication Sig Dispense Refill    spironolactone (ALDACTONE) 25 MG tablet Take 25 mg by mouth daily      metoprolol succinate (TOPROL XL) 50 MG extended release tablet Take 1 tablet by mouth 2 times daily 60 tablet 3    sacubitril-valsartan (ENTRESTO) 49-51 MG per tablet Take 1 tablet by mouth 2 times daily 60 tablet 3    bumetanide (BUMEX) 2 MG tablet Take 1 tablet by mouth See Admin Instructions Take 1 tab 8am daily. On days you gain weight more than 2 lbs or have worse swelling or short of breath then take a 2pm dose that day.  60 tablet 3    docusate sodium (COLACE) 100 MG capsule Take 2 capsules by mouth daily 30 capsule 0    insulin glargine (LANTUS SOLOSTAR) 100 UNIT/ML injection pen Inject 20 Units into the skin 2 times daily 10 pen 3    Insulin Pen Needle (BD PEN NEEDLE MICRO U/F) 32G X 6 MM MISC Uses with insulin 4 times a day 250 each 5    insulin lispro, 1 Unit Dial, (HUMALOG KWIKPEN) 100 UNIT/ML SOPN Inject 15 units with meals + following sliding scale. -200 add 3U, -250 add 6U, -300 add 9U, -350 add 12U, -400 add 15U, BS over 400 add 18U. MAX 75U/day 15 pen 3    Lancets MISC Test 4 times/day before meals and at bedtime and as needed for symptoms of irregular blood glucose. 300 each 3    triamcinolone (KENALOG) 0.1 % cream Apply topically as needed Apply topically 2 times daily.  Vitamin D (CHOLECALCIFEROL) 50 MCG (2000 UT) TABS tablet Take 1 tablet by mouth daily 60 tablet 0    CVS Lancets MISC 1 each by Does not apply route daily To check sugar 4 x a day and if feeling ill 200 each 3    blood glucose monitor strips Test **4* times a day & as needed for symptoms of irregular blood glucose. Dispense sufficient amount for indicated testing frequency plus additional to accommodate PRN testing needs. 200 strip 2    clobetasol (TEMOVATE) 0.05 % ointment Apply topically 2 times daily. 60 g 1    potassium chloride (KLOR-CON M) 20 MEQ extended release tablet Take 2 tablets by mouth daily (Patient taking differently: Take 40 mEq by mouth daily Pt only taking one tablet daily unless he takes Bumex BID then he takes 2 tabs) 180 tablet 1           Guideline directed medical:  ARNI/ACE I/ARB: Yes  Beta blocker:   Yes  Aldosterone antagonist:  Yes ( Was NOT listed in Epic, but states he's taking it, so added to list in Epic)        Physical Examination:     BP (!) 146/86   Pulse 86   Resp 18   Wt 238 lb (108 kg)   SpO2 97%   BMI 34.15 kg/m²     Assessment  Charting Type: Shift assessment    Neurological  Level of Consciousness: Alert (0)  Orientation Level: Oriented X4              Respiratory  Respiratory Pattern: Regular  Respiratory Depth: Normal  Respiratory Quality/Effort: Unlabored  Chest Assessment: Chest expansion symmetrical  L Breath Sounds: Clear  R Breath Sounds: Clear                   Rhythm Interpretation  Pulse: 86         Gastrointestinal  Abdominal (WDL): Within Defined Limits Peripheral Vascular  Peripheral Vascular (WDL): Within Defined Limits  Edema: None                                                 Pulse: 86                   LAB DATA:    BNP  No results for input(s): BNP in the last 72 hours. proBNP  No results for input(s): PROBNP in the last 72 hours. BMP  No results for input(s): NA, K, CL, CO2, BUN, CREATININE, GLUCOSE, CALCIUM in the last 72 hours. WEIGHTS:  Wt Readings from Last 3 Encounters:   10/04/21 238 lb 6.4 oz (108.1 kg)   10/04/21 238 lb (108 kg)   09/30/21 239 lb (108.4 kg)         TELEMETRY:  Cardiac Regularity: Regular  Cardiac Rhythm/Interpretation: NSR        ASSESSMENT:  Maureen Coppola is evolemic with stable weights     Interventions completed this visit:  IV diuretics given no  Lab work obtained yes, BMP BNP   Reviewed continue current medications that patient as prescribed answered any questions   Educated on signs and symptoms of HF  Educated on low sodium diet    PLAN:  Scheduled to follow up in CHF clinic on October 11,2021 Instructed patient to bring all medication bottles with him next week. Given clinic phone number and aware of signs and symptoms to call with any HF change in symptoms.

## 2021-10-04 NOTE — PROGRESS NOTES
[facility] Cardiology  Office Visit         Reason for Visit: Heart Failure    Primary Cardiologist: Dr. Mookie Joseph         History of Present Illness:     Mr. Jammie Murphy is a 38year old male with a PMHx of chronic HFrEF, nonischemic cardiomyopathy, HTN, T2DM, obesity, hx of tobacco and ETOH abuse    Since his last office visit he was hospitalized at the end of September for DKA, during the hospitalization he also underwent I&D of a large left perianal abscess of the buttock. Due to above he had periods of hypotension which resulted in discontinuation of all of his cardiac medications. He was discharged on 920/2021 off of all cardiac medications. He called the office in follow-up and was referred to the CHF clinic for initial evaluation due to office unavailability. He was seen at CHF clinic on 9/28/2021, he self reported that he restarted some of his cardiac medications. At that time he was instructed to restart his metoprolol succinate to 50 mg twice daily, Entresto moderate dose twice daily and bumetanide to 2 mg once daily. He represented to the CHF clinic today prior to this appointment and reported that he also had self resumed spironolactone. He presents today in follow up, since reinitiation of cardiac medications he has tolerated this well. He denies any dizziness, lightheadedness, near-syncope or syncope. He denies any chest pain, pressure, heaviness. He has had an occasional palpitation and the symptoms have improved since reinitiating medications. He reports good urinary response to oral bumetanide with clear yellow urine production. His weight has remained stable around 240 pounds. He is attempting to monitor his diet for sodium content, however discussed how he ate [employer] food in the past week and therefore needs ongoing education.   He has chronic dyspnea on exertion but is at baseline, states he still has difficulty ambulating up a flight of stairs which is multifactorial from chronic LOCKE as well as peripheral neuropathy and recent buttock I&D that has limited his mobility. He has not been wearing his CPAP, states that the machine is located on his second floor and has not been able to go upstairs to get the device. Patient Active Problem List    Diagnosis Date Noted    DKA, type 1, not at goal [facility]) 09/15/2021    Hyperosmolar hyperglycemic state (HHS) ([facility] 07/30/2021    HHS (hypothenar hammer syndrome) ([facility] 07/28/2021    Pulmonary edema cardiac cause ([facility] 01/21/2021    Acute on chronic systolic heart failure ([facility] 12/23/2020    Acute on chronic diastolic heart failure (HCC)     Essential hypertension     Chest pain     DM (diabetes mellitus) ([facility]     Asthma      PAST MEDICAL HISTORY:    1. Obesity. 2. Hypertension. 3. TTE (5/5/2015, Dr. Qamar Abdi)  Summary: Ejection fraction is visually estimated at 60%. No regional wall motion abnormalities seen. Moderate left ventricular concentric hypertrophy noted. There is doppler evidence of stage II diastolic dysfunction. Physiologic and/or trace mitral regurgitation is present. 4. Chronic HFrEF. Non-ischemic cardiomyopathy diagnosed around October/November 2020 during hospitalization in [place] with normal coronary arteries on left heart cath per the patient. No records available for review at this time. 5. Medical non-compliance. Sleep study (4/5/2021)  IMPRESSION:  1. Severe obstructive sleep apnea. 2.  Inadequate titration with positive airway pressure. 3.  Moderate snoring. 4.  Nocturnal hypoxia. 5.  No cardiac dysrhythmia. DISCUSSION:  Prior to the titration of CPAP, this patient had an  apnea/hypopnea index of 83. Normal is less than five and mild is 5 to  15. Greater than 30 is considered severe, leaving this patient in the  severe category. With titration of CPAP, which the patient tolerated  well, inadequate results were achieved, mainly because the patient ran  out of time to be titrated.   It appeared the patient was getting better  as the CPAP increased in pressure. Therefore, it may be reasonable to  use auto-CPAP and if the CPAP download is not satisfactory, the patient  could come back to [address] [place] [address] for a full night of titration. PLAN:  1. Auto-CPAP at 7 to 17 cm of water pressure. 2.  ResMed AirFit F10 mask, size medium with heated humidification. 3.  The patient to be seen in 6 to 10 weeks. 4.  When the patient is seen, if CPAP download is abnormal or if the  patient remains symptomatic, he will be referred back to [address] [place] [address] for a full night of positive airway pressure titration.         Past Medical History:   Diagnosis Date    CAD (coronary artery disease)     [facility]     Diabetes mellitus ([facility]     Hyperlipidemia     [facility]/30/[facility]    Hypertension        Past Surgical History:   Procedure Laterality Date    ABDOMEN SURGERY N/A 9/18/2021    INCISION AND DRAINAGE OF LARGE PERIANAL ABSCESS performed by Carlos Sullivan MD at [address]      buttocks    ECHO COMPL W DOP COLOR FLOW  5/5/2015         WISDOM TOOTH EXTRACTION           No Known Allergies      Outpatient Medications Marked as Taking for the 10/4/21 encounter (Office Visit) with CRIS Henriquez CNP   Medication Sig Dispense Refill    spironolactone (ALDACTONE) 25 MG tablet Take 25 mg by mouth daily      metoprolol succinate (TOPROL XL) 100 MG extended release tablet Take 1 tablet by mouth 2 times daily 60 tablet 3    sacubitril-valsartan (ENTRESTO) 49-51 MG per tablet Take 1 tablet by mouth 2 times daily 60 tablet 3    bumetanide (BUMEX) 2 MG tablet Take 1 tablet by mouth See Admin Instructions Take 1 tab 8am daily. On days you gain weight more than 2 lbs or have worse swelling or short of breath then take a 2pm dose that day.  60 tablet 3    insulin glargine (LANTUS SOLOSTAR) 100 UNIT/ML injection pen Inject 20 Units into the skin 2 times daily 10 pen 3    Insulin Pen Needle (BD PEN NEEDLE MICRO U/F) 32G X 6 MM MISC Uses with insulin 4 times a day 250 each 5    insulin lispro, 1 Unit Dial, (HUMALOG KWIKPEN) 100 UNIT/ML SOPN Inject 15 units with meals + following sliding scale. -200 add 3U, -250 add 6U, -300 add 9U, -350 add 12U, -400 add 15U, BS over 400 add 18U. MAX 75U/day 15 pen 3    Lancets MISC Test 4 times/day before meals and at bedtime and as needed for symptoms of irregular blood glucose. 300 each 3    triamcinolone (KENALOG) 0.1 % cream Apply topically as needed Apply topically 2 times daily.  Vitamin D (CHOLECALCIFEROL) 50 MCG (2000 UT) TABS tablet Take 1 tablet by mouth daily 60 tablet 0    [employer] Lancets MISC 1 each by Does not apply route daily To check sugar 4 x a day and if feeling ill 200 each 3    blood glucose monitor strips Test **4* times a day & as needed for symptoms of irregular blood glucose. Dispense sufficient amount for indicated testing frequency plus additional to accommodate PRN testing needs. 200 strip 2    potassium chloride (KLOR-CON M) 20 MEQ extended release tablet Take 2 tablets by mouth daily (Patient taking differently: Take 40 mEq by mouth daily Pt only taking one tablet daily unless he takes Bumex BID then he takes 2 tabs) 180 tablet 1       Guideline directed medical/device therapy:  ARNI/ACE I/ARB: Yes  Beta blocker:   Yes  Aldosterone antagonist:  Yes  ICD/CRT-P/-D:  none  QRS interval on recent ECG (personally reviewed/interpreted): <120 ms  Percentage RV pacing (personally reviewed/interpreted): %/NA          Review of Systems:   Cardiac: As per HPI  General: No fever, chills, rigors  Pulmonary: As per HPI  HEENT: No visual disturbances, difficult swallowing  GI: No nausea, vomiting, abdominal pain  : No dysuria or hematuria  Endocrine: No thyroid disease or diabetes  Musculoskeletal: MARIA x 4, no focal motor deficits  Skin: Intact, no rashes  Neuro/Psych: No headache or seizures      Weights: Wt Readings from Last 3 Encounters:   10/04/21 238 lb 6.4 oz (108.1 kg)   10/04/21 238 lb (108 kg)   09/30/21 239 lb (108.4 kg)         Physical Examination:     BP (!) 140/90   Pulse 91   Resp 20   Ht 5' 10\" (1.778 m)   Wt 238 lb 6.4 oz (108.1 kg)   SpO2 95%   BMI 34.21 kg/m²     CONSTITUTIONAL: Alert and oriented times 3, no acute distress and cooperative to examination with proper mood and affect. SKIN: Skin color, texture, turgor normal. No rashes or lesions. LYMPH: no cervical nodes, no inguinal nodes  HEENT: Head is normocephalic, atraumatic. EOMI, PERRLA. NECK: Supple, symmetrical, trachea midline, no adenopathy, thyroid symmetric, not enlarged and no tenderness, skin normal.  CHEST/LUNGS: chest symmetric with normal A/P diameter, normal respiratory rate and rhythm, lungs clear to auscultation without wheezes, rales or rhonchi. No accessory muscle use. Scars None   CARDIOVASCULAR: Heart sounds are normal.  Regular rate and rhythm with III/VI systolic murmur, gallop or rub. Normal S1 and S2. . Carotid and femoral pulses 2+/4 and equal bilaterally. ABDOMEN: Obese. No and Laparoscopic scar(s) present. Normal bowel sounds. No bruits. soft, nondistended, no masses or organomegaly. no evidence of hernia. Percussion: Normal without hepatosplenomegally. Tenderness: absent. RECTAL: deferred, not clinically indicated  NEUROLOGIC: There are no focalizing motor or sensory deficits. CN II-XII are grossly intact. Barnetta Maxwell EXTREMITIES: no cyanosis, no clubbing. Trace bilateral lower extremity edema. All the following diagnostics were personally reviewed and interpreted by me.        LAB DATA:     9/28/2021 08:30   Sodium 135   Potassium 3.9   Chloride 102   CO2 27   BUN 9   Creatinine 0.9   Anion Gap 6 (L)   GFR Non- >60   GFR African American >60   Glucose 340 (H)   Calcium 9.3   Pro- (H)       IMAGING:    CTA Chest (12/22/2020)  Impression:  No evidence of pulmonary embolism. Mild pulmonary edema. Mild mediastinal and bilateral hilar lymphadenopathy, nonspecific. CXR (915/2021)  FINDINGS:  There is no cardiomegaly or vascular congestion.  There is no infiltrate, pleural effusion or pneumothorax.  Lungs are clear.   Impression:  No acute abnormality identified. CARDIAC TESTING:    TTE (12/23/2020)   Summary:   Severe left ventricle hypertrophy. Moderate global hypokinesis. Estimated left ventricle ejection fraction is 30-35%. Grade I diastolic dysfunction. Non dilated right ventricle with moderate dysfunction. No significant valvular abnormalities. EKG  Sinus Rhythm       ASSESSMENT:  1. Chronic HFrEF  2. ACC stage C / NYHA class  3. Near euvolemic   4. Nonischemic cardiomyopathy (reported clean cath in [place], records previously requested). 5. LVEF 30-35%, LVEDD 5.3, LVMI 190  6. Moderate RV dysfunction  7. Hx of uncontrolled hypertension - now controlled   8. Uncontrolled T2DM - hgba1c 15  9. Obesity  10. Severe AMANUEL - currently not wearing mask   11. Psoriasis   12. Hx of tobacco (smoking 1-2 cigars weekly) and heavy etoh abuse (still drinking 2/3 drinks daily)   13. Hx of medical noncompliance         PLAN:  1. Increase metoprolol succinate to 100 mg twice daily     2. Continue rest of current cardiac medications    3. Follow with CHF clinic in 1 week    4. Once we review your CHF clinic visit will continue to adjust you heart failure medication    5. Please start wearing your CPAP    6. Plan to resume cardiac rehab once healed from recent surgery    7. Follow up echocardiogram in December 8. Follow up with Dr. Stanton Fernando as schedule in December 9.  Weigh yourself daily    -Stay Hydrated    -Diet should sodium restricted to 2 grams    -Again watch your daily weight trends and if you gain water weight please follow below instructions.    -If you gain 3-5 pounds in 2-3 days OR notice that you are retaining fluid in anyway just like you did before then take an extra dose of your water pill (Bumetanide/Bumex) every day until you lose the weight or feel better.    -If you notice that you have taken more than 2 extra doses in 1 week then please call and let us know. -If at any time you feel that you are retaining fluid, your medications are not working, or you feel ill in anyway, then please call us for either same day appointment or the next day, and for instructions. Our goal is to keep you out of the emergency room and the hospital and we have ways to do it. You just need to call us in a timely manner.     -If you become sick for other reasons, and notice that you are not urinating as much, the urine is very dark, you have significant diarrhea or vomiting, then please DO NOT take your water pill and CALL US immediately.       Raymundo LYNCH-CNP  [facility] Cardiology

## 2021-10-04 NOTE — PATIENT INSTRUCTIONS
1. Increase metoprolol succinate to 100 mg twice daily     2. Continue rest of current cardiac medications    3. Follow with CHF clinic in 1 week    4. Once we review your CHF clinic visit will continue to adjust you heart failure medication    5. Please start wearing your CPAP    6. Plan to resume cardiac rehab once healed from recent surgery    7. Follow up echocardiogram in December 8. Follow up with Dr. Alvin Howard as schedule in December 9. Weigh yourself daily    -Stay Hydrated    -Diet should sodium restricted to 2 grams    -Again watch your daily weight trends and if you gain water weight please follow below instructions.    -If you gain 3-5 pounds in 2-3 days OR notice that you are retaining fluid in anyway just like you did before then take an extra dose of your water pill (Bumetanide/Bumex) every day until you lose the weight or feel better.    -If you notice that you have taken more than 2 extra doses in 1 week then please call and let us know. -If at any time you feel that you are retaining fluid, your medications are not working, or you feel ill in anyway, then please call us for either same day appointment or the next day, and for instructions. Our goal is to keep you out of the emergency room and the hospital and we have ways to do it. You just need to call us in a timely manner.     -If you become sick for other reasons, and notice that you are not urinating as much, the urine is very dark, you have significant diarrhea or vomiting, then please DO NOT take your water pill and CALL US immediately.

## 2021-10-11 ENCOUNTER — CLINICAL DOCUMENTATION (OUTPATIENT)
Dept: CARDIAC REHAB | Age: 36
End: 2021-10-11

## 2021-10-11 NOTE — PROGRESS NOTES
Edith Zheng has completed 12/36 telemetry monitored exercise sessions. He has been a no call, no show since 8/30/2021 and has not returned our calls. We are discharging him at this time. We would be happy to accommodate him if he would like to return to Cardiac Rehab. Upon request, adetailed summary of his sessions can be sent for your review. Please call [facility] at [phone] with any questions or concerns. Thank you for allowing us to care for your patient.

## 2021-10-16 ENCOUNTER — TELEPHONE (OUTPATIENT)
Dept: OTHER | Facility: CLINIC | Age: 36
End: 2021-10-16

## 2021-10-16 ENCOUNTER — APPOINTMENT (OUTPATIENT)
Dept: CT IMAGING | Age: 36
End: 2021-10-16
Payer: MEDICARE

## 2021-10-16 ENCOUNTER — HOSPITAL ENCOUNTER (EMERGENCY)
Age: 36
Discharge: ANOTHER HOSPITAL | End: 2021-10-16
Attending: EMERGENCY MEDICINE
Payer: MEDICARE

## 2021-10-16 VITALS
HEART RATE: 90 BPM | HEIGHT: 70 IN | RESPIRATION RATE: 16 BRPM | BODY MASS INDEX: 32.93 KG/M2 | DIASTOLIC BLOOD PRESSURE: 69 MMHG | TEMPERATURE: 98.2 F | OXYGEN SATURATION: 100 % | SYSTOLIC BLOOD PRESSURE: 109 MMHG | WEIGHT: 230 LBS

## 2021-10-16 DIAGNOSIS — Z91.199 NONCOMPLIANCE: ICD-10-CM

## 2021-10-16 DIAGNOSIS — L03.317 CELLULITIS, GLUTEAL, LEFT: Primary | ICD-10-CM

## 2021-10-16 DIAGNOSIS — R73.9 HYPERGLYCEMIA: ICD-10-CM

## 2021-10-16 DIAGNOSIS — E10.69 TYPE 1 DIABETES MELLITUS WITH OTHER SPECIFIED COMPLICATION (HCC): ICD-10-CM

## 2021-10-16 DIAGNOSIS — N17.9 AKI (ACUTE KIDNEY INJURY) (HCC): ICD-10-CM

## 2021-10-16 DIAGNOSIS — R65.10 SIRS (SYSTEMIC INFLAMMATORY RESPONSE SYNDROME) (HCC): ICD-10-CM

## 2021-10-16 PROBLEM — L03.90 CELLULITIS: Status: ACTIVE | Noted: 2021-10-16

## 2021-10-16 LAB
ALBUMIN SERPL-MCNC: 3.7 G/DL (ref 3.5–5.2)
ALP BLD-CCNC: 96 U/L (ref 40–129)
ALT SERPL-CCNC: 5 U/L (ref 0–40)
ANION GAP SERPL CALCULATED.3IONS-SCNC: 15 MMOL/L (ref 7–16)
AST SERPL-CCNC: 6 U/L (ref 0–39)
BACTERIA: ABNORMAL /HPF
BASOPHILS ABSOLUTE: 0.04 E9/L (ref 0–0.2)
BASOPHILS RELATIVE PERCENT: 0.2 % (ref 0–2)
BETA-HYDROXYBUTYRATE: 0.68 MMOL/L (ref 0.02–0.27)
BILIRUB SERPL-MCNC: 0.3 MG/DL (ref 0–1.2)
BILIRUBIN URINE: NEGATIVE
BLOOD, URINE: ABNORMAL
BUN BLDV-MCNC: 23 MG/DL (ref 6–20)
CALCIUM SERPL-MCNC: 9.3 MG/DL (ref 8.6–10.2)
CHLORIDE BLD-SCNC: 91 MMOL/L (ref 98–107)
CLARITY: CLEAR
CO2: 22 MMOL/L (ref 22–29)
COLOR: YELLOW
CREAT SERPL-MCNC: 1.5 MG/DL (ref 0.7–1.2)
EOSINOPHILS ABSOLUTE: 0.02 E9/L (ref 0.05–0.5)
EOSINOPHILS RELATIVE PERCENT: 0.1 % (ref 0–6)
EPITHELIAL CELLS, UA: ABNORMAL /HPF
GFR AFRICAN AMERICAN: >60
GFR NON-AFRICAN AMERICAN: >60 ML/MIN/1.73
GLUCOSE BLD-MCNC: 480 MG/DL (ref 74–99)
GLUCOSE URINE: >=1000 MG/DL
HCT VFR BLD CALC: 36.1 % (ref 37–54)
HEMOGLOBIN: 12.3 G/DL (ref 12.5–16.5)
IMMATURE GRANULOCYTES #: 0.11 E9/L
IMMATURE GRANULOCYTES %: 0.6 % (ref 0–5)
KETONES, URINE: NEGATIVE MG/DL
LACTIC ACID, SEPSIS: 1.3 MMOL/L (ref 0.5–1.9)
LEUKOCYTE ESTERASE, URINE: NEGATIVE
LIPASE: 34 U/L (ref 13–60)
LYMPHOCYTES ABSOLUTE: 1.72 E9/L (ref 1.5–4)
LYMPHOCYTES RELATIVE PERCENT: 9.7 % (ref 20–42)
MAGNESIUM: 2.3 MG/DL (ref 1.6–2.6)
MCH RBC QN AUTO: 32.1 PG (ref 26–35)
MCHC RBC AUTO-ENTMCNC: 34.1 % (ref 32–34.5)
MCV RBC AUTO: 94.3 FL (ref 80–99.9)
METER GLUCOSE: 283 MG/DL (ref 74–99)
MONOCYTES ABSOLUTE: 1.21 E9/L (ref 0.1–0.95)
MONOCYTES RELATIVE PERCENT: 6.8 % (ref 2–12)
NEUTROPHILS ABSOLUTE: 14.6 E9/L (ref 1.8–7.3)
NEUTROPHILS RELATIVE PERCENT: 82.6 % (ref 43–80)
NITRITE, URINE: NEGATIVE
PDW BLD-RTO: 13.2 FL (ref 11.5–15)
PH UA: 6 (ref 5–9)
PLATELET # BLD: 411 E9/L (ref 130–450)
PMV BLD AUTO: 10.1 FL (ref 7–12)
POC BASE EXCESS: 0.5 MMOL/L (ref -3–3)
POC CPB: NO
POC DELIVERY SYSTEM: ABNORMAL
POC DEVICE ID: ABNORMAL
POC HCO3: 23 MMOL/L (ref 22–26)
POC O2 SATURATION: 97.6 % (ref 92–98.5)
POC OPERATOR ID: ABNORMAL
POC PCO2: 29.8 MMHG (ref 35–45)
POC PH: 7.49 (ref 7.35–7.45)
POC PO2: 87.3 MMHG (ref 80–100)
POC SAMPLE TYPE: ABNORMAL
POTASSIUM SERPL-SCNC: 4.1 MMOL/L (ref 3.5–5)
PRO-BNP: 351 PG/ML (ref 0–125)
PROCALCITONIN: 0.22 NG/ML (ref 0–0.08)
PROTEIN UA: NEGATIVE MG/DL
RBC # BLD: 3.83 E12/L (ref 3.8–5.8)
RBC UA: ABNORMAL /HPF (ref 0–2)
SODIUM BLD-SCNC: 128 MMOL/L (ref 132–146)
SPECIFIC GRAVITY UA: 1.01 (ref 1–1.03)
TOTAL PROTEIN: 7.7 G/DL (ref 6.4–8.3)
UROBILINOGEN, URINE: 0.2 E.U./DL
WBC # BLD: 17.7 E9/L (ref 4.5–11.5)
WBC UA: ABNORMAL /HPF (ref 0–5)

## 2021-10-16 PROCEDURE — 80053 COMPREHEN METABOLIC PANEL: CPT

## 2021-10-16 PROCEDURE — 81001 URINALYSIS AUTO W/SCOPE: CPT

## 2021-10-16 PROCEDURE — 6360000004 HC RX CONTRAST MEDICATION: Performed by: RADIOLOGY

## 2021-10-16 PROCEDURE — 36415 COLL VENOUS BLD VENIPUNCTURE: CPT

## 2021-10-16 PROCEDURE — 6360000002 HC RX W HCPCS: Performed by: EMERGENCY MEDICINE

## 2021-10-16 PROCEDURE — 96367 TX/PROPH/DG ADDL SEQ IV INF: CPT

## 2021-10-16 PROCEDURE — 83690 ASSAY OF LIPASE: CPT

## 2021-10-16 PROCEDURE — 96376 TX/PRO/DX INJ SAME DRUG ADON: CPT

## 2021-10-16 PROCEDURE — 87040 BLOOD CULTURE FOR BACTERIA: CPT

## 2021-10-16 PROCEDURE — 2500000003 HC RX 250 WO HCPCS: Performed by: EMERGENCY MEDICINE

## 2021-10-16 PROCEDURE — 74177 CT ABD & PELVIS W/CONTRAST: CPT

## 2021-10-16 PROCEDURE — 82010 KETONE BODYS QUAN: CPT

## 2021-10-16 PROCEDURE — 82962 GLUCOSE BLOOD TEST: CPT

## 2021-10-16 PROCEDURE — 82803 BLOOD GASES ANY COMBINATION: CPT

## 2021-10-16 PROCEDURE — 99283 EMERGENCY DEPT VISIT LOW MDM: CPT

## 2021-10-16 PROCEDURE — 84145 PROCALCITONIN (PCT): CPT

## 2021-10-16 PROCEDURE — 96375 TX/PRO/DX INJ NEW DRUG ADDON: CPT

## 2021-10-16 PROCEDURE — 83735 ASSAY OF MAGNESIUM: CPT

## 2021-10-16 PROCEDURE — 93005 ELECTROCARDIOGRAM TRACING: CPT | Performed by: EMERGENCY MEDICINE

## 2021-10-16 PROCEDURE — 83880 ASSAY OF NATRIURETIC PEPTIDE: CPT

## 2021-10-16 PROCEDURE — 83605 ASSAY OF LACTIC ACID: CPT

## 2021-10-16 PROCEDURE — 85025 COMPLETE CBC W/AUTO DIFF WBC: CPT

## 2021-10-16 PROCEDURE — 2580000003 HC RX 258: Performed by: EMERGENCY MEDICINE

## 2021-10-16 PROCEDURE — 96365 THER/PROPH/DIAG IV INF INIT: CPT

## 2021-10-16 RX ORDER — DIPHENHYDRAMINE HYDROCHLORIDE 50 MG/ML
12.5 INJECTION INTRAMUSCULAR; INTRAVENOUS ONCE
Status: COMPLETED | OUTPATIENT
Start: 2021-10-16 | End: 2021-10-16

## 2021-10-16 RX ORDER — CEFEPIME HYDROCHLORIDE 2 G/1
INJECTION, POWDER, FOR SOLUTION INTRAVENOUS
Status: DISCONTINUED
Start: 2021-10-16 | End: 2021-10-17 | Stop reason: HOSPADM

## 2021-10-16 RX ORDER — FENTANYL CITRATE 50 UG/ML
50 INJECTION, SOLUTION INTRAMUSCULAR; INTRAVENOUS ONCE
Status: COMPLETED | OUTPATIENT
Start: 2021-10-16 | End: 2021-10-16

## 2021-10-16 RX ORDER — SODIUM CHLORIDE 9 MG/ML
INJECTION, SOLUTION INTRAVENOUS CONTINUOUS
Status: DISCONTINUED | OUTPATIENT
Start: 2021-10-16 | End: 2021-10-17 | Stop reason: HOSPADM

## 2021-10-16 RX ORDER — 0.9 % SODIUM CHLORIDE 0.9 %
1000 INTRAVENOUS SOLUTION INTRAVENOUS ONCE
Status: COMPLETED | OUTPATIENT
Start: 2021-10-16 | End: 2021-10-16

## 2021-10-16 RX ADMIN — METRONIDAZOLE 500 MG: 500 INJECTION, SOLUTION INTRAVENOUS at 22:28

## 2021-10-16 RX ADMIN — SODIUM CHLORIDE: 9 INJECTION, SOLUTION INTRAVENOUS at 22:26

## 2021-10-16 RX ADMIN — HYDROMORPHONE HYDROCHLORIDE 0.5 MG: 1 INJECTION, SOLUTION INTRAMUSCULAR; INTRAVENOUS; SUBCUTANEOUS at 22:26

## 2021-10-16 RX ADMIN — CEFEPIME HYDROCHLORIDE 2000 MG: 2 INJECTION, POWDER, FOR SOLUTION INTRAVENOUS at 18:42

## 2021-10-16 RX ADMIN — IOPAMIDOL 75 ML: 755 INJECTION, SOLUTION INTRAVENOUS at 17:47

## 2021-10-16 RX ADMIN — HYDROMORPHONE HYDROCHLORIDE 1 MG: 1 INJECTION, SOLUTION INTRAMUSCULAR; INTRAVENOUS; SUBCUTANEOUS at 18:41

## 2021-10-16 RX ADMIN — DIPHENHYDRAMINE HYDROCHLORIDE 12.5 MG: 50 INJECTION, SOLUTION INTRAMUSCULAR; INTRAVENOUS at 22:25

## 2021-10-16 RX ADMIN — FENTANYL CITRATE 50 MCG: 50 INJECTION INTRAMUSCULAR; INTRAVENOUS at 17:19

## 2021-10-16 RX ADMIN — SODIUM CHLORIDE 1000 ML: 9 INJECTION, SOLUTION INTRAVENOUS at 17:19

## 2021-10-16 ASSESSMENT — PAIN SCALES - GENERAL
PAINLEVEL_OUTOF10: 10
PAINLEVEL_OUTOF10: 10
PAINLEVEL_OUTOF10: 5
PAINLEVEL_OUTOF10: 10

## 2021-10-17 ENCOUNTER — HOSPITAL ENCOUNTER (INPATIENT)
Age: 36
LOS: 3 days | Discharge: HOME | DRG: 317 | End: 2021-10-20
Attending: INTERNAL MEDICINE | Admitting: INTERNAL MEDICINE
Payer: MEDICARE

## 2021-10-17 DIAGNOSIS — L02.31 ABSCESS OF MULTIPLE SITES OF BUTTOCK: Primary | ICD-10-CM

## 2021-10-17 LAB
ALBUMIN SERPL-MCNC: 3.3 G/DL (ref 3.5–5.2)
ALP BLD-CCNC: 73 U/L (ref 40–129)
ALT SERPL-CCNC: <5 U/L (ref 0–40)
ANION GAP SERPL CALCULATED.3IONS-SCNC: 14 MMOL/L (ref 7–16)
AST SERPL-CCNC: 12 U/L (ref 0–39)
BASOPHILS ABSOLUTE: 0.05 E9/L (ref 0–0.2)
BASOPHILS RELATIVE PERCENT: 0.3 % (ref 0–2)
BILIRUB SERPL-MCNC: 0.6 MG/DL (ref 0–1.2)
BUN BLDV-MCNC: 16 MG/DL (ref 6–20)
CALCIUM SERPL-MCNC: 9 MG/DL (ref 8.6–10.2)
CHLORIDE BLD-SCNC: 96 MMOL/L (ref 98–107)
CO2: 19 MMOL/L (ref 22–29)
CREAT SERPL-MCNC: 1.2 MG/DL (ref 0.7–1.2)
EOSINOPHILS ABSOLUTE: 0.04 E9/L (ref 0.05–0.5)
EOSINOPHILS RELATIVE PERCENT: 0.2 % (ref 0–6)
GFR AFRICAN AMERICAN: >60
GFR NON-AFRICAN AMERICAN: >60 ML/MIN/1.73
GLUCOSE BLD-MCNC: 197 MG/DL (ref 74–99)
HCT VFR BLD CALC: 36 % (ref 37–54)
HEMOGLOBIN: 11.8 G/DL (ref 12.5–16.5)
IMMATURE GRANULOCYTES #: 0.13 E9/L
IMMATURE GRANULOCYTES %: 0.8 % (ref 0–5)
LYMPHOCYTES ABSOLUTE: 2.04 E9/L (ref 1.5–4)
LYMPHOCYTES RELATIVE PERCENT: 12 % (ref 20–42)
MCH RBC QN AUTO: 32.2 PG (ref 26–35)
MCHC RBC AUTO-ENTMCNC: 32.8 % (ref 32–34.5)
MCV RBC AUTO: 98.1 FL (ref 80–99.9)
METER GLUCOSE: 128 MG/DL (ref 74–99)
METER GLUCOSE: 234 MG/DL (ref 74–99)
METER GLUCOSE: 290 MG/DL (ref 74–99)
METER GLUCOSE: 293 MG/DL (ref 74–99)
MONOCYTES ABSOLUTE: 1.26 E9/L (ref 0.1–0.95)
MONOCYTES RELATIVE PERCENT: 7.4 % (ref 2–12)
NEUTROPHILS ABSOLUTE: 13.55 E9/L (ref 1.8–7.3)
NEUTROPHILS RELATIVE PERCENT: 79.3 % (ref 43–80)
PDW BLD-RTO: 13.2 FL (ref 11.5–15)
PLATELET # BLD: 297 E9/L (ref 130–450)
PMV BLD AUTO: 9.8 FL (ref 7–12)
POTASSIUM REFLEX MAGNESIUM: 3.9 MMOL/L (ref 3.5–5)
RBC # BLD: 3.67 E12/L (ref 3.8–5.8)
RBC # BLD: NORMAL 10*6/UL
SODIUM BLD-SCNC: 129 MMOL/L (ref 132–146)
SODIUM URINE: 50 MMOL/L
TOTAL PROTEIN: 7.3 G/DL (ref 6.4–8.3)
WBC # BLD: 17.1 E9/L (ref 4.5–11.5)

## 2021-10-17 PROCEDURE — 6370000000 HC RX 637 (ALT 250 FOR IP): Performed by: INTERNAL MEDICINE

## 2021-10-17 PROCEDURE — 2580000003 HC RX 258: Performed by: INTERNAL MEDICINE

## 2021-10-17 PROCEDURE — 36415 COLL VENOUS BLD VENIPUNCTURE: CPT

## 2021-10-17 PROCEDURE — 99223 1ST HOSP IP/OBS HIGH 75: CPT | Performed by: INTERNAL MEDICINE

## 2021-10-17 PROCEDURE — 1200000000 HC SEMI PRIVATE

## 2021-10-17 PROCEDURE — 6360000002 HC RX W HCPCS: Performed by: SPECIALIST

## 2021-10-17 PROCEDURE — 85025 COMPLETE CBC W/AUTO DIFF WBC: CPT

## 2021-10-17 PROCEDURE — 82962 GLUCOSE BLOOD TEST: CPT

## 2021-10-17 PROCEDURE — 84300 ASSAY OF URINE SODIUM: CPT

## 2021-10-17 PROCEDURE — 80053 COMPREHEN METABOLIC PANEL: CPT

## 2021-10-17 PROCEDURE — 2580000003 HC RX 258: Performed by: SPECIALIST

## 2021-10-17 RX ORDER — METOPROLOL SUCCINATE 50 MG/1
50 TABLET, EXTENDED RELEASE ORAL 2 TIMES DAILY
Status: DISCONTINUED | OUTPATIENT
Start: 2021-10-17 | End: 2021-10-20 | Stop reason: HOSPADM

## 2021-10-17 RX ORDER — OXYCODONE HYDROCHLORIDE AND ACETAMINOPHEN 5; 325 MG/1; MG/1
1 TABLET ORAL EVERY 4 HOURS PRN
Status: DISCONTINUED | OUTPATIENT
Start: 2021-10-17 | End: 2021-10-20 | Stop reason: HOSPADM

## 2021-10-17 RX ORDER — NICOTINE POLACRILEX 4 MG
15 LOZENGE BUCCAL PRN
Status: DISCONTINUED | OUTPATIENT
Start: 2021-10-17 | End: 2021-10-20 | Stop reason: HOSPADM

## 2021-10-17 RX ORDER — ONDANSETRON 4 MG/1
4 TABLET, ORALLY DISINTEGRATING ORAL EVERY 8 HOURS PRN
Status: DISCONTINUED | OUTPATIENT
Start: 2021-10-17 | End: 2021-10-20 | Stop reason: HOSPADM

## 2021-10-17 RX ORDER — DEXTROSE MONOHYDRATE 25 G/50ML
12.5 INJECTION, SOLUTION INTRAVENOUS PRN
Status: DISCONTINUED | OUTPATIENT
Start: 2021-10-17 | End: 2021-10-20 | Stop reason: HOSPADM

## 2021-10-17 RX ORDER — ACETAMINOPHEN 650 MG/1
650 SUPPOSITORY RECTAL EVERY 6 HOURS PRN
Status: DISCONTINUED | OUTPATIENT
Start: 2021-10-17 | End: 2021-10-20 | Stop reason: HOSPADM

## 2021-10-17 RX ORDER — SODIUM CHLORIDE 9 MG/ML
INJECTION, SOLUTION INTRAVENOUS CONTINUOUS
Status: DISCONTINUED | OUTPATIENT
Start: 2021-10-17 | End: 2021-10-20 | Stop reason: HOSPADM

## 2021-10-17 RX ORDER — INSULIN GLARGINE 100 [IU]/ML
10 INJECTION, SOLUTION SUBCUTANEOUS 2 TIMES DAILY
Status: DISCONTINUED | OUTPATIENT
Start: 2021-10-17 | End: 2021-10-17

## 2021-10-17 RX ORDER — SODIUM CHLORIDE 9 MG/ML
25 INJECTION, SOLUTION INTRAVENOUS PRN
Status: DISCONTINUED | OUTPATIENT
Start: 2021-10-17 | End: 2021-10-20 | Stop reason: HOSPADM

## 2021-10-17 RX ORDER — INSULIN GLARGINE 100 [IU]/ML
15 INJECTION, SOLUTION SUBCUTANEOUS 2 TIMES DAILY
Status: DISCONTINUED | OUTPATIENT
Start: 2021-10-17 | End: 2021-10-20 | Stop reason: HOSPADM

## 2021-10-17 RX ORDER — LIDOCAINE HYDROCHLORIDE 20 MG/ML
JELLY TOPICAL 2 TIMES DAILY PRN
Status: DISCONTINUED | OUTPATIENT
Start: 2021-10-17 | End: 2021-10-20 | Stop reason: HOSPADM

## 2021-10-17 RX ORDER — SODIUM CHLORIDE 9 MG/ML
INJECTION, SOLUTION INTRAVENOUS EVERY 8 HOURS
Status: DISCONTINUED | OUTPATIENT
Start: 2021-10-17 | End: 2021-10-20

## 2021-10-17 RX ORDER — SODIUM CHLORIDE 0.9 % (FLUSH) 0.9 %
5-40 SYRINGE (ML) INJECTION EVERY 12 HOURS SCHEDULED
Status: DISCONTINUED | OUTPATIENT
Start: 2021-10-17 | End: 2021-10-20 | Stop reason: HOSPADM

## 2021-10-17 RX ORDER — ONDANSETRON 2 MG/ML
4 INJECTION INTRAMUSCULAR; INTRAVENOUS EVERY 6 HOURS PRN
Status: DISCONTINUED | OUTPATIENT
Start: 2021-10-17 | End: 2021-10-20 | Stop reason: HOSPADM

## 2021-10-17 RX ORDER — POLYETHYLENE GLYCOL 3350 17 G/17G
17 POWDER, FOR SOLUTION ORAL DAILY PRN
Status: DISCONTINUED | OUTPATIENT
Start: 2021-10-17 | End: 2021-10-20 | Stop reason: HOSPADM

## 2021-10-17 RX ORDER — SODIUM CHLORIDE 0.9 % (FLUSH) 0.9 %
5-40 SYRINGE (ML) INJECTION PRN
Status: DISCONTINUED | OUTPATIENT
Start: 2021-10-17 | End: 2021-10-20 | Stop reason: HOSPADM

## 2021-10-17 RX ORDER — ACETAMINOPHEN 325 MG/1
650 TABLET ORAL EVERY 6 HOURS PRN
Status: DISCONTINUED | OUTPATIENT
Start: 2021-10-17 | End: 2021-10-20 | Stop reason: HOSPADM

## 2021-10-17 RX ORDER — DEXTROSE MONOHYDRATE 50 MG/ML
100 INJECTION, SOLUTION INTRAVENOUS PRN
Status: DISCONTINUED | OUTPATIENT
Start: 2021-10-17 | End: 2021-10-20 | Stop reason: HOSPADM

## 2021-10-17 RX ADMIN — INSULIN LISPRO 3 UNITS: 100 INJECTION, SOLUTION INTRAVENOUS; SUBCUTANEOUS at 16:39

## 2021-10-17 RX ADMIN — METOPROLOL SUCCINATE 50 MG: 50 TABLET, EXTENDED RELEASE ORAL at 21:38

## 2021-10-17 RX ADMIN — OXYCODONE AND ACETAMINOPHEN 1 TABLET: 5; 325 TABLET ORAL at 04:29

## 2021-10-17 RX ADMIN — INSULIN LISPRO 2 UNITS: 100 INJECTION, SOLUTION INTRAVENOUS; SUBCUTANEOUS at 11:37

## 2021-10-17 RX ADMIN — SODIUM CHLORIDE: 9 INJECTION, SOLUTION INTRAVENOUS at 04:28

## 2021-10-17 RX ADMIN — INSULIN LISPRO 2 UNITS: 100 INJECTION, SOLUTION INTRAVENOUS; SUBCUTANEOUS at 22:34

## 2021-10-17 RX ADMIN — LIDOCAINE HYDROCHLORIDE: 20 JELLY TOPICAL at 12:30

## 2021-10-17 RX ADMIN — OXYCODONE AND ACETAMINOPHEN 1 TABLET: 5; 325 TABLET ORAL at 16:37

## 2021-10-17 RX ADMIN — OXYCODONE AND ACETAMINOPHEN 1 TABLET: 5; 325 TABLET ORAL at 12:30

## 2021-10-17 RX ADMIN — OXYCODONE AND ACETAMINOPHEN 1 TABLET: 5; 325 TABLET ORAL at 21:38

## 2021-10-17 RX ADMIN — ACETAMINOPHEN 650 MG: 325 TABLET ORAL at 10:22

## 2021-10-17 RX ADMIN — PIPERACILLIN AND TAZOBACTAM 3375 MG: 3; .375 INJECTION, POWDER, LYOPHILIZED, FOR SOLUTION INTRAVENOUS at 18:51

## 2021-10-17 RX ADMIN — INSULIN GLARGINE 10 UNITS: 100 INJECTION, SOLUTION SUBCUTANEOUS at 10:12

## 2021-10-17 RX ADMIN — VANCOMYCIN HYDROCHLORIDE 2000 MG: 10 INJECTION, POWDER, LYOPHILIZED, FOR SOLUTION INTRAVENOUS at 16:58

## 2021-10-17 RX ADMIN — SODIUM CHLORIDE: 9 INJECTION, SOLUTION INTRAVENOUS at 21:49

## 2021-10-17 RX ADMIN — ACETAMINOPHEN 650 MG: 325 TABLET ORAL at 04:29

## 2021-10-17 RX ADMIN — INSULIN GLARGINE 15 UNITS: 100 INJECTION, SOLUTION SUBCUTANEOUS at 22:35

## 2021-10-17 ASSESSMENT — PAIN DESCRIPTION - LOCATION
LOCATION: BUTTOCKS
LOCATION: BUTTOCKS

## 2021-10-17 ASSESSMENT — PAIN SCALES - GENERAL
PAINLEVEL_OUTOF10: 10
PAINLEVEL_OUTOF10: 8
PAINLEVEL_OUTOF10: 10
PAINLEVEL_OUTOF10: 5
PAINLEVEL_OUTOF10: 9
PAINLEVEL_OUTOF10: 10
PAINLEVEL_OUTOF10: 9

## 2021-10-17 ASSESSMENT — PAIN DESCRIPTION - PAIN TYPE
TYPE: ACUTE PAIN
TYPE: ACUTE PAIN

## 2021-10-17 ASSESSMENT — PAIN DESCRIPTION - PROGRESSION
CLINICAL_PROGRESSION: NOT CHANGED
CLINICAL_PROGRESSION: GRADUALLY WORSENING

## 2021-10-17 ASSESSMENT — PAIN DESCRIPTION - DESCRIPTORS: DESCRIPTORS: CONSTANT;JABBING

## 2021-10-17 ASSESSMENT — PAIN DESCRIPTION - FREQUENCY
FREQUENCY: CONTINUOUS
FREQUENCY: CONTINUOUS

## 2021-10-17 ASSESSMENT — PAIN - FUNCTIONAL ASSESSMENT: PAIN_FUNCTIONAL_ASSESSMENT: PREVENTS OR INTERFERES SOME ACTIVE ACTIVITIES AND ADLS

## 2021-10-17 ASSESSMENT — PAIN DESCRIPTION - ORIENTATION
ORIENTATION: LEFT;RIGHT
ORIENTATION: RIGHT;LEFT

## 2021-10-17 ASSESSMENT — PAIN DESCRIPTION - ONSET
ONSET: ON-GOING
ONSET: ON-GOING

## 2021-10-17 NOTE — CONSULTS
General Surgery Consult    Patient's Name/Date of Birth: Matilde Lam / 1985    Date: October 17, 2021     Consulting Surgeon: Brit Akers M.D.    PCP: No primary care provider on file. Chief Complaint:     HPI:   Matilde Lam is a 38 y.o. male who presents for evaluation of rectal abscess. Patient is s/p incision and drainage and subsequent healing of the abscess. He comes in today with worsening pain. Denied any drainage. Denied any fevers/chills.         Past Medical History:   Diagnosis Date    CAD (coronary artery disease)     [facility]     Diabetes mellitus ([facility]     Hyperlipidemia     Hyperosmolar hyperglycemic state (HHS) ([facility] 7/30/2021    Hypertension        Past Surgical History:   Procedure Laterality Date    ABDOMEN SURGERY N/A 9/18/2021    INCISION AND DRAINAGE OF LARGE PERIANAL ABSCESS performed by Juan Manuel Wilson MD at [address]      buttocks    ECHO COMPL W DOP COLOR FLOW  5/5/2015         WISDOM TOOTH EXTRACTION         Current Facility-Administered Medications   Medication Dose Route Frequency Provider Last Rate Last Admin    metoprolol succinate (TOPROL XL) extended release tablet 50 mg  50 mg Oral BID Chava Hric, DO        sodium chloride flush 0.9 % injection 5-40 mL  5-40 mL IntraVENous 2 times per day Chava Hric, DO        sodium chloride flush 0.9 % injection 5-40 mL  5-40 mL IntraVENous PRN Chava Hric, DO        0.9 % sodium chloride infusion  25 mL IntraVENous PRN Chava Hric, DO        enoxaparin (LOVENOX) injection 40 mg  40 mg SubCUTAneous Daily Chava Hric, DO        ondansetron (ZOFRAN-ODT) disintegrating tablet 4 mg  4 mg Oral Q8H PRN Chava Hric, DO        Or    ondansetron (ZOFRAN) injection 4 mg  4 mg IntraVENous Q6H PRN Chava Hric, DO        polyethylene glycol (GLYCOLAX) packet 17 g  17 g Oral Daily PRN Chava Hric, DO        acetaminophen (TYLENOL) tablet 650 mg  650 mg Oral Q6H PRN Eugenia Rizvi Hric, DO   650 mg at 10/17/21 1022    Or    acetaminophen (TYLENOL) suppository 650 mg  650 mg Rectal Q6H PRN Chava Hric, DO        insulin lispro (HUMALOG) injection vial 0-6 Units  0-6 Units SubCUTAneous TID WC Chava Hric, DO   2 Units at 10/17/21 1137    insulin lispro (HUMALOG) injection vial 0-3 Units  0-3 Units SubCUTAneous Nightly Chava Hric, DO        glucose (GLUTOSE) 40 % oral gel 15 g  15 g Oral PRN Chava Hric, DO        dextrose 50 % IV solution  12.5 g IntraVENous PRN Chava Hric, DO        glucagon (rDNA) injection 1 mg  1 mg IntraMUSCular PRN Chava Hric, DO        dextrose 5 % solution  100 mL/hr IntraVENous PRN Chava Hric, DO        oxyCODONE-acetaminophen (PERCOCET) 5-325 MG per tablet 1 tablet  1 tablet Oral Q4H PRN Chava Hric, DO   1 tablet at 10/17/21 1230    0.9 % sodium chloride infusion   IntraVENous Continuous Chava Hric,  mL/hr at 10/17/21 0428 New Bag at 10/17/21 0428    lidocaine (XYLOCAINE) 2 % jelly   Topical BID PRN Marycruz Mathias MD   Given at 10/17/21 1230    insulin glargine (LANTUS) injection vial 15 Units  15 Units SubCUTAneous BID Marycruz Mathias MD        piperacillin-tazobactam (ZOSYN) 3,375 mg in dextrose 5 % 50 mL IVPB extended infusion (mini-bag)  3,375 mg IntraVENous Q8H Ivelisse Mejia MD        vancomycin 2000 mg in dextrose 5% 500 ml IVPB  2,000 mg IntraVENous Q12H Ivelisse Mejia MD        Saline - post-Zosyn flush   IntraVENous Q8H Peter Trent MD           No Known Allergies    Family History   Problem Relation Age of Onset    High Blood Pressure Father     Heart Disease Father     High Blood Pressure Paternal Grandmother     Heart Attack Paternal Grandfather     High Blood Pressure Mother     Heart Disease Mother        Social History     Socioeconomic History    Marital status: Single     Spouse name: Not on file    Number of children: 1    Years of education: Not on file    Highest education level: Not on file Occupational History    Occupation: unemployed   Tobacco Use    Smoking status: Former Smoker     Years: 8.00     Types: Cigars, Cigarettes     Start date: 2000    Smokeless tobacco: Never Used    Tobacco comment: Smokes 1 cigar weekly/ cigs 2-3 a week   Vaping Use    Vaping Use: Every day    Start date: 6/1/2021    Substances: Nicotine   Substance and Sexual Activity    Alcohol use: Yes     Comment: social drinker    Drug use: No    Sexual activity: Not on file   Other Topics Concern    Not on file   Social History Narrative    Denies caffiene     Social Determinants of Health     Financial Resource Strain:     Difficulty of Paying Living Expenses:    Food Insecurity:     Worried About Running Out of Food in the Last Year:     [address] in the Last Year:    Transportation Needs:     Lack of Transportation (Medical):      Lack of Transportation (Non-Medical):    Physical Activity:     Days of Exercise per Week:     Minutes of Exercise per Session:    Stress:     Feeling of Stress :    Social Connections:     Frequency of Communication with Friends and Family:     Frequency of Social Gatherings with Friends and Family:     Attends [employer] Services:     Active Member of Clubs or Organizations:     Attends Club or Organization Meetings:     Marital Status:    Intimate Partner Violence:     Fear of Current or Ex-Partner:     Emotionally Abused:     Physically Abused:     Sexually Abused:            Review of Systems  Negative times ten except what was stated in HPI and PMH    Physical exam:   BP 95/83   Pulse 99   Temp 99.3 °F (37.4 °C) (Oral)   Resp 16   Ht 5' 10\" (1.778 m)   Wt 233 lb (105.7 kg)   SpO2 97%   BMI 33.43 kg/m²   General appearance: AAOx3, NAD  Head: NCAT, PERRLA, EOMI, red conjunctiva  Neck: supple, no masses  Lungs: CTAB, equal chest rise bilateral  Heart: Reg rate  Abdomen: soft, non tender, non distended, no masses or organomegaly, no palpable hernias or defects, +bowel sounds, surgical scars were  Present, rectal exam with scaring and edema.   Skin; no lesions  Gu: no cva tenderness  Extremities: extremities normal, atraumatic, no cyanosis or edema      Radiology:  CT abdomen/pelvis:     Assessment:  38 y.o. male with shraddha rectal abscess    Plan:  IV ab  Keep NPO after midnight for possible drainage tomorrow        Gaye Egan MD  10/17/2021  4:19 PM

## 2021-10-17 NOTE — PROGRESS NOTES
Spoke with Dr. Mignon Cueva via omar kim to switch EMLA cream to a lidocaine ointment per pharmacy. Pharmacy notified.

## 2021-10-17 NOTE — CONSULTS
[address] [place] [address] [facility]    Consultation Note     Admit Date: 10/17/2021 12:02 AM    Reason for Consult:   Recurrent perirectal abscess    Attending Physician:  Ele Jaramillo MD     Chief Complaint: Worsening of previously I indeed wound with more edema and pain    HISTORY OF PRESENT ILLNESS:   The patient is a 38 y.o. black man not known to the Infectious Diseases service. The patient is admitted with a similar problem that occurred about a month ago when he had a gluteal abscess perianal I&D 8 09/18/2021. He was discharged and had wound care check his packing of the wound and at home he noted that the wound area in general got more painful swollen tender and he was unable to ambulate. He was brought into the emergency room for further evaluation and admitted he had low-grade temperature, he is diabetic his blood pressure was only 93/52. And his white count was 17,000 with a hemoglobin of 12. His bicarb was somewhere between 19 and 22 and then on admission his BUN and creatinine 23 and 1.5. Cultures previously grew group B strep mixed with other organisms including alpha hemolytic strep and coag negative staph. Last month he was treated with Rocephin and Flagyl and was discharged on Omnicef and Flagyl for 7 days. CT scan of the abdomen pelvis was reviewed and is significant inflammatory changes and phlegmon in the perirectal region as well as the gluteal area.   Surgery has been consulted today as well my review of the CT scan shows some air in the soft tissue                  Past Medical History:        Diagnosis Date    CAD (coronary artery disease)     [facility] [address] [facility]     Diabetes mellitus ([facility] [facility]     Hyperlipidemia     Hyperosmolar hyperglycemic state (HHS) ([facility] [address] 7/30/2021    Hypertension      Past Surgical History:        Procedure Laterality Date    ABDOMEN SURGERY N/A 9/18/2021    INCISION AND DRAINAGE OF LARGE PERIANAL ABSCESS performed by Suraj Caraballo MD at [address]      buttocks    ECHO COMPL W DOP COLOR FLOW  5/5/2015         WISDOM TOOTH EXTRACTION       Current Medications:   Scheduled Meds:   metoprolol succinate  50 mg Oral BID    sodium chloride flush  5-40 mL IntraVENous 2 times per day    enoxaparin  40 mg SubCUTAneous Daily    insulin lispro  0-6 Units SubCUTAneous TID WC    insulin lispro  0-3 Units SubCUTAneous Nightly    insulin glargine  15 Units SubCUTAneous BID     Continuous Infusions:   sodium chloride      dextrose      sodium chloride 100 mL/hr at 10/17/21 0428     PRN Meds:sodium chloride flush, sodium chloride, ondansetron **OR** ondansetron, polyethylene glycol, acetaminophen **OR** acetaminophen, glucose, dextrose, glucagon (rDNA), dextrose, oxyCODONE-acetaminophen, lidocaine    Allergies:  Patient has no known allergies. Social History:   Social History     Socioeconomic History    Marital status: Single     Spouse name: None    Number of children: 1    Years of education: None    Highest education level: None   Occupational History    Occupation: unemployed   Tobacco Use    Smoking status: Former Smoker     Years: 8.00     Types: Cigars, Cigarettes     Start date: 2000    Smokeless tobacco: Never Used    Tobacco comment: Smokes 1 cigar weekly/ cigs 2-3 a week   Vaping Use    Vaping Use: Every day    Start date: 6/1/2021    Substances: Nicotine   Substance and Sexual Activity    Alcohol use: Yes     Comment: social drinker    Drug use: No    Sexual activity: None   Other Topics Concern    None   Social History Narrative    Denies caffiene     Social Determinants of Health     Financial Resource Strain:     Difficulty of Paying Living Expenses:    Food Insecurity:     Worried About Running Out of Food in the Last Year:     [address] in the Last Year:    Transportation Needs:     Lack of Transportation (Medical):      Lack of Transportation (Non-Medical):    Physical Activity:     Days of Exercise per Week:     Minutes of Exercise per Session:    Stress:     Feeling of Stress :    Social Connections:     Frequency of Communication with Friends and Family:     Frequency of Social Gatherings with Friends and Family:     Attends Jewish Services:     Active Member of Clubs or Organizations:     Attends Club or Organization Meetings:     Marital Status:    Intimate Partner Violence:     Fear of Current or Ex-Partner:     Emotionally Abused:     Physically Abused:     Sexually Abused:      Family History:       Problem Relation Age of Onset    High Blood Pressure Father     Heart Disease Father     High Blood Pressure Paternal Grandmother     Heart Attack Paternal Grandfather     High Blood Pressure Mother     Heart Disease Mother    . Otherwise non-pertinent to the chief complaint. REVIEW OF SYSTEMS:    CONSTITUTIONAL:  No chills, fevers or night sweats. No loss of weight. EYES:  No double vision or drainage from eyes, ears or throat. HEENT:  No neck stiffness. No dysphagia. No drainage from eyes, ears or throat  RESPIRATORY:  No cough, productive sputum or hemoptysis. CARDIOVASCULAR:  No chest pain, palpitations, orthopnea or dyspnea on exertion. GASTROINTESTINAL:  No nausea, vomiting, diarrhea or constipation or hematochezia   GENITOURINARY:  No frequency burning dysuria or hematuria. INTEGUMENT/BREAST:  No rash or breast masses. HEMATOLOGIC/LYMPHATIC:  No lymphadenopathy or blood dyscrasics. ALLERGIC/IMMUNOLOGIC:  No anaphylaxis. ENDOCRINE:  No polyuria or polydipsia or temperature intolerance. MUSCULOSKELETAL:  No myalgia or arthralgia. Full ROM of the left hip and leg affected in range of motion because of the perirectal perianal abscess  NEUROLOGICAL:  No focal motor sensory deficit. BEHAVIOR/PSYCH:  No psychosis.      PHYSICAL EXAM:    Vitals:    BP 95/83   Pulse 99   Temp 99.3 °F (37.4 °C) (Oral)   Resp 16   Ht 5' 10\" (1.778 m) Wt 233 lb (105.7 kg)   SpO2 97%   BMI 33.43 kg/m²   Constitutional: The patient is awake, alert, and oriented. Skin: Warm and dry. No rashes were noted. No jaundice. HEENT: Eyes show round, and reactive pupils. Moist mucous membranes, no ulcerations, no thrush. Neck: Supple to movements. No lymphadenopathy. Chest: No use of accessory muscles to breathe. Symmetrical expansion. Auscultation reveals no wheezing, crackles, or rhonchi. Cardiovascular: S1 and S2 are rhythmic and regular. No murmurs appreciated. Abdomen: Positive bowel sounds to auscultation. Benign to palpation. No masses felt. No hepatosplenomegaly. Genitourinary: Male  Extremities: No clubbing, no cyanosis, no edema. Musculoskeletal: Equal and symmetrical except for the left  perirectal and gluteal skin and soft tissue which is quite edematous tender warm although I cannot auscultate crepitations or feel them. Neurological: No focal  Lines: peripheral      CBC+dif:  Recent Labs     10/16/21  1650 10/16/21  1650 10/17/21  0440   WBC 17.7*  --  17.1*   HGB 12.3*   < > 11.8*   HCT 36.1*   < > 36.0*   MCV 94.3   < > 98.1      < > 297   NEUTROABS 14.60*   < > 13.55*    < > = values in this interval not displayed.      No results found for: CRP  No results found for: CRPHS  No results found for: SEDRATE  Lab Results   Component Value Date    ALT <5 10/17/2021    AST 12 10/17/2021    ALKPHOS 73 10/17/2021    BILITOT 0.6 10/17/2021     Lab Results   Component Value Date     10/17/2021    K 3.9 10/17/2021    CL 96 10/17/2021    CO2 19 10/17/2021    BUN 16 10/17/2021    CREATININE 1.2 10/17/2021    GFRAA >60 10/17/2021    LABGLOM >60 10/17/2021    GLUCOSE 197 10/17/2021    PROT 7.3 10/17/2021    LABALBU 3.3 10/17/2021    CALCIUM 9.0 10/17/2021    BILITOT 0.6 10/17/2021    ALKPHOS 73 10/17/2021    AST 12 10/17/2021    ALT <5 10/17/2021       Lab Results   Component Value Date    PROTIME 14.2 12/24/2020    INR 1.3 12/24/2020       Lab Results   Component Value Date    TSH 1.240 09/17/2021       Lab Results   Component Value Date    COLORU Yellow 10/16/2021    PHUR 6.0 10/16/2021    WBCUA 0-1 10/16/2021    RBCUA 5-10 10/16/2021    BACTERIA RARE 10/16/2021    CLARITYU Clear 10/16/2021    SPECGRAV 1.010 10/16/2021    LEUKOCYTESUR Negative 10/16/2021    UROBILINOGEN 0.2 10/16/2021    BILIRUBINUR Negative 10/16/2021    BLOODU SMALL 10/16/2021    GLUCOSEU >=1000 10/16/2021       No results found for: Adela[place], A2QMDBDD, PHART, THGBART, SKK7KZL, PO2ART, WRZ5RKL  Radiology:  No orders to display       Microbiology:  Pending  No results for input(s): BC in the last 72 hours. No results for input(s): ORG in the last 72 hours. No results for input(s): [place] in the last 72 hours. No results for input(s): STREPNEUMAGU in the last 72 hours. No results for input(s): LP1UAG in the last 72 hours. No results for input(s): ASO in the last 72 hours. No results for input(s): CULTRESP in the last 72 hours. Assessment:  · Significant concern for necrotizing fasciitis left perirectal left gluteal area especially after reviewing the CAT scan and I do see some signs of air in the soft tissue    Plan:    · Cont  Zosyn/vancomycin x1  · Surgical I&D as soon as possible; notified and patient made n.p.o.  · check cultures  · Baseline ESR, CRP  · Monitor labs  · Will follow with you    Thank you for having us see this patient in consultation. I will be discussing this case with the treating physicians.       Electronically signed by Rachid Lomas MD on 10/17/2021 at 3:47 PM

## 2021-10-17 NOTE — PROGRESS NOTES
IntraVENous Continuous Chava Hric,  mL/hr at 10/17/21 0428 New Bag at 10/17/21 0428    lidocaine (XYLOCAINE) 2 % jelly   Topical BID PRN Tony Skaggs MD         Recent Complaints:  Review of Systems  Vitals:    10/17/21 0715   BP: 95/83   Pulse: 99   Resp: 16   Temp: 99.3 °F (37.4 °C)   SpO2: 97%     Physical Exam  Cardiovascular:      Rate and Rhythm: Normal rate and regular rhythm. Heart sounds: Normal heart sounds. Pulmonary:      Effort: Pulmonary effort is normal.      Breath sounds: Normal breath sounds. Genitourinary:     Comments: Buttocks with induration and abscesses  Musculoskeletal:      Right lower leg: No edema. Left lower leg: No edema. Recent Labs     10/16/21  1650 10/17/21  0440   * 129*   K 4.1 3.9   CL 91* 96*   CO2 22 19*   BUN 23* 16   CREATININE 1.5* 1.2   GLUCOSE 480* 197*   CALCIUM 9.3 9.0     Recent Labs     10/16/21  1650 10/17/21  0440   WBC 17.7* 17.1*   RBC 3.83 3.67*   HGB 12.3* 11.8*   HCT 36.1* 36.0*   MCV 94.3 98.1   MCH 32.1 32.2   MCHC 34.1 32.8   RDW 13.2 13.2    297   MPV 10.1 9.8           Active Problems:    Cellulitis    RONA (acute kidney injury) ([facility] [address]    Dehydration    Uncontrolled type 2 diabetes mellitus with hyperglycemia (HCC)    Abscess of multiple sites of buttock  Resolved Problems:    * No resolved hospital problems.  *        Plan:  Lidocaine  Pain control  Ice  ATB  Insulin   Correct electrolytes await Ur Sodium    Electronically signed by Tony Skaggs MD on 10/17/2021 at 11:11 AM

## 2021-10-17 NOTE — H&P
[facility]   History and Physical      CHIEF COMPLAINT:  pain    History of Present Illness: pt is a 38 y.o. male who presents with pain from healing wound from left buttock perianal abscess that had been debrided recently. Pt had been discharged from hospital and has been taking care of wound with help from mom with wound checks and packing. Pt states that approx Wednesday, pt has been having pain from the area. Area has noted to have swelling. Pain has steadily increased. Pt has noted increased drainage. Pt has noted subjective fevers at home. Pt has been laying on couch due to pain. Pt states he has not been checking sugars because of not eating much. REVIEW OF SYSTEMS:    A detailed system review was conducted with patient and is negative unless stated in hpi. PMH:  Past Medical History:   Diagnosis Date    CAD (coronary artery disease)     [facility]est[facility]) ([facility] [address]     Diabetes mellitus ([facility] [address]     Hyperlipidemia     Hyperosmolar hyperglycemic state (HHS) ([facility] [address] 7/30/2021    Hypertension        Surgical History:  Past Surgical History:   Procedure Laterality Date    ABDOMEN SURGERY N/A 9/18/2021    INCISION AND DRAINAGE OF LARGE PERIANAL ABSCESS performed by India Briggs MD at [address]      buttocks    ECHO COMPL W DOP COLOR FLOW  5/5/2015         WISDOM TOOTH EXTRACTION         Medications Prior to Admission:    Prior to Admission medications    Medication Sig Start Date End Date Taking?  Authorizing Provider   spironolactone (ALDACTONE) 25 MG tablet Take 25 mg by mouth daily   Yes Historical Provider, MD   metoprolol succinate (TOPROL XL) 100 MG extended release tablet Take 1 tablet by mouth 2 times daily 10/4/21  Yes Vicki Like, APRN - CNP   sacubitril-valsartan (ENTRESTO) 49-51 MG per tablet Take 1 tablet by mouth 2 times daily 9/30/21  Yes Vicki Jay, APRN - CNP   bumetanide (BUMEX) 2 MG tablet Take 1 tablet by mouth See Admin Instructions Take 1 tab 8am daily. On days you gain weight more than 2 lbs or have worse swelling or short of breath then take a 2pm dose that day. 9/30/21  Yes CRIS Spencer CNP   insulin glargine (LANTUS SOLOSTAR) 100 UNIT/ML injection pen Inject 20 Units into the skin 2 times daily 9/20/21  Yes Lavonne Nageotte, MD   insulin lispro, 1 Unit Dial, (HUMALOG KWIKPEN) 100 UNIT/ML SOPN Inject 15 units with meals + following sliding scale. -200 add 3U, -250 add 6U, -300 add 9U, -350 add 12U, -400 add 15U, BS over 400 add 18U. MAX 75U/day 9/20/21  Yes Lavonne Nageotte, MD   triamcinolone (KENALOG) 0.1 % cream Apply topically as needed Apply topically 2 times daily. Yes Historical Provider, MD   Vitamin D (CHOLECALCIFEROL) 50 MCG (2000 UT) TABS tablet Take 1 tablet by mouth daily 9/18/21 10/18/21 Yes Gasper Banks MD   potassium chloride (KLOR-CON M) 20 MEQ extended release tablet Take 2 tablets by mouth daily  Patient taking differently: Take 40 mEq by mouth daily Pt only taking one tablet daily unless he takes Bumex BID then he takes 2 tabs 1/19/21  Yes CRIS Spencer CNP   Insulin Pen Needle (BD PEN NEEDLE MICRO U/F) 32G X 6 MM MISC Uses with insulin 4 times a day 9/20/21   Lavonne Nageotte, MD   Lancets MISC Test 4 times/day before meals and at bedtime and as needed for symptoms of irregular blood glucose. 9/20/21   Jairo Bell MD   [facility] Lancets MISC 1 each by Does not apply route daily To check sugar 4 x a day and if feeling ill 7/31/21   Jose M Zambrano DO   blood glucose monitor strips Test **4* times a day & as needed for symptoms of irregular blood glucose. Dispense sufficient amount for indicated testing frequency plus additional to accommodate PRN testing needs. 7/31/21   Jose M Zambrano DO       Allergies:    Patient has no known allergies. Social History:    reports that he has quit smoking.  His smoking use included cigars and cigarettes. He started smoking about 21 years ago. He quit after 8.00 years of use. He has never used smokeless tobacco. He reports current alcohol use. He reports that he does not use drugs. Family History:       Problem Relation Age of Onset    High Blood Pressure Father     Heart Disease Father     High Blood Pressure Paternal Grandmother     Heart Attack Paternal Grandfather     High Blood Pressure Mother     Heart Disease Mother        PHYSICAL EXAM:  Vitals:  BP (!) 93/52   Pulse 71   Temp 99.1 °F (37.3 °C) (Oral)   Resp 18   SpO2 93%         PHYSICAL EXAM:    Vitals:  BP (!) 93/52   Pulse 71   Temp 99.1 °F (37.3 °C) (Oral)   Resp 18   SpO2 93%     General:  Appears comfortable resting but when awoke pt noted how much pain he was in. Answers questions appropriately and cooperative with exam  HEENT:  Mucous membranes moist. No erythema, rhinorrhea, or post-nasal drip noted. Neck:  No carotid bruits. Heart:  Rhythm regular at rate of 72  Lungs:  CTA. No wheeze, rales, or rhonchi  Abdomen:  Positive bowel sounds positive. Soft. Non-tender. No guarding, rebound or rigidity. Breast/Rectal/Genitourinary: not pertinent. Extremities:  Negative for lower extremity edema  Skin:  Warm and dry  Vascular: 2/4 Dorsalis Pedis pulses bilaterally. Neuro:  Cranial nerves 2-12 grossly intact, no focal weakness or change in sensation noted. Extraocular muscles intact. Pupils equal, round, reactive to light. LABS:  Recent Labs     10/16/21  1650   *   K 4.1   CL 91*   CO2 22   BUN 23*   CREATININE 1.5*   GLUCOSE 480*   CALCIUM 9.3       Recent Labs     10/16/21  1650   WBC 17.7*   RBC 3.83   HGB 12.3*   HCT 36.1*   MCV 94.3   MCH 32.1   MCHC 34.1   RDW 13.2      MPV 10.1       No results for input(s): POCGLU in the last 72 hours.     CBC with Differential:    Lab Results   Component Value Date    WBC 17.7 10/16/2021    RBC 3.83 10/16/2021    HGB 12.3 10/16/2021 HCT 36.1 10/16/2021     10/16/2021    MCV 94.3 10/16/2021    MCH 32.1 10/16/2021    MCHC 34.1 10/16/2021    RDW 13.2 10/16/2021    SEGSPCT 64 02/04/2014    LYMPHOPCT 9.7 10/16/2021    MONOPCT 6.8 10/16/2021    BASOPCT 0.2 10/16/2021    MONOSABS 1.21 10/16/2021    LYMPHSABS 1.72 10/16/2021    EOSABS 0.02 10/16/2021    BASOSABS 0.04 10/16/2021     CMP:    Lab Results   Component Value Date     10/16/2021    K 4.1 10/16/2021    K 3.5 09/19/2021    CL 91 10/16/2021    CO2 22 10/16/2021    BUN 23 10/16/2021    CREATININE 1.5 10/16/2021    GFRAA >60 10/16/2021    LABGLOM >60 10/16/2021    GLUCOSE 480 10/16/2021    PROT 7.7 10/16/2021    LABALBU 3.7 10/16/2021    CALCIUM 9.3 10/16/2021    BILITOT 0.3 10/16/2021    ALKPHOS 96 10/16/2021    AST 6 10/16/2021    ALT 5 10/16/2021     Magnesium:    Lab Results   Component Value Date    MG 2.3 10/16/2021     U/A:    Lab Results   Component Value Date    COLORU Yellow 10/16/2021    PROTEINU Negative 10/16/2021    PHUR 6.0 10/16/2021    WBCUA 0-1 10/16/2021    RBCUA 5-10 10/16/2021    BACTERIA RARE 10/16/2021    CLARITYU Clear 10/16/2021    SPECGRAV 1.010 10/16/2021    LEUKOCYTESUR Negative 10/16/2021    UROBILINOGEN 0.2 10/16/2021    BILIRUBINUR Negative 10/16/2021    BLOODU SMALL 10/16/2021    GLUCOSEU >=1000 10/16/2021     LIPASE:    Lab Results   Component Value Date    LIPASE 34 10/16/2021       Radiology: No results found. ASSESSMENT:      Active Problems:    Cellulitis  Resolved Problems:    * No resolved hospital problems. *      PLAN:    1. Left buttock cellulitis with possible abscess from area that had been s/p I &d. Pt given abx in ER. Pt had been doing well apparently unclear if new infection or abx not fully treated wound will ask ID and surgery to see pt. Symptom control  2. hi monitor on iv fluids  3. Dehydration iv fluids  4. Obesity (bmi33.0) rec wt loss  5. Dm type 2 uncontrolled monitor bs and treat with insulin with adjusted dose  6.  Hx of chf, given presentation, will hold entresto and diuretic for now  7. htn monitor bp and continue betablocker            Electronically signed by Vy Kruger DO on 10/17/2021 at 3:21 AM

## 2021-10-17 NOTE — PROGRESS NOTES
Spoke with Dr. Chitra Randhawa regarding Blood Culture order - already pending from [facility] ED.  Will discontinue the new order

## 2021-10-18 LAB
ANION GAP SERPL CALCULATED.3IONS-SCNC: 12 MMOL/L (ref 7–16)
BUN BLDV-MCNC: 10 MG/DL (ref 6–20)
CALCIUM SERPL-MCNC: 9.2 MG/DL (ref 8.6–10.2)
CHLORIDE BLD-SCNC: 100 MMOL/L (ref 98–107)
CO2: 22 MMOL/L (ref 22–29)
CREAT SERPL-MCNC: 1 MG/DL (ref 0.7–1.2)
EKG ATRIAL RATE: 91 BPM
EKG P AXIS: 67 DEGREES
EKG P-R INTERVAL: 148 MS
EKG Q-T INTERVAL: 374 MS
EKG QRS DURATION: 92 MS
EKG QTC CALCULATION (BAZETT): 460 MS
EKG R AXIS: 66 DEGREES
EKG T AXIS: 93 DEGREES
EKG VENTRICULAR RATE: 91 BPM
GFR AFRICAN AMERICAN: >60
GFR NON-AFRICAN AMERICAN: >60 ML/MIN/1.73
GLUCOSE BLD-MCNC: 142 MG/DL (ref 74–99)
HCT VFR BLD CALC: 31.9 % (ref 37–54)
HEMOGLOBIN: 10.8 G/DL (ref 12.5–16.5)
LACTIC ACID: 1 MMOL/L (ref 0.5–2.2)
MCH RBC QN AUTO: 32.6 PG (ref 26–35)
MCHC RBC AUTO-ENTMCNC: 33.9 % (ref 32–34.5)
MCV RBC AUTO: 96.4 FL (ref 80–99.9)
METER GLUCOSE: 146 MG/DL (ref 74–99)
METER GLUCOSE: 167 MG/DL (ref 74–99)
METER GLUCOSE: 222 MG/DL (ref 74–99)
METER GLUCOSE: 247 MG/DL (ref 74–99)
PDW BLD-RTO: 13.2 FL (ref 11.5–15)
PLATELET # BLD: 417 E9/L (ref 130–450)
PMV BLD AUTO: 10 FL (ref 7–12)
POTASSIUM SERPL-SCNC: 3.4 MMOL/L (ref 3.5–5)
RBC # BLD: 3.31 E12/L (ref 3.8–5.8)
SODIUM BLD-SCNC: 134 MMOL/L (ref 132–146)
WBC # BLD: 15 E9/L (ref 4.5–11.5)

## 2021-10-18 PROCEDURE — 36415 COLL VENOUS BLD VENIPUNCTURE: CPT

## 2021-10-18 PROCEDURE — 2580000003 HC RX 258: Performed by: INTERNAL MEDICINE

## 2021-10-18 PROCEDURE — 1200000000 HC SEMI PRIVATE

## 2021-10-18 PROCEDURE — 80048 BASIC METABOLIC PNL TOTAL CA: CPT

## 2021-10-18 PROCEDURE — 87075 CULTR BACTERIA EXCEPT BLOOD: CPT

## 2021-10-18 PROCEDURE — 99232 SBSQ HOSP IP/OBS MODERATE 35: CPT | Performed by: FAMILY MEDICINE

## 2021-10-18 PROCEDURE — 0J990ZZ DRAINAGE OF BUTTOCK SUBCUTANEOUS TISSUE AND FASCIA, OPEN APPROACH: ICD-10-PCS | Performed by: STUDENT IN AN ORGANIZED HEALTH CARE EDUCATION/TRAINING PROGRAM

## 2021-10-18 PROCEDURE — 6360000002 HC RX W HCPCS: Performed by: SPECIALIST

## 2021-10-18 PROCEDURE — 87186 SC STD MICRODIL/AGAR DIL: CPT

## 2021-10-18 PROCEDURE — 83605 ASSAY OF LACTIC ACID: CPT

## 2021-10-18 PROCEDURE — 6370000000 HC RX 637 (ALT 250 FOR IP): Performed by: INTERNAL MEDICINE

## 2021-10-18 PROCEDURE — 93010 ELECTROCARDIOGRAM REPORT: CPT | Performed by: INTERNAL MEDICINE

## 2021-10-18 PROCEDURE — 82962 GLUCOSE BLOOD TEST: CPT

## 2021-10-18 PROCEDURE — 87205 SMEAR GRAM STAIN: CPT

## 2021-10-18 PROCEDURE — 87070 CULTURE OTHR SPECIMN AEROBIC: CPT

## 2021-10-18 PROCEDURE — 85027 COMPLETE CBC AUTOMATED: CPT

## 2021-10-18 PROCEDURE — 2580000003 HC RX 258: Performed by: SPECIALIST

## 2021-10-18 PROCEDURE — 6360000002 HC RX W HCPCS: Performed by: INTERNAL MEDICINE

## 2021-10-18 PROCEDURE — 2500000003 HC RX 250 WO HCPCS: Performed by: STUDENT IN AN ORGANIZED HEALTH CARE EDUCATION/TRAINING PROGRAM

## 2021-10-18 PROCEDURE — 87077 CULTURE AEROBIC IDENTIFY: CPT

## 2021-10-18 RX ORDER — LIDOCAINE HYDROCHLORIDE 10 MG/ML
5 INJECTION, SOLUTION EPIDURAL; INFILTRATION; INTRACAUDAL; PERINEURAL ONCE
Status: COMPLETED | OUTPATIENT
Start: 2021-10-18 | End: 2021-10-18

## 2021-10-18 RX ORDER — ACETAMINOPHEN 650 MG
TABLET, EXTENDED RELEASE ORAL ONCE
Status: COMPLETED | OUTPATIENT
Start: 2021-10-18 | End: 2021-10-18

## 2021-10-18 RX ADMIN — ENOXAPARIN SODIUM 40 MG: 40 INJECTION SUBCUTANEOUS at 22:01

## 2021-10-18 RX ADMIN — LIDOCAINE HYDROCHLORIDE 5 ML: 10 INJECTION, SOLUTION EPIDURAL; INFILTRATION; INTRACAUDAL; PERINEURAL at 14:21

## 2021-10-18 RX ADMIN — OXYCODONE AND ACETAMINOPHEN 1 TABLET: 5; 325 TABLET ORAL at 22:02

## 2021-10-18 RX ADMIN — PIPERACILLIN AND TAZOBACTAM 3375 MG: 3; .375 INJECTION, POWDER, LYOPHILIZED, FOR SOLUTION INTRAVENOUS at 18:48

## 2021-10-18 RX ADMIN — PIPERACILLIN AND TAZOBACTAM 3375 MG: 3; .375 INJECTION, POWDER, LYOPHILIZED, FOR SOLUTION INTRAVENOUS at 10:15

## 2021-10-18 RX ADMIN — OXYCODONE AND ACETAMINOPHEN 1 TABLET: 5; 325 TABLET ORAL at 16:13

## 2021-10-18 RX ADMIN — VANCOMYCIN HYDROCHLORIDE 2000 MG: 10 INJECTION, POWDER, LYOPHILIZED, FOR SOLUTION INTRAVENOUS at 06:30

## 2021-10-18 RX ADMIN — SODIUM CHLORIDE: 9 INJECTION, SOLUTION INTRAVENOUS at 06:10

## 2021-10-18 RX ADMIN — INSULIN GLARGINE 15 UNITS: 100 INJECTION, SOLUTION SUBCUTANEOUS at 22:02

## 2021-10-18 RX ADMIN — VANCOMYCIN HYDROCHLORIDE 2000 MG: 10 INJECTION, POWDER, LYOPHILIZED, FOR SOLUTION INTRAVENOUS at 17:13

## 2021-10-18 RX ADMIN — ONDANSETRON 4 MG: 2 INJECTION INTRAMUSCULAR; INTRAVENOUS at 10:16

## 2021-10-18 RX ADMIN — METOPROLOL SUCCINATE 50 MG: 50 TABLET, EXTENDED RELEASE ORAL at 22:02

## 2021-10-18 RX ADMIN — SODIUM CHLORIDE, PRESERVATIVE FREE 10 ML: 5 INJECTION INTRAVENOUS at 22:01

## 2021-10-18 RX ADMIN — SODIUM CHLORIDE: 9 INJECTION, SOLUTION INTRAVENOUS at 13:00

## 2021-10-18 RX ADMIN — PIPERACILLIN AND TAZOBACTAM 3375 MG: 3; .375 INJECTION, POWDER, LYOPHILIZED, FOR SOLUTION INTRAVENOUS at 04:03

## 2021-10-18 RX ADMIN — SODIUM CHLORIDE, PRESERVATIVE FREE 10 ML: 5 INJECTION INTRAVENOUS at 10:17

## 2021-10-18 RX ADMIN — INSULIN LISPRO 2 UNITS: 100 INJECTION, SOLUTION INTRAVENOUS; SUBCUTANEOUS at 17:13

## 2021-10-18 RX ADMIN — Medication: at 14:22

## 2021-10-18 RX ADMIN — INSULIN LISPRO 1 UNITS: 100 INJECTION, SOLUTION INTRAVENOUS; SUBCUTANEOUS at 22:02

## 2021-10-18 RX ADMIN — OXYCODONE AND ACETAMINOPHEN 1 TABLET: 5; 325 TABLET ORAL at 10:16

## 2021-10-18 RX ADMIN — METOPROLOL SUCCINATE 50 MG: 50 TABLET, EXTENDED RELEASE ORAL at 10:16

## 2021-10-18 RX ADMIN — SODIUM CHLORIDE: 9 INJECTION, SOLUTION INTRAVENOUS at 22:06

## 2021-10-18 ASSESSMENT — PAIN DESCRIPTION - ORIENTATION
ORIENTATION: RIGHT;LEFT
ORIENTATION: MID;LOWER

## 2021-10-18 ASSESSMENT — PAIN SCALES - GENERAL
PAINLEVEL_OUTOF10: 7
PAINLEVEL_OUTOF10: 0
PAINLEVEL_OUTOF10: 2
PAINLEVEL_OUTOF10: 3
PAINLEVEL_OUTOF10: 0
PAINLEVEL_OUTOF10: 0
PAINLEVEL_OUTOF10: 2
PAINLEVEL_OUTOF10: 7
PAINLEVEL_OUTOF10: 8
PAINLEVEL_OUTOF10: 2

## 2021-10-18 ASSESSMENT — PAIN DESCRIPTION - LOCATION
LOCATION: BUTTOCKS
LOCATION: BACK;BUTTOCKS

## 2021-10-18 ASSESSMENT — PAIN DESCRIPTION - PROGRESSION
CLINICAL_PROGRESSION: GRADUALLY WORSENING
CLINICAL_PROGRESSION: GRADUALLY WORSENING

## 2021-10-18 ASSESSMENT — PAIN DESCRIPTION - FREQUENCY
FREQUENCY: INTERMITTENT
FREQUENCY: CONTINUOUS

## 2021-10-18 ASSESSMENT — PAIN DESCRIPTION - ONSET
ONSET: ON-GOING
ONSET: GRADUAL

## 2021-10-18 ASSESSMENT — PAIN - FUNCTIONAL ASSESSMENT
PAIN_FUNCTIONAL_ASSESSMENT: PREVENTS OR INTERFERES WITH MANY ACTIVE NOT PASSIVE ACTIVITIES
PAIN_FUNCTIONAL_ASSESSMENT: ACTIVITIES ARE NOT PREVENTED

## 2021-10-18 ASSESSMENT — PAIN DESCRIPTION - PAIN TYPE
TYPE: ACUTE PAIN
TYPE: CHRONIC PAIN;ACUTE PAIN;SURGICAL PAIN

## 2021-10-18 ASSESSMENT — PAIN DESCRIPTION - DIRECTION: RADIATING_TOWARDS: NON-RADIATING

## 2021-10-18 ASSESSMENT — PAIN DESCRIPTION - DESCRIPTORS
DESCRIPTORS: JABBING
DESCRIPTORS: ACHING;CONSTANT;DISCOMFORT

## 2021-10-18 NOTE — PROGRESS NOTES
[facility] Progress Note    Admitting Date and Time: 10/17/2021 12:02 AM  Admit Dx: Cellulitis [L03.90]    Subjective:  Patient is being followed for Cellulitis [L03.90]   Pt feels fairly well. He does note pain in the region of the abscess. No new complaints. He is eating well. Not dehydrated. No side effects from abx. General Surgery resident came to bedside to perform I&D. ROS: denies fever, chills, cp, sob, n/v, HA unless stated above.       metoprolol succinate  50 mg Oral BID    sodium chloride flush  5-40 mL IntraVENous 2 times per day    enoxaparin  40 mg SubCUTAneous Daily    insulin lispro  0-6 Units SubCUTAneous TID WC    insulin lispro  0-3 Units SubCUTAneous Nightly    insulin glargine  15 Units SubCUTAneous BID    piperacillin-tazobactam  3,375 mg IntraVENous Q8H    vancomycin  2,000 mg IntraVENous Q12H     sodium chloride flush, 5-40 mL, PRN  sodium chloride, 25 mL, PRN  ondansetron, 4 mg, Q8H PRN   Or  ondansetron, 4 mg, Q6H PRN  polyethylene glycol, 17 g, Daily PRN  acetaminophen, 650 mg, Q6H PRN   Or  acetaminophen, 650 mg, Q6H PRN  glucose, 15 g, PRN  dextrose, 12.5 g, PRN  glucagon (rDNA), 1 mg, PRN  dextrose, 100 mL/hr, PRN  oxyCODONE-acetaminophen, 1 tablet, Q4H PRN  lidocaine, , BID PRN         Objective:    /62   Pulse 73   Temp 98.1 °F (36.7 °C) (Oral)   Resp 20   Ht 5' 10\" (1.778 m)   Wt 233 lb (105.7 kg)   SpO2 100%   BMI 33.43 kg/m²     General Appearance: alert and oriented to person, place and time and in no acute distress  Skin: warm and dry  Head: normocephalic and atraumatic  Eyes: pupils equal, round, and reactive to light, extraocular eye movements intact, conjunctivae normal  Neck: neck supple and non tender without mass   Pulmonary/Chest: clear to auscultation bilaterally- no wheezes, rales or rhonchi, normal air movement, no respiratory distress  Cardiovascular: normal rate, normal S1 and S2 and no carotid bruits  Abdomen: soft, non-tender, non-distended, normal bowel sounds, no masses or organomegaly  Extremities: no cyanosis, no clubbing and no edema  Neurologic: no cranial nerve deficit and speech normal        Recent Labs     10/16/21  1650 10/17/21  0440 10/18/21  1015   * 129* 134   K 4.1 3.9 3.4*   CL 91* 96* 100   CO2 22 19* 22   BUN 23* 16 10   CREATININE 1.5* 1.2 1.0   GLUCOSE 480* 197* 142*   CALCIUM 9.3 9.0 9.2       Recent Labs     10/16/21  1650 10/17/21  0440 10/18/21  1015   WBC 17.7* 17.1* 15.0*   RBC 3.83 3.67* 3.31*   HGB 12.3* 11.8* 10.8*   HCT 36.1* 36.0* 31.9*   MCV 94.3 98.1 96.4   MCH 32.1 32.2 32.6   MCHC 34.1 32.8 33.9   RDW 13.2 13.2 13.2    297 417   MPV 10.1 9.8 10.0       Radiology:   CT abd/pelvis 10/16  Impression   No acute inflammatory process in the abdomen and pelvis.       Inflammatory changes with skin thickening and phlegmon in the perirectal and   gluteal region, significantly worse on the left. Assessment:    Active Problems:    Cellulitis    RONA (acute kidney injury) ([facility] [address]    Dehydration    Uncontrolled type 2 diabetes mellitus with hyperglycemia (HCC)    Abscess of multiple sites of buttock  Resolved Problems:    * No resolved hospital problems. *      Plan:  1. Continue IV abx per ID  2. I&D performed today per general surgery  3. Await final cultures. 4.  Continue to monitor creatinine and BG    NOTE: This report was transcribed using voice recognition software. Every effort was made to ensure accuracy; however, inadvertent computerized transcription errors may be present.   Electronically signed by Edyta Rodriguez MD on 10/18/2021 at 3:30 PM

## 2021-10-18 NOTE — PROCEDURES
Incision and Drainage Procedure Note    Indication: Right buttock abscess    Procedure: The patient was positioned appropriately and the skin over the incision site was prepped with betadine. Local anesthesia was obtained by infiltration using 1% Lidocaine without epinephrine. An incision was then made over the greatest area of fluctuance down through the subcutaneous tissue and approximately 10 cc of thick, purulent and bloody material was expressed. Loculations were broken up using forceps and more of the material was able to be expressed. The drainage cavity was then irrigated, packed with sterile gauze and dressed with gauze. The abscess measured 4 cm x 5 cm x 2 cm    The patient tolerated the procedure well.     Complications: None    Electronically signed by Sloane Cortez MD on 10/18/2021 at 2:05 PM

## 2021-10-18 NOTE — CARE COORDINATION
Social Work / Discharge Planning :  SW and CM met with patient and explained role as discharge planner/ transition of care. Patient admitted with Gluteal Abscess. He is scheduled for sx. Patient active with [facility] and they have surgeon following for [facility] 78 orders. Will need MISHA at discharge., Patient independent from [address] with mother. Mother and friends packed his wounds. Patient does NOT have PCP and in agreement for CM to schedule an appointment. Patient wants to be on disability and SW/CM explained have to go through PCP. Patient expressed an understanding. AWait plan. SW to follow.  Electronically signed by DOROTHY Longoria on 10/18/21 at 11:13 AM EDT

## 2021-10-18 NOTE — PROGRESS NOTES
[address] [place] [address] [facility]  Progress Note    SUBJECTIVE:  CC: perirectal abscess    Patient is tolerating medications. No reported adverse drug reactions. No nausea, vomiting, diarrhea. Awaiting debridement   No fevers     Review of systems:  As stated above in the chief complaint, otherwise negative. Medications:  Scheduled Meds:   metoprolol succinate  50 mg Oral BID    sodium chloride flush  5-40 mL IntraVENous 2 times per day    enoxaparin  40 mg SubCUTAneous Daily    insulin lispro  0-6 Units SubCUTAneous TID WC    insulin lispro  0-3 Units SubCUTAneous Nightly    insulin glargine  15 Units SubCUTAneous BID    piperacillin-tazobactam  3,375 mg IntraVENous Q8H    vancomycin  2,000 mg IntraVENous Q12H     Continuous Infusions:   sodium chloride      dextrose      sodium chloride 100 mL/hr at 10/17/21 0428    sodium chloride 12.5 mL/hr at 10/18/21 0610     PRN Meds:sodium chloride flush, sodium chloride, ondansetron **OR** ondansetron, polyethylene glycol, acetaminophen **OR** acetaminophen, glucose, dextrose, glucagon (rDNA), dextrose, oxyCODONE-acetaminophen, lidocaine    OBJECTIVE:  /62   Pulse 73   Temp 98.1 °F (36.7 °C) (Oral)   Resp 20   Ht 5' 10\" (1.778 m)   Wt 233 lb (105.7 kg)   SpO2 100%   BMI 33.43 kg/m²   Temp  Av.1 °F (36.7 °C)  Min: 98 °F (36.7 °C)  Max: 98.1 °F (36.7 °C)  Constitutional: The patient is awake, alert, and oriented. Lying on side. Skin: Warm and dry. No rashes were noted. HEENT: Round and reactive pupils. Moist mucous membranes. No ulcerations or thrush. Neck: Supple to movements. Chest: No use of accessory muscles to breathe. Symmetrical expansion. No wheezing, crackles or rhonchi. Cardiovascular: S1 and S2 are rhythmic and regular. No murmurs appreciated. Abdomen: Positive bowel sounds to auscultation. Benign to palpation. No masses felt. Extremities: No edema.  Left gluteal and perirectal area indurated, tender, warm. Lines: peripheral    Laboratory and Tests Review:  Lab Results   Component Value Date    WBC 15.0 (H) 10/18/2021    WBC 17.1 (H) 10/17/2021    WBC 17.7 (H) 10/16/2021    HGB 10.8 (L) 10/18/2021    HCT 31.9 (L) 10/18/2021    MCV 96.4 10/18/2021     10/18/2021     Lab Results   Component Value Date    NEUTROABS 13.55 (H) 10/17/2021    NEUTROABS 14.60 (H) 10/16/2021    NEUTROABS 3.83 09/20/2021     No results found for: CRPHS  Lab Results   Component Value Date    ALT <5 10/17/2021    AST 12 10/17/2021    ALKPHOS 73 10/17/2021    BILITOT 0.6 10/17/2021     Lab Results   Component Value Date     10/18/2021    K 3.4 10/18/2021    K 3.9 10/17/2021     10/18/2021    CO2 22 10/18/2021    BUN 10 10/18/2021    CREATININE 1.0 10/18/2021    CREATININE 1.2 10/17/2021    CREATININE 1.5 10/16/2021    GFRAA >60 10/18/2021    LABGLOM >60 10/18/2021    GLUCOSE 142 10/18/2021    PROT 7.3 10/17/2021    LABALBU 3.3 10/17/2021    CALCIUM 9.2 10/18/2021    BILITOT 0.6 10/17/2021    ALKPHOS 73 10/17/2021    AST 12 10/17/2021    ALT <5 10/17/2021     No results found for: CRP  No results found for: Rodrigo Cummings  Radiology:  Reviewed     Microbiology:   Blood cultures 10/16/2021: negative so far  Wound culture: pending    Recent Labs     10/16/21  1650   PROCAL 0.22*       ASSESSMENT:  · Concern for necrotizing fasciitis left perirectal left gluteal area   · History of perirectal abscess 9/18/2021 that grew group B strep   · Leukocytosis associated to the above     PLAN:  · Continue Zosyn and Vancomycin  · Await surgical debridement - planned for today-doubt bedside will be sufficient in this case  · Await cultures   · Monitor labs- WBC 15    CRIS Camejo - CNP  11:44 AM  10/18/2021   Pt seen and examined. Above discussed agree with advanced practice nurse. Labs, cultures, and radiographs reviewed. Face to Face encounter occurred. Changes made as necessary.      Aliyah Matson MD

## 2021-10-18 NOTE — CARE COORDINATION
Hx 9/18 I&D L gluteal abscess- plan for bedside I&D today. On iv abxs. [facility] following- resume order is on chart. DOES NOT have a PCP- PCP appointment made w/ [facility] for 10/27/21 @ 9:15 am with Dr. Marya Baptiste on pt's discharge instructions. Per pt, plan is to return home w/ his mother on discharge.  Will follow Sun Gaston RN case manager

## 2021-10-18 NOTE — PROGRESS NOTES
GENERAL SURGERY  DAILY PROGRESS NOTE  10/18/2021  No chief complaint on file. cc: right and left gluteal abscess    Subjective:  Patient reports improvement in his pain. He reports draining from his left gluteal abscess. Objective:  /64   Pulse 84   Temp 98 °F (36.7 °C) (Oral)   Resp 18   Ht 5' 10\" (1.778 m)   Wt 233 lb (105.7 kg)   SpO2 97%   BMI 33.43 kg/m²     General appearance: alert, cooperative and in no acute distress. Eyes: grossly normal  Lungs: nonlabored breathing on room air  Heart: regular rate, normotensive  Abdomen: soft, non-tender, non distended  Skin: Right gluteal abscess and left draining gluteal abscess  Neurologic: Alert and oriented x 3. Grossly normal  Musculoskeletal: No clubbing cyanosis or edema    Assessment/Plan:  38 y.o. male with recurrent gluteal abscesses requiring I&D. Bedside I&D of right gluteal abscess  Pain/nausea control prn  ID team following-appreciate their recommendations    Patient findings and plan discussed with Dr. Shar Weldon. The patient was seen and examined and the chart was reviewed. The right gluteal abscess was incised and drained. The patient continues to have a significant amount of inflammatory changes about the perianal area. No other abnormalities are noted. The patient will continue IV antibiotic and local wound care.     Electronically signed by Severiano Pizano MD on 10/18/2021 at 6:49 AM

## 2021-10-18 NOTE — PLAN OF CARE
Problem: Pain:  Goal: Pain level will decrease  Description: Pain level will decrease  10/18/2021 0049 by Errol Liao RN  Outcome: Ongoing  10/17/2021 1600 by Stacy Yap RN  Outcome: Ongoing  Goal: Control of acute pain  Description: Control of acute pain  10/18/2021 0049 by Errol Liao RN  Outcome: Ongoing  10/17/2021 1600 by Stacy Yap RN  Outcome: Ongoing  Goal: Control of chronic pain  Description: Control of chronic pain  Outcome: Ongoing  Goal: Patient's pain/discomfort is manageable  Description: Patient's pain/discomfort is manageable  Outcome: Ongoing     Problem: Infection:  Goal: Will remain free from infection  Description: Will remain free from infection  Outcome: Ongoing

## 2021-10-19 LAB
ANION GAP SERPL CALCULATED.3IONS-SCNC: 10 MMOL/L (ref 7–16)
BASOPHILS ABSOLUTE: 0.06 E9/L (ref 0–0.2)
BASOPHILS RELATIVE PERCENT: 0.6 % (ref 0–2)
BUN BLDV-MCNC: 7 MG/DL (ref 6–20)
CALCIUM SERPL-MCNC: 9.1 MG/DL (ref 8.6–10.2)
CHLORIDE BLD-SCNC: 101 MMOL/L (ref 98–107)
CO2: 26 MMOL/L (ref 22–29)
CREAT SERPL-MCNC: 1 MG/DL (ref 0.7–1.2)
EOSINOPHILS ABSOLUTE: 0.27 E9/L (ref 0.05–0.5)
EOSINOPHILS RELATIVE PERCENT: 2.9 % (ref 0–6)
GFR AFRICAN AMERICAN: >60
GFR NON-AFRICAN AMERICAN: >60 ML/MIN/1.73
GLUCOSE BLD-MCNC: 237 MG/DL (ref 74–99)
HCT VFR BLD CALC: 34.6 % (ref 37–54)
HEMOGLOBIN: 11.2 G/DL (ref 12.5–16.5)
IMMATURE GRANULOCYTES #: 0.03 E9/L
IMMATURE GRANULOCYTES %: 0.3 % (ref 0–5)
LYMPHOCYTES ABSOLUTE: 2.46 E9/L (ref 1.5–4)
LYMPHOCYTES RELATIVE PERCENT: 26.1 % (ref 20–42)
MCH RBC QN AUTO: 31.5 PG (ref 26–35)
MCHC RBC AUTO-ENTMCNC: 32.4 % (ref 32–34.5)
MCV RBC AUTO: 97.5 FL (ref 80–99.9)
METER GLUCOSE: 137 MG/DL (ref 74–99)
METER GLUCOSE: 212 MG/DL (ref 74–99)
METER GLUCOSE: 233 MG/DL (ref 74–99)
METER GLUCOSE: 248 MG/DL (ref 74–99)
MONOCYTES ABSOLUTE: 0.82 E9/L (ref 0.1–0.95)
MONOCYTES RELATIVE PERCENT: 8.7 % (ref 2–12)
NEUTROPHILS ABSOLUTE: 5.77 E9/L (ref 1.8–7.3)
NEUTROPHILS RELATIVE PERCENT: 61.4 % (ref 43–80)
PDW BLD-RTO: 13.1 FL (ref 11.5–15)
PLATELET # BLD: 473 E9/L (ref 130–450)
PMV BLD AUTO: 9.4 FL (ref 7–12)
POTASSIUM SERPL-SCNC: 3.7 MMOL/L (ref 3.5–5)
RBC # BLD: 3.55 E12/L (ref 3.8–5.8)
SODIUM BLD-SCNC: 137 MMOL/L (ref 132–146)
WBC # BLD: 9.4 E9/L (ref 4.5–11.5)

## 2021-10-19 PROCEDURE — 2580000003 HC RX 258: Performed by: INTERNAL MEDICINE

## 2021-10-19 PROCEDURE — 6370000000 HC RX 637 (ALT 250 FOR IP): Performed by: INTERNAL MEDICINE

## 2021-10-19 PROCEDURE — 6360000002 HC RX W HCPCS: Performed by: SPECIALIST

## 2021-10-19 PROCEDURE — 99232 SBSQ HOSP IP/OBS MODERATE 35: CPT | Performed by: FAMILY MEDICINE

## 2021-10-19 PROCEDURE — 36415 COLL VENOUS BLD VENIPUNCTURE: CPT

## 2021-10-19 PROCEDURE — 82962 GLUCOSE BLOOD TEST: CPT

## 2021-10-19 PROCEDURE — 85025 COMPLETE CBC W/AUTO DIFF WBC: CPT

## 2021-10-19 PROCEDURE — 1200000000 HC SEMI PRIVATE

## 2021-10-19 PROCEDURE — 6360000002 HC RX W HCPCS: Performed by: INTERNAL MEDICINE

## 2021-10-19 PROCEDURE — 80048 BASIC METABOLIC PNL TOTAL CA: CPT

## 2021-10-19 PROCEDURE — 2580000003 HC RX 258: Performed by: SPECIALIST

## 2021-10-19 RX ADMIN — SODIUM CHLORIDE: 9 INJECTION, SOLUTION INTRAVENOUS at 13:37

## 2021-10-19 RX ADMIN — PIPERACILLIN AND TAZOBACTAM 3375 MG: 3; .375 INJECTION, POWDER, LYOPHILIZED, FOR SOLUTION INTRAVENOUS at 08:35

## 2021-10-19 RX ADMIN — VANCOMYCIN HYDROCHLORIDE 2000 MG: 10 INJECTION, POWDER, LYOPHILIZED, FOR SOLUTION INTRAVENOUS at 04:34

## 2021-10-19 RX ADMIN — INSULIN GLARGINE 15 UNITS: 100 INJECTION, SOLUTION SUBCUTANEOUS at 08:37

## 2021-10-19 RX ADMIN — SODIUM CHLORIDE: 9 INJECTION, SOLUTION INTRAVENOUS at 04:35

## 2021-10-19 RX ADMIN — OXYCODONE AND ACETAMINOPHEN 1 TABLET: 5; 325 TABLET ORAL at 08:35

## 2021-10-19 RX ADMIN — VANCOMYCIN HYDROCHLORIDE 2000 MG: 10 INJECTION, POWDER, LYOPHILIZED, FOR SOLUTION INTRAVENOUS at 15:38

## 2021-10-19 RX ADMIN — METOPROLOL SUCCINATE 50 MG: 50 TABLET, EXTENDED RELEASE ORAL at 08:36

## 2021-10-19 RX ADMIN — SODIUM CHLORIDE: 9 INJECTION, SOLUTION INTRAVENOUS at 19:02

## 2021-10-19 RX ADMIN — PIPERACILLIN AND TAZOBACTAM 3375 MG: 3; .375 INJECTION, POWDER, LYOPHILIZED, FOR SOLUTION INTRAVENOUS at 17:47

## 2021-10-19 RX ADMIN — ENOXAPARIN SODIUM 40 MG: 40 INJECTION SUBCUTANEOUS at 08:36

## 2021-10-19 RX ADMIN — SODIUM CHLORIDE: 9 INJECTION, SOLUTION INTRAVENOUS at 20:33

## 2021-10-19 RX ADMIN — INSULIN GLARGINE 15 UNITS: 100 INJECTION, SOLUTION SUBCUTANEOUS at 21:02

## 2021-10-19 RX ADMIN — SODIUM CHLORIDE, PRESERVATIVE FREE 10 ML: 5 INJECTION INTRAVENOUS at 20:34

## 2021-10-19 RX ADMIN — OXYCODONE AND ACETAMINOPHEN 1 TABLET: 5; 325 TABLET ORAL at 13:38

## 2021-10-19 RX ADMIN — SODIUM CHLORIDE, PRESERVATIVE FREE 10 ML: 5 INJECTION INTRAVENOUS at 08:36

## 2021-10-19 RX ADMIN — PIPERACILLIN AND TAZOBACTAM 3375 MG: 3; .375 INJECTION, POWDER, LYOPHILIZED, FOR SOLUTION INTRAVENOUS at 02:22

## 2021-10-19 RX ADMIN — INSULIN LISPRO 2 UNITS: 100 INJECTION, SOLUTION INTRAVENOUS; SUBCUTANEOUS at 07:17

## 2021-10-19 RX ADMIN — OXYCODONE AND ACETAMINOPHEN 1 TABLET: 5; 325 TABLET ORAL at 17:46

## 2021-10-19 RX ADMIN — INSULIN LISPRO 1 UNITS: 100 INJECTION, SOLUTION INTRAVENOUS; SUBCUTANEOUS at 21:03

## 2021-10-19 RX ADMIN — OXYCODONE AND ACETAMINOPHEN 1 TABLET: 5; 325 TABLET ORAL at 04:31

## 2021-10-19 RX ADMIN — METOPROLOL SUCCINATE 50 MG: 50 TABLET, EXTENDED RELEASE ORAL at 20:34

## 2021-10-19 RX ADMIN — INSULIN LISPRO 2 UNITS: 100 INJECTION, SOLUTION INTRAVENOUS; SUBCUTANEOUS at 15:38

## 2021-10-19 ASSESSMENT — PAIN SCALES - GENERAL
PAINLEVEL_OUTOF10: 1
PAINLEVEL_OUTOF10: 6
PAINLEVEL_OUTOF10: 7
PAINLEVEL_OUTOF10: 5
PAINLEVEL_OUTOF10: 1
PAINLEVEL_OUTOF10: 2
PAINLEVEL_OUTOF10: 1
PAINLEVEL_OUTOF10: 6

## 2021-10-19 ASSESSMENT — PAIN DESCRIPTION - DIRECTION: RADIATING_TOWARDS: NON-RADIATING

## 2021-10-19 ASSESSMENT — PAIN DESCRIPTION - ONSET: ONSET: ON-GOING

## 2021-10-19 ASSESSMENT — PAIN DESCRIPTION - PROGRESSION: CLINICAL_PROGRESSION: NOT CHANGED

## 2021-10-19 ASSESSMENT — PAIN DESCRIPTION - ORIENTATION
ORIENTATION: RIGHT
ORIENTATION: RIGHT

## 2021-10-19 ASSESSMENT — PAIN DESCRIPTION - PAIN TYPE
TYPE: CHRONIC PAIN
TYPE: CHRONIC PAIN;SURGICAL PAIN

## 2021-10-19 ASSESSMENT — PAIN - FUNCTIONAL ASSESSMENT: PAIN_FUNCTIONAL_ASSESSMENT: ACTIVITIES ARE NOT PREVENTED

## 2021-10-19 ASSESSMENT — PAIN DESCRIPTION - DESCRIPTORS: DESCRIPTORS: ACHING;CONSTANT;DISCOMFORT

## 2021-10-19 ASSESSMENT — PAIN DESCRIPTION - FREQUENCY: FREQUENCY: CONTINUOUS

## 2021-10-19 ASSESSMENT — PAIN DESCRIPTION - LOCATION
LOCATION: BUTTOCKS
LOCATION: BUTTOCKS

## 2021-10-19 NOTE — CARE COORDINATION
Social Work / Discharge Planning : Patient had an I & D at bedside yesterday. ID following and patient most likely will need a PICC. SW spoke to [place] from [facility] and she was proactive yesterday and ran patient current benefits. Patient has 100% coverage. Will need to fax IV antibiotics scripts to [facility] [phone] once received. Patient plan is HOME with family. Will need to add to [facility] orders IV antibiotics if plan. SW to follow.  Electronically signed by DOROTHY Mora on 10/19/2021 at 1:04 PM

## 2021-10-19 NOTE — PROGRESS NOTES
GENERAL SURGERY  DAILY PROGRESS NOTE  10/19/2021  No chief complaint on file. cc: right and left gluteal abscess    Subjective:  Pain well controlled today. No bleeding or fluid drainage from wound, minimal erythema. Denies fevers or chills. Objective:  /65   Pulse 76   Temp 98.3 °F (36.8 °C) (Oral)   Resp 18   Ht 5' 10\" (1.778 m)   Wt 233 lb (105.7 kg)   SpO2 96%   BMI 33.43 kg/m²     General appearance: alert, cooperative and in no acute distress. Eyes: grossly normal  Lungs: nonlabored breathing on room air  Heart: regular rate, normotensive  Abdomen: soft, non-tender, non distended  Skin: Right gluteal abscess s/p I&D and left draining gluteal abscess. Minimal erythema and tenderness  Neurologic: Alert and oriented x 3. Grossly normal  Musculoskeletal: No clubbing cyanosis or edema    Assessment/Plan:  38 y.o. male s/p I&D right gluteal abscess 10/18/21.     Daily packing changes  Pain/nausea control prn  ID team following-appreciate their recommendations  F/u culture result    Patient findings and plan discussed with Dr. Nakia Vela     Electronically signed by Lauren Zuñiga MD on 10/19/2021 at 5:04 AM   Electronically signed by Sydnee Gomez MD on 10/19/2021 at 11:45 AM     Continue dressing changes  Continue antibiotics  Follow-up as an outpatient

## 2021-10-19 NOTE — PROGRESS NOTES
[address] [place] [address] [facility]  Progress Note    SUBJECTIVE:  CC: perirectal abscess    Patient is tolerating medications. Left buttock is much less tender. Remains indurated, no crepitus felt. No fevers. Eating lunch - appetite is good. Review of systems:  As stated above in the chief complaint, otherwise negative. Medications:  Scheduled Meds:   metoprolol succinate  50 mg Oral BID    sodium chloride flush  5-40 mL IntraVENous 2 times per day    enoxaparin  40 mg SubCUTAneous Daily    insulin lispro  0-6 Units SubCUTAneous TID WC    insulin lispro  0-3 Units SubCUTAneous Nightly    insulin glargine  15 Units SubCUTAneous BID    piperacillin-tazobactam  3,375 mg IntraVENous Q8H    vancomycin  2,000 mg IntraVENous Q12H     Continuous Infusions:   sodium chloride      dextrose      sodium chloride 100 mL/hr at 10/17/21 0428    sodium chloride 12.5 mL/hr at 10/19/21 0435     PRN Meds:sodium chloride flush, sodium chloride, ondansetron **OR** ondansetron, polyethylene glycol, acetaminophen **OR** acetaminophen, glucose, dextrose, glucagon (rDNA), dextrose, oxyCODONE-acetaminophen, lidocaine    OBJECTIVE:  /67   Pulse 61   Temp 98.6 °F (37 °C) (Oral)   Resp (!) 63   Ht 5' 10\" (1.778 m)   Wt 237 lb (107.5 kg)   SpO2 98%   BMI 34.01 kg/m²   Temp  Av.5 °F (36.9 °C)  Min: 98.3 °F (36.8 °C)  Max: 98.6 °F (37 °C)  Constitutional: The patient is awake, alert, and oriented. Sitting up in bed, eating lunch   Skin: Warm and dry. No rashes were noted. HEENT: Round and reactive pupils. Moist mucous membranes. No ulcerations or thrush. Neck: Supple to movements. Chest: No use of accessory muscles to breathe. Symmetrical expansion. No wheezing, crackles or rhonchi. Cardiovascular: S1 and S2 are rhythmic and regular. No murmurs appreciated. Abdomen: Positive bowel sounds to auscultation. Benign to palpation. No masses felt. Extremities: No edema.  Left gluteal and perirectal area indurated, less tender, not as warm. No crepitus felt. Draining small amount serosang drainage    Lines: peripheral    Laboratory and Tests Review:  Lab Results   Component Value Date    WBC 15.0 (H) 10/18/2021    WBC 17.1 (H) 10/17/2021    WBC 17.7 (H) 10/16/2021    HGB 10.8 (L) 10/18/2021    HCT 31.9 (L) 10/18/2021    MCV 96.4 10/18/2021     10/18/2021     Lab Results   Component Value Date    NEUTROABS 13.55 (H) 10/17/2021    NEUTROABS 14.60 (H) 10/16/2021    NEUTROABS 3.83 09/20/2021     No results found for: CRPHS  Lab Results   Component Value Date    ALT <5 10/17/2021    AST 12 10/17/2021    ALKPHOS 73 10/17/2021    BILITOT 0.6 10/17/2021     Lab Results   Component Value Date     10/18/2021    K 3.4 10/18/2021    K 3.9 10/17/2021     10/18/2021    CO2 22 10/18/2021    BUN 10 10/18/2021    CREATININE 1.0 10/18/2021    CREATININE 1.2 10/17/2021    CREATININE 1.5 10/16/2021    GFRAA >60 10/18/2021    LABGLOM >60 10/18/2021    GLUCOSE 142 10/18/2021    PROT 7.3 10/17/2021    LABALBU 3.3 10/17/2021    CALCIUM 9.2 10/18/2021    BILITOT 0.6 10/17/2021    ALKPHOS 73 10/17/2021    AST 12 10/17/2021    ALT <5 10/17/2021     No results found for: CRP  No results found for: [address]  Radiology:  Reviewed     Microbiology:   Blood cultures 10/16/2021: negative so far  Wound culture: negative so far     Recent Labs     10/16/21  1650   PROCAL 0.22*       ASSESSMENT:  Concern for necrotizing fasciitis left perirectal left gluteal area, status post bedside I&D 10/18/2021    History of perirectal abscess 9/18/2021 that grew group B strep   Leukocytosis associated to the above     PLAN:  Continue Zosyn and Vancomycin for now   Surgery with no further plans, wound care per their rec's   Discussed likely butt of discharging on IV antibiotics with patient - he is agreeable  Await final culture reports   Labs reviewed     CRIS Martínez - CNP  11:47 AM  10/19/2021   Pt seen and examined. Above discussed agree with advanced practice nurse. Labs, cultures, and radiographs reviewed. Face to Face encounter occurred. Changes made as necessary.      Manisha Lees MD

## 2021-10-19 NOTE — PROGRESS NOTES
[facility] Progress Note    Admitting Date and Time: 10/17/2021 12:02 AM  Admit Dx: Cellulitis [L03.90]    Subjective:  Patient is being followed for Cellulitis [L03.90]   Pt feels a little better today than yesterday. He does note pain in the region of the abscess. No new complaints. He is eating well. Not dehydrated. No side effects from abx. Had bedside I&D yesterday. ROS: denies fever, chills, cp, sob, n/v, HA unless stated above.       metoprolol succinate  50 mg Oral BID    sodium chloride flush  5-40 mL IntraVENous 2 times per day    enoxaparin  40 mg SubCUTAneous Daily    insulin lispro  0-6 Units SubCUTAneous TID WC    insulin lispro  0-3 Units SubCUTAneous Nightly    insulin glargine  15 Units SubCUTAneous BID    piperacillin-tazobactam  3,375 mg IntraVENous Q8H    vancomycin  2,000 mg IntraVENous Q12H     sodium chloride flush, 5-40 mL, PRN  sodium chloride, 25 mL, PRN  ondansetron, 4 mg, Q8H PRN   Or  ondansetron, 4 mg, Q6H PRN  polyethylene glycol, 17 g, Daily PRN  acetaminophen, 650 mg, Q6H PRN   Or  acetaminophen, 650 mg, Q6H PRN  glucose, 15 g, PRN  dextrose, 12.5 g, PRN  glucagon (rDNA), 1 mg, PRN  dextrose, 100 mL/hr, PRN  oxyCODONE-acetaminophen, 1 tablet, Q4H PRN  lidocaine, , BID PRN         Objective:    /67   Pulse 61   Temp 98.6 °F (37 °C) (Oral)   Resp (!) 63   Ht 5' 10\" (1.778 m)   Wt 237 lb (107.5 kg)   SpO2 98%   BMI 34.01 kg/m²     General Appearance: alert and oriented to person, place and time and in no acute distress  Skin: warm and dry  Head: normocephalic and atraumatic  Eyes: pupils equal, round, and reactive to light, extraocular eye movements intact, conjunctivae normal  Neck: neck supple and non tender without mass   Pulmonary/Chest: clear to auscultation bilaterally- no wheezes, rales or rhonchi, normal air movement, no respiratory distress  Cardiovascular: normal rate, normal S1 and S2 and no carotid bruits  Abdomen: soft, non-tender, non-distended, normal bowel sounds, no masses or organomegaly  Extremities: no cyanosis, no clubbing and no edema  Neurologic: no cranial nerve deficit and speech normal        Recent Labs     10/16/21  1650 10/17/21  0440 10/18/21  1015   * 129* 134   K 4.1 3.9 3.4*   CL 91* 96* 100   CO2 22 19* 22   BUN 23* 16 10   CREATININE 1.5* 1.2 1.0   GLUCOSE 480* 197* 142*   CALCIUM 9.3 9.0 9.2       Recent Labs     10/16/21  1650 10/17/21  0440 10/18/21  1015   WBC 17.7* 17.1* 15.0*   RBC 3.83 3.67* 3.31*   HGB 12.3* 11.8* 10.8*   HCT 36.1* 36.0* 31.9*   MCV 94.3 98.1 96.4   MCH 32.1 32.2 32.6   MCHC 34.1 32.8 33.9   RDW 13.2 13.2 13.2    297 417   MPV 10.1 9.8 10.0       Radiology:   CT abd/pelvis 10/16  Impression   No acute inflammatory process in the abdomen and pelvis.       Inflammatory changes with skin thickening and phlegmon in the perirectal and   gluteal region, significantly worse on the left. Assessment:    Active Problems:    Cellulitis    RONA (acute kidney injury) ([facility] [address]    Dehydration    Uncontrolled type 2 diabetes mellitus with hyperglycemia (HCC)    Abscess of multiple sites of buttock  Resolved Problems:    * No resolved hospital problems. *    Per ID, concern for necrotizing fasciitis left perirectal left gluteal area    Plan:  1. Continue IV abx per ID-zosyn and vancomycin; will likely need IV abx on discharge as patient was treated one month ago for similar abscess  2. I&D performed 10/18/21 per general surgery  3. Await final cultures and sensitivity  4. Continue to monitor creatinine and BG  5. Wound care    NOTE: This report was transcribed using voice recognition software. Every effort was made to ensure accuracy; however, inadvertent computerized transcription errors may be present.   Electronically signed by Radha Simon MD on 10/19/2021 at 12:15 PM

## 2021-10-20 VITALS
DIASTOLIC BLOOD PRESSURE: 62 MMHG | HEIGHT: 70 IN | TEMPERATURE: 98.4 F | SYSTOLIC BLOOD PRESSURE: 131 MMHG | OXYGEN SATURATION: 98 % | HEART RATE: 69 BPM | WEIGHT: 242.38 LBS | BODY MASS INDEX: 34.7 KG/M2 | RESPIRATION RATE: 16 BRPM

## 2021-10-20 LAB
METER GLUCOSE: 158 MG/DL (ref 74–99)
METER GLUCOSE: 235 MG/DL (ref 74–99)
METER GLUCOSE: 99 MG/DL (ref 74–99)

## 2021-10-20 PROCEDURE — 2580000003 HC RX 258: Performed by: INTERNAL MEDICINE

## 2021-10-20 PROCEDURE — 76937 US GUIDE VASCULAR ACCESS: CPT

## 2021-10-20 PROCEDURE — 02HV33Z INSERTION OF INFUSION DEVICE INTO SUPERIOR VENA CAVA, PERCUTANEOUS APPROACH: ICD-10-PCS | Performed by: FAMILY MEDICINE

## 2021-10-20 PROCEDURE — 6370000000 HC RX 637 (ALT 250 FOR IP): Performed by: INTERNAL MEDICINE

## 2021-10-20 PROCEDURE — 6370000000 HC RX 637 (ALT 250 FOR IP): Performed by: REGISTERED NURSE

## 2021-10-20 PROCEDURE — 6360000002 HC RX W HCPCS: Performed by: REGISTERED NURSE

## 2021-10-20 PROCEDURE — 82962 GLUCOSE BLOOD TEST: CPT

## 2021-10-20 PROCEDURE — 2580000003 HC RX 258: Performed by: REGISTERED NURSE

## 2021-10-20 PROCEDURE — 2500000003 HC RX 250 WO HCPCS: Performed by: REGISTERED NURSE

## 2021-10-20 PROCEDURE — 2580000003 HC RX 258: Performed by: SPECIALIST

## 2021-10-20 PROCEDURE — 99238 HOSP IP/OBS DSCHRG MGMT 30/<: CPT | Performed by: FAMILY MEDICINE

## 2021-10-20 PROCEDURE — C1751 CATH, INF, PER/CENT/MIDLINE: HCPCS

## 2021-10-20 PROCEDURE — 36569 INSJ PICC 5 YR+ W/O IMAGING: CPT

## 2021-10-20 PROCEDURE — 6360000002 HC RX W HCPCS: Performed by: SPECIALIST

## 2021-10-20 RX ORDER — SODIUM CHLORIDE 0.9 % (FLUSH) 0.9 %
5-40 SYRINGE (ML) INJECTION PRN
Status: DISCONTINUED | OUTPATIENT
Start: 2021-10-20 | End: 2021-10-20 | Stop reason: HOSPADM

## 2021-10-20 RX ORDER — SODIUM CHLORIDE 9 MG/ML
25 INJECTION, SOLUTION INTRAVENOUS PRN
Status: DISCONTINUED | OUTPATIENT
Start: 2021-10-20 | End: 2021-10-20 | Stop reason: HOSPADM

## 2021-10-20 RX ORDER — SODIUM CHLORIDE 0.9 % (FLUSH) 0.9 %
5-40 SYRINGE (ML) INJECTION EVERY 12 HOURS SCHEDULED
Status: DISCONTINUED | OUTPATIENT
Start: 2021-10-20 | End: 2021-10-20 | Stop reason: HOSPADM

## 2021-10-20 RX ORDER — HEPARIN SODIUM (PORCINE) LOCK FLUSH IV SOLN 100 UNIT/ML 100 UNIT/ML
3 SOLUTION INTRAVENOUS PRN
Status: DISCONTINUED | OUTPATIENT
Start: 2021-10-20 | End: 2021-10-20 | Stop reason: HOSPADM

## 2021-10-20 RX ORDER — METRONIDAZOLE 500 MG/1
500 TABLET ORAL EVERY 8 HOURS SCHEDULED
Status: DISCONTINUED | OUTPATIENT
Start: 2021-10-20 | End: 2021-10-20 | Stop reason: HOSPADM

## 2021-10-20 RX ORDER — METOPROLOL SUCCINATE 50 MG/1
50 TABLET, EXTENDED RELEASE ORAL 2 TIMES DAILY
Qty: 30 TABLET | Refills: 3 | Status: SHIPPED | OUTPATIENT
Start: 2021-10-20 | End: 2021-10-27 | Stop reason: SDUPTHER

## 2021-10-20 RX ORDER — METRONIDAZOLE 500 MG/1
500 TABLET ORAL EVERY 8 HOURS SCHEDULED
Qty: 42 TABLET | Refills: 0 | Status: SHIPPED | OUTPATIENT
Start: 2021-10-20 | End: 2021-11-03

## 2021-10-20 RX ORDER — LIDOCAINE HYDROCHLORIDE 10 MG/ML
5 INJECTION, SOLUTION EPIDURAL; INFILTRATION; INTRACAUDAL; PERINEURAL ONCE
Status: COMPLETED | OUTPATIENT
Start: 2021-10-20 | End: 2021-10-20

## 2021-10-20 RX ORDER — HEPARIN SODIUM (PORCINE) LOCK FLUSH IV SOLN 100 UNIT/ML 100 UNIT/ML
3 SOLUTION INTRAVENOUS EVERY 12 HOURS SCHEDULED
Status: DISCONTINUED | OUTPATIENT
Start: 2021-10-20 | End: 2021-10-20 | Stop reason: HOSPADM

## 2021-10-20 RX ADMIN — METOPROLOL SUCCINATE 50 MG: 50 TABLET, EXTENDED RELEASE ORAL at 09:13

## 2021-10-20 RX ADMIN — METRONIDAZOLE 500 MG: 500 TABLET ORAL at 13:33

## 2021-10-20 RX ADMIN — OXYCODONE AND ACETAMINOPHEN 1 TABLET: 5; 325 TABLET ORAL at 16:28

## 2021-10-20 RX ADMIN — OXYCODONE AND ACETAMINOPHEN 1 TABLET: 5; 325 TABLET ORAL at 20:07

## 2021-10-20 RX ADMIN — VANCOMYCIN HYDROCHLORIDE 2000 MG: 10 INJECTION, POWDER, LYOPHILIZED, FOR SOLUTION INTRAVENOUS at 04:47

## 2021-10-20 RX ADMIN — OXYCODONE AND ACETAMINOPHEN 1 TABLET: 5; 325 TABLET ORAL at 06:54

## 2021-10-20 RX ADMIN — SODIUM CHLORIDE: 9 INJECTION, SOLUTION INTRAVENOUS at 04:47

## 2021-10-20 RX ADMIN — SODIUM CHLORIDE, PRESERVATIVE FREE 20 ML: 5 INJECTION INTRAVENOUS at 09:14

## 2021-10-20 RX ADMIN — PIPERACILLIN AND TAZOBACTAM 3375 MG: 3; .375 INJECTION, POWDER, LYOPHILIZED, FOR SOLUTION INTRAVENOUS at 01:05

## 2021-10-20 RX ADMIN — PIPERACILLIN AND TAZOBACTAM 3375 MG: 3; .375 INJECTION, POWDER, LYOPHILIZED, FOR SOLUTION INTRAVENOUS at 09:13

## 2021-10-20 RX ADMIN — INSULIN LISPRO 2 UNITS: 100 INJECTION, SOLUTION INTRAVENOUS; SUBCUTANEOUS at 08:21

## 2021-10-20 RX ADMIN — WATER 2000 MG: 1 INJECTION INTRAMUSCULAR; INTRAVENOUS; SUBCUTANEOUS at 12:16

## 2021-10-20 RX ADMIN — INSULIN GLARGINE 15 UNITS: 100 INJECTION, SOLUTION SUBCUTANEOUS at 08:21

## 2021-10-20 RX ADMIN — INSULIN LISPRO 1 UNITS: 100 INJECTION, SOLUTION INTRAVENOUS; SUBCUTANEOUS at 16:26

## 2021-10-20 RX ADMIN — LIDOCAINE HYDROCHLORIDE 2 ML: 10 INJECTION, SOLUTION EPIDURAL; INFILTRATION; INTRACAUDAL; PERINEURAL at 16:10

## 2021-10-20 RX ADMIN — SODIUM CHLORIDE: 9 INJECTION, SOLUTION INTRAVENOUS at 08:18

## 2021-10-20 RX ADMIN — OXYCODONE AND ACETAMINOPHEN 1 TABLET: 5; 325 TABLET ORAL at 10:45

## 2021-10-20 RX ADMIN — OXYCODONE AND ACETAMINOPHEN 1 TABLET: 5; 325 TABLET ORAL at 01:05

## 2021-10-20 ASSESSMENT — PAIN SCALES - GENERAL
PAINLEVEL_OUTOF10: 2
PAINLEVEL_OUTOF10: 7
PAINLEVEL_OUTOF10: 2
PAINLEVEL_OUTOF10: 0
PAINLEVEL_OUTOF10: 7
PAINLEVEL_OUTOF10: 0
PAINLEVEL_OUTOF10: 7
PAINLEVEL_OUTOF10: 1
PAINLEVEL_OUTOF10: 10
PAINLEVEL_OUTOF10: 8

## 2021-10-20 ASSESSMENT — PAIN - FUNCTIONAL ASSESSMENT
PAIN_FUNCTIONAL_ASSESSMENT: ACTIVITIES ARE NOT PREVENTED
PAIN_FUNCTIONAL_ASSESSMENT: ACTIVITIES ARE NOT PREVENTED

## 2021-10-20 ASSESSMENT — PAIN DESCRIPTION - PAIN TYPE
TYPE: SURGICAL PAIN
TYPE: SURGICAL PAIN

## 2021-10-20 ASSESSMENT — PAIN DESCRIPTION - PROGRESSION
CLINICAL_PROGRESSION: GRADUALLY WORSENING
CLINICAL_PROGRESSION: NOT CHANGED

## 2021-10-20 ASSESSMENT — PAIN DESCRIPTION - LOCATION
LOCATION: BUTTOCKS
LOCATION: BUTTOCKS

## 2021-10-20 ASSESSMENT — PAIN DESCRIPTION - FREQUENCY
FREQUENCY: CONTINUOUS
FREQUENCY: CONTINUOUS

## 2021-10-20 ASSESSMENT — PAIN DESCRIPTION - ORIENTATION
ORIENTATION: RIGHT;LEFT
ORIENTATION: LEFT;RIGHT

## 2021-10-20 ASSESSMENT — PAIN DESCRIPTION - DESCRIPTORS
DESCRIPTORS: ACHING;CONSTANT;SHOOTING
DESCRIPTORS: ACHING;SHARP;CONSTANT

## 2021-10-20 ASSESSMENT — PAIN DESCRIPTION - ONSET
ONSET: ON-GOING
ONSET: GRADUAL

## 2021-10-20 NOTE — PLAN OF CARE
Problem: Pain:  Goal: Pain level will decrease  Description: Pain level will decrease  10/20/2021 1038 by Magdaleno Koch RN  Outcome: Met This Shift  10/19/2021 2226 by Donna Bryant RN  Outcome: Ongoing  Goal: Control of acute pain  Description: Control of acute pain  10/20/2021 1038 by Magdaleno Koch RN  Outcome: Met This Shift  10/19/2021 2226 by Donna Bryant RN  Outcome: Ongoing  Goal: Control of chronic pain  Description: Control of chronic pain  10/20/2021 1038 by Magdaleno Koch RN  Outcome: Met This Shift  10/19/2021 2226 by Donna Bryant RN  Outcome: Ongoing  Goal: Patient's pain/discomfort is manageable  Description: Patient's pain/discomfort is manageable  10/20/2021 1038 by Magdaleno Koch RN  Outcome: Met This Shift  10/19/2021 2226 by Donna Bryant RN  Outcome: Ongoing

## 2021-10-20 NOTE — PROGRESS NOTES
Reached out to surgical resident regarding packing falling out of left buttock wound. Received order to simply cover with 4x4 gauze.

## 2021-10-20 NOTE — PROGRESS NOTES
Spoke to Omid at [facility] to let him know that the patient has his PICC and will be discharged today.

## 2021-10-20 NOTE — PROGRESS NOTES
CLINICAL PHARMACY NOTE: MEDS TO BEDS    Total # of Prescriptions Filled: 1   The following medications were delivered to the patient:  · Metronidazole 500 mg    Additional Documentation:informed patient that he needs to get the refill to finish the 14 days

## 2021-10-20 NOTE — PROGRESS NOTES
[facility] Progress Note    Admitting Date and Time: 10/17/2021 12:02 AM  Admit Dx: Cellulitis [L03.90]    Subjective:  Patient is being followed for Cellulitis [L03.90]   Pt feels good today. He does note minimal pain in the region of the abscess. No new complaints. He is eating well. Not dehydrated. No side effects from abx. Had bedside I&D 10/18/21. Awaiting PICC line. Will need home abx. ROS: denies fever, chills, cp, sob, n/v, HA unless stated above.       cefTRIAXone (ROCEPHIN) IV  2,000 mg IntraVENous Q24H    metroNIDAZOLE  500 mg Oral 3 times per day    lidocaine PF  5 mL IntraDERmal Once    sodium chloride flush  5-40 mL IntraVENous 2 times per day    heparin flush  3 mL IntraVENous 2 times per day    metoprolol succinate  50 mg Oral BID    sodium chloride flush  5-40 mL IntraVENous 2 times per day    enoxaparin  40 mg SubCUTAneous Daily    insulin lispro  0-6 Units SubCUTAneous TID WC    insulin lispro  0-3 Units SubCUTAneous Nightly    insulin glargine  15 Units SubCUTAneous BID     sodium chloride flush, 5-40 mL, PRN  sodium chloride, 25 mL, PRN  heparin flush, 3 mL, PRN  sodium chloride flush, 5-40 mL, PRN  sodium chloride, 25 mL, PRN  ondansetron, 4 mg, Q8H PRN   Or  ondansetron, 4 mg, Q6H PRN  polyethylene glycol, 17 g, Daily PRN  acetaminophen, 650 mg, Q6H PRN   Or  acetaminophen, 650 mg, Q6H PRN  glucose, 15 g, PRN  dextrose, 12.5 g, PRN  glucagon (rDNA), 1 mg, PRN  dextrose, 100 mL/hr, PRN  oxyCODONE-acetaminophen, 1 tablet, Q4H PRN  lidocaine, , BID PRN         Objective:    /62   Pulse 69   Temp 98.4 °F (36.9 °C) (Oral)   Resp 16   Ht 5' 10\" (1.778 m)   Wt 242 lb 6 oz (109.9 kg)   SpO2 98%   BMI 34.78 kg/m²     General Appearance: alert and oriented to person, place and time and in no acute distress  Skin: warm and dry  Head: normocephalic and atraumatic  Eyes: pupils equal, round, and reactive to light, extraocular eye movements intact, conjunctivae normal  Neck: neck supple and non tender without mass   Pulmonary/Chest: clear to auscultation bilaterally- no wheezes, rales or rhonchi, normal air movement, no respiratory distress  Cardiovascular: normal rate, normal S1 and S2 and no carotid bruits  Abdomen: soft, non-tender, non-distended, normal bowel sounds, no masses or organomegaly  Extremities: no cyanosis, no clubbing and no edema  Neurologic: no cranial nerve deficit and speech normal        Recent Labs     10/18/21  1015 10/19/21  1445    137   K 3.4* 3.7    101   CO2 22 26   BUN 10 7   CREATININE 1.0 1.0   GLUCOSE 142* 237*   CALCIUM 9.2 9.1       Recent Labs     10/18/21  1015 10/19/21  1445   WBC 15.0* 9.4   RBC 3.31* 3.55*   HGB 10.8* 11.2*   HCT 31.9* 34.6*   MCV 96.4 97.5   MCH 32.6 31.5   MCHC 33.9 32.4   RDW 13.2 13.1    473*   MPV 10.0 9.4       Radiology:   CT abd/pelvis 10/16  Impression   No acute inflammatory process in the abdomen and pelvis.       Inflammatory changes with skin thickening and phlegmon in the perirectal and   gluteal region, significantly worse on the left. Assessment:    Active Problems:    Cellulitis    RONA (acute kidney injury) ([address]    Dehydration    Uncontrolled type 2 diabetes mellitus with hyperglycemia (HCC)    Abscess of multiple sites of buttock  Resolved Problems:    * No resolved hospital problems. *        Plan:  1. Continue IV abx per ID-treated with zosyn and vancomycin; Awaiting PICC line for home rocephin. 2.  I&D performed 10/18/21 per general surgery  3. Continue Wound care    Discharge dependent upon PICC placement and [facility] set up for abx. NOTE: This report was transcribed using voice recognition software. Every effort was made to ensure accuracy; however, inadvertent computerized transcription errors may be present.   Electronically signed by Cesar Gonzalez MD on 10/20/2021 at 2:53 PM

## 2021-10-20 NOTE — PROGRESS NOTES
peripheral    Laboratory and Tests Review:  Lab Results   Component Value Date    WBC 9.4 10/19/2021    WBC 15.0 (H) 10/18/2021    WBC 17.1 (H) 10/17/2021    HGB 11.2 (L) 10/19/2021    HCT 34.6 (L) 10/19/2021    MCV 97.5 10/19/2021     (H) 10/19/2021     Lab Results   Component Value Date    NEUTROABS 5.77 10/19/2021    NEUTROABS 13.55 (H) 10/17/2021    NEUTROABS 14.60 (H) 10/16/2021     No results found for: CRPHS  Lab Results   Component Value Date    ALT <5 10/17/2021    AST 12 10/17/2021    ALKPHOS 73 10/17/2021    BILITOT 0.6 10/17/2021     Lab Results   Component Value Date     10/19/2021    K 3.7 10/19/2021    K 3.9 10/17/2021     10/19/2021    CO2 26 10/19/2021    BUN 7 10/19/2021    CREATININE 1.0 10/19/2021    CREATININE 1.0 10/18/2021    CREATININE 1.2 10/17/2021    GFRAA >60 10/19/2021    LABGLOM >60 10/19/2021    GLUCOSE 237 10/19/2021    PROT 7.3 10/17/2021    LABALBU 3.3 10/17/2021    CALCIUM 9.1 10/19/2021    BILITOT 0.6 10/17/2021    ALKPHOS 73 10/17/2021    AST 12 10/17/2021    ALT <5 10/17/2021     No results found for: CRP  No results found for: Odean Masker  Radiology:  Reviewed     Microbiology:   Blood cultures 10/16/2021: negative so far  Wound culture: pansensitive E.coli      No results for input(s): PROCAL in the last 72 hours. ASSESSMENT:  · Concern for necrotizing fasciitis left perirectal left gluteal area, status post bedside I&D 10/18/2021    · History of perirectal abscess 9/18/2021 that grew group B strep   · Leukocytosis associated to the above, improved      PLAN:  · Start Ceftriaxone 2g IV daily & PO flagyl x at least 2 weeks   · PICC line   · Stop Zosyn and Vancomycin  · Surgery with no further plans, wound care per their rec's   · Labs reviewed - WBC 9.9   · Follow up CT in 1 week & follow up in the office in 7-10 days    · Ok to discharge from  [facility] - CNP  11:39 AM  10/20/2021   Pt seen and examined.  Above discussed agree with advanced practice nurse. Labs, cultures, and radiographs reviewed. Face to Face encounter occurred. Changes made as necessary.      Edita Machado MD

## 2021-10-20 NOTE — CARE COORDINATION
Social Work / Discharge Planning : Patient to have PICC placed today. Tobias from [facility] updated and SW faxed script for IV medication for 2 weeks to [facility] [phone]. SW did follow up with patient and in agreement for discharge plan home with [facility]. Patient has 100% benefit for IV antibiotic. [facility] information added to AVS. SW to follow. Electronically signed by DOROTHY Mathis on 10/20/21 at 12:11 PM EDT

## 2021-10-20 NOTE — PROCEDURES
PICC  Catheter insertion date: 10/20/2021     Product Number:  [id number]   Lot No: [id number]   Gauge: 4.5 fr   Lumen: single   Rt Brachial    Vein Diameter : 0.55 cm   Mid upper arm circumference: 35 cm   Catheter Length : 43 cm(12 cm cut from a 55 cm line)   Internal Length: 42 cm   External Catheter Length: 1 cm   Ultrasound Used:yes  LEESA Cansecoseye confirms PICC tip is placed in the lower 1/3 of the SVC or at the Cavoatrial junction. Floor nurse notified PICC is okay to use.    : Benny Tinajero RN

## 2021-10-20 NOTE — PROGRESS NOTES
GENERAL SURGERY  DAILY PROGRESS NOTE  10/20/2021  No chief complaint on file. cc: right and left gluteal abscess    Subjective:  Patient resting comfortably in his bed. No acute events overnight. Objective:  /75   Pulse 64   Temp 98.2 °F (36.8 °C) (Oral)   Resp 16   Ht 5' 10\" (1.778 m)   Wt 237 lb (107.5 kg)   SpO2 97%   BMI 34.01 kg/m²     General appearance: alert, cooperative and in no acute distress. Eyes: grossly normal  Lungs: nonlabored breathing on room air  Heart: regular rate, normotensive  Abdomen: soft, non-tender, non distended  Skin: Right gluteal abscess s/p I&D and left draining gluteal abscess. Minimal erythema and tenderness. Improved swelling. Neurologic: Alert and oriented x 3. Grossly normal  Musculoskeletal: No clubbing cyanosis or edema    Assessment/Plan:  38 y.o. male s/p I&D right gluteal abscess 10/18/21. Daily dressing changes  Pain/nausea control prn  Antibiotics per ID team.   Follow-up in Dr. Nichelle Crook office outpatient. No plans for surgical intervention at this time. Please contact general surgery with any questions or concerns.     Patient findings and plan discussed with Dr. Elton Dupont     Electronically signed by Estela Damian MD on 10/20/2021 at 5:28 AM

## 2021-10-20 NOTE — DISCHARGE SUMMARY
[facility] Physician Discharge Summary       Laine Hall MD  [address]  [phone]    Go on 10/27/2021  Go to PCP appointment 10/27/21 @ 9:15 am- bring photo ID, Insurance card, and medication list with you to office visit    MD Gomez Campbell [phone] [address] [phone]    Schedule an appointment as soon as possible for a visit in 2 weeks  For wound re-check    [address]  [address]  [phone]    Schedule an appointment as soon as possible for a visit in 10 days  For outpatient follow up, [facility] [address]  [address]  [phone]          Activity level: As tolerated     Dispo: Home      Condition on discharge: Stable     Patient ID:  Katelin Timmons  [id number]  60 y.o.  1985    Admit date: 10/17/2021    Discharge date and time:  10/20/2021  5:06 PM    Admission Diagnoses: Active Problems:    Cellulitis    RONA (acute kidney injury) ([facility] [address]    Dehydration    Uncontrolled type 2 diabetes mellitus with hyperglycemia (HCC)    Abscess of multiple sites of buttock  Resolved Problems:    * No resolved hospital problems. *      Discharge Diagnoses: Active Problems:    Cellulitis    RONA (acute kidney injury) ([address]    Dehydration    Uncontrolled type 2 diabetes mellitus with hyperglycemia (HCC)    Abscess of multiple sites of buttock  Resolved Problems:    * No resolved hospital problems. *      Consults:  IP CONSULT TO GENERAL SURGERY  IP CONSULT TO INFECTIOUS DISEASES  IP CONSULT TO SOCIAL WORK  IP CONSULT TO PHARMACY  IP CONSULT TO IV TEAM  IP CONSULT TO IV TEAM  IP CONSULT TO IV TEAM    Hospital Course:   Patient Katelin Timmons is a 38 y.o. presented with Cellulitis [L03.90]  Patient was admitted with buttocks abscess. I&D was performed at bedside by general surgery. Initially given zosyn and vancomycin.   Culture and sensitivity returned and is sensitive to rocephin. PICC line placed and patient will be discharged with home health and rocephin per ID. Discharge Exam:    General Appearance: alert and oriented to person, place and time and in no acute distress  Skin: warm and dry  Head: normocephalic and atraumatic  Eyes: pupils equal, round, and reactive to light, extraocular eye movements intact, conjunctivae normal  Neck: neck supple and non tender without mass   Pulmonary/Chest: clear to auscultation bilaterally- no wheezes, rales or rhonchi, normal air movement, no respiratory distress  Cardiovascular: normal rate, normal S1 and S2 and no carotid bruits  Abdomen: soft, non-tender, non-distended, normal bowel sounds, no masses or organomegaly  Extremities: no cyanosis, no clubbing and no edema  Neurologic: no cranial nerve deficit and speech normal    No intake/output data recorded. No intake/output data recorded. LABS:  Recent Labs     10/18/21  1015 10/19/21  1445    137   K 3.4* 3.7    101   CO2 22 26   BUN 10 7   CREATININE 1.0 1.0   GLUCOSE 142* 237*   CALCIUM 9.2 9.1       Recent Labs     10/18/21  1015 10/19/21  1445   WBC 15.0* 9.4   RBC 3.31* 3.55*   HGB 10.8* 11.2*   HCT 31.9* 34.6*   MCV 96.4 97.5   MCH 32.6 31.5   MCHC 33.9 32.4   RDW 13.2 13.1    473*   MPV 10.0 9.4       No results for input(s): POCGLU in the last 72 hours. Imaging:  No results found.     Patient Instructions:      Medication List      START taking these medications    cefTRIAXone  infusion  Commonly known as: ROCEPHIN  Infuse 2,000 mg intravenously every 24 hours for 14 days Compound per protocol     metroNIDAZOLE 500 MG tablet  Commonly known as: FLAGYL  Take 1 tablet by mouth every 8 hours for 14 days        CHANGE how you take these medications    metoprolol succinate 50 MG extended release tablet  Commonly known as: TOPROL XL  Take 1 tablet by mouth 2 times daily  What changed:   · medication Where to Get Your Medications      These medications were sent to [facility], [address]ones [phone]  [address], [address] [address]    Phone: [phone]   · metroNIDAZOLE 500 MG tablet     These medications were sent to [facility], [address] [phone] Magdalene Nones [phone]  [address] [facility] [address]    Phone: [phone]   · metoprolol succinate 50 MG extended release tablet     You can get these medications from any pharmacy    Bring a paper prescription for each of these medications  · cefTRIAXone  infusion           Note that more than 30 minutes was spent in preparing discharge papers, discussing discharge with patient, medication review, etc.    Signed:  Electronically signed by Surendra Patel MD on 10/20/2021 at 5:07 PM

## 2021-10-21 LAB
ANAEROBIC CULTURE: NORMAL
GRAM STAIN RESULT: ABNORMAL
ORGANISM: ABNORMAL
ORGANISM: ABNORMAL
WOUND/ABSCESS: ABNORMAL
WOUND/ABSCESS: ABNORMAL

## 2021-10-22 LAB
BLOOD CULTURE, ROUTINE: NORMAL
CULTURE, BLOOD 2: NORMAL

## 2021-10-26 ENCOUNTER — HOSPITAL ENCOUNTER (OUTPATIENT)
Dept: CT IMAGING | Age: 36
Discharge: HOME | End: 2021-10-28
Payer: MEDICARE

## 2021-10-26 DIAGNOSIS — L02.31 ABSCESS OF MULTIPLE SITES OF BUTTOCK: ICD-10-CM

## 2021-10-26 PROCEDURE — 6360000004 HC RX CONTRAST MEDICATION: Performed by: RADIOLOGY

## 2021-10-26 PROCEDURE — 74177 CT ABD & PELVIS W/CONTRAST: CPT

## 2021-10-26 RX ADMIN — IOPAMIDOL 90 ML: 755 INJECTION, SOLUTION INTRAVENOUS at 16:04

## 2021-10-27 ENCOUNTER — OFFICE VISIT (OUTPATIENT)
Dept: PRIMARY CARE CLINIC | Age: 36
End: 2021-10-27
Payer: MEDICARE

## 2021-10-27 VITALS
HEIGHT: 70 IN | HEART RATE: 85 BPM | TEMPERATURE: 97 F | WEIGHT: 240 LBS | DIASTOLIC BLOOD PRESSURE: 70 MMHG | OXYGEN SATURATION: 98 % | BODY MASS INDEX: 34.36 KG/M2 | SYSTOLIC BLOOD PRESSURE: 110 MMHG

## 2021-10-27 DIAGNOSIS — M54.50 LUMBAR BACK PAIN: ICD-10-CM

## 2021-10-27 DIAGNOSIS — G47.33 OSA (OBSTRUCTIVE SLEEP APNEA): ICD-10-CM

## 2021-10-27 DIAGNOSIS — K76.9 LESION OF LIVER: ICD-10-CM

## 2021-10-27 DIAGNOSIS — L02.31 CELLULITIS AND ABSCESS OF BUTTOCK: ICD-10-CM

## 2021-10-27 DIAGNOSIS — I50.22 CHRONIC SYSTOLIC HEART FAILURE (HCC): ICD-10-CM

## 2021-10-27 DIAGNOSIS — E11.65 UNCONTROLLED TYPE 2 DIABETES MELLITUS WITH HYPERGLYCEMIA (HCC): Primary | ICD-10-CM

## 2021-10-27 DIAGNOSIS — G62.9 NEUROPATHY: ICD-10-CM

## 2021-10-27 DIAGNOSIS — F17.208 NICOTINE DEPENDENCE WITH OTHER NICOTINE-INDUCED DISORDER, UNSPECIFIED NICOTINE PRODUCT TYPE: ICD-10-CM

## 2021-10-27 DIAGNOSIS — Z76.89 ENCOUNTER TO ESTABLISH CARE WITH NEW DOCTOR: ICD-10-CM

## 2021-10-27 DIAGNOSIS — Z11.59 ENCOUNTER FOR HEPATITIS C SCREENING TEST FOR LOW RISK PATIENT: ICD-10-CM

## 2021-10-27 DIAGNOSIS — L30.9 ECZEMA, UNSPECIFIED TYPE: ICD-10-CM

## 2021-10-27 DIAGNOSIS — M25.552 LEFT HIP PAIN: ICD-10-CM

## 2021-10-27 DIAGNOSIS — Z91.14 HISTORY OF MEDICATION NONCOMPLIANCE: ICD-10-CM

## 2021-10-27 DIAGNOSIS — I10 ESSENTIAL HYPERTENSION: ICD-10-CM

## 2021-10-27 DIAGNOSIS — E78.2 MIXED HYPERLIPIDEMIA: ICD-10-CM

## 2021-10-27 DIAGNOSIS — L03.317 CELLULITIS AND ABSCESS OF BUTTOCK: ICD-10-CM

## 2021-10-27 DIAGNOSIS — Z11.4 ENCOUNTER FOR SCREENING FOR HIV: ICD-10-CM

## 2021-10-27 LAB
CHP ED QC CHECK: ABNORMAL
GLUCOSE BLD-MCNC: 254 MG/DL

## 2021-10-27 PROCEDURE — 3046F HEMOGLOBIN A1C LEVEL >9.0%: CPT | Performed by: FAMILY MEDICINE

## 2021-10-27 PROCEDURE — G8427 DOCREV CUR MEDS BY ELIG CLIN: HCPCS | Performed by: FAMILY MEDICINE

## 2021-10-27 PROCEDURE — 36415 COLL VENOUS BLD VENIPUNCTURE: CPT | Performed by: FAMILY MEDICINE

## 2021-10-27 PROCEDURE — G8484 FLU IMMUNIZE NO ADMIN: HCPCS | Performed by: FAMILY MEDICINE

## 2021-10-27 PROCEDURE — 99205 OFFICE O/P NEW HI 60 MIN: CPT | Performed by: FAMILY MEDICINE

## 2021-10-27 PROCEDURE — 1036F TOBACCO NON-USER: CPT | Performed by: FAMILY MEDICINE

## 2021-10-27 PROCEDURE — 1111F DSCHRG MED/CURRENT MED MERGE: CPT | Performed by: FAMILY MEDICINE

## 2021-10-27 PROCEDURE — 2022F DILAT RTA XM EVC RTNOPTHY: CPT | Performed by: FAMILY MEDICINE

## 2021-10-27 PROCEDURE — 82962 GLUCOSE BLOOD TEST: CPT | Performed by: FAMILY MEDICINE

## 2021-10-27 PROCEDURE — G8417 CALC BMI ABV UP PARAM F/U: HCPCS | Performed by: FAMILY MEDICINE

## 2021-10-27 RX ORDER — TRIAMCINOLONE ACETONIDE 1 MG/G
CREAM TOPICAL PRN
Qty: 80 G | Refills: 0 | Status: SHIPPED
Start: 2021-10-27 | End: 2021-12-01 | Stop reason: SDUPTHER

## 2021-10-27 RX ORDER — PEN NEEDLE, DIABETIC 32GX 5/32"
NEEDLE, DISPOSABLE MISCELLANEOUS
COMMUNITY
Start: 2021-09-20 | End: 2021-12-01

## 2021-10-27 RX ORDER — INSULIN LISPRO 100 [IU]/ML
INJECTION, SOLUTION SUBCUTANEOUS
COMMUNITY
Start: 2021-09-20 | End: 2021-10-27

## 2021-10-27 RX ORDER — ATORVASTATIN CALCIUM 20 MG/1
20 TABLET, FILM COATED ORAL DAILY
Qty: 90 TABLET | Refills: 1 | Status: SHIPPED
Start: 2021-10-27 | End: 2022-04-11

## 2021-10-27 RX ORDER — FENOFIBRATE 67 MG/1
67 CAPSULE ORAL
COMMUNITY
End: 2021-12-01

## 2021-10-27 RX ORDER — BUMETANIDE 2 MG/1
2 TABLET ORAL SEE ADMIN INSTRUCTIONS
Qty: 60 TABLET | Refills: 3 | Status: ON HOLD
Start: 2021-10-27 | End: 2022-04-08 | Stop reason: HOSPADM

## 2021-10-27 RX ORDER — METOPROLOL SUCCINATE 100 MG/1
100 TABLET, EXTENDED RELEASE ORAL 2 TIMES DAILY
Qty: 60 TABLET | Refills: 0
Start: 2021-10-27 | End: 2021-11-27 | Stop reason: SDUPTHER

## 2021-10-27 SDOH — ECONOMIC STABILITY: FOOD INSECURITY: WITHIN THE PAST 12 MONTHS, YOU WORRIED THAT YOUR FOOD WOULD RUN OUT BEFORE YOU GOT MONEY TO BUY MORE.: OFTEN TRUE

## 2021-10-27 SDOH — ECONOMIC STABILITY: FOOD INSECURITY: WITHIN THE PAST 12 MONTHS, THE FOOD YOU BOUGHT JUST DIDN'T LAST AND YOU DIDN'T HAVE MONEY TO GET MORE.: OFTEN TRUE

## 2021-10-27 ASSESSMENT — ENCOUNTER SYMPTOMS
CHEST TIGHTNESS: 0
EYE DISCHARGE: 0
RHINORRHEA: 0
VOMITING: 0
ABDOMINAL DISTENTION: 0
ABDOMINAL PAIN: 0
COUGH: 0
SORE THROAT: 0
BACK PAIN: 1
CONSTIPATION: 0
WHEEZING: 0
EYE PAIN: 0
COLOR CHANGE: 0
NAUSEA: 0
SHORTNESS OF BREATH: 0
BLOOD IN STOOL: 0
SINUS PRESSURE: 0

## 2021-10-27 ASSESSMENT — SOCIAL DETERMINANTS OF HEALTH (SDOH): HOW HARD IS IT FOR YOU TO PAY FOR THE VERY BASICS LIKE FOOD, HOUSING, MEDICAL CARE, AND HEATING?: HARD

## 2021-10-27 NOTE — PROGRESS NOTES
Former[facility])  Family Medicine Outpatient        SUBJECTIVE:  CC: had concerns including Establish Care and Follow-up (was in ER on 10/17 due to multiple abscess on buttocks). HPI:  Dejon Pascual is a male 38 y.o. presented to the clinic to establish care. He was recently admitted 10/7/21-10/20/21 for cellulitis, dehydration, uncontrolled diabetes, and multiple abscesses on his buttock. He had a PICC line placed while in the hospital and was discharged on Rocephin per ID. He reports he will call to schedule follow up with ID and General surgery when he leaves here. He denies any acute concerns at this time. CT Abdomen/Pelvis W IV Contrast 10/27/21  FINDINGS:   Images of the lower chest are unremarkable. Jory Clement is a stable 9 mm hypodense   lesion in the right lobe of the liver.  The etiology and significance of this   finding are uncertain.  The wall of the gallbladder appears mildly thickened. There is no biliary dilatation.  The spleen, pancreas, right and left   kidneys, and both adrenal glands appear unremarkable.  The wall of the   urinary bladder is mildly thickened.  No acute intestinal abnormality is   identified.  The appendix appears unremarkable.  No free intra-abdominal air   or ascites is seen. Jory Clement are skin thickening and subcutaneous infiltrative   changes at the gluteal cleft, greater on the left than right.  This finding   is significantly improved when compared with the prior exam.  No residual   fluid collection is visualized.           Impression   Skin thickening and subcutaneous infiltrative changes at the gluteal cleft,   significantly improved since 10/16/2021.  No residual fluid collection.       Stable small indeterminate liver lesion.       Mildly thickened gallbladder wall.       Mildly thickened urinary bladder wall.           Review of Systems   Constitutional: Negative for activity change, appetite change, fatigue and fever.    HENT: Negative for congestion, postnasal drip, rhinorrhea, sinus pressure, sneezing and sore throat. Eyes: Negative for pain and discharge. Respiratory: Negative for cough, chest tightness, shortness of breath and wheezing. Cardiovascular: Negative for chest pain and leg swelling. Gastrointestinal: Negative for abdominal distention, abdominal pain, blood in stool, constipation, nausea and vomiting. Endocrine: Negative for cold intolerance and heat intolerance. Genitourinary: Negative for decreased urine volume, dysuria, frequency and urgency. Musculoskeletal: Positive for arthralgias and back pain. Skin: Negative for color change and rash. Allergic/Immunologic: Negative for food allergies and immunocompromised state. Neurological: Positive for light-headedness and numbness. Negative for dizziness, seizures, syncope, weakness and headaches. Psychiatric/Behavioral: Negative for agitation, behavioral problems and suicidal ideas. The patient is not nervous/anxious. Outpatient Medications Marked as Taking for the 10/27/21 encounter (Office Visit) with Yumiko Peterson MD   Medication Sig Dispense Refill    metoprolol succinate (TOPROL XL) 100 MG extended release tablet 1 tab qam and a half a tab qpm 60 tablet 0    BD PEN NEEDLE TIFFANY 2ND GEN 32G X 4 MM MISC USE AS DIRECTED FOUR TIMES DAILY      fenofibrate micronized (LOFIBRA) 67 MG capsule Take 67 mg by mouth every morning (before breakfast) UNSURE OF MG      triamcinolone (KENALOG) 0.1 % cream Apply topically as needed (rash) Apply topically 2 times daily x 10 days prn 80 g 0    bumetanide (BUMEX) 2 MG tablet Take 1 tablet by mouth See Admin Instructions Take 1 tab 8am daily. On days you gain weight more than 2 lbs or have worse swelling or short of breath then take a 2pm dose that day.  60 tablet 3    atorvastatin (LIPITOR) 20 MG tablet Take 1 tablet by mouth daily 90 tablet 1    [] metroNIDAZOLE (FLAGYL) 500 MG tablet Take 1 tablet by mouth every 8 hours for 14 days 42 tablet 0    spironolactone (ALDACTONE) 25 MG tablet Take 25 mg by mouth daily      sacubitril-valsartan (ENTRESTO) 49-51 MG per tablet Take 1 tablet by mouth 2 times daily 60 tablet 3    insulin glargine (LANTUS SOLOSTAR) 100 UNIT/ML injection pen Inject 20 Units into the skin 2 times daily 10 pen 3    Insulin Pen Needle (BD PEN NEEDLE MICRO U/F) 32G X 6 MM MISC Uses with insulin 4 times a day 250 each 5    insulin lispro, 1 Unit Dial, (HUMALOG KWIKPEN) 100 UNIT/ML SOPN Inject 15 units with meals + following sliding scale. -200 add 3U, -250 add 6U, -300 add 9U, -350 add 12U, -400 add 15U, BS over 400 add 18U. MAX 75U/day 15 pen 3    Lancets MISC Test 4 times/day before meals and at bedtime and as needed for symptoms of irregular blood glucose. 300 each 3    CVS Lancets MISC 1 each by Does not apply route daily To check sugar 4 x a day and if feeling ill 200 each 3    blood glucose monitor strips Test **4* times a day & as needed for symptoms of irregular blood glucose. Dispense sufficient amount for indicated testing frequency plus additional to accommodate PRN testing needs. 200 strip 2       I have reviewed all pertinent PMHx, PSHx, FamHx, SocialHx, medications, and allergies and updated history as appropriate. OBJECTIVE    VS: /70   Pulse 85   Temp 97 °F (36.1 °C)   Ht 5' 10\" (1.778 m)   Wt 240 lb (108.9 kg)   SpO2 98%   BMI 34.44 kg/m²   Physical Exam  Constitutional:       General: He is not in acute distress. Appearance: He is well-developed. He is obese. He is not diaphoretic. HENT:      Head: Normocephalic and atraumatic. Right Ear: External ear normal.      Left Ear: External ear normal.   Eyes:      Conjunctiva/sclera: Conjunctivae normal.      Pupils: Pupils are equal, round, and reactive to light. Cardiovascular:      Rate and Rhythm: Normal rate and regular rhythm. Heart sounds: Normal heart sounds. No murmur heard.   No friction rub. No gallop. Pulmonary:      Effort: Pulmonary effort is normal.      Breath sounds: Normal breath sounds. Abdominal:      General: Bowel sounds are normal.      Palpations: Abdomen is soft. Tenderness: There is no abdominal tenderness. Hernia: No hernia is present. Musculoskeletal:         General: Tenderness present. No swelling. Normal range of motion. Cervical back: Normal range of motion and neck supple. Skin:     General: Skin is warm and dry. Comments: Examined buttock with a chaperone present. Area of cellulitis with abscess appears to be clean and dry. No erythema or warmth. Neurological:      Mental Status: He is alert and oriented to person, place, and time. Motor: No weakness. Psychiatric:         Behavior: Behavior normal.       ASSESSMENT/PLAN:  1. Uncontrolled type 2 diabetes mellitus with hyperglycemia ([facility] [address]  9/30/21 a1c 15%. Random glucose today 196 mg/dl. - CBC; Future  - Comprehensive Metabolic Panel; Future  - POCT Glucose  - atorvastatin (LIPITOR) 20 MG tablet; Take 1 tablet by mouth daily  Dispense: 90 tablet; Refill: 1    2. Cellulitis and abscess of buttock  Reviewed recent imaging. No acute infection noted exam today as above. F/u with ID and surgery. 3. Chronic systolic heart failure [facility])  Established with Cardiology. Continue to follow. - bumetanide (BUMEX) 2 MG tablet; Take 1 tablet by mouth See Admin Instructions Take 1 tab 8am daily. On days you gain weight more than 2 lbs or have worse swelling or short of breath then take a 2pm dose that day. Dispense: 60 tablet; Refill: 3    4. Lesion of liver  - US LIVER; Future    5. Mixed hyperlipidemia  - atorvastatin (LIPITOR) 20 MG tablet; Take 1 tablet by mouth daily  Dispense: 90 tablet; Refill: 1    6. Encounter for screening for HIV  - HIV Screen; Future    7. Encounter for hepatitis C screening test for low risk patient  - Hepatitis C Antibody; Future    8.  Eczema, unspecified type  - triamcinolone (KENALOG) 0.1 % cream; Apply topically as needed (rash) Apply topically 2 times daily x 10 days prn  Dispense: 80 g; Refill: 0    9. Neuropathy  - Vitamin B12; Future  - Folate; Future  - R Feliberto Thornton, DPM, Podiatry, [place]    10. Left hip pain  - XR HIP 2-3 VW W PELVIS LEFT; Future    11. Lumbar back pain  - XR LUMBAR SPINE (MIN 4 VIEWS); Future    12. Encounter to establish care with new doctor    13. Essential Hypertension  Patient reports episodic lightheadedness when he bends over and stands up sometime. Query borderline episodic low blood pressure. Deescalate Metoprolol from 100 mg/bid to 100 mg/qam and 50 mg/qpm.    14. History of Medical Noncompliance    15. AMANUEL  Historic and not currently wearing a mask. Encourage patient to consider treatment for sleep apnea. Patient declines at this time understanding and accepting risk including death. 16. Nicotine Dependence   Encourage smoking cessation    17. Alcohol Abuse  Patient denies drinking any further at this time. Encourage patient to join [place] [id number] and resources discussed for alcohol rehab. Take Thiamine 100 mg/qd and Folate 1 mg/qd. I have reviewed my findings and recommendations with Melissa Villarreal MD  11/27/2021 11:07 PM  Return in about 4 weeks (around 11/24/2021). Counseled regarding above diagnosis, including possible risks and complications, especially if left uncontrolled. Patient counseled on red flag symptoms and if they occur to go to the ED. Discussed medications risk/benefits and possible side effects and alternatives to treatment. Patient and/or guardian verbalizes understanding, agrees, feels comfortable with and wishes to proceed with above treatment plan.       Advised patient regarding importance of keeping up with recommended health maintenance and to schedule as soon as possible if overdue, as this is important in assessing for undiagnosed pathology, especially cancer, as well as protecting against potentially harmful/life threatening disease. Patient and/or guardian verbalizes understanding and agrees with above counseling, assessment and plan. All questions answered. Please note this report has been partially produced using speech recognition software  and may contain errors related to that system including grammar, punctuation and spelling as well as words and phrases that may seem inappropriate. If there are questions or concerns please feel free to contact me to clarify.

## 2021-10-28 RX ORDER — POTASSIUM CHLORIDE 20 MEQ/1
40 TABLET, EXTENDED RELEASE ORAL DAILY
Qty: 180 TABLET | Refills: 3 | Status: SHIPPED | OUTPATIENT
Start: 2021-10-28

## 2021-10-28 NOTE — PROGRESS NOTES
Physician Progress Note      PATIENT:               Ramirez Kumar  CSN #:                  [id number]  :                       1985  ADMIT DATE:       10/17/2021 12:02 AM  DISCH DATE:        10/20/2021 9:52 PM  RESPONDING  PROVIDER #:        Manasa Scott MD          QUERY TEXT:    Patient admitted with Cellulitis. Documentation reflects Concern for   necrotizing fasciitis in IM progress note dated 10/19 and ID progress notes. If possible, please document in the progress notes and discharge summary if   Necrotizing fasciitis was: The medical record reflects the following:  Risk Factors: Cellulitis  Clinical Indicators: Per ID consult note 10/17 \". Yana Fanning Yana Fanning Significant concern for   necrotizing fasciitis left perirectal left gluteal area especially after   reviewing the CAT scan and I do see some signs of air in the soft tissue ? \". Per IM progress note 10/19 \". Yana Fanning Yana Fanning Per ID, concern for necrotizing fasciitis left   perirectal left gluteal area. Yana Fanning Yana Fanning \"  Treatment: Zosyn, vancomycin  Options provided:  -- Necrotizing fasciitis confirmed after study  -- Necrotizing fasciitis treated and resolved  -- Necrotizing fasciitis ruled out after study  -- Other - I will add my own diagnosis  -- Disagree - Not applicable / Not valid  -- Disagree - Clinically unable to determine / Unknown  -- Refer to Clinical Documentation Reviewer    PROVIDER RESPONSE TEXT:    Necrotizing fasciitis treated and resolved.     Query created by: Lee Guzman on 10/25/2021 11:25 AM      Electronically signed by:  Manasa Scott MD 10/28/2021 7:14 AM

## 2021-11-10 ENCOUNTER — HOSPITAL ENCOUNTER (OUTPATIENT)
Dept: ULTRASOUND IMAGING | Age: 36
Discharge: HOME | End: 2021-11-12
Payer: COMMERCIAL

## 2021-11-10 ENCOUNTER — HOSPITAL ENCOUNTER (OUTPATIENT)
Age: 36
Discharge: HOME | End: 2021-11-10
Payer: COMMERCIAL

## 2021-11-10 ENCOUNTER — HOSPITAL ENCOUNTER (OUTPATIENT)
Dept: GENERAL RADIOLOGY | Age: 36
Discharge: HOME | End: 2021-11-12
Payer: COMMERCIAL

## 2021-11-10 ENCOUNTER — HOSPITAL ENCOUNTER (OUTPATIENT)
Age: 36
Discharge: HOME | End: 2021-11-12
Payer: COMMERCIAL

## 2021-11-10 DIAGNOSIS — G62.9 NEUROPATHY: ICD-10-CM

## 2021-11-10 DIAGNOSIS — K76.9 LESION OF LIVER: ICD-10-CM

## 2021-11-10 DIAGNOSIS — M54.50 LUMBAR BACK PAIN: ICD-10-CM

## 2021-11-10 DIAGNOSIS — E11.65 TYPE 2 DIABETES MELLITUS WITH HYPERGLYCEMIA, WITH LONG-TERM CURRENT USE OF INSULIN (HCC): ICD-10-CM

## 2021-11-10 DIAGNOSIS — M25.552 LEFT HIP PAIN: ICD-10-CM

## 2021-11-10 DIAGNOSIS — Z79.4 TYPE 2 DIABETES MELLITUS WITH HYPERGLYCEMIA, WITH LONG-TERM CURRENT USE OF INSULIN (HCC): ICD-10-CM

## 2021-11-10 DIAGNOSIS — Z11.4 ENCOUNTER FOR SCREENING FOR HIV: ICD-10-CM

## 2021-11-10 DIAGNOSIS — E11.65 UNCONTROLLED TYPE 2 DIABETES MELLITUS WITH HYPERGLYCEMIA (HCC): ICD-10-CM

## 2021-11-10 DIAGNOSIS — Z11.59 ENCOUNTER FOR HEPATITIS C SCREENING TEST FOR LOW RISK PATIENT: ICD-10-CM

## 2021-11-10 LAB
ALBUMIN SERPL-MCNC: 4.4 G/DL (ref 3.5–5.2)
ALP BLD-CCNC: 74 U/L (ref 40–129)
ALT SERPL-CCNC: 10 U/L (ref 0–40)
ANION GAP SERPL CALCULATED.3IONS-SCNC: 16 MMOL/L (ref 7–16)
AST SERPL-CCNC: 14 U/L (ref 0–39)
BILIRUB SERPL-MCNC: 0.5 MG/DL (ref 0–1.2)
BUN BLDV-MCNC: 20 MG/DL (ref 6–20)
CALCIUM SERPL-MCNC: 9.9 MG/DL (ref 8.6–10.2)
CHLORIDE BLD-SCNC: 101 MMOL/L (ref 98–107)
CO2: 23 MMOL/L (ref 22–29)
CREAT SERPL-MCNC: 1.1 MG/DL (ref 0.7–1.2)
CREATININE URINE: 415 MG/DL (ref 40–278)
FOLATE: 18.8 NG/ML (ref 4.8–24.2)
GFR AFRICAN AMERICAN: >60
GFR NON-AFRICAN AMERICAN: >60 ML/MIN/1.73
GLUCOSE BLD-MCNC: 196 MG/DL (ref 74–99)
GLUCOSE FASTING: 196 MG/DL (ref 74–99)
HCT VFR BLD CALC: 40.5 % (ref 37–54)
HEMOGLOBIN: 13.8 G/DL (ref 12.5–16.5)
MCH RBC QN AUTO: 32.2 PG (ref 26–35)
MCHC RBC AUTO-ENTMCNC: 34.1 % (ref 32–34.5)
MCV RBC AUTO: 94.4 FL (ref 80–99.9)
MICROALBUMIN UR-MCNC: 41.9 MG/L
MICROALBUMIN/CREAT UR-RTO: 10.1 (ref 0–30)
PDW BLD-RTO: 13.2 FL (ref 11.5–15)
PLATELET # BLD: 421 E9/L (ref 130–450)
PMV BLD AUTO: 10 FL (ref 7–12)
POTASSIUM SERPL-SCNC: 3.6 MMOL/L (ref 3.5–5)
RBC # BLD: 4.29 E12/L (ref 3.8–5.8)
SODIUM BLD-SCNC: 140 MMOL/L (ref 132–146)
TOTAL PROTEIN: 8.6 G/DL (ref 6.4–8.3)
VITAMIN B-12: 539 PG/ML (ref 211–946)
WBC # BLD: 17.2 E9/L (ref 4.5–11.5)

## 2021-11-10 PROCEDURE — 82570 ASSAY OF URINE CREATININE: CPT

## 2021-11-10 PROCEDURE — 82746 ASSAY OF FOLIC ACID SERUM: CPT

## 2021-11-10 PROCEDURE — 84681 ASSAY OF C-PEPTIDE: CPT

## 2021-11-10 PROCEDURE — 85027 COMPLETE CBC AUTOMATED: CPT

## 2021-11-10 PROCEDURE — 86803 HEPATITIS C AB TEST: CPT

## 2021-11-10 PROCEDURE — 72110 X-RAY EXAM L-2 SPINE 4/>VWS: CPT

## 2021-11-10 PROCEDURE — 82044 UR ALBUMIN SEMIQUANTITATIVE: CPT

## 2021-11-10 PROCEDURE — 73502 X-RAY EXAM HIP UNI 2-3 VIEWS: CPT

## 2021-11-10 PROCEDURE — 82947 ASSAY GLUCOSE BLOOD QUANT: CPT

## 2021-11-10 PROCEDURE — 86703 HIV-1/HIV-2 1 RESULT ANTBDY: CPT

## 2021-11-10 PROCEDURE — 80053 COMPREHEN METABOLIC PANEL: CPT

## 2021-11-10 PROCEDURE — 83516 IMMUNOASSAY NONANTIBODY: CPT

## 2021-11-10 PROCEDURE — 36415 COLL VENOUS BLD VENIPUNCTURE: CPT

## 2021-11-10 PROCEDURE — 76705 ECHO EXAM OF ABDOMEN: CPT

## 2021-11-10 PROCEDURE — 82607 VITAMIN B-12: CPT

## 2021-11-11 ENCOUNTER — OFFICE VISIT (OUTPATIENT)
Dept: PODIATRY | Age: 36
End: 2021-11-11
Payer: COMMERCIAL

## 2021-11-11 VITALS — BODY MASS INDEX: 33.64 KG/M2 | HEIGHT: 70 IN | WEIGHT: 235 LBS

## 2021-11-11 DIAGNOSIS — E11.65 UNCONTROLLED TYPE 2 DIABETES MELLITUS WITH HYPERGLYCEMIA (HCC): Primary | ICD-10-CM

## 2021-11-11 DIAGNOSIS — E11.42 DIABETIC POLYNEUROPATHY ASSOCIATED WITH TYPE 2 DIABETES MELLITUS (HCC): ICD-10-CM

## 2021-11-11 DIAGNOSIS — B35.3 TINEA PEDIS OF BOTH FEET: ICD-10-CM

## 2021-11-11 LAB
HEPATITIS C ANTIBODY INTERPRETATION: NORMAL
HIV-1 AND HIV-2 ANTIBODIES: NORMAL

## 2021-11-11 PROCEDURE — 2022F DILAT RTA XM EVC RTNOPTHY: CPT | Performed by: PODIATRIST

## 2021-11-11 PROCEDURE — G8427 DOCREV CUR MEDS BY ELIG CLIN: HCPCS | Performed by: PODIATRIST

## 2021-11-11 PROCEDURE — 99243 OFF/OP CNSLTJ NEW/EST LOW 30: CPT | Performed by: PODIATRIST

## 2021-11-11 PROCEDURE — G8484 FLU IMMUNIZE NO ADMIN: HCPCS | Performed by: PODIATRIST

## 2021-11-11 PROCEDURE — G8417 CALC BMI ABV UP PARAM F/U: HCPCS | Performed by: PODIATRIST

## 2021-11-11 RX ORDER — LEUCOVORIN/PYRIDOX/MECOBALAMIN 4-50-2 MG
4-50 TABLET ORAL 2 TIMES DAILY
Qty: 90 TABLET | Refills: 2 | Status: SHIPPED
Start: 2021-11-11 | End: 2021-12-01 | Stop reason: SDUPTHER

## 2021-11-11 NOTE — PROGRESS NOTES
21     Dejon Pascual    : 1985 Sex: male   Age: 38 y.o. Patient was referred by: Leland Dorado MD  Patient's PCP/Provider is:  Leland Dorado MD    Subjective:    Patient is seen today for evaluation regarding diabetic neuropathy issues into both lower extremities. Chief Complaint   Patient presents with    Diabetes     foot exam, neuropathy        HPI: Patient stated he has had issues with hyperglycemia in the recent past which has aggravated his neuropathy issues. Patient was also concerned about some chronic dryness and cracking to the plantar aspect of both feet. Patient does try to wear appropriate shoe gear with all ambulatory activities. He denies any additional issues at this time. ROS:  Const: Positives and pertinent negatives as per HPI. Musculo: Denies symptoms other than stated above. Neuro: Denies symptoms other than stated above. Skin: Denies symptoms other than stated above. Current Medications:    Current Outpatient Medications:     nystatin-triamcinolone (MYCOLOG II) 633394-6.1 UNIT/GM-% cream, Apply topically 2 times daily. , Disp: 60 g, Rfl: 3    Folinic Acid-Vit B6-Vit B12 (FOLINIC-PLUS) 4-50-2 MG TABS, Take 4-50 mg by mouth 2 times daily, Disp: 90 tablet, Rfl: 2    potassium chloride (KLOR-CON M) 20 MEQ extended release tablet, Take 2 tablets by mouth daily Pt only taking one tablet daily unless he takes Bumex BID then he takes 2 tabs, Disp: 180 tablet, Rfl: 3    BD PEN NEEDLE TIFFANY 2ND GEN 32G X 4 MM MISC, USE AS DIRECTED FOUR TIMES DAILY, Disp: , Rfl:     fenofibrate micronized (LOFIBRA) 67 MG capsule, Take 67 mg by mouth every morning (before breakfast) UNSURE OF MG, Disp: , Rfl:     triamcinolone (KENALOG) 0.1 % cream, Apply topically as needed (rash) Apply topically 2 times daily x 10 days prn, Disp: 80 g, Rfl: 0    bumetanide (BUMEX) 2 MG tablet, Take 1 tablet by mouth See Admin Instructions Take 1 tab 8am daily.  On days you gain weight more than 2 lbs or have worse swelling or short of breath then take a 2pm dose that day., Disp: 60 tablet, Rfl: 3    atorvastatin (LIPITOR) 20 MG tablet, Take 1 tablet by mouth daily, Disp: 90 tablet, Rfl: 1    metoprolol succinate (TOPROL XL) 100 MG extended release tablet, Take 1 tablet by mouth 2 times daily, Disp: 60 tablet, Rfl: 0    spironolactone (ALDACTONE) 25 MG tablet, Take 25 mg by mouth daily, Disp: , Rfl:     sacubitril-valsartan (ENTRESTO) 49-51 MG per tablet, Take 1 tablet by mouth 2 times daily, Disp: 60 tablet, Rfl: 3    insulin glargine (LANTUS SOLOSTAR) 100 UNIT/ML injection pen, Inject 20 Units into the skin 2 times daily, Disp: 10 pen, Rfl: 3    Insulin Pen Needle (BD PEN NEEDLE MICRO U/F) 32G X 6 MM MISC, Uses with insulin 4 times a day, Disp: 250 each, Rfl: 5    insulin lispro, 1 Unit Dial, (HUMALOG KWIKPEN) 100 UNIT/ML SOPN, Inject 15 units with meals + following sliding scale. -200 add 3U, -250 add 6U, -300 add 9U, -350 add 12U, -400 add 15U, BS over 400 add 18U. MAX 75U/day, Disp: 15 pen, Rfl: 3    Lancets MISC, Test 4 times/day before meals and at bedtime and as needed for symptoms of irregular blood glucose., Disp: 300 each, Rfl: 3    [employer] Lancets MISC, 1 each by Does not apply route daily To check sugar 4 x a day and if feeling ill, Disp: 200 each, Rfl: 3    blood glucose monitor strips, Test **4* times a day & as needed for symptoms of irregular blood glucose. Dispense sufficient amount for indicated testing frequency plus additional to accommodate PRN testing needs. , Disp: 200 strip, Rfl: 2    Vitamin D (CHOLECALCIFEROL) 50 MCG (2000 UT) TABS tablet, Take 1 tablet by mouth daily, Disp: 60 tablet, Rfl: 0    Allergies:  No Known Allergies    Vitals:    11/11/21 0911   Weight: 235 lb (106.6 kg)   Height: 5' 10\" (1.778 m)        Past Medical History:   Diagnosis Date    CAD (coronary artery disease)     [facility] [address]     Diabetes mellitus [facility] [address]     Hyperlipidemia     Hyperosmolar hyperglycemic state (HHS) ([facility] [address] 7/30/2021    Hypertension      Family History   Problem Relation Age of Onset    High Blood Pressure Father     Heart Disease Father     High Blood Pressure Paternal Grandmother     Heart Attack Paternal Grandfather     High Blood Pressure Mother     Heart Disease Mother      Past Surgical History:   Procedure Laterality Date    ABDOMEN SURGERY N/A 9/18/2021    INCISION AND DRAINAGE OF LARGE PERIANAL ABSCESS performed by Danna Meigs, MD at [address]      buttocks    ECHO COMPL W DOP COLOR FLOW  5/5/2015         HC INSERT PICC CATH, 5/> YRS  10/20/2021         WISDOM TOOTH EXTRACTION       Social History     Tobacco Use    Smoking status: Former Smoker     Years: 8.00     Types: Cigars, Cigarettes     Start date: 2000    Smokeless tobacco: Never Used    Tobacco comment: Smokes 1 cigar weekly/ cigs 2-3 a week   Vaping Use    Vaping Use: Every day    Start date: 6/1/2021    Substances: Nicotine   Substance Use Topics    Alcohol use: Yes     Comment: social drinker    Drug use: No           Diagnostic studies:    XR LUMBAR SPINE (MIN 4 VIEWS)    Result Date: 11/10/2021  EXAMINATION: 4. XRAY VIEWS OF THE LUMBAR SPINE; ONE XRAY VIEW OF THE PELVIS AND TWO XRAY VIEWS LEFT HIP 11/10/2021 10:24 am COMPARISON: 22 August 2014 HISTORY: ORDERING SYSTEM PROVIDED HISTORY: Lumbar back pain; ORDERING SYSTEM PROVIDED HISTORY: Left hip pain FINDINGS: L-spine: Very mild degenerative disc disease best appreciated at L4-5 where there is mild endplate spurring. No fracture or dislocation. Normal soft tissues. Pelvis and left hip: Acetabular spurring compatible with osteoarthritis at the left hip. No fracture or dislocation. Normal soft tissues. L-spine: Mild degenerative disc disease. Mild osteoarthritis at the left hip.            Procedures:    None    Exam:  VASCULAR: Pedal pulses palpable bilateral foot. Capillary refill time brisk digits 1 through 5 bilateral foot. NEUROLOGICAL: Paresthesias noted to both lower extremities. Protective sensation diminished to testing bilateral lower extremities. DERMATOLOGICAL: Chronic dry, scaly skin noted in a moccasin type distribution to both lower extremities. No maceration of webspaces noted bilateral foot. No plantar ulcerations noted bilateral foot. MUSCULOSKELETAL: Adequate range of motion ankle, subtalar joint, and MTPJ regions noted bilaterally. Plan Per Assessment  Mayra Farah was seen today for diabetes. Diagnoses and all orders for this visit:    Uncontrolled type 2 diabetes mellitus with hyperglycemia (HCC)  -     Folinic Acid-Vit B6-Vit B12 (FOLINIC-PLUS) 4-50-2 MG TABS; Take 4-50 mg by mouth 2 times daily    Diabetic polyneuropathy associated with type 2 diabetes mellitus (HCC)  -     Folinic Acid-Vit B6-Vit B12 (FOLINIC-PLUS) 4-50-2 MG TABS; Take 4-50 mg by mouth 2 times daily    Tinea pedis of both feet  -     nystatin-triamcinolone (MYCOLOG II) 404689-1.1 UNIT/GM-% cream; Apply topically 2 times daily. 1. New patient consultation and management  2. We did discuss appropriate diabetic foot care techniques with patient in detail today. 3. Prescriptions were given for both folinic plus orally and topical Mycolog-II cream to be applied as directed to treat underlying issues. 4. We did stress the importance of following his PCPs diabetic advice to prevent worsening of current issues. 5. Patient will be followed up at a later date for continued diabetic foot evaluation and care. Seen By:    Semaj Bird DPM    Electronically signed by Semaj Bird DPM on 11/11/2021 at 11:01 AM      This note was created using voice recognition software. The note was reviewed however may contain grammatical errors.

## 2021-11-11 NOTE — PROGRESS NOTES
Patient is in today for evaluation of the diabetic foot. Patient is having issues with neuropathy.  pcp is Kristin Spence MD  Last ov 10/27/21

## 2021-11-14 LAB — C-PEPTIDE: 1.7 NG/ML (ref 0.5–3.3)

## 2021-11-15 LAB — GLUTAMIC ACID DECARB AB: 11.7 IU/ML (ref 0–5)

## 2021-11-17 ENCOUNTER — TELEPHONE (OUTPATIENT)
Dept: ENDOCRINOLOGY | Age: 36
End: 2021-11-17

## 2021-11-17 NOTE — TELEPHONE ENCOUNTER
----- Message from CRIS Ackerman - CNS sent at 11/15/2021 10:24 AM EST -----  Please call patient and inform him I have reviewed blood work. [place] antibody is positive. Patient most likely has late onset type 1 diabetes. Patient will need to stay on insulin.   Please have patient send blood glucose log to office

## 2021-11-27 RX ORDER — METOPROLOL SUCCINATE 100 MG/1
TABLET, EXTENDED RELEASE ORAL
Qty: 60 TABLET | Refills: 0
Start: 2021-11-27 | End: 2021-12-01 | Stop reason: SDUPTHER

## 2021-11-29 RX ORDER — SPIRONOLACTONE 25 MG/1
TABLET ORAL
Qty: 90 TABLET | Refills: 1 | Status: ON HOLD
Start: 2021-11-29 | End: 2022-02-10

## 2021-12-01 ENCOUNTER — OFFICE VISIT (OUTPATIENT)
Dept: PRIMARY CARE CLINIC | Age: 36
End: 2021-12-01
Payer: COMMERCIAL

## 2021-12-01 VITALS
TEMPERATURE: 97.7 F | OXYGEN SATURATION: 97 % | BODY MASS INDEX: 35.05 KG/M2 | DIASTOLIC BLOOD PRESSURE: 68 MMHG | HEART RATE: 82 BPM | WEIGHT: 244.8 LBS | HEIGHT: 70 IN | SYSTOLIC BLOOD PRESSURE: 107 MMHG

## 2021-12-01 DIAGNOSIS — M16.0 OSTEOARTHRITIS OF BOTH HIPS, UNSPECIFIED OSTEOARTHRITIS TYPE: ICD-10-CM

## 2021-12-01 DIAGNOSIS — L30.9 ECZEMA, UNSPECIFIED TYPE: ICD-10-CM

## 2021-12-01 DIAGNOSIS — L85.3 XEROSIS OF SKIN: ICD-10-CM

## 2021-12-01 DIAGNOSIS — I10 ESSENTIAL HYPERTENSION: ICD-10-CM

## 2021-12-01 DIAGNOSIS — E55.9 VITAMIN D DEFICIENCY: ICD-10-CM

## 2021-12-01 DIAGNOSIS — K76.9 LIVER LESION: ICD-10-CM

## 2021-12-01 DIAGNOSIS — G62.9 PERIPHERAL POLYNEUROPATHY: ICD-10-CM

## 2021-12-01 DIAGNOSIS — I50.40 COMBINED SYSTOLIC AND DIASTOLIC CONGESTIVE HEART FAILURE, UNSPECIFIED HF CHRONICITY (HCC): ICD-10-CM

## 2021-12-01 DIAGNOSIS — E10.65 UNCONTROLLED TYPE 1 DIABETES MELLITUS WITH HYPERGLYCEMIA (HCC): Primary | ICD-10-CM

## 2021-12-01 DIAGNOSIS — M51.36 DDD (DEGENERATIVE DISC DISEASE), LUMBAR: ICD-10-CM

## 2021-12-01 LAB
ANION GAP SERPL CALCULATED.3IONS-SCNC: 17 MMOL/L (ref 7–16)
BUN BLDV-MCNC: 23 MG/DL (ref 6–20)
CALCIUM SERPL-MCNC: 9.7 MG/DL (ref 8.6–10.2)
CHLORIDE BLD-SCNC: 99 MMOL/L (ref 98–107)
CHP ED QC CHECK: NORMAL
CO2: 23 MMOL/L (ref 22–29)
CREAT SERPL-MCNC: 1.1 MG/DL (ref 0.7–1.2)
GFR AFRICAN AMERICAN: >60
GFR NON-AFRICAN AMERICAN: >60 ML/MIN/1.73
GLUCOSE BLD-MCNC: 175 MG/DL (ref 74–99)
GLUCOSE BLD-MCNC: 182 MG/DL
POTASSIUM SERPL-SCNC: 4.6 MMOL/L (ref 3.5–5)
SODIUM BLD-SCNC: 139 MMOL/L (ref 132–146)
VITAMIN D 25-HYDROXY: 23 NG/ML (ref 30–100)

## 2021-12-01 PROCEDURE — G8484 FLU IMMUNIZE NO ADMIN: HCPCS | Performed by: FAMILY MEDICINE

## 2021-12-01 PROCEDURE — 2022F DILAT RTA XM EVC RTNOPTHY: CPT | Performed by: FAMILY MEDICINE

## 2021-12-01 PROCEDURE — 3046F HEMOGLOBIN A1C LEVEL >9.0%: CPT | Performed by: FAMILY MEDICINE

## 2021-12-01 PROCEDURE — 82962 GLUCOSE BLOOD TEST: CPT | Performed by: FAMILY MEDICINE

## 2021-12-01 PROCEDURE — 99214 OFFICE O/P EST MOD 30 MIN: CPT | Performed by: FAMILY MEDICINE

## 2021-12-01 PROCEDURE — 1036F TOBACCO NON-USER: CPT | Performed by: FAMILY MEDICINE

## 2021-12-01 PROCEDURE — 36415 COLL VENOUS BLD VENIPUNCTURE: CPT | Performed by: FAMILY MEDICINE

## 2021-12-01 PROCEDURE — G8427 DOCREV CUR MEDS BY ELIG CLIN: HCPCS | Performed by: FAMILY MEDICINE

## 2021-12-01 PROCEDURE — G8417 CALC BMI ABV UP PARAM F/U: HCPCS | Performed by: FAMILY MEDICINE

## 2021-12-01 RX ORDER — PEN NEEDLE, DIABETIC 31 G X1/4"
1 NEEDLE, DISPOSABLE MISCELLANEOUS DAILY
Qty: 100 EACH | Refills: 0 | Status: ON HOLD
Start: 2021-12-01 | End: 2022-04-05

## 2021-12-01 RX ORDER — CHOLECALCIFEROL (VITAMIN D3) 50 MCG
2000 TABLET ORAL DAILY
Qty: 60 TABLET | Refills: 0 | Status: CANCELLED | OUTPATIENT
Start: 2021-12-01 | End: 2021-12-31

## 2021-12-01 RX ORDER — METOPROLOL SUCCINATE 100 MG/1
TABLET, EXTENDED RELEASE ORAL
Qty: 60 TABLET | Refills: 0 | Status: SHIPPED
Start: 2021-12-01 | End: 2022-02-09 | Stop reason: DRUGHIGH

## 2021-12-01 RX ORDER — FLASH GLUCOSE SCANNING READER
EACH MISCELLANEOUS
Qty: 4 EACH | Refills: 0 | Status: SHIPPED
Start: 2021-12-01 | End: 2022-02-28

## 2021-12-01 RX ORDER — TRIAMCINOLONE ACETONIDE 1 MG/G
CREAM TOPICAL PRN
Qty: 80 G | Refills: 0 | Status: SHIPPED
Start: 2021-12-01 | End: 2022-01-10 | Stop reason: SDUPTHER

## 2021-12-01 RX ORDER — LEUCOVORIN/PYRIDOX/MECOBALAMIN 4-50-2 MG
4-50 TABLET ORAL 2 TIMES DAILY
Qty: 90 TABLET | Refills: 2 | Status: SHIPPED | OUTPATIENT
Start: 2021-12-01

## 2021-12-01 RX ORDER — SPIRONOLACTONE 25 MG/1
TABLET ORAL
Qty: 90 TABLET | Refills: 1 | Status: CANCELLED | OUTPATIENT
Start: 2021-12-01

## 2021-12-01 RX ORDER — INSULIN GLARGINE 100 [IU]/ML
22 INJECTION, SOLUTION SUBCUTANEOUS 2 TIMES DAILY
Qty: 10 PEN | Refills: 3 | Status: ON HOLD
Start: 2021-12-01 | End: 2022-04-08 | Stop reason: HOSPADM

## 2021-12-01 NOTE — PROGRESS NOTES
[facility])  Family Medicine Outpatient        SUBJECTIVE:  CC: had concerns including Follow-up (4 wk f/u). HPI:  Bhumika Mckenzie is a male 38 y.o. presented to the clinic for an established visit. He reports getting his PICC line out after completing his antibiotic. He notes doing well and being healed up. He states his sugars have been running from the 120's to 200's. He denies any hypoglycemic episodes. He has not eaten anything today. Fasting glucose is 182 mg/dl today. He reports recently receiving a letter that his Sky Larger won't be covered on his new insurance. He still has half a bottle left and has an appointment with Cardiology today to discuss this. He reports being compliant on his medication regimen. Review of Systems   Constitutional: Negative for appetite change, fatigue and fever. Respiratory: Negative for cough, shortness of breath and wheezing. Cardiovascular: Negative for chest pain and palpitations. Gastrointestinal: Negative for abdominal pain, constipation, diarrhea, nausea and vomiting. Neurological: Positive for numbness. Negative for dizziness.        Outpatient Medications Marked as Taking for the 12/1/21 encounter (Office Visit) with Sophia Barrett MD   Medication Sig Dispense Refill    vitamin D (ERGOCALCIFEROL) 1.25 MG (41736 UT) CAPS capsule Take 1 capsule by mouth once a week 12 capsule 0    metoprolol succinate (TOPROL XL) 100 MG extended release tablet 1 tab qam and a half a tab qpm 60 tablet 0    Folinic Acid-Vit B6-Vit B12 (FOLINIC-PLUS) 4-50-2 MG TABS Take 4-50 mg by mouth 2 times daily 90 tablet 2    triamcinolone (KENALOG) 0.1 % cream Apply topically as needed (rash) Apply topically 2 times daily x 10 days prn 80 g 0    Continuous Blood Gluc  (FREESTYLE CHRISTIAN 14 DAY READER) PEDRO As directed 4 each 0    Insulin Pen Needle (PEN NEEDLES) 31G X 6 MM MISC 1 each by Does not apply route daily 100 each 0    insulin glargine (LANTUS SOLOSTAR) 100 UNIT/ML /68   Pulse 82   Temp 97.7 °F (36.5 °C) (Temporal)   Ht 5' 10\" (1.778 m)   Wt 244 lb 12.8 oz (111 kg)   SpO2 97%   BMI 35.13 kg/m²   Physical Exam  Constitutional:       General: He is not in acute distress. Appearance: He is well-developed. He is obese. He is not diaphoretic. HENT:      Head: Normocephalic and atraumatic. Eyes:      Conjunctiva/sclera: Conjunctivae normal.      Pupils: Pupils are equal, round, and reactive to light. Cardiovascular:      Rate and Rhythm: Normal rate and regular rhythm. Pulmonary:      Effort: Pulmonary effort is normal.      Breath sounds: Normal breath sounds. Abdominal:      General: Bowel sounds are normal. There is no distension. Palpations: Abdomen is soft. Tenderness: There is no abdominal tenderness. Hernia: No hernia is present. Musculoskeletal:      Cervical back: Normal range of motion and neck supple. Skin:     General: Skin is warm. Comments: Dry skin b/l hands   Neurological:      Mental Status: He is alert and oriented to person, place, and time. ASSESSMENT/PLAN:  1. Uncontrolled type 1 diabetes mellitus with hyperglycemia (HCC)  Titrate up Lantus from 20 to 22U bid. Cgm placed. - Folinic Acid-Vit B6-Vit B12 (FOLINIC-PLUS) 4-50-2 MG TABS; Take 4-50 mg by mouth 2 times daily  Dispense: 90 tablet; Refill: 2  - POCT Glucose  - Continuous Blood Gluc  (FREESTYLE CHRISTIAN 14 DAY READER) PEDRO; As directed  Dispense: 4 each; Refill: 0  - Insulin Pen Needle (PEN NEEDLES) 31G X 6 MM MISC; 1 each by Does not apply route daily  Dispense: 100 each; Refill: 0  - insulin glargine (LANTUS SOLOSTAR) 100 UNIT/ML injection pen; Inject 22 Units into the skin 2 times daily  Dispense: 10 pen; Refill: 3    2. Peripheral polyneuropathy    3. Xerosis of skin    4.  Eczema, unspecified type  - triamcinolone (KENALOG) 0.1 % cream; Apply topically as needed (rash) Apply topically 2 times daily x 10 days prn  Dispense: 80 g; Refill: 0    5. Essential hypertension  - metoprolol succinate (TOPROL XL) 100 MG extended release tablet; 1 tab qam and a half a tab qpm  Dispense: 60 tablet; Refill: 0    6. Combined systolic and diastolic congestive heart failure, unspecified HF chronicity ([facility] [address]  - Basic Metabolic Panel; Future    7. Osteoarthritis of both hips, unspecified osteoarthritis type    8. DDD (degenerative disc disease), lumbar    9. Liver lesion  Repeat liver u/s placed for 5/2022. 10. Vitamin D deficiency  - Vitamin D 25 Hydroxy; Future        I have reviewed my findings and recommendations with Eileen Solis MD  12/13/2021 8:35 PM  Return in about 4 weeks (around 12/29/2021). Counseled regarding above diagnosis, including possible risks and complications, especially if left uncontrolled. Patient counseled on red flag symptoms and if they occur to go to the ED. Discussed medications risk/benefits and possible side effects and alternatives to treatment. Patient and/or guardian verbalizes understanding, agrees, feels comfortable with and wishes to proceed with above treatment plan. Advised patient regarding importance of keeping up with recommended health maintenance and to schedule as soon as possible if overdue, as this is important in assessing for undiagnosed pathology, especially cancer, as well as protecting against potentially harmful/life threatening disease. Patient and/or guardian verbalizes understanding and agrees with above counseling, assessment and plan. All questions answered. Please note this report has been partially produced using speech recognition software  and may contain errors related to that system including grammar, punctuation and spelling as well as words and phrases that may seem inappropriate. If there are questions or concerns please feel free to contact me to clarify.

## 2021-12-02 RX ORDER — ERGOCALCIFEROL 1.25 MG/1
50000 CAPSULE ORAL WEEKLY
Qty: 12 CAPSULE | Refills: 0 | Status: SHIPPED
Start: 2021-12-02 | End: 2022-01-10 | Stop reason: SDUPTHER

## 2021-12-13 ASSESSMENT — ENCOUNTER SYMPTOMS
DIARRHEA: 0
CONSTIPATION: 0
WHEEZING: 0
COUGH: 0
ABDOMINAL PAIN: 0
NAUSEA: 0
VOMITING: 0
SHORTNESS OF BREATH: 0

## 2022-01-10 ENCOUNTER — OFFICE VISIT (OUTPATIENT)
Dept: PRIMARY CARE CLINIC | Age: 37
End: 2022-01-10
Payer: COMMERCIAL

## 2022-01-10 VITALS
BODY MASS INDEX: 34.93 KG/M2 | SYSTOLIC BLOOD PRESSURE: 122 MMHG | HEART RATE: 75 BPM | OXYGEN SATURATION: 96 % | WEIGHT: 244 LBS | HEIGHT: 70 IN | TEMPERATURE: 97.1 F | DIASTOLIC BLOOD PRESSURE: 68 MMHG

## 2022-01-10 DIAGNOSIS — E11.628 DIABETIC DERMOPATHY (HCC): ICD-10-CM

## 2022-01-10 DIAGNOSIS — B34.9 VIRAL ILLNESS: ICD-10-CM

## 2022-01-10 DIAGNOSIS — E55.9 VITAMIN D DEFICIENCY: ICD-10-CM

## 2022-01-10 DIAGNOSIS — E78.2 MIXED HYPERLIPIDEMIA: ICD-10-CM

## 2022-01-10 DIAGNOSIS — L85.3 XEROSIS CUTIS: ICD-10-CM

## 2022-01-10 DIAGNOSIS — E10.65 TYPE 1 DIABETES MELLITUS WITH HYPERGLYCEMIA, WITH LONG-TERM CURRENT USE OF INSULIN (HCC): Primary | ICD-10-CM

## 2022-01-10 DIAGNOSIS — L98.8 DIABETIC DERMOPATHY (HCC): ICD-10-CM

## 2022-01-10 DIAGNOSIS — I50.22 CHRONIC SYSTOLIC HEART FAILURE (HCC): ICD-10-CM

## 2022-01-10 DIAGNOSIS — G47.33 OSA (OBSTRUCTIVE SLEEP APNEA): ICD-10-CM

## 2022-01-10 LAB
ALBUMIN SERPL-MCNC: 3.8 G/DL (ref 3.5–5.2)
ALP BLD-CCNC: 96 U/L (ref 40–129)
ALT SERPL-CCNC: 11 U/L (ref 0–40)
ANION GAP SERPL CALCULATED.3IONS-SCNC: 14 MMOL/L (ref 7–16)
AST SERPL-CCNC: 9 U/L (ref 0–39)
BASOPHILS ABSOLUTE: 0.05 E9/L (ref 0–0.2)
BASOPHILS RELATIVE PERCENT: 0.4 % (ref 0–2)
BILIRUB SERPL-MCNC: 0.5 MG/DL (ref 0–1.2)
BILIRUBIN, POC: NORMAL
BLOOD URINE, POC: NORMAL
BUN BLDV-MCNC: 22 MG/DL (ref 6–20)
CALCIUM SERPL-MCNC: 9.2 MG/DL (ref 8.6–10.2)
CHLORIDE BLD-SCNC: 87 MMOL/L (ref 98–107)
CHP ED QC CHECK: NORMAL
CLARITY, POC: CLEAR
CO2: 25 MMOL/L (ref 22–29)
COLOR, POC: YELLOW
CREAT SERPL-MCNC: 1.6 MG/DL (ref 0.7–1.2)
EOSINOPHILS ABSOLUTE: 0.47 E9/L (ref 0.05–0.5)
EOSINOPHILS RELATIVE PERCENT: 3.5 % (ref 0–6)
GFR AFRICAN AMERICAN: 59
GFR NON-AFRICAN AMERICAN: 59 ML/MIN/1.73
GLUCOSE BLD-MCNC: 572 MG/DL
GLUCOSE BLD-MCNC: 572 MG/DL (ref 74–99)
GLUCOSE URINE, POC: 500
HBA1C MFR BLD: 14.2 %
HCT VFR BLD CALC: 39.9 % (ref 37–54)
HEMOGLOBIN: 13.7 G/DL (ref 12.5–16.5)
IMMATURE GRANULOCYTES #: 0.07 E9/L
IMMATURE GRANULOCYTES %: 0.5 % (ref 0–5)
KETONES, POC: NORMAL
LEUKOCYTE EST, POC: NORMAL
LYMPHOCYTES ABSOLUTE: 2.68 E9/L (ref 1.5–4)
LYMPHOCYTES RELATIVE PERCENT: 20.1 % (ref 20–42)
MCH RBC QN AUTO: 31.2 PG (ref 26–35)
MCHC RBC AUTO-ENTMCNC: 34.3 % (ref 32–34.5)
MCV RBC AUTO: 90.9 FL (ref 80–99.9)
MONOCYTES ABSOLUTE: 0.73 E9/L (ref 0.1–0.95)
MONOCYTES RELATIVE PERCENT: 5.5 % (ref 2–12)
NEUTROPHILS ABSOLUTE: 9.31 E9/L (ref 1.8–7.3)
NEUTROPHILS RELATIVE PERCENT: 70 % (ref 43–80)
NITRITE, POC: NORMAL
PDW BLD-RTO: 12.6 FL (ref 11.5–15)
PH, POC: 5.5
PLATELET # BLD: 472 E9/L (ref 130–450)
PMV BLD AUTO: 11.4 FL (ref 7–12)
POTASSIUM SERPL-SCNC: 4.7 MMOL/L (ref 3.5–5)
PROTEIN, POC: NORMAL
RBC # BLD: 4.39 E12/L (ref 3.8–5.8)
SODIUM BLD-SCNC: 126 MMOL/L (ref 132–146)
SPECIFIC GRAVITY, POC: 1.01
TOTAL PROTEIN: 8.1 G/DL (ref 6.4–8.3)
UROBILINOGEN, POC: 0.2
WBC # BLD: 13.3 E9/L (ref 4.5–11.5)

## 2022-01-10 PROCEDURE — 82962 GLUCOSE BLOOD TEST: CPT | Performed by: FAMILY MEDICINE

## 2022-01-10 PROCEDURE — 83036 HEMOGLOBIN GLYCOSYLATED A1C: CPT | Performed by: FAMILY MEDICINE

## 2022-01-10 PROCEDURE — G8427 DOCREV CUR MEDS BY ELIG CLIN: HCPCS | Performed by: FAMILY MEDICINE

## 2022-01-10 PROCEDURE — 1036F TOBACCO NON-USER: CPT | Performed by: FAMILY MEDICINE

## 2022-01-10 PROCEDURE — 3046F HEMOGLOBIN A1C LEVEL >9.0%: CPT | Performed by: FAMILY MEDICINE

## 2022-01-10 PROCEDURE — 99214 OFFICE O/P EST MOD 30 MIN: CPT | Performed by: FAMILY MEDICINE

## 2022-01-10 PROCEDURE — 2022F DILAT RTA XM EVC RTNOPTHY: CPT | Performed by: FAMILY MEDICINE

## 2022-01-10 PROCEDURE — 81003 URINALYSIS AUTO W/O SCOPE: CPT | Performed by: FAMILY MEDICINE

## 2022-01-10 PROCEDURE — G8484 FLU IMMUNIZE NO ADMIN: HCPCS | Performed by: FAMILY MEDICINE

## 2022-01-10 PROCEDURE — G8417 CALC BMI ABV UP PARAM F/U: HCPCS | Performed by: FAMILY MEDICINE

## 2022-01-10 RX ORDER — TRIAMCINOLONE ACETONIDE 1 MG/G
CREAM TOPICAL PRN
Qty: 80 G | Refills: 0 | Status: ON HOLD
Start: 2022-01-10 | End: 2022-04-05

## 2022-01-10 RX ORDER — ERGOCALCIFEROL 1.25 MG/1
50000 CAPSULE ORAL WEEKLY
Qty: 12 CAPSULE | Refills: 0 | Status: SHIPPED | OUTPATIENT
Start: 2022-01-10

## 2022-01-10 RX ORDER — SPIRONOLACTONE 25 MG/1
25 TABLET ORAL DAILY
Qty: 90 TABLET | Refills: 1 | Status: CANCELLED | OUTPATIENT
Start: 2022-01-10

## 2022-01-10 RX ORDER — BUMETANIDE 2 MG/1
2 TABLET ORAL SEE ADMIN INSTRUCTIONS
Qty: 60 TABLET | Refills: 3 | Status: CANCELLED | OUTPATIENT
Start: 2022-01-10

## 2022-01-10 RX ORDER — FENOFIBRATE 48 MG/1
TABLET, COATED ORAL
COMMUNITY
Start: 2021-11-17 | End: 2022-01-10

## 2022-01-10 RX ORDER — ATORVASTATIN CALCIUM 20 MG/1
20 TABLET, FILM COATED ORAL DAILY
Qty: 90 TABLET | Refills: 1 | Status: CANCELLED | OUTPATIENT
Start: 2022-01-10

## 2022-01-10 RX ORDER — LANOLIN ALCOHOL/MO/W.PET/CERES
3 CREAM (GRAM) TOPICAL NIGHTLY PRN
Qty: 30 TABLET | Refills: 1 | Status: SHIPPED | OUTPATIENT
Start: 2022-01-10

## 2022-01-10 ASSESSMENT — ENCOUNTER SYMPTOMS
CONSTIPATION: 0
VOMITING: 0
WHEEZING: 0
COUGH: 1
ABDOMINAL PAIN: 0
NAUSEA: 0
SHORTNESS OF BREATH: 0
DIARRHEA: 0
BLOOD IN STOOL: 0
EYE PAIN: 0

## 2022-01-10 NOTE — PROGRESS NOTES
[facility])  Family Medicine Outpatient        SUBJECTIVE:  CC: had concerns including Diabetes (1 mo f/u, discuss meds). HPI:  Brandi Mcmullen is a male 43 y.o. presented to the clinic for an established visit. He established care 10/27/21. His a1c 9/30 was 15% which was taken the day he established with Endocrinology. He was suppose to follow up with Endocrine in November, but has not to date. He admits that his sugars are still high. He admits to missing doses sometimes. He had lab work by Endocrinology that yielded a positive BING. He is currently taking Lantus 22U bid and Humalog ss per Endocrinology. He reports using approximately 20-22U of short acting Insulin. He eats 1-2 meals a day. He does not work. In addition the patient reports an ongoing rash for the past couple on months on his legs, arms, and back. He reports having this in the past and using a topical steroid. Review of Systems   Constitutional: Negative for appetite change, fatigue and fever. Eyes: Negative for pain and visual disturbance. Respiratory: Positive for cough. Negative for shortness of breath and wheezing. Cardiovascular: Negative for chest pain and palpitations. Gastrointestinal: Negative for abdominal pain, blood in stool, constipation, diarrhea, nausea and vomiting. Genitourinary: Negative for difficulty urinating, dysuria, flank pain and frequency. Musculoskeletal: Negative for arthralgias and gait problem. Skin: Positive for rash. Negative for pallor. Neurological: Negative for dizziness, seizures, syncope, weakness, light-headedness and headaches.        Outpatient Medications Marked as Taking for the 1/10/22 encounter (Office Visit) with Loren Taylor MD   Medication Sig Dispense Refill    vitamin D (ERGOCALCIFEROL) 1.25 MG (67180 UT) CAPS capsule Take 1 capsule by mouth once a week 12 capsule 0    melatonin (RA MELATONIN) 3 MG TABS tablet Take 1 tablet by mouth nightly as needed (sleep) 30 tablet 1  triamcinolone (KENALOG) 0.1 % cream Apply topically as needed (rash) Apply topically 2 times daily x 10 days prn 80 g 0    metoprolol succinate (TOPROL XL) 100 MG extended release tablet 1 tab qam and a half a tab qpm (Patient taking differently: Take 100 mg by mouth 1 tab qam and a half a tab qpm) 60 tablet 0    Insulin Pen Needle (PEN NEEDLES) 31G X 6 MM MISC 1 each by Does not apply route daily 100 each 0    insulin glargine (LANTUS SOLOSTAR) 100 UNIT/ML injection pen Inject 22 Units into the skin 2 times daily 10 pen 3    spironolactone (ALDACTONE) 25 MG tablet TAKE 1 TABLET BY MOUTH DAILY 90 tablet 1    potassium chloride (KLOR-CON M) 20 MEQ extended release tablet Take 2 tablets by mouth daily Pt only taking one tablet daily unless he takes Bumex BID then he takes 2 tabs 180 tablet 3    bumetanide (BUMEX) 2 MG tablet Take 1 tablet by mouth See Admin Instructions Take 1 tab 8am daily. On days you gain weight more than 2 lbs or have worse swelling or short of breath then take a 2pm dose that day. 60 tablet 3    atorvastatin (LIPITOR) 20 MG tablet Take 1 tablet by mouth daily 90 tablet 1    sacubitril-valsartan (ENTRESTO) 49-51 MG per tablet Take 1 tablet by mouth 2 times daily 60 tablet 3    Insulin Pen Needle (BD PEN NEEDLE MICRO U/F) 32G X 6 MM MISC Uses with insulin 4 times a day 250 each 5    insulin lispro, 1 Unit Dial, (HUMALOG KWIKPEN) 100 UNIT/ML SOPN Inject 15 units with meals + following sliding scale. -200 add 3U, -250 add 6U, -300 add 9U, -350 add 12U, -400 add 15U, BS over 400 add 18U. MAX 75U/day 15 pen 3    Lancets MISC Test 4 times/day before meals and at bedtime and as needed for symptoms of irregular blood glucose. 300 each 3    blood glucose monitor strips Test **4* times a day & as needed for symptoms of irregular blood glucose. Dispense sufficient amount for indicated testing frequency plus additional to accommodate PRN testing needs.  200 strip 2 I have reviewed all pertinent PMHx, PSHx, FamHx, SocialHx, medications, and allergies and updated history as appropriate. OBJECTIVE    VS: /68   Pulse 75   Temp 97.1 °F (36.2 °C) (Temporal)   Ht 5' 10\" (1.778 m)   Wt 244 lb (110.7 kg)   SpO2 96%   BMI 35.01 kg/m²   Physical Exam  Constitutional:       General: He is not in acute distress. Appearance: He is well-developed. He is obese. He is not diaphoretic. Comments: Appears euvolemic    HENT:      Head: Normocephalic and atraumatic. Eyes:      Conjunctiva/sclera: Conjunctivae normal.      Pupils: Pupils are equal, round, and reactive to light. Cardiovascular:      Rate and Rhythm: Normal rate and regular rhythm. Pulmonary:      Effort: Pulmonary effort is normal.      Breath sounds: Normal breath sounds. Abdominal:      General: Bowel sounds are normal. There is no distension. Palpations: Abdomen is soft. Tenderness: There is no abdominal tenderness. Hernia: No hernia is present. Musculoskeletal:      Cervical back: Normal range of motion and neck supple. Skin:     General: Skin is warm. Comments: B/l legs, arms, abdomen, and back with papular hyperpigmentation with roughness over palpation. Very dry skin. There is no erythema, calor, or fluctuance. Neurological:      Mental Status: He is alert and oriented to person, place, and time. ASSESSMENT/PLAN:  1. Type 1 diabetes mellitus with hyperglycemia, with long-term current use of insulin (HCC)  a1c down to 14.2 from 15% today. Would benefit from an Insulin pump. The patient is to contact Endocrinology and set up an appointment in the next week. - POCT glycosylated hemoglobin (Hb A1C)  - POCT Glucose  - POCT Urinalysis No Micro (Auto)  - BASIC METABOLIC PANEL; Future    2. Diabetic dermopathy (HCC)  - Marcell Fields MD,  Dermatology, [place] ([place])  - CBC Auto Differential; Future  - COMPREHENSIVE METABOLIC PANEL;  Future  - triamcinolone (KENALOG) 0.1 % cream; Apply topically as needed (rash) Apply topically 2 times daily x 10 days prn  Dispense: 80 g; Refill: 0    3. Xerosis cutis  - triamcinolone (KENALOG) 0.1 % cream; Apply topically as needed (rash) Apply topically 2 times daily x 10 days prn  Dispense: 80 g; Refill: 0    4. Chronic systolic heart failure ([address]    5. Mixed hyperlipidemia    6. Vitamin D deficiency  - vitamin D (ERGOCALCIFEROL) 1.25 MG (48285 UT) CAPS capsule; Take 1 capsule by mouth once a week  Dispense: 12 capsule; Refill: 0    7. Viral illness  Cough x 1 week improving. Conservative treatment as discussed. 8. AMANUEL (obstructive sleep apnea)  Patient reports insurance took back his cpap. He last used it in October. Records pending. Referral placed to sleep medicine. - melatonin (RA MELATONIN) 3 MG TABS tablet; Take 1 tablet by mouth nightly as needed (sleep)  Dispense: 30 tablet; Refill: 1  - Agata Cordero MD, Sleep Medicine, [place]    I have reviewed my findings and recommendations with Andree Ervin MD  1/15/2022 10:52 PM  Return in about 1 week (around 1/17/2022). Counseled regarding above diagnosis, including possible risks and complications, especially if left uncontrolled. Patient counseled on red flag symptoms and if they occur to go to the ED. Discussed medications risk/benefits and possible side effects and alternatives to treatment. Patient and/or guardian verbalizes understanding, agrees, feels comfortable with and wishes to proceed with above treatment plan. Advised patient regarding importance of keeping up with recommended health maintenance and to schedule as soon as possible if overdue, as this is important in assessing for undiagnosed pathology, especially cancer, as well as protecting against potentially harmful/life threatening disease. Patient and/or guardian verbalizes understanding and agrees with above counseling, assessment and plan.  All questions answered. Please note this report has been partially produced using speech recognition software  and may contain errors related to that system including grammar, punctuation and spelling as well as words and phrases that may seem inappropriate. If there are questions or concerns please feel free to contact me to clarify.

## 2022-01-11 ENCOUNTER — TELEPHONE (OUTPATIENT)
Dept: PRIMARY CARE CLINIC | Age: 37
End: 2022-01-11

## 2022-01-11 NOTE — TELEPHONE ENCOUNTER
Referral was placed yesterday to dermatology Dr. Marisela Stevens that office called this morning they are not contracted with [facility].

## 2022-01-11 NOTE — TELEPHONE ENCOUNTER
Please have the patient contact his insurance company to find out who the preferred dermatology providers are in the area. We can resend referral of his choice when he gets back to us.

## 2022-02-03 ENCOUNTER — VIRTUAL VISIT (OUTPATIENT)
Dept: ENDOCRINOLOGY | Age: 37
End: 2022-02-03
Payer: COMMERCIAL

## 2022-02-03 DIAGNOSIS — E55.9 VITAMIN D DEFICIENCY: ICD-10-CM

## 2022-02-03 DIAGNOSIS — E11.65 TYPE 2 DIABETES MELLITUS WITH HYPERGLYCEMIA, WITH LONG-TERM CURRENT USE OF INSULIN (HCC): Primary | ICD-10-CM

## 2022-02-03 DIAGNOSIS — Z79.4 TYPE 2 DIABETES MELLITUS WITH HYPERGLYCEMIA, WITH LONG-TERM CURRENT USE OF INSULIN (HCC): Primary | ICD-10-CM

## 2022-02-03 DIAGNOSIS — E78.2 MIXED HYPERLIPIDEMIA: ICD-10-CM

## 2022-02-03 PROCEDURE — 3046F HEMOGLOBIN A1C LEVEL >9.0%: CPT | Performed by: INTERNAL MEDICINE

## 2022-02-03 PROCEDURE — 2022F DILAT RTA XM EVC RTNOPTHY: CPT | Performed by: INTERNAL MEDICINE

## 2022-02-03 PROCEDURE — G8484 FLU IMMUNIZE NO ADMIN: HCPCS | Performed by: INTERNAL MEDICINE

## 2022-02-03 PROCEDURE — G8417 CALC BMI ABV UP PARAM F/U: HCPCS | Performed by: INTERNAL MEDICINE

## 2022-02-03 PROCEDURE — 99214 OFFICE O/P EST MOD 30 MIN: CPT | Performed by: INTERNAL MEDICINE

## 2022-02-03 PROCEDURE — G8427 DOCREV CUR MEDS BY ELIG CLIN: HCPCS | Performed by: INTERNAL MEDICINE

## 2022-02-03 PROCEDURE — 1036F TOBACCO NON-USER: CPT | Performed by: INTERNAL MEDICINE

## 2022-02-03 RX ORDER — FLASH GLUCOSE SCANNING READER
1 EACH MISCELLANEOUS ONCE
Qty: 1 EACH | Refills: 0 | Status: SHIPPED | OUTPATIENT
Start: 2022-02-03 | End: 2022-02-03

## 2022-02-03 RX ORDER — FLASH GLUCOSE SENSOR
KIT MISCELLANEOUS
Qty: 6 EACH | Refills: 3 | Status: SHIPPED
Start: 2022-02-03 | End: 2022-02-28

## 2022-02-03 NOTE — PROGRESS NOTES
[address] [place] [address] Department of Endocrinology Diabetes and Metabolism   [address] [facility] [address]   Phone: [phone]  Fax: [phone]    Date of Service: 2/3/2022  Primary Care Physician: Lolita Sanchez MD  Provider: Perez Reyes MD     Reason for the visit:  Type 2 DM     History of Present Illness: The history is provided by the patient. No  was used. Accuracy of the patient data is excellent. Maral Jimenez is a very pleasant 43 y.o. male seen today for diabetes management     Maral Jimenez was diagnosed with diabetes per hba1c back in 2015  and currently on Lantus pen15 units BID and Humalog 15 units TID with meals plus high dose ISS   The patient has been checking blood sugar 1-2 times per day.     Most recent A1c results summarized below  Lab Results   Component Value Date    LABA1C 14.2 01/10/2022    LABA1C 15 09/30/2021    LABA1C 15.2 07/29/2021     Patient has had no hypoglycemic episodes    The patient has been mindful of what has been eating and following diabetic diet as encouraged  I reviewed current medications and the patient has no issues with diabetes medications   The patient is due for an eye exam.  no h/o diabetic retinopathy  The patient performs her own feet care  Microvascular complications:  No Retinopathy, Nephropathy or  + Neuropathy   Macrovascular complications: no CAD, PVD, or Stroke    The patient refuses Flushot and pneumonia vaccine     PAST MEDICAL HISTORY   Past Medical History:   Diagnosis Date    CAD (coronary artery disease)     CHF (congestive heart failure) ([address]     Diabetes mellitus ([address]     Hyperlipidemia     Hyperosmolar hyperglycemic state (HHS) ([address] 7/30/2021    Hypertension        PAST SURGICAL HISTORY   Past Surgical History:   Procedure Laterality Date    ABDOMEN SURGERY N/A 9/18/2021    INCISION AND DRAINAGE OF LARGE PERIANAL ABSCESS performed by Huang Ramirez MD at [facility] DRAINAGE      buttocks    ECHO COMPL W DOP COLOR FLOW  5/5/2015         HC INSERT PICC CATH, 5/> YRS  10/20/2021         WISDOM TOOTH EXTRACTION         SOCIAL HISTORY   Tobacco:   reports that he has quit smoking. His smoking use included cigars and cigarettes. He started smoking about 22 years ago. He quit after 8.00 years of use. He has never used smokeless tobacco.  Alcohol:   reports current alcohol use. Drugs:   reports no history of drug use.     FAMILY HISTORY   Family History   Problem Relation Age of Onset    High Blood Pressure Father     Heart Disease Father     High Blood Pressure Paternal Grandmother     Heart Attack Paternal Grandfather     High Blood Pressure Mother     Heart Disease Mother        ALLERGIES AND DRUG REACTIONS   No Known Allergies    CURRENT MEDICATIONS   Current Outpatient Medications   Medication Sig Dispense Refill    vitamin D (ERGOCALCIFEROL) 1.25 MG (87024 UT) CAPS capsule Take 1 capsule by mouth once a week 12 capsule 0    melatonin (RA MELATONIN) 3 MG TABS tablet Take 1 tablet by mouth nightly as needed (sleep) 30 tablet 1    triamcinolone (KENALOG) 0.1 % cream Apply topically as needed (rash) Apply topically 2 times daily x 10 days prn 80 g 0    metoprolol succinate (TOPROL XL) 100 MG extended release tablet 1 tab qam and a half a tab qpm (Patient taking differently: Take 100 mg by mouth 1 tab qam and a half a tab qpm) 60 tablet 0    Continuous Blood Gluc  (FREESTYLE CHRISTIAN 14 DAY READER) PEDRO As directed 4 each 0    Insulin Pen Needle (PEN NEEDLES) 31G X 6 MM MISC 1 each by Does not apply route daily 100 each 0    insulin glargine (LANTUS SOLOSTAR) 100 UNIT/ML injection pen Inject 22 Units into the skin 2 times daily 10 pen 3    spironolactone (ALDACTONE) 25 MG tablet TAKE 1 TABLET BY MOUTH DAILY 90 tablet 1    potassium chloride (KLOR-CON M) 20 MEQ extended release tablet Take 2 tablets by mouth daily Pt only taking one tablet daily unless he takes Bumex BID then he takes 2 tabs 180 tablet 3    bumetanide (BUMEX) 2 MG tablet Take 1 tablet by mouth See Admin Instructions Take 1 tab 8am daily. On days you gain weight more than 2 lbs or have worse swelling or short of breath then take a 2pm dose that day. 60 tablet 3    atorvastatin (LIPITOR) 20 MG tablet Take 1 tablet by mouth daily 90 tablet 1    sacubitril-valsartan (ENTRESTO) 49-51 MG per tablet Take 1 tablet by mouth 2 times daily 60 tablet 3    Insulin Pen Needle (BD PEN NEEDLE MICRO U/F) 32G X 6 MM MISC Uses with insulin 4 times a day 250 each 5    insulin lispro, 1 Unit Dial, (HUMALOG KWIKPEN) 100 UNIT/ML SOPN Inject 15 units with meals + following sliding scale. -200 add 3U, -250 add 6U, -300 add 9U, -350 add 12U, -400 add 15U, BS over 400 add 18U. MAX 75U/day 15 pen 3    Lancets MISC Test 4 times/day before meals and at bedtime and as needed for symptoms of irregular blood glucose. 300 each 3    CVS Lancets MISC 1 each by Does not apply route daily To check sugar 4 x a day and if feeling ill 200 each 3    blood glucose monitor strips Test **4* times a day & as needed for symptoms of irregular blood glucose. Dispense sufficient amount for indicated testing frequency plus additional to accommodate PRN testing needs. 200 strip 2    Folinic Acid-Vit B6-Vit B12 (FOLINIC-PLUS) 4-50-2 MG TABS Take 4-50 mg by mouth 2 times daily (Patient not taking: Reported on 2/3/2022) 90 tablet 2     No current facility-administered medications for this visit. Review of Systems  Constitutional: No fever, no chills, no diaphoresis, no generalized weakness. HEENT: No blurred vision, No sore throat, no ear pain, no hair loss  Neck: denied any neck swelling, difficulty swallowing,   Cardio-pulmonary: No CP, SOB or palpitation, No orthopnea or PND. No cough or wheezing.   GI: No N/V/D, no constipation, No abdominal pain, no melena or hematochezia   : Denied any dysuria, hematuria, flank pain, discharge, or incontinence. Skin: denied any rash, ulcer, Hirsute, or hyperpigmentation. MSK: denied any joint deformity, joint pain/swelling, muscle pain, or back pain. Neuro: no numbness, no tingling, no weakness, _    OBJECTIVE    There were no vitals taken for this visit. BP Readings from Last 4 Encounters:   01/10/22 122/68   12/01/21 107/68   10/27/21 110/70   10/20/21 131/62     Wt Readings from Last 6 Encounters:   01/10/22 244 lb (110.7 kg)   12/01/21 244 lb 12.8 oz (111 kg)   11/11/21 235 lb (106.6 kg)   10/27/21 240 lb (108.9 kg)   10/20/21 242 lb 6 oz (109.9 kg)   10/16/21 230 lb (104.3 kg)       Physical examination:  General: awake alert, oriented x3, no abnormal position or movements. HEENT: normocephalic non-traumatic, no exophthalmos   Neck: supple, no LN enlargement, no thyromegaly, no thyroid tenderness, no JVD. Pulm: Clear equal air entry no added sounds, no wheezing or rhonchi    CVS: S1 + S2, no murmur, no heave. Dorsalis pedis pulse palpable   Abd: soft lax, no tenderness, no organomegaly, audible bowel sounds. Skin: warm, no lesions, no rash.  No callus, no Ulcers, No acanthosis nigricans  Musculoskeletal: No back tenderness, no kyphosis/scoliosis    Neuro: CN intact, Monofilament sensation decreased bilateral , muscle power normal  Psych: normal mood, and affect      Review of Laboratory Data:  I personally reviewed the following lab:  Lab Results   Component Value Date/Time    WBC 13.3 (H) 01/10/2022 12:00 PM    RBC 4.39 01/10/2022 12:00 PM    HGB 13.7 01/10/2022 12:00 PM    HCT 39.9 01/10/2022 12:00 PM    MCV 90.9 01/10/2022 12:00 PM    MCH 31.2 01/10/2022 12:00 PM    MCHC 34.3 01/10/2022 12:00 PM    RDW 12.6 01/10/2022 12:00 PM     (H) 01/10/2022 12:00 PM    MPV 11.4 01/10/2022 12:00 PM      Lab Results   Component Value Date/Time     (L) 01/10/2022 12:00 PM    K 4.7 01/10/2022 12:00 PM    K 3.9 10/17/2021 04:40 AM    CO2 25 01/10/2022 12:00 PM    BUN 22 (H) 01/10/2022 12:00 PM    CREATININE 1.6 (H) 01/10/2022 12:00 PM    CALCIUM 9.2 01/10/2022 12:00 PM    LABGLOM 59 01/10/2022 12:00 PM    GFRAA 59 01/10/2022 12:00 PM      Lab Results   Component Value Date/Time    TSH 1.240 09/17/2021 05:52 AM     Lab Results   Component Value Date    LABA1C 14.2 01/10/2022    GLUCOSE 572 01/10/2022    MALBCR 10.1 11/10/2021    LABMICR 41.9 11/10/2021    LABCREA 415 11/10/2021     Lab Results   Component Value Date    LABA1C 14.2 01/10/2022    LABA1C 15 09/30/2021    LABA1C 15.2 07/29/2021     Lab Results   Component Value Date    TRIG 635 09/17/2021    HDL 25 09/17/2021    LDLCALC - 09/17/2021    CHOL 243 09/17/2021     Lab Results   Component Value Date    VITD25 23 12/01/2021    VITD25 16 09/17/2021       ASSESSMENT & RECOMMENDATIONS   Farzaneh Cornejo, a 43 y.o.-old male seen in for the following issues       Assessment:      Diagnosis Orders   1. Type 2 diabetes mellitus with hyperglycemia, with long-term current use of insulin (HCC)  Continuous Blood Gluc Sensor (FREESTYLE CHRISTIAN 2 SENSOR) MISC    Continuous Blood Gluc  (FREESTYLE CHRISTIAN 2 READER) PEDRO    Basic Metabolic Panel    Hemoglobin A1C    Lipid Panel    Microalbumin / Creatinine Urine Ratio   2. Mixed hyperlipidemia     3. Vitamin D deficiency  Vitamin D 25 Hydroxy       Plan:     1. Type 2 diabetes mellitus with hyperglycemia, with long-term current use of insulin (HCC)   · Patient's diabetes is uncontrolled due to por compliance with diet and insulin therapy   · Unfortunately again he didn't bring his readings today and I don't have any readings today to adjust his regimen   · Continue current regimen for now   · Pt was advised to check BG 4 times/day and send us sugar log in a week   · Labs before next visit      2. Mixed hyperlipidemia   · Last triglycerides elevated. Most likely related to elevated blood sugars. Will start fenofibrate 48 mg nightly.   May stop once glucose levels are better controlled and lipids are reevaluated   4. Vitamin D deficiency   · Continue vitamin D supplementation     I personally reviewed external notes from PCP and other patient's care team providers, and personally interpreted labs associated with the above diagnosis. I also ordered labs to further assess and manage the above addressed medical conditions    Return in about 4 months (around 6/3/2022) for DM type 2, Hyperlipidemia. The above issues were reviewed with the patient who understood and agreed with the plan. Thank you for allowing us to participate in the care of this patient. Please do not hesitate to contact us with any additional questions. Noemi Mortimer, MD [facility] Diabetes Care and Endocrinology   [address] [facility] [address]   Phone: [phone]  Fax: [phone]  --------------------------------------------  An electronic signature was used to authenticate this note.  Noemi Mortimer, MD on 2/3/2022 at 10:50 AM

## 2022-02-03 NOTE — PROGRESS NOTES
Sunni Silverio was read the following message We want to confirm that, for purposes of billing, this is a virtual visit with your provider for which we will submit a claim for reimbursement with your insurance company. You will be responsible for any copays, coinsurance amounts or other amounts not covered by your insurance company. If you do not accept this, unfortunately we will not be able to schedule or proceed with a virtual visit with the provider. Do you accept? Kevin Sánchez responded Yes .

## 2022-02-09 ENCOUNTER — OFFICE VISIT (OUTPATIENT)
Dept: CARDIOLOGY CLINIC | Age: 37
End: 2022-02-09
Payer: COMMERCIAL

## 2022-02-09 VITALS
RESPIRATION RATE: 20 BRPM | HEIGHT: 70 IN | DIASTOLIC BLOOD PRESSURE: 60 MMHG | WEIGHT: 243.4 LBS | BODY MASS INDEX: 34.84 KG/M2 | HEART RATE: 90 BPM | OXYGEN SATURATION: 96 % | SYSTOLIC BLOOD PRESSURE: 112 MMHG

## 2022-02-09 DIAGNOSIS — I42.8 NON-ISCHEMIC CARDIOMYOPATHY (HCC): Primary | ICD-10-CM

## 2022-02-09 DIAGNOSIS — I10 ESSENTIAL HYPERTENSION: ICD-10-CM

## 2022-02-09 DIAGNOSIS — Z99.89 OSA ON CPAP: ICD-10-CM

## 2022-02-09 DIAGNOSIS — I50.22 CHRONIC SYSTOLIC HEART FAILURE (HCC): ICD-10-CM

## 2022-02-09 DIAGNOSIS — G47.33 OSA ON CPAP: ICD-10-CM

## 2022-02-09 DIAGNOSIS — N18.31 STAGE 3A CHRONIC KIDNEY DISEASE (HCC): ICD-10-CM

## 2022-02-09 DIAGNOSIS — E66.09 NON MORBID OBESITY DUE TO EXCESS CALORIES: ICD-10-CM

## 2022-02-09 DIAGNOSIS — G47.33 OSA (OBSTRUCTIVE SLEEP APNEA): ICD-10-CM

## 2022-02-09 DIAGNOSIS — Z91.199 MEDICAL NON-COMPLIANCE: ICD-10-CM

## 2022-02-09 DIAGNOSIS — E11.42 DIABETIC PERIPHERAL NEUROPATHY (HCC): ICD-10-CM

## 2022-02-09 DIAGNOSIS — E11.65 POORLY CONTROLLED DIABETES MELLITUS (HCC): ICD-10-CM

## 2022-02-09 PROCEDURE — 1036F TOBACCO NON-USER: CPT | Performed by: INTERNAL MEDICINE

## 2022-02-09 PROCEDURE — G8427 DOCREV CUR MEDS BY ELIG CLIN: HCPCS | Performed by: INTERNAL MEDICINE

## 2022-02-09 PROCEDURE — 2022F DILAT RTA XM EVC RTNOPTHY: CPT | Performed by: INTERNAL MEDICINE

## 2022-02-09 PROCEDURE — G8484 FLU IMMUNIZE NO ADMIN: HCPCS | Performed by: INTERNAL MEDICINE

## 2022-02-09 PROCEDURE — G8417 CALC BMI ABV UP PARAM F/U: HCPCS | Performed by: INTERNAL MEDICINE

## 2022-02-09 PROCEDURE — 3046F HEMOGLOBIN A1C LEVEL >9.0%: CPT | Performed by: INTERNAL MEDICINE

## 2022-02-09 PROCEDURE — 93000 ELECTROCARDIOGRAM COMPLETE: CPT | Performed by: INTERNAL MEDICINE

## 2022-02-09 PROCEDURE — 99214 OFFICE O/P EST MOD 30 MIN: CPT | Performed by: INTERNAL MEDICINE

## 2022-02-09 RX ORDER — METOPROLOL SUCCINATE 100 MG/1
TABLET, EXTENDED RELEASE ORAL
Qty: 60 TABLET | Refills: 11 | Status: SHIPPED
Start: 2022-02-09 | End: 2022-11-03

## 2022-02-10 ENCOUNTER — ANESTHESIA (OUTPATIENT)
Dept: OPERATING ROOM | Age: 37
DRG: 710 | End: 2022-02-10
Payer: COMMERCIAL

## 2022-02-10 ENCOUNTER — APPOINTMENT (OUTPATIENT)
Dept: CT IMAGING | Age: 37
DRG: 710 | End: 2022-02-10
Payer: COMMERCIAL

## 2022-02-10 ENCOUNTER — HOSPITAL ENCOUNTER (INPATIENT)
Age: 37
LOS: 4 days | Discharge: HOME HEALTH | DRG: 710 | End: 2022-02-14
Attending: EMERGENCY MEDICINE | Admitting: INTERNAL MEDICINE
Payer: COMMERCIAL

## 2022-02-10 ENCOUNTER — ANESTHESIA EVENT (OUTPATIENT)
Dept: OPERATING ROOM | Age: 37
DRG: 710 | End: 2022-02-10
Payer: COMMERCIAL

## 2022-02-10 VITALS
RESPIRATION RATE: 3 BRPM | DIASTOLIC BLOOD PRESSURE: 91 MMHG | TEMPERATURE: 95.5 F | OXYGEN SATURATION: 97 % | SYSTOLIC BLOOD PRESSURE: 156 MMHG

## 2022-02-10 DIAGNOSIS — E10.10 DIABETIC KETOACIDOSIS WITHOUT COMA ASSOCIATED WITH TYPE 1 DIABETES MELLITUS (HCC): Primary | ICD-10-CM

## 2022-02-10 DIAGNOSIS — L02.31 ABSCESS, GLUTEAL, LEFT: ICD-10-CM

## 2022-02-10 PROBLEM — R65.21 SEPTIC SHOCK (HCC): Status: ACTIVE | Noted: 2022-02-10

## 2022-02-10 PROBLEM — A41.9 SEPTIC SHOCK (HCC): Status: ACTIVE | Noted: 2022-02-10

## 2022-02-10 LAB
ABO/RH: NORMAL
ALBUMIN SERPL-MCNC: 3.9 G/DL (ref 3.5–5.2)
ALP BLD-CCNC: 93 U/L (ref 40–129)
ALT SERPL-CCNC: 8 U/L (ref 0–40)
ANION GAP SERPL CALCULATED.3IONS-SCNC: 19 MMOL/L (ref 7–16)
ANION GAP SERPL CALCULATED.3IONS-SCNC: 28 MMOL/L (ref 7–16)
ANTIBODY SCREEN: NORMAL
AST SERPL-CCNC: 6 U/L (ref 0–39)
BASOPHILS ABSOLUTE: 0.06 E9/L (ref 0–0.2)
BASOPHILS RELATIVE PERCENT: 0.4 % (ref 0–2)
BETA-HYDROXYBUTYRATE: >4.5 MMOL/L (ref 0.02–0.27)
BILIRUB SERPL-MCNC: 0.4 MG/DL (ref 0–1.2)
BUN BLDV-MCNC: 17 MG/DL (ref 6–20)
BUN BLDV-MCNC: 21 MG/DL (ref 6–20)
CALCIUM SERPL-MCNC: 8.7 MG/DL (ref 8.6–10.2)
CALCIUM SERPL-MCNC: 9.6 MG/DL (ref 8.6–10.2)
CHLORIDE BLD-SCNC: 81 MMOL/L (ref 98–107)
CHLORIDE BLD-SCNC: 92 MMOL/L (ref 98–107)
CO2: 15 MMOL/L (ref 22–29)
CO2: 20 MMOL/L (ref 22–29)
CREAT SERPL-MCNC: 1.1 MG/DL (ref 0.7–1.2)
CREAT SERPL-MCNC: 1.3 MG/DL (ref 0.7–1.2)
EOSINOPHILS ABSOLUTE: 0.04 E9/L (ref 0.05–0.5)
EOSINOPHILS RELATIVE PERCENT: 0.3 % (ref 0–6)
GFR AFRICAN AMERICAN: >60
GFR AFRICAN AMERICAN: >60
GFR NON-AFRICAN AMERICAN: >60 ML/MIN/1.73
GFR NON-AFRICAN AMERICAN: >60 ML/MIN/1.73
GLUCOSE BLD-MCNC: 270 MG/DL (ref 74–99)
GLUCOSE BLD-MCNC: 474 MG/DL (ref 74–99)
HCT VFR BLD CALC: 35.8 % (ref 37–54)
HEMOGLOBIN: 12.3 G/DL (ref 12.5–16.5)
IMMATURE GRANULOCYTES #: 0.03 E9/L
IMMATURE GRANULOCYTES %: 0.2 % (ref 0–5)
LACTIC ACID, SEPSIS: 1.7 MMOL/L (ref 0.5–1.9)
LACTIC ACID, SEPSIS: 1.8 MMOL/L (ref 0.5–1.9)
LYMPHOCYTES ABSOLUTE: 1.99 E9/L (ref 1.5–4)
LYMPHOCYTES RELATIVE PERCENT: 14.3 % (ref 20–42)
MAGNESIUM: 2.5 MG/DL (ref 1.6–2.6)
MCH RBC QN AUTO: 31.6 PG (ref 26–35)
MCHC RBC AUTO-ENTMCNC: 34.4 % (ref 32–34.5)
MCV RBC AUTO: 92 FL (ref 80–99.9)
METER GLUCOSE: 171 MG/DL (ref 74–99)
METER GLUCOSE: 192 MG/DL (ref 74–99)
METER GLUCOSE: 192 MG/DL (ref 74–99)
METER GLUCOSE: 272 MG/DL (ref 74–99)
METER GLUCOSE: 350 MG/DL (ref 74–99)
METER GLUCOSE: 394 MG/DL (ref 74–99)
METER GLUCOSE: 398 MG/DL (ref 74–99)
MONOCYTES ABSOLUTE: 1.22 E9/L (ref 0.1–0.95)
MONOCYTES RELATIVE PERCENT: 8.8 % (ref 2–12)
NEUTROPHILS ABSOLUTE: 10.53 E9/L (ref 1.8–7.3)
NEUTROPHILS RELATIVE PERCENT: 76 % (ref 43–80)
PDW BLD-RTO: 12.8 FL (ref 11.5–15)
PH VENOUS: 7.31 (ref 7.35–7.45)
PHOSPHORUS: 2 MG/DL (ref 2.5–4.5)
PLATELET # BLD: 353 E9/L (ref 130–450)
PMV BLD AUTO: 10.6 FL (ref 7–12)
POTASSIUM REFLEX MAGNESIUM: 4.4 MMOL/L (ref 3.5–5)
POTASSIUM SERPL-SCNC: 3.8 MMOL/L (ref 3.5–5)
RBC # BLD: 3.89 E12/L (ref 3.8–5.8)
SEDIMENTATION RATE, ERYTHROCYTE: 80 MM/HR (ref 0–15)
SODIUM BLD-SCNC: 124 MMOL/L (ref 132–146)
SODIUM BLD-SCNC: 131 MMOL/L (ref 132–146)
TOTAL PROTEIN: 7.7 G/DL (ref 6.4–8.3)
WBC # BLD: 13.9 E9/L (ref 4.5–11.5)

## 2022-02-10 PROCEDURE — 6360000002 HC RX W HCPCS: Performed by: NURSE ANESTHETIST, CERTIFIED REGISTERED

## 2022-02-10 PROCEDURE — 87070 CULTURE OTHR SPECIMN AEROBIC: CPT

## 2022-02-10 PROCEDURE — 96374 THER/PROPH/DIAG INJ IV PUSH: CPT

## 2022-02-10 PROCEDURE — 7100000001 HC PACU RECOVERY - ADDTL 15 MIN: Performed by: SURGERY

## 2022-02-10 PROCEDURE — 87040 BLOOD CULTURE FOR BACTERIA: CPT

## 2022-02-10 PROCEDURE — 6370000000 HC RX 637 (ALT 250 FOR IP): Performed by: ANESTHESIOLOGY

## 2022-02-10 PROCEDURE — 84100 ASSAY OF PHOSPHORUS: CPT

## 2022-02-10 PROCEDURE — 2709999900 HC NON-CHARGEABLE SUPPLY: Performed by: SURGERY

## 2022-02-10 PROCEDURE — 86900 BLOOD TYPING SEROLOGIC ABO: CPT

## 2022-02-10 PROCEDURE — 3600000012 HC SURGERY LEVEL 2 ADDTL 15MIN: Performed by: SURGERY

## 2022-02-10 PROCEDURE — 87205 SMEAR GRAM STAIN: CPT

## 2022-02-10 PROCEDURE — 6370000000 HC RX 637 (ALT 250 FOR IP): Performed by: STUDENT IN AN ORGANIZED HEALTH CARE EDUCATION/TRAINING PROGRAM

## 2022-02-10 PROCEDURE — 3700000001 HC ADD 15 MINUTES (ANESTHESIA): Performed by: SURGERY

## 2022-02-10 PROCEDURE — 2500000003 HC RX 250 WO HCPCS: Performed by: INTERNAL MEDICINE

## 2022-02-10 PROCEDURE — 6360000002 HC RX W HCPCS: Performed by: STUDENT IN AN ORGANIZED HEALTH CARE EDUCATION/TRAINING PROGRAM

## 2022-02-10 PROCEDURE — 87015 SPECIMEN INFECT AGNT CONCNTJ: CPT

## 2022-02-10 PROCEDURE — 80048 BASIC METABOLIC PNL TOTAL CA: CPT

## 2022-02-10 PROCEDURE — 6360000002 HC RX W HCPCS: Performed by: ANESTHESIOLOGY

## 2022-02-10 PROCEDURE — 2580000003 HC RX 258: Performed by: INTERNAL MEDICINE

## 2022-02-10 PROCEDURE — 87147 CULTURE TYPE IMMUNOLOGIC: CPT

## 2022-02-10 PROCEDURE — 82800 BLOOD PH: CPT

## 2022-02-10 PROCEDURE — 87077 CULTURE AEROBIC IDENTIFY: CPT

## 2022-02-10 PROCEDURE — 86850 RBC ANTIBODY SCREEN: CPT

## 2022-02-10 PROCEDURE — 87075 CULTR BACTERIA EXCEPT BLOOD: CPT

## 2022-02-10 PROCEDURE — 2500000003 HC RX 250 WO HCPCS: Performed by: NURSE ANESTHETIST, CERTIFIED REGISTERED

## 2022-02-10 PROCEDURE — 85651 RBC SED RATE NONAUTOMATED: CPT

## 2022-02-10 PROCEDURE — 86901 BLOOD TYPING SEROLOGIC RH(D): CPT

## 2022-02-10 PROCEDURE — 2580000003 HC RX 258: Performed by: GENERAL PRACTICE

## 2022-02-10 PROCEDURE — 7100000000 HC PACU RECOVERY - FIRST 15 MIN: Performed by: SURGERY

## 2022-02-10 PROCEDURE — 80053 COMPREHEN METABOLIC PANEL: CPT

## 2022-02-10 PROCEDURE — 87081 CULTURE SCREEN ONLY: CPT

## 2022-02-10 PROCEDURE — 36415 COLL VENOUS BLD VENIPUNCTURE: CPT

## 2022-02-10 PROCEDURE — 87102 FUNGUS ISOLATION CULTURE: CPT

## 2022-02-10 PROCEDURE — 83605 ASSAY OF LACTIC ACID: CPT

## 2022-02-10 PROCEDURE — 6360000004 HC RX CONTRAST MEDICATION: Performed by: RADIOLOGY

## 2022-02-10 PROCEDURE — 99284 EMERGENCY DEPT VISIT MOD MDM: CPT

## 2022-02-10 PROCEDURE — 82010 KETONE BODYS QUAN: CPT

## 2022-02-10 PROCEDURE — 87206 SMEAR FLUORESCENT/ACID STAI: CPT

## 2022-02-10 PROCEDURE — 2580000003 HC RX 258: Performed by: STUDENT IN AN ORGANIZED HEALTH CARE EDUCATION/TRAINING PROGRAM

## 2022-02-10 PROCEDURE — 6360000002 HC RX W HCPCS: Performed by: GENERAL PRACTICE

## 2022-02-10 PROCEDURE — 74177 CT ABD & PELVIS W/CONTRAST: CPT

## 2022-02-10 PROCEDURE — 3700000000 HC ANESTHESIA ATTENDED CARE: Performed by: SURGERY

## 2022-02-10 PROCEDURE — 3600000002 HC SURGERY LEVEL 2 BASE: Performed by: SURGERY

## 2022-02-10 PROCEDURE — 0J990ZZ DRAINAGE OF BUTTOCK SUBCUTANEOUS TISSUE AND FASCIA, OPEN APPROACH: ICD-10-PCS | Performed by: STUDENT IN AN ORGANIZED HEALTH CARE EDUCATION/TRAINING PROGRAM

## 2022-02-10 PROCEDURE — 6370000000 HC RX 637 (ALT 250 FOR IP): Performed by: INTERNAL MEDICINE

## 2022-02-10 PROCEDURE — 6360000002 HC RX W HCPCS: Performed by: INTERNAL MEDICINE

## 2022-02-10 PROCEDURE — 2580000003 HC RX 258: Performed by: NURSE ANESTHETIST, CERTIFIED REGISTERED

## 2022-02-10 PROCEDURE — 83735 ASSAY OF MAGNESIUM: CPT

## 2022-02-10 PROCEDURE — 99223 1ST HOSP IP/OBS HIGH 75: CPT | Performed by: INTERNAL MEDICINE

## 2022-02-10 PROCEDURE — 82962 GLUCOSE BLOOD TEST: CPT

## 2022-02-10 PROCEDURE — 2000000000 HC ICU R&B

## 2022-02-10 PROCEDURE — 85025 COMPLETE CBC W/AUTO DIFF WBC: CPT

## 2022-02-10 PROCEDURE — 87186 SC STD MICRODIL/AGAR DIL: CPT

## 2022-02-10 PROCEDURE — 87116 MYCOBACTERIA CULTURE: CPT

## 2022-02-10 RX ORDER — OXYCODONE HYDROCHLORIDE 5 MG/1
5 TABLET ORAL EVERY 4 HOURS PRN
Status: DISCONTINUED | OUTPATIENT
Start: 2022-02-10 | End: 2022-02-15 | Stop reason: HOSPADM

## 2022-02-10 RX ORDER — LANOLIN ALCOHOL/MO/W.PET/CERES
3 CREAM (GRAM) TOPICAL NIGHTLY PRN
Status: DISCONTINUED | OUTPATIENT
Start: 2022-02-10 | End: 2022-02-15 | Stop reason: HOSPADM

## 2022-02-10 RX ORDER — ONDANSETRON 2 MG/ML
4 INJECTION INTRAMUSCULAR; INTRAVENOUS
Status: DISCONTINUED | OUTPATIENT
Start: 2022-02-10 | End: 2022-02-10 | Stop reason: HOSPADM

## 2022-02-10 RX ORDER — PROPOFOL 10 MG/ML
INJECTION, EMULSION INTRAVENOUS PRN
Status: DISCONTINUED | OUTPATIENT
Start: 2022-02-10 | End: 2022-02-10 | Stop reason: SDUPTHER

## 2022-02-10 RX ORDER — ATORVASTATIN CALCIUM 20 MG/1
20 TABLET, FILM COATED ORAL DAILY
Status: DISCONTINUED | OUTPATIENT
Start: 2022-02-10 | End: 2022-02-15 | Stop reason: HOSPADM

## 2022-02-10 RX ORDER — POTASSIUM CHLORIDE 7.45 MG/ML
10 INJECTION INTRAVENOUS PRN
Status: DISCONTINUED | OUTPATIENT
Start: 2022-02-10 | End: 2022-02-11

## 2022-02-10 RX ORDER — MAGNESIUM SULFATE 1 G/100ML
1000 INJECTION INTRAVENOUS PRN
Status: DISCONTINUED | OUTPATIENT
Start: 2022-02-10 | End: 2022-02-11

## 2022-02-10 RX ORDER — ROCURONIUM BROMIDE 10 MG/ML
INJECTION, SOLUTION INTRAVENOUS PRN
Status: DISCONTINUED | OUTPATIENT
Start: 2022-02-10 | End: 2022-02-10 | Stop reason: SDUPTHER

## 2022-02-10 RX ORDER — MEPERIDINE HYDROCHLORIDE 25 MG/ML
25 INJECTION INTRAMUSCULAR; INTRAVENOUS; SUBCUTANEOUS EVERY 5 MIN PRN
Status: DISCONTINUED | OUTPATIENT
Start: 2022-02-10 | End: 2022-02-10 | Stop reason: HOSPADM

## 2022-02-10 RX ORDER — INSULIN GLARGINE 100 [IU]/ML
22 INJECTION, SOLUTION SUBCUTANEOUS 2 TIMES DAILY
Status: DISCONTINUED | OUTPATIENT
Start: 2022-02-11 | End: 2022-02-11

## 2022-02-10 RX ORDER — FENTANYL CITRATE 50 UG/ML
25 INJECTION, SOLUTION INTRAMUSCULAR; INTRAVENOUS EVERY 5 MIN PRN
Status: DISCONTINUED | OUTPATIENT
Start: 2022-02-10 | End: 2022-02-10 | Stop reason: HOSPADM

## 2022-02-10 RX ORDER — DEXTROSE MONOHYDRATE 25 G/50ML
12.5 INJECTION, SOLUTION INTRAVENOUS PRN
Status: DISCONTINUED | OUTPATIENT
Start: 2022-02-10 | End: 2022-02-10 | Stop reason: CLARIF

## 2022-02-10 RX ORDER — 0.9 % SODIUM CHLORIDE 0.9 %
1000 INTRAVENOUS SOLUTION INTRAVENOUS ONCE
Status: COMPLETED | OUTPATIENT
Start: 2022-02-10 | End: 2022-02-10

## 2022-02-10 RX ORDER — DIPHENHYDRAMINE HYDROCHLORIDE 50 MG/ML
25 INJECTION INTRAMUSCULAR; INTRAVENOUS EVERY 6 HOURS PRN
Status: DISCONTINUED | OUTPATIENT
Start: 2022-02-10 | End: 2022-02-15 | Stop reason: HOSPADM

## 2022-02-10 RX ORDER — SODIUM CHLORIDE 9 MG/ML
INJECTION, SOLUTION INTRAVENOUS CONTINUOUS
Status: DISCONTINUED | OUTPATIENT
Start: 2022-02-10 | End: 2022-02-11

## 2022-02-10 RX ORDER — SODIUM CHLORIDE 9 MG/ML
INJECTION, SOLUTION INTRAVENOUS EVERY 8 HOURS
Status: DISCONTINUED | OUTPATIENT
Start: 2022-02-11 | End: 2022-02-14

## 2022-02-10 RX ORDER — SUCCINYLCHOLINE/SOD CL,ISO/PF 200MG/10ML
SYRINGE (ML) INTRAVENOUS PRN
Status: DISCONTINUED | OUTPATIENT
Start: 2022-02-10 | End: 2022-02-10 | Stop reason: SDUPTHER

## 2022-02-10 RX ORDER — NEOSTIGMINE METHYLSULFATE 1 MG/ML
INJECTION, SOLUTION INTRAVENOUS PRN
Status: DISCONTINUED | OUTPATIENT
Start: 2022-02-10 | End: 2022-02-10 | Stop reason: SDUPTHER

## 2022-02-10 RX ORDER — ETOMIDATE 2 MG/ML
INJECTION INTRAVENOUS PRN
Status: DISCONTINUED | OUTPATIENT
Start: 2022-02-10 | End: 2022-02-10 | Stop reason: SDUPTHER

## 2022-02-10 RX ORDER — DEXTROSE AND SODIUM CHLORIDE 5; .45 G/100ML; G/100ML
INJECTION, SOLUTION INTRAVENOUS CONTINUOUS PRN
Status: DISCONTINUED | OUTPATIENT
Start: 2022-02-10 | End: 2022-02-11

## 2022-02-10 RX ORDER — ONDANSETRON 2 MG/ML
INJECTION INTRAMUSCULAR; INTRAVENOUS PRN
Status: DISCONTINUED | OUTPATIENT
Start: 2022-02-10 | End: 2022-02-10 | Stop reason: SDUPTHER

## 2022-02-10 RX ORDER — 0.9 % SODIUM CHLORIDE 0.9 %
15 INTRAVENOUS SOLUTION INTRAVENOUS ONCE
Status: COMPLETED | OUTPATIENT
Start: 2022-02-10 | End: 2022-02-10

## 2022-02-10 RX ORDER — PROMETHAZINE HYDROCHLORIDE 25 MG/ML
6.25 INJECTION, SOLUTION INTRAMUSCULAR; INTRAVENOUS
Status: DISCONTINUED | OUTPATIENT
Start: 2022-02-10 | End: 2022-02-10 | Stop reason: HOSPADM

## 2022-02-10 RX ORDER — FENTANYL CITRATE 50 UG/ML
50 INJECTION, SOLUTION INTRAMUSCULAR; INTRAVENOUS ONCE
Status: COMPLETED | OUTPATIENT
Start: 2022-02-10 | End: 2022-02-10

## 2022-02-10 RX ORDER — METOPROLOL SUCCINATE 25 MG/1
25 TABLET, EXTENDED RELEASE ORAL DAILY
Status: DISCONTINUED | OUTPATIENT
Start: 2022-02-11 | End: 2022-02-15 | Stop reason: HOSPADM

## 2022-02-10 RX ORDER — LIDOCAINE HYDROCHLORIDE 20 MG/ML
INJECTION, SOLUTION EPIDURAL; INFILTRATION; INTRACAUDAL; PERINEURAL PRN
Status: DISCONTINUED | OUTPATIENT
Start: 2022-02-10 | End: 2022-02-10 | Stop reason: SDUPTHER

## 2022-02-10 RX ORDER — GLYCOPYRROLATE 1 MG/5 ML
SYRINGE (ML) INTRAVENOUS PRN
Status: DISCONTINUED | OUTPATIENT
Start: 2022-02-10 | End: 2022-02-10 | Stop reason: SDUPTHER

## 2022-02-10 RX ORDER — SODIUM CHLORIDE 9 MG/ML
INJECTION, SOLUTION INTRAVENOUS CONTINUOUS PRN
Status: DISCONTINUED | OUTPATIENT
Start: 2022-02-10 | End: 2022-02-10 | Stop reason: SDUPTHER

## 2022-02-10 RX ORDER — FENTANYL CITRATE 50 UG/ML
50 INJECTION, SOLUTION INTRAMUSCULAR; INTRAVENOUS EVERY 5 MIN PRN
Status: DISCONTINUED | OUTPATIENT
Start: 2022-02-10 | End: 2022-02-10 | Stop reason: HOSPADM

## 2022-02-10 RX ORDER — FENTANYL CITRATE 50 UG/ML
INJECTION, SOLUTION INTRAMUSCULAR; INTRAVENOUS PRN
Status: DISCONTINUED | OUTPATIENT
Start: 2022-02-10 | End: 2022-02-10 | Stop reason: SDUPTHER

## 2022-02-10 RX ORDER — ACETAMINOPHEN 325 MG/1
650 TABLET ORAL EVERY 6 HOURS
Status: DISCONTINUED | OUTPATIENT
Start: 2022-02-10 | End: 2022-02-15 | Stop reason: HOSPADM

## 2022-02-10 RX ADMIN — DEXTROSE MONOHYDRATE 8 MCG/MIN: 50 INJECTION, SOLUTION INTRAVENOUS at 21:30

## 2022-02-10 RX ADMIN — SODIUM CHLORIDE: 9 INJECTION, SOLUTION INTRAVENOUS at 19:41

## 2022-02-10 RX ADMIN — SODIUM CHLORIDE 11.02 UNITS/HR: 9 INJECTION, SOLUTION INTRAVENOUS at 19:21

## 2022-02-10 RX ADMIN — Medication 5 MG: at 17:34

## 2022-02-10 RX ADMIN — POTASSIUM CHLORIDE 10 MEQ: 7.46 INJECTION, SOLUTION INTRAVENOUS at 23:31

## 2022-02-10 RX ADMIN — SODIUM CHLORIDE: 9 INJECTION, SOLUTION INTRAVENOUS at 16:45

## 2022-02-10 RX ADMIN — DEXTROSE AND SODIUM CHLORIDE: 5; 450 INJECTION, SOLUTION INTRAVENOUS at 21:37

## 2022-02-10 RX ADMIN — SODIUM PHOSPHATE, MONOBASIC, MONOHYDRATE 15 MMOL: 276; 142 INJECTION, SOLUTION INTRAVENOUS at 22:58

## 2022-02-10 RX ADMIN — DIPHENHYDRAMINE HYDROCHLORIDE 25 MG: 50 INJECTION, SOLUTION INTRAMUSCULAR; INTRAVENOUS at 20:38

## 2022-02-10 RX ADMIN — FENTANYL CITRATE 50 MCG: 50 INJECTION, SOLUTION INTRAMUSCULAR; INTRAVENOUS at 16:00

## 2022-02-10 RX ADMIN — ATORVASTATIN CALCIUM 20 MG: 20 TABLET, FILM COATED ORAL at 20:01

## 2022-02-10 RX ADMIN — ETOMIDATE 20 MG: 2 INJECTION, SOLUTION INTRAVENOUS at 16:55

## 2022-02-10 RX ADMIN — ONDANSETRON 4 MG: 2 INJECTION INTRAMUSCULAR; INTRAVENOUS at 17:30

## 2022-02-10 RX ADMIN — Medication 0.8 MG: at 17:33

## 2022-02-10 RX ADMIN — ROCURONIUM BROMIDE 30 MG: 50 INJECTION, SOLUTION INTRAVENOUS at 17:04

## 2022-02-10 RX ADMIN — PROPOFOL 70 MG: 10 INJECTION, EMULSION INTRAVENOUS at 16:58

## 2022-02-10 RX ADMIN — INSULIN HUMAN 15 UNITS: 100 INJECTION, SOLUTION PARENTERAL at 16:33

## 2022-02-10 RX ADMIN — Medication 3 MG: at 20:01

## 2022-02-10 RX ADMIN — VANCOMYCIN HYDROCHLORIDE 2000 MG: 10 INJECTION, POWDER, LYOPHILIZED, FOR SOLUTION INTRAVENOUS at 16:49

## 2022-02-10 RX ADMIN — PROPOFOL 30 MG: 10 INJECTION, EMULSION INTRAVENOUS at 16:53

## 2022-02-10 RX ADMIN — PIPERACILLIN SODIUM AND TAZOBACTAM SODIUM 4500 MG: 4; .5 INJECTION, POWDER, LYOPHILIZED, FOR SOLUTION INTRAVENOUS at 16:41

## 2022-02-10 RX ADMIN — FENTANYL CITRATE 100 MCG: 50 INJECTION, SOLUTION INTRAMUSCULAR; INTRAVENOUS at 16:46

## 2022-02-10 RX ADMIN — FENTANYL CITRATE 100 MCG: 50 INJECTION, SOLUTION INTRAMUSCULAR; INTRAVENOUS at 17:23

## 2022-02-10 RX ADMIN — LIDOCAINE HYDROCHLORIDE 50 MG: 20 INJECTION, SOLUTION EPIDURAL; INFILTRATION; INTRACAUDAL; PERINEURAL at 16:55

## 2022-02-10 RX ADMIN — ROCURONIUM BROMIDE 5 MG: 50 INJECTION, SOLUTION INTRAVENOUS at 16:55

## 2022-02-10 RX ADMIN — OXYCODONE 5 MG: 5 TABLET ORAL at 23:06

## 2022-02-10 RX ADMIN — ACETAMINOPHEN 650 MG: 325 TABLET ORAL at 19:45

## 2022-02-10 RX ADMIN — IOPAMIDOL 75 ML: 755 INJECTION, SOLUTION INTRAVENOUS at 15:45

## 2022-02-10 RX ADMIN — Medication 200 MG: at 16:55

## 2022-02-10 RX ADMIN — HYDROMORPHONE HYDROCHLORIDE 0.5 MG: 1 INJECTION, SOLUTION INTRAMUSCULAR; INTRAVENOUS; SUBCUTANEOUS at 18:07

## 2022-02-10 RX ADMIN — SODIUM CHLORIDE 1653 ML: 9 INJECTION, SOLUTION INTRAVENOUS at 18:55

## 2022-02-10 RX ADMIN — POTASSIUM CHLORIDE 10 MEQ: 7.46 INJECTION, SOLUTION INTRAVENOUS at 22:26

## 2022-02-10 RX ADMIN — SODIUM CHLORIDE 1000 ML: 9 INJECTION, SOLUTION INTRAVENOUS at 14:46

## 2022-02-10 ASSESSMENT — PULMONARY FUNCTION TESTS
PIF_VALUE: 26
PIF_VALUE: 1
PIF_VALUE: 4
PIF_VALUE: 3
PIF_VALUE: 24
PIF_VALUE: 5
PIF_VALUE: 26
PIF_VALUE: 24
PIF_VALUE: 28
PIF_VALUE: 2
PIF_VALUE: 24
PIF_VALUE: 26
PIF_VALUE: 6
PIF_VALUE: 1
PIF_VALUE: 33
PIF_VALUE: 27
PIF_VALUE: 3
PIF_VALUE: 5
PIF_VALUE: 29
PIF_VALUE: 23
PIF_VALUE: 29
PIF_VALUE: 24
PIF_VALUE: 28
PIF_VALUE: 26
PIF_VALUE: 27
PIF_VALUE: 31
PIF_VALUE: 28
PIF_VALUE: 25
PIF_VALUE: 45
PIF_VALUE: 21
PIF_VALUE: 25
PIF_VALUE: 3
PIF_VALUE: 27
PIF_VALUE: 6
PIF_VALUE: 1
PIF_VALUE: 20
PIF_VALUE: 28
PIF_VALUE: 1
PIF_VALUE: 26
PIF_VALUE: 28
PIF_VALUE: 25
PIF_VALUE: 4
PIF_VALUE: 26
PIF_VALUE: 28
PIF_VALUE: 24
PIF_VALUE: 24
PIF_VALUE: 27
PIF_VALUE: 27
PIF_VALUE: 29
PIF_VALUE: 26
PIF_VALUE: 2
PIF_VALUE: 24
PIF_VALUE: 9

## 2022-02-10 ASSESSMENT — ENCOUNTER SYMPTOMS
SORE THROAT: 0
EYE DISCHARGE: 0
VOMITING: 0
DIARRHEA: 0
ABDOMINAL PAIN: 0
SHORTNESS OF BREATH: 0
EYE PAIN: 0
BACK PAIN: 0
SINUS PRESSURE: 0
COUGH: 0
WHEEZING: 0
NAUSEA: 0
EYE REDNESS: 0

## 2022-02-10 ASSESSMENT — PAIN SCALES - GENERAL
PAINLEVEL_OUTOF10: 10
PAINLEVEL_OUTOF10: 10
PAINLEVEL_OUTOF10: 0
PAINLEVEL_OUTOF10: 8
PAINLEVEL_OUTOF10: 0
PAINLEVEL_OUTOF10: 0

## 2022-02-10 ASSESSMENT — PAIN DESCRIPTION - LOCATION: LOCATION: BUTTOCKS

## 2022-02-10 NOTE — ANESTHESIA PRE PROCEDURE
Department of Anesthesiology  Preprocedure Note       Name:  Chucho Sue   Age:  43 y.o.  :  1985                                          MRN:  [id number]         Date:  2/10/2022      Surgeon: Corinthia Goodpasture):  Huang Stanford MD    Procedure: Procedure(s):  LEFT BUTTOCK INCISION AND DRAINAGE    Medications prior to admission:   Prior to Admission medications    Medication Sig Start Date End Date Taking?  Authorizing Provider   metoprolol succinate (TOPROL XL) 100 MG extended release tablet 1 tab twice daily 22   Sandy Ruvalcaba MD   Continuous Blood Gluc Sensor (FREESTYLE CHRISTIAN 2 SENSOR) MISC Change sensor every 14 days 2/3/22   Kaylan Johnson MD   vitamin D (ERGOCALCIFEROL) 1.25 MG (47237 UT) CAPS capsule Take 1 capsule by mouth once a week 1/10/22   Ling Lopez MD   melatonin (RA MELATONIN) 3 MG TABS tablet Take 1 tablet by mouth nightly as needed (sleep) 1/10/22   Ling Lopez MD   triamcinolone (KENALOG) 0.1 % cream Apply topically as needed (rash) Apply topically 2 times daily x 10 days prn 1/10/22   Ling Lopez MD   Folinic Acid-Vit B6-Vit B12 (FOLINIC-PLUS) 4-50-2 MG TABS Take 4-50 mg by mouth 2 times daily  Patient not taking: Reported on 2/3/2022 12/1/21   Ling Lopez MD   Continuous Blood Gluc  (FREESTYLE CHRISTIAN 14 DAY READER) PEDRO As directed 21   Ling Lopez MD   Insulin Pen Needle (PEN NEEDLES) 31G X 6 MM MISC 1 each by Does not apply route daily 21   Ling Lopez MD   insulin glargine (LANTUS SOLOSTAR) 100 UNIT/ML injection pen Inject 22 Units into the skin 2 times daily 21   Ling Lopez MD   spironolactone (ALDACTONE) 25 MG tablet TAKE 1 TABLET BY MOUTH DAILY  Patient not taking: Reported on 21   Sandy Ruvalcaba MD   potassium chloride (KLOR-CON M) 20 MEQ extended release tablet Take 2 tablets by mouth daily Pt only taking one tablet daily unless he takes Bumex BID then he takes 2 tabs 10/28/21 Yi Gonzalez MD   bumetanide (BUMEX) 2 MG tablet Take 1 tablet by mouth See Admin Instructions Take 1 tab 8am daily. On days you gain weight more than 2 lbs or have worse swelling or short of breath then take a 2pm dose that day. 10/27/21   Hermelinda Ronquillo MD   atorvastatin (LIPITOR) 20 MG tablet Take 1 tablet by mouth daily  Patient not taking: Reported on 2/9/2022 10/27/21   Hermelinda Ronquillo MD   sacubitril-valsartan (ENTRESTO) 49-51 MG per tablet Take 1 tablet by mouth 2 times daily  Patient taking differently: Take 1 tablet by mouth 2 times daily taking  mg 9/30/21   Naeem Cornelius APRN - CNP   Insulin Pen Needle (BD PEN NEEDLE MICRO U/F) 32G X 6 MM MISC Uses with insulin 4 times a day 9/20/21   Sylvester Chen MD   insulin lispro, 1 Unit Dial, (HUMALOG KWIKPEN) 100 UNIT/ML SOPN Inject 15 units with meals + following sliding scale. -200 add 3U, -250 add 6U, -300 add 9U, -350 add 12U, -400 add 15U, BS over 400 add 18U. MAX 75U/day 9/20/21   Sylvester Chen MD   Lancets MISC Test 4 times/day before meals and at bedtime and as needed for symptoms of irregular blood glucose. 9/20/21   Jairo Feliciano MD   CVS Lancets MISC 1 each by Does not apply route daily To check sugar 4 x a day and if feeling ill 7/31/21   Ben Hargrove, DO   blood glucose monitor strips Test **4* times a day & as needed for symptoms of irregular blood glucose. Dispense sufficient amount for indicated testing frequency plus additional to accommodate PRN testing needs.  7/31/21   Fabrizio Montano, DO       Current medications:    Current Facility-Administered Medications   Medication Dose Route Frequency Provider Last Rate Last Admin    piperacillin-tazobactam (ZOSYN) 4,500 mg in dextrose 5 % 100 mL IVPB (mini-bag)  4,500 mg IntraVENous Once Rome Cleveland MD         Current Outpatient Medications   Medication Sig Dispense Refill    metoprolol succinate (TOPROL XL) 100 MG extended release tablet 1 tab twice daily 60 tablet 11    Continuous Blood Gluc Sensor (FREESTYLE CHRISTIAN 2 SENSOR) MISC Change sensor every 14 days 6 each 3    vitamin D (ERGOCALCIFEROL) 1.25 MG (78810 UT) CAPS capsule Take 1 capsule by mouth once a week 12 capsule 0    melatonin (RA MELATONIN) 3 MG TABS tablet Take 1 tablet by mouth nightly as needed (sleep) 30 tablet 1    triamcinolone (KENALOG) 0.1 % cream Apply topically as needed (rash) Apply topically 2 times daily x 10 days prn 80 g 0    Folinic Acid-Vit B6-Vit B12 (FOLINIC-PLUS) 4-50-2 MG TABS Take 4-50 mg by mouth 2 times daily (Patient not taking: Reported on 2/3/2022) 90 tablet 2    Continuous Blood Gluc  (FREESTYLE CHRISTIAN 14 DAY READER) PEDRO As directed 4 each 0    Insulin Pen Needle (PEN NEEDLES) 31G X 6 MM MISC 1 each by Does not apply route daily 100 each 0    insulin glargine (LANTUS SOLOSTAR) 100 UNIT/ML injection pen Inject 22 Units into the skin 2 times daily 10 pen 3    spironolactone (ALDACTONE) 25 MG tablet TAKE 1 TABLET BY MOUTH DAILY (Patient not taking: Reported on 2/9/2022) 90 tablet 1    potassium chloride (KLOR-CON M) 20 MEQ extended release tablet Take 2 tablets by mouth daily Pt only taking one tablet daily unless he takes Bumex BID then he takes 2 tabs 180 tablet 3    bumetanide (BUMEX) 2 MG tablet Take 1 tablet by mouth See Admin Instructions Take 1 tab 8am daily. On days you gain weight more than 2 lbs or have worse swelling or short of breath then take a 2pm dose that day.  60 tablet 3    atorvastatin (LIPITOR) 20 MG tablet Take 1 tablet by mouth daily (Patient not taking: Reported on 2/9/2022) 90 tablet 1    sacubitril-valsartan (ENTRESTO) 49-51 MG per tablet Take 1 tablet by mouth 2 times daily (Patient taking differently: Take 1 tablet by mouth 2 times daily taking  mg) 60 tablet 3    Insulin Pen Needle (BD PEN NEEDLE MICRO U/F) 32G X 6 MM MISC Uses with insulin 4 times a day 250 each 5    insulin lispro, 1 Unit Dial, (HUMALOG KWIKPEN) 100 UNIT/ML SOPN Inject 15 units with meals + following sliding scale. -200 add 3U, -250 add 6U, -300 add 9U, -350 add 12U, -400 add 15U, BS over 400 add 18U. MAX 75U/day 15 pen 3    Lancets MISC Test 4 times/day before meals and at bedtime and as needed for symptoms of irregular blood glucose. 300 each 3    CVS Lancets MISC 1 each by Does not apply route daily To check sugar 4 x a day and if feeling ill 200 each 3    blood glucose monitor strips Test **4* times a day & as needed for symptoms of irregular blood glucose. Dispense sufficient amount for indicated testing frequency plus additional to accommodate PRN testing needs.  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    Hyperosmolar hyperglycemic state (HHS) ([facility] E11.00, E11.65    DKA, type 1, not at goal ([facility] E10.10    Medication dose changed Z79.899    Cellulitis L03.90    RONA (acute kidney injury) ([facility] N17.9    Uncontrolled type 2 diabetes mellitus with hyperglycemia ([facility]) E11.65    Abscess of multiple sites of buttock L02.31    Diabetic polyneuropathy associated with type 2 diabetes mellitus ([facility]) E11.42    Tinea pedis of both feet B35.3    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, 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    Vitamin D deficiency E55.9    Mixed hyperlipidemia E78.2    Xerosis cutis L85.3       Past Medical History:        Diagnosis Date    CAD (coronary artery disease)     CHF (congestive heart failure) ([facility]     Diabetes mellitus ([facility]     Hyperlipidemia     Hyperosmolar hyperglycemic state (HHS) ([facility] 7/30/2021    Hypertension        Past Surgical History:        Procedure Laterality Date    ABDOMEN SURGERY N/A 9/18/2021    INCISION AND DRAINAGE OF LARGE PERIANAL ABSCESS performed by Abhishek Judd MD at [address]      buttocks    ECHO COMPL W DOP COLOR FLOW  5/5/2015         HC INSERT PICC CATH, 5/> YRS  10/20/2021         WISDOM TOOTH EXTRACTION         Social History:    Social History     Tobacco Use    Smoking status: Former Smoker     Years: 8.00     Types: Cigars, Cigarettes     Start date: 2000    Smokeless tobacco: Never Used    Tobacco comment: Smokes 1 cigar weekly/ cigs 2-3 a week   Substance Use Topics    Alcohol use: Yes     Comment: social drinker                                Counseling given: Not Answered  Comment: Smokes 1 cigar weekly/ cigs 2-3 a week      Vital Signs (Current):   Vitals:    02/10/22 1449   BP: 93/65   Pulse: 102   Resp: 16   Temp: 98.7 °F (37.1 °C)   SpO2: 99%   Weight: 243 lb (110.2 kg)   Height: 5' 10\" (1.778 m)                                              BP Readings from Last 3 Encounters:   02/10/22 93/65   02/09/22 112/60   01/10/22 122/68       NPO Status:                                                                                 BMI:   Wt Readings from Last 3 Encounters:   02/10/22 243 lb (110.2 kg)   02/09/22 243 lb 6.4 oz (110.4 kg)   01/10/22 244 lb (110.7 kg)     Body mass index is 34.87 kg/m².     CBC:   Lab Results   Component Value Date    WBC 13.9 02/10/2022    RBC 3.89 02/10/2022    HGB 12.3 02/10/2022    HCT 35.8 02/10/2022    MCV 92.0 02/10/2022    RDW 12.8 02/10/2022     02/10/2022       CMP:   Lab Results   Component Value Date     02/10/2022    K 4.4 02/10/2022    CL 81 02/10/2022    CO2 15 02/10/2022    BUN 21 02/10/2022    CREATININE 1.3 02/10/2022    GFRAA >60 02/10/2022    LABGLOM >60 02/10/2022    GLUCOSE 474 02/10/2022    PROT 7.7 02/10/2022    CALCIUM 9.6 02/10/2022    BILITOT 0.4 02/10/2022    ALKPHOS 93 02/10/2022 AST 6 02/10/2022    ALT 8 02/10/2022       POC Tests: No results for input(s): POCGLU, POCNA, POCK, POCCL, POCBUN, POCHEMO, POCHCT in the last 72 hours. Coags:   Lab Results   Component Value Date    PROTIME 14.2 12/24/2020    INR 1.3 12/24/2020    APTT 32.4 12/24/2020       HCG (If Applicable): No results found for: PREGTESTUR, PREGSERUM, HCG, HCGQUANT     ABGs: No results found for: PHART, PO2ART, NAN3SCM, WRO8IKJ, BEART, [id number]     Type & Screen (If Applicable):  No results found for: LABABO, LABRH    Drug/Infectious Status (If Applicable):  No results found for: HIV, HEPCAB    COVID-19 Screening (If Applicable):   Lab Results   Component Value Date    COVID19 Not Detected 09/15/2021    COVID19 Not Detected 12/23/2020           Anesthesia Evaluation  Patient summary reviewed  Airway: Mallampati: III  TM distance: >3 FB   Neck ROM: full  Mouth opening: > = 3 FB Dental: normal exam         Pulmonary: breath sounds clear to auscultation  (+) asthma:                            Cardiovascular:    (+) hypertension:, CAD:, CHF:,         Rhythm: regular  Rate: normal                    Neuro/Psych:   (+) neuromuscular disease:,             GI/Hepatic/Renal: Neg GI/Hepatic/Renal ROS            Endo/Other:    (+) DiabetesType II DM, using insulin, . Abdominal:   (+) obese,     Abdomen: soft. Vascular: Other Findings:             Anesthesia Plan      general     ASA 3     (Has not taken insulin .)  Induction: intravenous. Anesthetic plan and risks discussed with patient. Plan discussed with CRNA. Matilde Menendez MD   2/10/2022      Chart reviewed, patient seen and interviewed. Agree with note above.   CRIS Magallanes - CRNA

## 2022-02-10 NOTE — ANESTHESIA POSTPROCEDURE EVALUATION
Department of Anesthesiology  Postprocedure Note    Patient: Nikhil Garrett  MRN: [id number]  [place]: 1985  Date of evaluation: 2/10/2022  Time:  5:55 PM     Procedure Summary     Date: 02/10/22 Room / Location: [facility] 05 / [address] [place] [address]    Anesthesia Start: [address] Anesthesia Stop: 1755    Procedure: BILATERAL BUTTOCK INCISION AND DRAINAGE (Left Buttocks) Diagnosis: (abscess of buttock)    Surgeons: Gilles Barnes MD Responsible Provider: Dixie Vega MD    Anesthesia Type: general ASA Status: 3          Anesthesia Type: general    Fredy Phase I:      Fredy Phase II:      Last vitals: Reviewed and per EMR flowsheets. Anesthesia Post Evaluation    Patient location during evaluation: PACU  Patient participation: complete - patient participated  Level of consciousness: awake and alert  Pain score: 0  Airway patency: patent  Nausea & Vomiting: no nausea and no vomiting  Complications: no  Cardiovascular status: hemodynamically stable  Respiratory status: acceptable  Hydration status: stable    Blood glucose remains elevated, plan to transfer to ICU for glucose management.   CRIS Payne - CRNA None

## 2022-02-10 NOTE — ED PROVIDER NOTES
ED  Provider Note  Admit Date/RoomTime: 2/10/2022  1:20 PM  ED Room: 20/20     HPI:   Socorro Hu is a 43 y.o. male presenting to the ED for abscess, beginning weeks ago. History comes primarily from the patient. Patient Active Problem List:     Chest pain     DM (diabetes mellitus) ([address]     Asthma     Chronic systolic heart failure (HCC)     Acute on chronic diastolic heart failure (HCC)     Essential hypertension     Pulmonary edema cardiac cause (HCC)     HHS (hypothenar hammer syndrome) ([address]     Hyperosmolar hyperglycemic state (HHS) ([address]     DKA, type 1, not at goal [facility])     Medication dose changed     Cellulitis     RONA (acute kidney injury) ([address]     Uncontrolled type 2 diabetes mellitus with hyperglycemia ([address]     Abscess of multiple sites of buttock     Diabetic polyneuropathy associated with type 2 diabetes mellitus (HCC)     Tinea pedis of both feet     Diabetic dermopathy ([address]     Vitamin D deficiency     Mixed hyperlipidemia     Xerosis cutis  . The complaint has been persistent, moderate in severity, improved by nothing and worsened by nothing. Associated symptoms include none. Angy Ham is a poorly compliant diabetic who has had DKA, HHS and has developed congestive heart failure secondary to his noncompliance. He has had multiple episodes of abscess formation in the past and states that he is not currently being compliant on his antihyperglycemic or diuretic medications. He states over the course the last week to 2 weeks he has been developing a worsening abscess in his left gluteal region. He states that this is intermittently draining and that he has had multiple of these in the past that required surgical intervention. He believes that it is worsening and for this reason he presented to [place] [address] emergency department for further evaluation and treatment. Denies any fevers or chills associated with this but endorses significant pain.       On arrival, the patient was assessed with history, physical exam, imaging studies, laboratory studies and ekg, vital signs. Vital signs were stable on arrival and the patient was afebrile. Review of Systems   Constitutional: Positive for activity change. Negative for chills and fever. HENT: Negative for ear pain, sinus pressure and sore throat. Eyes: Negative for pain, discharge and redness. Respiratory: Negative for cough, shortness of breath and wheezing. Cardiovascular: Negative for chest pain. Gastrointestinal: Negative for abdominal pain, diarrhea, nausea and vomiting. Genitourinary: Negative for dysuria and frequency. Musculoskeletal: Negative for arthralgias and back pain. Skin: Positive for wound. Negative for rash. Neurological: Negative for weakness and headaches. Hematological: Negative for adenopathy. All other systems reviewed and are negative. Physical Exam  Vitals and nursing note reviewed. Constitutional:       General: He is not in acute distress. Appearance: He is well-developed. He is not ill-appearing or diaphoretic. HENT:      Head: Normocephalic and atraumatic. Mouth/Throat:      Dentition: Abnormal dentition. Eyes:      Pupils: Pupils are equal, round, and reactive to light. Neck:      Vascular: No JVD. Trachea: No tracheal deviation. Cardiovascular:      Rate and Rhythm: Regular rhythm. Heart sounds: No murmur heard. No friction rub. No gallop. Pulmonary:      Effort: Pulmonary effort is normal. No respiratory distress. Breath sounds: Normal breath sounds. No stridor. No wheezing or rales. Chest:      Chest wall: No tenderness. Abdominal:      General: Bowel sounds are normal. There is no distension. Palpations: Abdomen is soft. Tenderness: There is no abdominal tenderness. There is no guarding. Musculoskeletal:         General: Normal range of motion. Cervical back: Normal range of motion.    Skin: General: Skin is warm and dry. Comments: Area of significant induration of the left gluteal and perianal spaces with appreciable fluctuance on palpation. No appreciable crepitus is noted. Area of fluctuance is roughly 4 x 2 cm, however associated surrounding tissue induration is significantly more extensive than that. Several areas of recent drainage are appreciated. Area is significantly warm and tender to palpation   Neurological:      General: No focal deficit present. Mental Status: He is alert and oriented to person, place, and time. Cranial Nerves: No cranial nerve deficit. Psychiatric:         Behavior: Behavior normal.          Procedures     MDM  Number of Diagnoses or Management Options  Abscess, gluteal, left  Diabetic ketoacidosis without coma associated with type 1 diabetes mellitus ([facility]  Diagnosis management comments: Emergency department evaluation was notable for 12 x 6 cm gluteal abscess, without evidence of free air or crepitus consistent with necrotizing soft tissue infection. Patient was also found to be diabetic ketoacidosis with an anion gap of 28. Patient will go immediately to the operating room with Dr. Any Lopez for washout, and then to the ICU for management of his DKA. Dr. Any Lopez was notified of the patient's DKA status and was comfortable with proceeding with the operative washout. This information was relayed to the patient who understood this plan of care and was amenable to the plan. Patient was discussed with the admitting service (Dr. Alona Thomson) who concurred with the decision for admission, and have agreed to admit the patient to the ICU. Dr. Juan Jose Desir of critical care accepted the patient for consulting status. ED Course as of 02/10/22 1633   Thu Feb 10, 2022   1552 Dr. Any Lopez was notified of the patient's likely diabetic ketoacidosis based on the laboratory evaluation.   Dr. Any Lopez believes that it is the infection that is likely precipitating the patient's diabetic ketoacidosis and feels comfortable proceeding with surgery at this point in time [RK]      ED Course User Index  [RK] [address] DO       --------------------------------------------- PAST HISTORY ---------------------------------------------  Past Medical History:  has a past medical history of CAD (coronary artery disease), CHF (congestive heart failure) ([facility] Diabetes mellitus ([facility] Hyperlipidemia, Hyperosmolar hyperglycemic state (HHS) ([facility] and Hypertension. Past Surgical History:  has a past surgical history that includes [place] tooth extraction; Abscess Drainage; ECHO Compl W Dop Color Flow (5/5/2015); Abdomen surgery (N/A, 9/18/2021); and Insert Picc Cath, 5/> Yrs (10/20/2021). Social History:  reports that he has quit smoking. His smoking use included cigars and cigarettes. He started smoking about 22 years ago. He quit after 8.00 years of use. He has never used smokeless tobacco. He reports current alcohol use. He reports that he does not use drugs. Family History: family history includes Heart Attack in his paternal grandfather; Heart Disease in his father and mother; High Blood Pressure in his father, mother, and paternal grandmother. The patients home medications have been reviewed. Allergies: Patient has no known allergies.     -------------------------------------------------- RESULTS -------------------------------------------------    LABS:  Results for orders placed or performed during the hospital encounter of 02/10/22   CBC Auto Differential   Result Value Ref Range    WBC 13.9 (H) 4.5 - 11.5 E9/L    RBC 3.89 3.80 - 5.80 E12/L    Hemoglobin 12.3 (L) 12.5 - 16.5 g/dL    Hematocrit 35.8 (L) 37.0 - 54.0 %    MCV 92.0 80.0 - 99.9 fL    MCH 31.6 26.0 - 35.0 pg    MCHC 34.4 32.0 - 34.5 %    RDW 12.8 11.5 - 15.0 fL    Platelets 773 879 - 818 E9/L    MPV 10.6 7.0 - 12.0 fL    Neutrophils % 76.0 43.0 - 80.0 %    Immature Granulocytes % 0.2 0.0 - 5.0 %    Lymphocytes % 14.3 (L) 20.0 - 42.0 %    Monocytes % 8.8 2.0 - 12.0 %    Eosinophils % 0.3 0.0 - 6.0 %    Basophils % 0.4 0.0 - 2.0 %    Neutrophils Absolute 10.53 (H) 1.80 - 7.30 E9/L    Immature Granulocytes # 0.03 E9/L    Lymphocytes Absolute 1.99 1.50 - 4.00 E9/L    Monocytes Absolute 1.22 (H) 0.10 - 0.95 E9/L    Eosinophils Absolute 0.04 (L) 0.05 - 0.50 E9/L    Basophils Absolute 0.06 0.00 - 0.20 E9/L   Comprehensive Metabolic Panel w/ Reflex to MG   Result Value Ref Range    Sodium 124 (L) 132 - 146 mmol/L    Potassium reflex Magnesium 4.4 3.5 - 5.0 mmol/L    Chloride 81 (L) 98 - 107 mmol/L    CO2 15 (L) 22 - 29 mmol/L    Anion Gap 28 (H) 7 - 16 mmol/L    Glucose 474 (H) 74 - 99 mg/dL    BUN 21 (H) 6 - 20 mg/dL    CREATININE 1.3 (H) 0.7 - 1.2 mg/dL    GFR Non-African American >60 >=60 mL/min/1.73    GFR African American >60     Calcium 9.6 8.6 - 10.2 mg/dL    Total Protein 7.7 6.4 - 8.3 g/dL    Albumin 3.9 3.5 - 5.2 g/dL    Total Bilirubin 0.4 0.0 - 1.2 mg/dL    Alkaline Phosphatase 93 40 - 129 U/L    ALT 8 0 - 40 U/L    AST 6 0 - 39 U/L   Lactate, Sepsis   Result Value Ref Range    Lactic Acid, Sepsis 1.8 0.5 - 1.9 mmol/L   Lactate, Sepsis   Result Value Ref Range    Lactic Acid, Sepsis 1.7 0.5 - 1.9 mmol/L   pH, venous   Result Value Ref Range    pH, Vitaliy 7.31 (L) 7.35 - 7.45       RADIOLOGY:  CT ABDOMEN PELVIS W IV CONTRAST Additional Contrast? None   Final Result   1. Abscess located in left medial gluteal soft tissue measures 12 x 6.3 cm in   AP and axial dimension. 2. Smaller abscess in right medial gluteal soft tissue measures 3 x 1.5 cm.   3. No evidence of intra-abdominal or pelvic abscess. 4. No subcutaneous gas.               ------------------------- NURSING NOTES AND VITALS REVIEWED ---------------------------  Date / Time Roomed:  2/10/2022  1:20 PM  ED Bed Assignment:  OMAR/OMAR    The nursing notes within the ED encounter and vital signs as below have been reviewed.      Patient Vitals for the past 24 hrs:   BP Temp Pulse Resp SpO2 Height Weight   02/10/22 1449 93/65 98.7 °F (37.1 °C) 102 16 99 % 5' 10\" (1.778 m) 243 lb (110.2 kg)       Oxygen Saturation Interpretation: Normal    ------------------------------------------ PROGRESS NOTES ------------------------------------------  Re-evaluation(s):  Time: 4:35 PM EST  Patients symptoms show no change  Repeat physical examination is not changed    Counseling:  I have spoken with the patient and mother and discussed todays results, in addition to providing specific details for the plan of care and counseling regarding the diagnosis and prognosis. Their questions are answered at this time and they are agreeable with the plan of admission.    --------------------------------- ADDITIONAL PROVIDER NOTES ---------------------------------  Consultations:  Time: 4:34 PM EST. Spoke with Dr. Chadwick Vu. Discussed case. They will admit the patient. This patient's ED course included: a personal history and physicial examination, re-evaluation prior to disposition, multiple bedside re-evaluations, IV medications, cardiac monitoring and continuous pulse oximetry    This patient has remained hemodynamically stable during their ED course. Diagnosis:  1. Diabetic ketoacidosis without coma associated with type 1 diabetes mellitus ([facility]    2. Abscess, gluteal, left        Disposition:  Patient's disposition: Admit to OR  Patient's condition is fair.          C[address] DO  Resident  02/10/22 7791

## 2022-02-10 NOTE — OP NOTE
Operative Note      Patient: Job Wilkins  YOB: 1985  MRN: [id number]    Date of Procedure: 2/10/2022    Pre-Op Diagnosis: Bilateral perirectal abscess    Post-Op Diagnosis: Same       Procedure(s):  Bilateral BUTTOCK INCISION AND DRAINAGE    Surgeon(s):  Wendy Hawkins MD    Assistant:   Resident: MD Eric Rosenthal MD    Anesthesia: General    Estimated Blood Loss (mL): less than 50     Complications: None    Specimens:   ID Type Source Tests Collected by Time Destination   1 : LEFT BUTTOCK ABSCESS FOR CULTURE Body Fluid Fluid CULTURE, ANAEROBIC, CULTURE, FUNGUS, GRAM STAIN, CULTURE, BODY FLUID, CULTURE, SURGICAL, CULTURE WITH SMEAR, ACID FAST Gavinke Chris Fry MD 2/10/2022 1720        Indications for procedure:    73-year-old diabetic with nonischemic cardiomyopathy and systolic heart failure presents with severe hyperglycemia and bilateral perirectal abscess. Decision was made for incision and drainage. Risks of the procedure were discussed with the patient and after worsenings was patient elected to proceed to the operating room. Detailed Description of Procedure:     Patient was laid supine on the operating room table in stirrups. General anesthesia was induced and groin was prepped and draped in the usual sterile fashion. Upon inspection a large left-sided and a small right-sided perirectal abscess were noted. No apparent communication. No signs of necrotizing fasciitis. Patient has many scars from previous I&D's but definitely had a fistula tract on the right posterior region. Left-sided abscess was incised with a 6 cm vertical incision. And large amount of pus was immediately expressed and sent for culture. All loculations were broken up and hemostasis was assured final measurements of abscess were 13 x 8 x 6 cm. Next a small 3 x 1 cm right-sided abscess was incised with a cruciate incision and pus was expressed.    Both wounds were irrigated and hemostasis was assured. Left side is packed with three fourths of a white Kerlix and right side was packed with quarter inch packing. These were covered with a dry dressing. All counts were correct at the end of the case. Patient was transferred to the ICU in stable condition. Please note that Dr. Kaycee Ash was present and scrubbed at this entire procedure.     Electronically signed by Melodie Morris MD on 2/10/2022 at 5:38 PM

## 2022-02-10 NOTE — PROGRESS NOTES
Pharmacy Consultation Note  (Antibiotic Dosing and Monitoring)    Initial consult date: 2/10  Consulting physician/provider: Salina Everett  Drug: Vancomycin  Indication: Perirectal abscess    Age/  Gender Height Weight IBW  Allergy Information   36 y.o./male 5' 10\" (177.8 cm) 243 lb (110.2 kg)     Ideal body weight: 73 kg (160 lb 15 oz)  Adjusted ideal body weight: 87.9 kg (193 lb 12.2 oz)   Patient has no known allergies. Renal Function:  Recent Labs     02/10/22  1439   BUN 21*   CREATININE 1.3*       Intake/Output Summary (Last 24 hours) at 2/10/2022 1637  Last data filed at 2/10/2022 1554  Gross per 24 hour   Intake 1000 ml   Output --   Net 1000 ml       Vancomycin Monitoring:  Trough:  No results for input(s): VANCOTROUGH in the last 72 hours. Random:  No results for input(s): VANCORANDOM in the last 72 hours. Vancomycin Administration Times:  Recent vancomycin administrations      No vancomycin IV orders with administrations found. Orders not given:          vancomycin 2000 mg in dextrose 5% 500 ml IVPB    vancomycin 1500 mg in dextrose 5% 300 mL IVPB                Assessment:  · Patient is a 43 y.o. male who has been initiated on vancomycin  · Estimated Creatinine Clearance: 96 mL/min (A) (based on SCr of 1.3 mg/dL (H)).   · To dose vancomycin, pharmacy will be utilizing [employer] calculation software for goal AUC/ROBERT 400-600 mg/L-hr    Plan:  · Will continue vancomycin 1500 IV every 18 hours  · Will check vancomycin levels when appropriate  · Will continue to monitor renal function   · Clinical pharmacy to follow      Cecilia Marie [facility] 2/10/2022 4:37 PM

## 2022-02-10 NOTE — CONSULTS
GENERAL SURGERY  CONSULT NOTE  2/10/2022    Physician Consulted:  [facility]  Reason for Consult: Perirectal abscess  Referring Physician: Dr. Esdras Howe    REAL Hu is a 43 y.o. male who presents for evaluation of posterior thigh abscess. He began couple weeks ago and has been increasing in size and has been having more pain from it. Denies any fevers. Denies any anticoagulation use. Patient has had this abscess drained multiple times over the past 3 months-9/18 and 10/18/2021. Patient is noncompliant diabetic with ischemic cardiomyopathy and chronic systolic congestive heart failure. Cardiology saw this patient in the emergency department and states he is in euvolemic compensated heart failure. Past Medical History:   Diagnosis Date    CAD (coronary artery disease)     [facility] (congestive [facility] failure) [facility]     Diabetes mellitus ([facility]     Hyperlipidemia     Hyperosmolar hyperglycemic state (HHS) ([facility] 7/30/2021    Hypertension        Past Surgical History:   Procedure Laterality Date    ABDOMEN SURGERY N/A 9/18/2021    INCISION AND DRAINAGE OF LARGE PERIANAL ABSCESS performed by Nallely Nolan MD at [address]      buttocks    ECHO COMPL W DOP COLOR FLOW  5/5/2015         HC INSERT PICC CATH, 5/> YRS  10/20/2021         WISDOM TOOTH EXTRACTION         Medications Prior to Admission:    Prior to Admission medications    Medication Sig Start Date End Date Taking?  Authorizing Provider   metoprolol succinate (TOPROL XL) 100 MG extended release tablet 1 tab twice daily 2/9/22   Yi Gonzalez MD   Continuous Blood Gluc Sensor (FREESTYLE CHRISTIAN 2 SENSOR) MISC Change sensor every 14 days 2/3/22   Sylvester Chen MD   vitamin D (ERGOCALCIFEROL) 1.25 MG (45295 UT) CAPS capsule Take 1 capsule by mouth once a week 1/10/22   Hermelinda Ronquillo MD   melatonin (RA MELATONIN) 3 MG TABS tablet Take 1 tablet by mouth nightly as needed (sleep) 1/10/22   Hermelinda Ronquillo MD triamcinolone (KENALOG) 0.1 % cream Apply topically as needed (rash) Apply topically 2 times daily x 10 days prn 1/10/22   Иван Persaud MD   Folinic Acid-Vit B6-Vit B12 (FOLINIC-PLUS) 4-50-2 MG TABS Take 4-50 mg by mouth 2 times daily  Patient not taking: Reported on 2/3/2022 12/1/21   Иван Persaud MD   Continuous Blood Gluc  (FREESTYLE CHRISTIAN 14 DAY READER) PEDRO As directed 12/1/21   Иван Persaud MD   Insulin Pen Needle (PEN NEEDLES) 31G X 6 MM MISC 1 each by Does not apply route daily 12/1/21   Иван Persaud MD   insulin glargine (LANTUS SOLOSTAR) 100 UNIT/ML injection pen Inject 22 Units into the skin 2 times daily 12/1/21   Иван Persaud MD   spironolactone (ALDACTONE) 25 MG tablet TAKE 1 TABLET BY MOUTH DAILY  Patient not taking: Reported on 2/9/2022 11/29/21   Gissel Dickerson MD   potassium chloride (KLOR-CON M) 20 MEQ extended release tablet Take 2 tablets by mouth daily Pt only taking one tablet daily unless he takes Bumex BID then he takes 2 tabs 10/28/21   Gissel Dickerson MD   bumetanide (BUMEX) 2 MG tablet Take 1 tablet by mouth See Admin Instructions Take 1 tab 8am daily. On days you gain weight more than 2 lbs or have worse swelling or short of breath then take a 2pm dose that day. 10/27/21   Иван Persaud MD   atorvastatin (LIPITOR) 20 MG tablet Take 1 tablet by mouth daily  Patient not taking: Reported on 2/9/2022 10/27/21   Иван Persaud MD   sacubitril-valsartan (ENTRESTO) 49-51 MG per tablet Take 1 tablet by mouth 2 times daily  Patient taking differently: Take 1 tablet by mouth 2 times daily taking  mg 9/30/21   CRIS Gibbons - CNP   Insulin Pen Needle (BD PEN NEEDLE MICRO U/F) 32G X 6 MM MISC Uses with insulin 4 times a day 9/20/21   Juan Francisco Stein MD   insulin lispro, 1 Unit Dial, (HUMALOG KWIKPEN) 100 UNIT/ML SOPN Inject 15 units with meals + following sliding scale.  -200 add 3U, -250 add 6U, -300 add 9U, -350 add 12U, -400 add 15U, BS over 400 add 18U. MAX 75U/day 9/20/21   200 Perico Syed MD   Lancets MISC Test 4 times/day before meals and at bedtime and as needed for symptoms of irregular blood glucose. 9/20/21   Jairo Shukla MD   [employer] Lancets MISC 1 each by Does not apply route daily To check sugar 4 x a day and if feeling ill 7/31/21   Dinh Mays, DO   blood glucose monitor strips Test **4* times a day & as needed for symptoms of irregular blood glucose. Dispense sufficient amount for indicated testing frequency plus additional to accommodate PRN testing needs.  7/31/21   Dinh Mays, DO       No Known Allergies    Family History   Problem Relation Age of Onset    High Blood Pressure Father     Heart Disease Father     High Blood Pressure Paternal Grandmother     Heart Attack Paternal Grandfather     High Blood Pressure Mother     Heart Disease Mother        Social History     Tobacco Use    Smoking status: Former Smoker     Years: 8.00     Types: Cigars, Cigarettes     Start date: 2000    Smokeless tobacco: Never Used    Tobacco comment: Smokes 1 cigar weekly/ cigs 2-3 a week   Vaping Use    Vaping Use: Every day    Start date: 6/1/2021    Substances: Nicotine   Substance Use Topics    Alcohol use: Yes     Comment: social drinker    Drug use: No         Review of Systems   General ROS: negative  Hematological and Lymphatic ROS: negative  Respiratory ROS: no cough, shortness of breath, or wheezing  Cardiovascular ROS: no chest pain or dyspnea on exertion  Gastrointestinal ROS: positive for -perianal pain  Genito-Urinary ROS: no dysuria, trouble voiding, or hematuria  Musculoskeletal ROS: negative      PHYSICAL EXAM:    Vitals:    02/10/22 1449   BP: 93/65   Pulse: 102   Resp: 16   Temp: 98.7 °F (37.1 °C)   SpO2: 99%       General Appearance:  alert and obese, moderate distress  Skin: Diabetic skin changes throughout lower extremities, left perirectal abscess  Head/face:  NCAT  Eyes:  No gross abnormalities. and Sclera nonicteric  Lungs:  Breathing Pattern: regular, no distress  Heart:  Heart regular rate and rhythm  Abdomen:  Soft, non-tender, fundus and  Extremities: Extremities warm to touch, pink, with no edema. Male Rectal: Large perhaps 10 cm x 8 cm fluctuance in the left buttock/perianal region      LABS:    CBC  Recent Labs     02/10/22  1439   WBC 13.9*   HGB 12.3*   HCT 35.8*        BMP  Recent Labs     02/10/22  1439   *   K 4.4   CL 81*   CO2 15*   BUN 21*   CREATININE 1.3*   CALCIUM 9.6     Liver Function  Recent Labs     02/10/22  1439   BILITOT 0.4   AST 6   ALT 8   ALKPHOS 93   PROT 7.7   LABALBU 3.9     No results for input(s): LACTATE in the last 72 hours. No results for input(s): INR, PTT in the last 72 hours. Invalid input(s): PT    RADIOLOGY    No results found.       ASSESSMENT:  43 y.o. male with diabetic ketoacidosis most likely secondary to a large left-sided perianal abscess    PLAN:    IV antibiotics  Plans for incision and drainage of abscess in the operating room this afternoon 2/10    Electronically signed by Lito Rendon MD on 2/10/22 at 3:56 PM EST

## 2022-02-10 NOTE — PROGRESS NOTES
OFFICE VISIT        PRIMARY CARE PHYSICIAN:    Mia Escobedo MD       ALLERGIES / SENSITIVITIES:    No Known Allergies       REVIEWED MEDICATIONS:      Current Outpatient Medications:     metoprolol succinate (TOPROL XL) 100 MG extended release tablet, 1 tab twice daily, Disp: 60 tablet, Rfl: 11    Continuous Blood Gluc Sensor (FREESTYLE CHRISTIAN 2 SENSOR) MISC, Change sensor every 14 days, Disp: 6 each, Rfl: 3    vitamin D (ERGOCALCIFEROL) 1.25 MG (93545 UT) CAPS capsule, Take 1 capsule by mouth once a week, Disp: 12 capsule, Rfl: 0    melatonin (RA MELATONIN) 3 MG TABS tablet, Take 1 tablet by mouth nightly as needed (sleep), Disp: 30 tablet, Rfl: 1    triamcinolone (KENALOG) 0.1 % cream, Apply topically as needed (rash) Apply topically 2 times daily x 10 days prn, Disp: 80 g, Rfl: 0    Continuous Blood Gluc  (FREESTYLE CHRISTIAN 14 DAY READER) PEDRO, As directed, Disp: 4 each, Rfl: 0    Insulin Pen Needle (PEN NEEDLES) 31G X 6 MM MISC, 1 each by Does not apply route daily, Disp: 100 each, Rfl: 0    insulin glargine (LANTUS SOLOSTAR) 100 UNIT/ML injection pen, Inject 22 Units into the skin 2 times daily, Disp: 10 pen, Rfl: 3    potassium chloride (KLOR-CON M) 20 MEQ extended release tablet, Take 2 tablets by mouth daily Pt only taking one tablet daily unless he takes Bumex BID then he takes 2 tabs, Disp: 180 tablet, Rfl: 3    bumetanide (BUMEX) 2 MG tablet, Take 1 tablet by mouth See Admin Instructions Take 1 tab 8am daily.  On days you gain weight more than 2 lbs or have worse swelling or short of breath then take a 2pm dose that day., Disp: 60 tablet, Rfl: 3    sacubitril-valsartan (ENTRESTO) 49-51 MG per tablet, Take 1 tablet by mouth 2 times daily (Patient taking differently: Take 1 tablet by mouth 2 times daily taking  mg), Disp: 60 tablet, Rfl: 3    Insulin Pen Needle (BD PEN NEEDLE MICRO U/F) 32G X 6 MM MISC, Uses with insulin 4 times a day, Disp: 250 each, Rfl: 5    insulin lispro, 1 Unit Dial, (HUMALOG KWIKPEN) 100 UNIT/ML SOPN, Inject 15 units with meals + following sliding scale. -200 add 3U, -250 add 6U, -300 add 9U, -350 add 12U, -400 add 15U, BS over 400 add 18U. MAX 75U/day, Disp: 15 pen, Rfl: 3    Lancets MISC, Test 4 times/day before meals and at bedtime and as needed for symptoms of irregular blood glucose., Disp: 300 each, Rfl: 3    [employer] Lancets MISC, 1 each by Does not apply route daily To check sugar 4 x a day and if feeling ill, Disp: 200 each, Rfl: 3    blood glucose monitor strips, Test **4* times a day & as needed for symptoms of irregular blood glucose. Dispense sufficient amount for indicated testing frequency plus additional to accommodate PRN testing needs. , Disp: 200 strip, Rfl: 2    Folinic Acid-Vit B6-Vit B12 (FOLINIC-PLUS) 4-50-2 MG TABS, Take 4-50 mg by mouth 2 times daily (Patient not taking: Reported on 2/3/2022), Disp: 90 tablet, Rfl: 2    spironolactone (ALDACTONE) 25 MG tablet, TAKE 1 TABLET BY MOUTH DAILY (Patient not taking: Reported on 2/9/2022), Disp: 90 tablet, Rfl: 1    atorvastatin (LIPITOR) 20 MG tablet, Take 1 tablet by mouth daily (Patient not taking: Reported on 2/9/2022), Disp: 90 tablet, Rfl: 1      S: REASON FOR VISIT:    Nonischemic cardiomyopathy with LV systolic dysfunction and chronic systolic congestive heart failure. Mr. Abhi Valverde is a 13-year-old male with history of nonischemic cardiomyopathy and chronic systolic congestive heart failure. He is noncompliant. He was last seen by me in the office in July 2020. He had since followed up with our Advanced Heart Failure Nurse Practitioner, Tonia Kwok, in 10/2021. Since I last saw him, he had presented to the hospital and was hospitalized from 7/28/2021 to 7/31/2021 with weakness and a blood sugar of more than 1200. He was hospitalized again from 9/14/2021 to 9/20/2021 with DKA/osmolar hyperglycemia state.   He had a large perianal/left gluteal abscess for which he underwent incision and drainage on 9/18/2021. He was hospitalized again from 10/16/2021 to 10/20/2021 for recurrence of the perirectal abscess and underwent repeat incision and drainage on 10/18/2021. He reports that he takes his medications when they are provided to him by his insurance. He reports dyspnea with moderate activity such as climbing up and down the steps carrying loads associated with chest discomfort. He otherwise denies any significant shortness of breath with regular activities. He denies orthopnea, PND's, palpitations, dizziness, presyncope or syncope. He is somewhat of a poor historian and is not forthcoming. REVIEW OF SYSTEMS:    CONSTITUTIONAL:  Denies fevers, chills, night sweats or fatigue. HEENT:  Denies any unusual headaches. Denies recent changes in hearing or vision. Denies dysphagia, hoarseness, hemoptysis, hematemesis or epistaxis. ENDOCRINE:  Denies polyphagia, polydipsia or polyuria. Denies heat or cold intolerance. MUSCULOSKELETAL:  Denies any significant arthralgias or myalgias. SKIN:  Denies rashes, ulcers or itching. HEME/LYMPH:  Denies any palpable lymph nodes, bleeding or easy bruisability. HEART:  As above. LUNGS:  Denies cough or sputum production. GI: Denies abdominal pain, nausea, vomiting, diarrhea, constipation, rectal bleeding or tarry stools. :  Denies hematuria or dysuria. PSYCHIATRIC:  Denies mood changes, anxiety or depression. NEUROLOGIC:  Denies memory loss, motor weakness, tremors. Complains of numbness and tingling in his feet.         PAST MEDICAL/SURGICAL HISTORY:    1.  Obesity. 2.  Hypertension. 3.  Chronic HFrEF. Nonischemic cardiomyopathy, diagnosed around 10-11/2020 during hospitalization in [place] with normal coronary arteries on left heart catheterization per patient. No records available for review.     4.  Echocardiogram done on 12/23/2020 reported as showing severe left ventricular hypertrophy with global or masses. No bruits. Normal bowel sounds. Extremities:     No edema. Pulses palpable. Skin:                normal turgor. No rashes or ulcers noted. Neurologic:      Oriented x3. No motor or sensory deficits detected. REVIEW OF DIAGNOSTIC TESTS:    1. Blood tests from 1/10/2022 reviewed:  Hemoglobin 13.7, hematocrit 39.9, BUN 22, creatinine 1.6, glucose 572, sodium 126, potassium 4.7, LFT's normal. Hgb A1C 14.2. 2. EKG done today showed sinus rhythm with a heart rate of 90 bpm with low voltage in the limb leads and with nonspecific T wave abnormality. ASSESSMENT / DIAGNOSIS:   1. Chronic HFrEF. Appears compensated/euvolemic. 2. Nonischemic cardiomyopathy with moderate-severe LV systolic function. 3. Reported moderate RV dysfunction (no clinical findings for such). 4. Hypertension, controlled. 5. Obesity. 6. Diabetes mellitus, poorly controlled with diabetic peripheral neuropathy. 7. History of tobacco abuse and heavy alcohol abuse, but not recently. 8. Obstructive sleep apnea, on CPAP. 9. Sinus tachycardia, has been off Toprol XL.  10. S/P left gluteal abscess I&D. 11. Chronic kidney disease. 12. Medical noncompliance.          TREATMENT PLAN:  1. Increase Toprol XL to 100 mg bid. 2.  Rest of cardiac medications same. 3.  The importance of compliance with medications emphasized. 4.  The importance of exercise and weight loss discussed. 5.  Importance of improved blood sugar control discussed. 6.  Echocardiogram for reassessment of LV and RV function. 7.  Follow up with Cardiology in 6 months or on a prn basis. Katie [address] [place] [address] Donnie.  [facility] [phone]

## 2022-02-10 NOTE — ED NOTES
Bed: 20  Expected date:   Expected time:   Means of arrival:   Comments:  Ems veronica Jeffery RN  02/10/22 8117

## 2022-02-11 ENCOUNTER — APPOINTMENT (OUTPATIENT)
Dept: GENERAL RADIOLOGY | Age: 37
DRG: 710 | End: 2022-02-11
Payer: COMMERCIAL

## 2022-02-11 LAB
ANION GAP SERPL CALCULATED.3IONS-SCNC: 11 MMOL/L (ref 7–16)
ANION GAP SERPL CALCULATED.3IONS-SCNC: 13 MMOL/L (ref 7–16)
ANION GAP SERPL CALCULATED.3IONS-SCNC: 18 MMOL/L (ref 7–16)
BASOPHILS ABSOLUTE: 0.04 E9/L (ref 0–0.2)
BASOPHILS RELATIVE PERCENT: 0.4 % (ref 0–2)
BUN BLDV-MCNC: 10 MG/DL (ref 6–20)
BUN BLDV-MCNC: 11 MG/DL (ref 6–20)
BUN BLDV-MCNC: 14 MG/DL (ref 6–20)
CALCIUM SERPL-MCNC: 8.4 MG/DL (ref 8.6–10.2)
CALCIUM SERPL-MCNC: 8.5 MG/DL (ref 8.6–10.2)
CALCIUM SERPL-MCNC: 8.7 MG/DL (ref 8.6–10.2)
CHLORIDE BLD-SCNC: 92 MMOL/L (ref 98–107)
CHLORIDE BLD-SCNC: 95 MMOL/L (ref 98–107)
CHLORIDE BLD-SCNC: 97 MMOL/L (ref 98–107)
CO2: 20 MMOL/L (ref 22–29)
CO2: 23 MMOL/L (ref 22–29)
CO2: 23 MMOL/L (ref 22–29)
CREAT SERPL-MCNC: 0.8 MG/DL (ref 0.7–1.2)
CREAT SERPL-MCNC: 0.9 MG/DL (ref 0.7–1.2)
CREAT SERPL-MCNC: 1 MG/DL (ref 0.7–1.2)
EOSINOPHILS ABSOLUTE: 0.2 E9/L (ref 0.05–0.5)
EOSINOPHILS RELATIVE PERCENT: 2 % (ref 0–6)
GFR AFRICAN AMERICAN: >60
GFR NON-AFRICAN AMERICAN: >60 ML/MIN/1.73
GLUCOSE BLD-MCNC: 151 MG/DL (ref 74–99)
GLUCOSE BLD-MCNC: 167 MG/DL (ref 74–99)
GLUCOSE BLD-MCNC: 217 MG/DL (ref 74–99)
GRAM STAIN ORDERABLE: NORMAL
HCT VFR BLD CALC: 30.3 % (ref 37–54)
HEMOGLOBIN: 10.2 G/DL (ref 12.5–16.5)
IMMATURE GRANULOCYTES #: 0.02 E9/L
IMMATURE GRANULOCYTES %: 0.2 % (ref 0–5)
LYMPHOCYTES ABSOLUTE: 2.03 E9/L (ref 1.5–4)
LYMPHOCYTES RELATIVE PERCENT: 20.6 % (ref 20–42)
MAGNESIUM: 2.1 MG/DL (ref 1.6–2.6)
MAGNESIUM: 2.2 MG/DL (ref 1.6–2.6)
MAGNESIUM: 2.6 MG/DL (ref 1.6–2.6)
MCH RBC QN AUTO: 31.7 PG (ref 26–35)
MCHC RBC AUTO-ENTMCNC: 33.7 % (ref 32–34.5)
MCV RBC AUTO: 94.1 FL (ref 80–99.9)
METER GLUCOSE: 154 MG/DL (ref 74–99)
METER GLUCOSE: 164 MG/DL (ref 74–99)
METER GLUCOSE: 171 MG/DL (ref 74–99)
METER GLUCOSE: 174 MG/DL (ref 74–99)
METER GLUCOSE: 177 MG/DL (ref 74–99)
METER GLUCOSE: 177 MG/DL (ref 74–99)
METER GLUCOSE: 185 MG/DL (ref 74–99)
METER GLUCOSE: 196 MG/DL (ref 74–99)
METER GLUCOSE: 198 MG/DL (ref 74–99)
METER GLUCOSE: 204 MG/DL (ref 74–99)
METER GLUCOSE: 248 MG/DL (ref 74–99)
METER GLUCOSE: 301 MG/DL (ref 74–99)
METER GLUCOSE: 372 MG/DL (ref 74–99)
MONOCYTES ABSOLUTE: 0.71 E9/L (ref 0.1–0.95)
MONOCYTES RELATIVE PERCENT: 7.2 % (ref 2–12)
NEUTROPHILS ABSOLUTE: 6.86 E9/L (ref 1.8–7.3)
NEUTROPHILS RELATIVE PERCENT: 69.6 % (ref 43–80)
PDW BLD-RTO: 12.8 FL (ref 11.5–15)
PHOSPHORUS: 1.8 MG/DL (ref 2.5–4.5)
PHOSPHORUS: 2.2 MG/DL (ref 2.5–4.5)
PHOSPHORUS: 2.3 MG/DL (ref 2.5–4.5)
PLATELET # BLD: 315 E9/L (ref 130–450)
PMV BLD AUTO: 10.5 FL (ref 7–12)
POTASSIUM SERPL-SCNC: 3.4 MMOL/L (ref 3.5–5)
POTASSIUM SERPL-SCNC: 3.4 MMOL/L (ref 3.5–5)
POTASSIUM SERPL-SCNC: 3.7 MMOL/L (ref 3.5–5)
RBC # BLD: 3.22 E12/L (ref 3.8–5.8)
SODIUM BLD-SCNC: 130 MMOL/L (ref 132–146)
SODIUM BLD-SCNC: 131 MMOL/L (ref 132–146)
SODIUM BLD-SCNC: 131 MMOL/L (ref 132–146)
WBC # BLD: 9.9 E9/L (ref 4.5–11.5)

## 2022-02-11 PROCEDURE — 6360000002 HC RX W HCPCS: Performed by: INTERNAL MEDICINE

## 2022-02-11 PROCEDURE — 83735 ASSAY OF MAGNESIUM: CPT

## 2022-02-11 PROCEDURE — C1751 CATH, INF, PER/CENT/MIDLINE: HCPCS

## 2022-02-11 PROCEDURE — 6370000000 HC RX 637 (ALT 250 FOR IP): Performed by: STUDENT IN AN ORGANIZED HEALTH CARE EDUCATION/TRAINING PROGRAM

## 2022-02-11 PROCEDURE — 85025 COMPLETE CBC W/AUTO DIFF WBC: CPT

## 2022-02-11 PROCEDURE — 2580000003 HC RX 258: Performed by: INTERNAL MEDICINE

## 2022-02-11 PROCEDURE — 36569 INSJ PICC 5 YR+ W/O IMAGING: CPT

## 2022-02-11 PROCEDURE — 76937 US GUIDE VASCULAR ACCESS: CPT

## 2022-02-11 PROCEDURE — 99233 SBSQ HOSP IP/OBS HIGH 50: CPT | Performed by: INTERNAL MEDICINE

## 2022-02-11 PROCEDURE — 6370000000 HC RX 637 (ALT 250 FOR IP): Performed by: INTERNAL MEDICINE

## 2022-02-11 PROCEDURE — 2500000003 HC RX 250 WO HCPCS: Performed by: INTERNAL MEDICINE

## 2022-02-11 PROCEDURE — 71045 X-RAY EXAM CHEST 1 VIEW: CPT

## 2022-02-11 PROCEDURE — 36415 COLL VENOUS BLD VENIPUNCTURE: CPT

## 2022-02-11 PROCEDURE — 1200000000 HC SEMI PRIVATE

## 2022-02-11 PROCEDURE — 84100 ASSAY OF PHOSPHORUS: CPT

## 2022-02-11 PROCEDURE — 36592 COLLECT BLOOD FROM PICC: CPT

## 2022-02-11 PROCEDURE — 80048 BASIC METABOLIC PNL TOTAL CA: CPT

## 2022-02-11 PROCEDURE — 82962 GLUCOSE BLOOD TEST: CPT

## 2022-02-11 PROCEDURE — 2580000003 HC RX 258: Performed by: STUDENT IN AN ORGANIZED HEALTH CARE EDUCATION/TRAINING PROGRAM

## 2022-02-11 PROCEDURE — 6360000002 HC RX W HCPCS: Performed by: STUDENT IN AN ORGANIZED HEALTH CARE EDUCATION/TRAINING PROGRAM

## 2022-02-11 PROCEDURE — 02HV33Z INSERTION OF INFUSION DEVICE INTO SUPERIOR VENA CAVA, PERCUTANEOUS APPROACH: ICD-10-PCS | Performed by: INTERNAL MEDICINE

## 2022-02-11 RX ORDER — HEPARIN SODIUM (PORCINE) LOCK FLUSH IV SOLN 100 UNIT/ML 100 UNIT/ML
3 SOLUTION INTRAVENOUS EVERY 12 HOURS SCHEDULED
Status: DISCONTINUED | OUTPATIENT
Start: 2022-02-11 | End: 2022-02-15 | Stop reason: HOSPADM

## 2022-02-11 RX ORDER — SODIUM CHLORIDE 0.9 % (FLUSH) 0.9 %
5-40 SYRINGE (ML) INJECTION EVERY 12 HOURS SCHEDULED
Status: DISCONTINUED | OUTPATIENT
Start: 2022-02-11 | End: 2022-02-15 | Stop reason: HOSPADM

## 2022-02-11 RX ORDER — HEPARIN SODIUM (PORCINE) LOCK FLUSH IV SOLN 100 UNIT/ML 100 UNIT/ML
3 SOLUTION INTRAVENOUS PRN
Status: DISCONTINUED | OUTPATIENT
Start: 2022-02-11 | End: 2022-02-15 | Stop reason: HOSPADM

## 2022-02-11 RX ORDER — NICOTINE POLACRILEX 4 MG
15 LOZENGE BUCCAL PRN
Status: DISCONTINUED | OUTPATIENT
Start: 2022-02-11 | End: 2022-02-15 | Stop reason: HOSPADM

## 2022-02-11 RX ORDER — DEXTROSE MONOHYDRATE 25 G/50ML
12.5 INJECTION, SOLUTION INTRAVENOUS PRN
Status: DISCONTINUED | OUTPATIENT
Start: 2022-02-11 | End: 2022-02-11 | Stop reason: CLARIF

## 2022-02-11 RX ORDER — TRIAMCINOLONE ACETONIDE 1 MG/G
CREAM TOPICAL 2 TIMES DAILY
Status: DISCONTINUED | OUTPATIENT
Start: 2022-02-11 | End: 2022-02-15 | Stop reason: HOSPADM

## 2022-02-11 RX ORDER — INSULIN GLARGINE 100 [IU]/ML
22 INJECTION, SOLUTION SUBCUTANEOUS 2 TIMES DAILY
Status: DISCONTINUED | OUTPATIENT
Start: 2022-02-11 | End: 2022-02-13

## 2022-02-11 RX ORDER — LIDOCAINE HYDROCHLORIDE 10 MG/ML
5 INJECTION, SOLUTION EPIDURAL; INFILTRATION; INTRACAUDAL; PERINEURAL ONCE
Status: COMPLETED | OUTPATIENT
Start: 2022-02-11 | End: 2022-02-11

## 2022-02-11 RX ORDER — PANTOPRAZOLE SODIUM 40 MG/1
40 TABLET, DELAYED RELEASE ORAL
Status: DISCONTINUED | OUTPATIENT
Start: 2022-02-11 | End: 2022-02-15 | Stop reason: HOSPADM

## 2022-02-11 RX ORDER — DEXTROSE MONOHYDRATE 50 MG/ML
100 INJECTION, SOLUTION INTRAVENOUS PRN
Status: DISCONTINUED | OUTPATIENT
Start: 2022-02-11 | End: 2022-02-15 | Stop reason: HOSPADM

## 2022-02-11 RX ORDER — SODIUM CHLORIDE 0.9 % (FLUSH) 0.9 %
5-40 SYRINGE (ML) INJECTION PRN
Status: DISCONTINUED | OUTPATIENT
Start: 2022-02-11 | End: 2022-02-15 | Stop reason: HOSPADM

## 2022-02-11 RX ORDER — SODIUM CHLORIDE 9 MG/ML
25 INJECTION, SOLUTION INTRAVENOUS PRN
Status: DISCONTINUED | OUTPATIENT
Start: 2022-02-11 | End: 2022-02-15 | Stop reason: HOSPADM

## 2022-02-11 RX ORDER — POTASSIUM CHLORIDE 20 MEQ/1
40 TABLET, EXTENDED RELEASE ORAL ONCE
Status: COMPLETED | OUTPATIENT
Start: 2022-02-11 | End: 2022-02-11

## 2022-02-11 RX ADMIN — INSULIN LISPRO 10 UNITS: 100 INJECTION, SOLUTION INTRAVENOUS; SUBCUTANEOUS at 16:24

## 2022-02-11 RX ADMIN — SODIUM PHOSPHATE, MONOBASIC, MONOHYDRATE 20 MMOL: 276; 142 INJECTION, SOLUTION INTRAVENOUS at 11:31

## 2022-02-11 RX ADMIN — POTASSIUM CHLORIDE 10 MEQ: 7.46 INJECTION, SOLUTION INTRAVENOUS at 02:16

## 2022-02-11 RX ADMIN — TRIAMCINOLONE ACETONIDE: 1 CREAM TOPICAL at 20:20

## 2022-02-11 RX ADMIN — HYDROMORPHONE HYDROCHLORIDE 1 MG: 1 INJECTION, SOLUTION INTRAMUSCULAR; INTRAVENOUS; SUBCUTANEOUS at 01:29

## 2022-02-11 RX ADMIN — POTASSIUM CHLORIDE 10 MEQ: 7.46 INJECTION, SOLUTION INTRAVENOUS at 11:18

## 2022-02-11 RX ADMIN — INSULIN GLARGINE 22 UNITS: 100 INJECTION, SOLUTION SUBCUTANEOUS at 12:04

## 2022-02-11 RX ADMIN — POTASSIUM CHLORIDE 40 MEQ: 20 TABLET, EXTENDED RELEASE ORAL at 14:53

## 2022-02-11 RX ADMIN — ATORVASTATIN CALCIUM 20 MG: 20 TABLET, FILM COATED ORAL at 20:43

## 2022-02-11 RX ADMIN — LIDOCAINE HYDROCHLORIDE 5 ML: 10 INJECTION, SOLUTION EPIDURAL; INFILTRATION; INTRACAUDAL; PERINEURAL at 05:10

## 2022-02-11 RX ADMIN — HYDROMORPHONE HYDROCHLORIDE 1 MG: 1 INJECTION, SOLUTION INTRAMUSCULAR; INTRAVENOUS; SUBCUTANEOUS at 06:07

## 2022-02-11 RX ADMIN — POTASSIUM CHLORIDE 10 MEQ: 7.46 INJECTION, SOLUTION INTRAVENOUS at 13:50

## 2022-02-11 RX ADMIN — SODIUM PHOSPHATE, MONOBASIC, MONOHYDRATE 10 MMOL: 276; 142 INJECTION, SOLUTION INTRAVENOUS at 04:10

## 2022-02-11 RX ADMIN — OXYCODONE 5 MG: 5 TABLET ORAL at 03:00

## 2022-02-11 RX ADMIN — INSULIN GLARGINE 22 UNITS: 100 INJECTION, SOLUTION SUBCUTANEOUS at 20:57

## 2022-02-11 RX ADMIN — POTASSIUM CHLORIDE 10 MEQ: 7.46 INJECTION, SOLUTION INTRAVENOUS at 08:57

## 2022-02-11 RX ADMIN — INSULIN LISPRO 15 UNITS: 100 INJECTION, SOLUTION INTRAVENOUS; SUBCUTANEOUS at 16:25

## 2022-02-11 RX ADMIN — VANCOMYCIN HYDROCHLORIDE 1500 MG: 500 INJECTION, POWDER, LYOPHILIZED, FOR SOLUTION INTRAVENOUS at 11:02

## 2022-02-11 RX ADMIN — ACETAMINOPHEN 650 MG: 325 TABLET ORAL at 18:47

## 2022-02-11 RX ADMIN — OXYCODONE 5 MG: 5 TABLET ORAL at 08:19

## 2022-02-11 RX ADMIN — PIPERACILLIN AND TAZOBACTAM 3375 MG: 3; .375 INJECTION, POWDER, LYOPHILIZED, FOR SOLUTION INTRAVENOUS at 02:08

## 2022-02-11 RX ADMIN — ACETAMINOPHEN 650 MG: 325 TABLET ORAL at 00:45

## 2022-02-11 RX ADMIN — ACETAMINOPHEN 650 MG: 325 TABLET ORAL at 06:07

## 2022-02-11 RX ADMIN — HYDROMORPHONE HYDROCHLORIDE 1 MG: 1 INJECTION, SOLUTION INTRAMUSCULAR; INTRAVENOUS; SUBCUTANEOUS at 11:07

## 2022-02-11 RX ADMIN — DEXTROSE AND SODIUM CHLORIDE: 5; 450 INJECTION, SOLUTION INTRAVENOUS at 04:27

## 2022-02-11 RX ADMIN — OXYCODONE 5 MG: 5 TABLET ORAL at 18:46

## 2022-02-11 RX ADMIN — ACETAMINOPHEN 650 MG: 325 TABLET ORAL at 13:50

## 2022-02-11 RX ADMIN — PIPERACILLIN AND TAZOBACTAM 3375 MG: 3; .375 INJECTION, POWDER, LYOPHILIZED, FOR SOLUTION INTRAVENOUS at 15:42

## 2022-02-11 RX ADMIN — DIPHENHYDRAMINE HYDROCHLORIDE 25 MG: 50 INJECTION, SOLUTION INTRAMUSCULAR; INTRAVENOUS at 03:00

## 2022-02-11 RX ADMIN — ENOXAPARIN SODIUM 30 MG: 100 INJECTION SUBCUTANEOUS at 20:44

## 2022-02-11 RX ADMIN — SODIUM CHLORIDE: 9 INJECTION, SOLUTION INTRAVENOUS at 04:28

## 2022-02-11 RX ADMIN — SODIUM CHLORIDE 4.16 UNITS/HR: 9 INJECTION, SOLUTION INTRAVENOUS at 10:00

## 2022-02-11 RX ADMIN — SODIUM CHLORIDE, PRESERVATIVE FREE 10 ML: 5 INJECTION INTRAVENOUS at 21:00

## 2022-02-11 RX ADMIN — POTASSIUM CHLORIDE 10 MEQ: 7.46 INJECTION, SOLUTION INTRAVENOUS at 03:18

## 2022-02-11 RX ADMIN — DIPHENHYDRAMINE HYDROCHLORIDE 25 MG: 50 INJECTION, SOLUTION INTRAMUSCULAR; INTRAVENOUS at 20:43

## 2022-02-11 RX ADMIN — POTASSIUM CHLORIDE 10 MEQ: 7.46 INJECTION, SOLUTION INTRAVENOUS at 00:48

## 2022-02-11 RX ADMIN — Medication 3 MG: at 20:43

## 2022-02-11 RX ADMIN — HYDROMORPHONE HYDROCHLORIDE 1 MG: 1 INJECTION, SOLUTION INTRAMUSCULAR; INTRAVENOUS; SUBCUTANEOUS at 15:47

## 2022-02-11 RX ADMIN — PANTOPRAZOLE SODIUM 40 MG: 40 TABLET, DELAYED RELEASE ORAL at 12:07

## 2022-02-11 RX ADMIN — ENOXAPARIN SODIUM 30 MG: 100 INJECTION SUBCUTANEOUS at 12:08

## 2022-02-11 RX ADMIN — HYDROMORPHONE HYDROCHLORIDE 1 MG: 1 INJECTION, SOLUTION INTRAMUSCULAR; INTRAVENOUS; SUBCUTANEOUS at 20:43

## 2022-02-11 RX ADMIN — SODIUM CHLORIDE: 9 INJECTION, SOLUTION INTRAVENOUS at 20:00

## 2022-02-11 RX ADMIN — INSULIN LISPRO 4 UNITS: 100 INJECTION, SOLUTION INTRAVENOUS; SUBCUTANEOUS at 20:57

## 2022-02-11 RX ADMIN — PIPERACILLIN AND TAZOBACTAM 3375 MG: 3; .375 INJECTION, POWDER, LYOPHILIZED, FOR SOLUTION INTRAVENOUS at 08:57

## 2022-02-11 RX ADMIN — POTASSIUM CHLORIDE 10 MEQ: 7.46 INJECTION, SOLUTION INTRAVENOUS at 04:26

## 2022-02-11 RX ADMIN — SODIUM CHLORIDE: 9 INJECTION, SOLUTION INTRAVENOUS at 11:42

## 2022-02-11 RX ADMIN — SACUBITRIL AND VALSARTAN 1 TABLET: 49; 51 TABLET, FILM COATED ORAL at 08:59

## 2022-02-11 RX ADMIN — POTASSIUM CHLORIDE 10 MEQ: 7.46 INJECTION, SOLUTION INTRAVENOUS at 06:43

## 2022-02-11 RX ADMIN — POTASSIUM CHLORIDE 10 MEQ: 7.46 INJECTION, SOLUTION INTRAVENOUS at 08:03

## 2022-02-11 RX ADMIN — OXYCODONE 5 MG: 5 TABLET ORAL at 13:50

## 2022-02-11 ASSESSMENT — PAIN SCALES - GENERAL
PAINLEVEL_OUTOF10: 7
PAINLEVEL_OUTOF10: 10
PAINLEVEL_OUTOF10: 7
PAINLEVEL_OUTOF10: 7
PAINLEVEL_OUTOF10: 8
PAINLEVEL_OUTOF10: 9
PAINLEVEL_OUTOF10: 0
PAINLEVEL_OUTOF10: 7
PAINLEVEL_OUTOF10: 10
PAINLEVEL_OUTOF10: 10
PAINLEVEL_OUTOF10: 9
PAINLEVEL_OUTOF10: 10
PAINLEVEL_OUTOF10: 8
PAINLEVEL_OUTOF10: 0
PAINLEVEL_OUTOF10: 0

## 2022-02-11 ASSESSMENT — PAIN DESCRIPTION - ONSET: ONSET: ON-GOING

## 2022-02-11 ASSESSMENT — PAIN DESCRIPTION - LOCATION
LOCATION: BUTTOCKS
LOCATION: BUTTOCKS

## 2022-02-11 ASSESSMENT — PAIN DESCRIPTION - PAIN TYPE
TYPE: SURGICAL PAIN
TYPE: SURGICAL PAIN

## 2022-02-11 NOTE — CONSULTS
Critical Care Admit/Consult Note         Patient - Sarah Rutledge   MRN -  [id number]   [place] # - [de-identified]   - 1985      Date of Admission -  2/10/2022  1:20 PM  Date of evaluation -  2022  0208/0208-A   Hospital Day - 1            ADMIT/CONSULT DETAILS     Reason for Admit/Consult   DKA     Consulting Service/Physician   Consulting - Kailee Liu DO  Primary Care Physician - Marlin Thakkar MD         HPI   The patient is a 43 y.o. male with significant past medical history of abscess and T1DM presented to the ED for abscess drainage. Abscess was drained by surgery. Patient was found to be in DKA during this visit. Patient states he has been feeling unwell for the past couple of days and has not been eating normally. He stopped taking his short acting insulin due to his poor appetite. Patient states he currently feels much improved and does not have any complaints outside of pain at the site of his abscess.         Past Medical History         Diagnosis Date    CAD (coronary artery disease)     CHF (congestive heart failure) ([address]     Diabetes mellitus ([address]     Hyperlipidemia     Hyperosmolar hyperglycemic state (HHS) ([address] 2021    Hypertension         Past Surgical History           Procedure Laterality Date    ABDOMEN SURGERY N/A 2021    INCISION AND DRAINAGE OF LARGE PERIANAL ABSCESS performed by Emile Ferraro MD at [facility]      buttocks    ECHO COMPL W DOP COLOR FLOW  2015         HC INSERT PICC CATH, 5/> YRS  10/20/2021         LEG SURGERY Left 2/10/2022    BILATERAL BUTTOCK INCISION AND DRAINAGE performed by Emile Ferraro MD at [facility]:  The patient is a Past smoker      Current Medications   Current Medications    sodium chloride flush  5-40 mL IntraVENous 2 times per day    heparin flush  3 mL IntraVENous 2 times per day    enoxaparin  30 mg SubCUTAneous BID    pantoprazole  40 mg Oral QAM AC    insulin glargine  22 Units SubCUTAneous BID    potassium chloride  40 mEq Oral Once    piperacillin-tazobactam  3,375 mg IntraVENous Q8H    vancomycin  1,500 mg IntraVENous Q18H    acetaminophen  650 mg Oral Q6H    atorvastatin  20 mg Oral Daily    insulin lispro  15 Units SubCUTAneous TID WC    metoprolol succinate  25 mg Oral Daily    sacubitril-valsartan  1 tablet Oral BID     sodium chloride flush, sodium chloride, heparin flush, oxyCODONE **OR** oxyCODONE, potassium chloride, magnesium sulfate, sodium phosphate IVPB **OR** sodium phosphate IVPB **OR** sodium phosphate IVPB, dextrose 5 % and 0.45 % NaCl, dextrose bolus (hypoglycemia) **OR** dextrose bolus (hypoglycemia), melatonin, HYDROmorphone, diphenhydrAMINE  IV Drips/Infusions   sodium chloride      sodium chloride 12.5 mL/hr at 02/11/22 1142    sodium chloride 250 mL/hr at 02/10/22 1941    dextrose 5 % and 0.45 % NaCl Stopped (02/11/22 1353)    insulin Stopped (02/11/22 1353)     Home Medications  Medications Prior to Admission: metoprolol succinate (TOPROL XL) 100 MG extended release tablet, 1 tab twice daily  Continuous Blood Gluc Sensor (FREESTYLE CHRISTIAN 2 SENSOR) MISC, Change sensor every 14 days  vitamin D (ERGOCALCIFEROL) 1.25 MG (50894 UT) CAPS capsule, Take 1 capsule by mouth once a week  melatonin (RA MELATONIN) 3 MG TABS tablet, Take 1 tablet by mouth nightly as needed (sleep)  triamcinolone (KENALOG) 0.1 % cream, Apply topically as needed (rash) Apply topically 2 times daily x 10 days prn  Folinic Acid-Vit B6-Vit B12 (FOLINIC-PLUS) 4-50-2 MG TABS, Take 4-50 mg by mouth 2 times daily  Continuous Blood Gluc  (FREESTYLE CHRISTIAN 14 DAY READER) PEDRO, As directed  Insulin Pen Needle (PEN NEEDLES) 31G X 6 MM MISC, 1 each by Does not apply route daily  insulin glargine (LANTUS SOLOSTAR) 100 UNIT/ML injection pen, Inject 22 Units into the skin 2 times daily  potassium chloride (KLOR-CON M) 20 MEQ extended release tablet, Take 2 tablets by mouth daily Pt only taking one tablet daily unless he takes Bumex BID then he takes 2 tabs  bumetanide (BUMEX) 2 MG tablet, Take 1 tablet by mouth See Admin Instructions Take 1 tab 8am daily. On days you gain weight more than 2 lbs or have worse swelling or short of breath then take a 2pm dose that day. atorvastatin (LIPITOR) 20 MG tablet, Take 1 tablet by mouth daily  sacubitril-valsartan (ENTRESTO) 49-51 MG per tablet, Take 1 tablet by mouth 2 times daily (Patient taking differently: Take 1 tablet by mouth 2 times daily taking  mg)  Insulin Pen Needle (BD PEN NEEDLE MICRO U/F) 32G X 6 MM MISC, Uses with insulin 4 times a day  insulin lispro, 1 Unit Dial, (HUMALOG KWIKPEN) 100 UNIT/ML SOPN, Inject 15 units with meals + following sliding scale. -200 add 3U, -250 add 6U, -300 add 9U, -350 add 12U, -400 add 15U, BS over 400 add 18U. MAX 75U/day  Lancets MISC, Test 4 times/day before meals and at bedtime and as needed for symptoms of irregular blood glucose. CVS Lancets MISC, 1 each by Does not apply route daily To check sugar 4 x a day and if feeling ill  blood glucose monitor strips, Test **4* times a day & as needed for symptoms of irregular blood glucose. Dispense sufficient amount for indicated testing frequency plus additional to accommodate PRN testing needs. [DISCONTINUED] spironolactone (ALDACTONE) 25 MG tablet, TAKE 1 TABLET BY MOUTH DAILY (Patient not taking: Reported on 2/9/2022)    Diet/Nutrition   ADULT DIET; Regular; 4 carb choices (60 gm/meal)    Allergies   Patient has no known allergies. Social History   Tobacco   reports that he has quit smoking. His smoking use included cigars and cigarettes. He started smoking about 22 years ago. He quit after 8.00 years of use. He has never used smokeless tobacco.    Alcohol     reports current alcohol use.     Occupational history :    Family History         Problem Relation Age of Onset    High Blood Pressure Father Heart Disease Father     High Blood Pressure Paternal Grandmother     Heart Attack Paternal Grandfather     High Blood Pressure Mother     Heart Disease Mother          ROS     REVIEW OF SYSTEMS:  CONSTITUTIONAL:  negative for  fevers and chills  EYES:  negative for  visual disturbance and irritation  HEENT:  negative for  earaches, epistaxis and sore throat  RESPIRATORY:  negative for  dyspnea and hemoptysis  CARDIOVASCULAR:  negative for  chest pain, edema  GASTROINTESTINAL:  negative for nausea, vomiting and abdominal pain  GENITOURINARY:  negative for frequency and hematuria  ENDOCRINE:  negative for diabetic symptoms including neither polyuria nor polydipsia  MUSCULOSKELETAL:  negative for  myalgias and arthralgias  NEUROLOGICAL:  negative for weakness and numbness    Lines and Devices    PICC L Brachial  PIV L Antecubital    Mechanical Ventilation Data   VENT SETTINGS (Comprehensive)  Vent Information  SpO2: 97 %  Additional Respiratory  Assessments  Pulse: 78  Resp: 19  SpO2: 97 %  Oral Care: Mouth swabbed    ABG  Lab Results   Component Value Date    PH 7.357 2021    PCO2 49.1 2021    PO2 27.0 2021    HCO3 26.9 2021    O2SAT 46.9 2021     Lab Results   Component Value Date    MODE RA 2021           Vitals    height is 5' 10\" (1.778 m) and weight is 245 lb 13 oz (111.5 kg). His oral temperature is 98.6 °F (37 °C). His blood pressure is 106/64 and his pulse is 78. His respiration is 19 and oxygen saturation is 97%.        Temperature Range: Temp: 98.6 °F (37 °C) Temp  Av.4 °F (35.2 °C)  Min: 51.8 °F (11 °C)  Max: 98.7 °F (37.1 °C)  BP Range:  Systolic (35LAW), BJX:145 , Min:88 , FUQ:386     Diastolic (66FPN), IMD:62, Min:49, Max:91    Pulse Range: Pulse  Av.9  Min: 57  Max: 106  Respiration Range: Resp  Av.3  Min: 3  Max: 46  Current Pulse Ox[de-identified]  SpO2: 97 %  24HR Pulse Ox Range:  SpO2  Av.9 %  Min: 92 %  Max: 100 %  Oxygen Amount and Delivery: O2 Flow Rate (L/min): 4 L/min      I/O (24 Hours)    Patient Vitals for the past 8 hrs:   BP Temp Temp src Pulse Resp SpO2   02/11/22 1300 106/64 -- -- 78 19 97 %   02/11/22 1200 103/62 98.6 °F (37 °C) Oral 85 30 100 %   02/11/22 1100 103/62 -- -- 67 21 98 %   02/11/22 1000 101/66 -- -- 65 15 100 %   02/11/22 0900 101/66 -- -- 76 12 97 %   02/11/22 0800 106/73 98.5 °F (36.9 °C) Oral 68 9 97 %   02/11/22 0700 106/73 -- -- 95 18 98 %   02/11/22 0623 112/68 -- -- 97 (!) 43 97 %       Intake/Output Summary (Last 24 hours) at 2/11/2022 1404  Last data filed at 2/11/2022 1300  Gross per 24 hour   Intake 6962.8 ml   Output 2615 ml   Net 4347.8 ml     I/O last 3 completed shifts: In: 6962.8 [P.O.:240; I.V.:2707.8; IV Piggyback:4015]  Out: 3645 [Urine:1400; Blood:15]   Date 02/11/22 0000 - 02/11/22 2359   Shift 7142-6737 9395-5904 9155-9796 24 Hour Total   INTAKE   I.V.(mL/kg) 1148. 3(10.3)   1148. 3(10.3)   IV Piggyback(mL/kg) 948(8.5)   948(8.5)   Shift Total(mL/kg) 2096. 3(18.8)   2096. 3(18.8)   OUTPUT   Urine(mL/kg/hr) 600(0.7) 1200  1800   Shift Total(mL/kg) 600(5.4) 1200(10.8)  1800(16.1)   Weight (kg) 111.5 111.5 111.5 111.5     Patient Vitals for the past 96 hrs (Last 3 readings):   Weight   02/11/22 0000 245 lb 13 oz (111.5 kg)   02/10/22 1449 243 lb (110.2 kg)         Drains/Tubes Outputs  None  Exam         PHYSICAL EXAM:  CONSTITUTIONAL:  awake, alert, cooperative, no apparent distress, and appears stated age  EYES:  Lids and lashes normal, pupils equal, round and reactive to light, extra ocular muscles intact, sclera clear, conjunctiva normal  ENT:  Normocephalic, without obvious abnormality, atraumatic, external ears without lesions, oral pharynx with moist mucus membranes.   NECK:  Supple, symmetrical, trachea midline, not enlarged and no tenderness, skin normal  LUNGS:  No increased work of breathing, good air exchange, clear to auscultation bilaterally, no crackles or wheezing  CARDIOVASCULAR: regular rate and rhythm, and no murmur noted  ABDOMEN:  soft, non-distended, non-tender, no masses palpated, no hepatosplenomegally  MUSCULOSKELETAL:  There is no redness, warmth, or swelling of the joints. Full range of motion noted. Motor strength is 5 out of 5 all extremities bilaterally. Tone is normal.  NEUROLOGIC:  Awake, alert, oriented to name, place and time. SKIN:  Incised abscess buttocks    Data   Old records and images have been reviewed    Lab Results   CBC     Lab Results   Component Value Date    WBC 9.9 02/11/2022    RBC 3.22 02/11/2022    HGB 10.2 02/11/2022    HCT 30.3 02/11/2022     02/11/2022    MCV 94.1 02/11/2022    MCH 31.7 02/11/2022    MCHC 33.7 02/11/2022    RDW 12.8 02/11/2022    SEGSPCT 64 02/04/2014    LYMPHOPCT 20.6 02/11/2022    MONOPCT 7.2 02/11/2022    BASOPCT 0.4 02/11/2022    MONOSABS 0.71 02/11/2022    LYMPHSABS 2.03 02/11/2022    EOSABS 0.20 02/11/2022    BASOSABS 0.04 02/11/2022       BMP   Lab Results   Component Value Date     02/11/2022    K 3.4 02/11/2022    K 4.4 02/10/2022    CL 97 02/11/2022    CO2 23 02/11/2022    BUN 10 02/11/2022    CREATININE 0.8 02/11/2022    GLUCOSE 151 02/11/2022    CALCIUM 8.4 02/11/2022       LFTS  Lab Results   Component Value Date    ALKPHOS 93 02/10/2022    ALT 8 02/10/2022    AST 6 02/10/2022    PROT 7.7 02/10/2022    BILITOT 0.4 02/10/2022    LABALBU 3.9 02/10/2022       INR  No results for input(s): PROTIME, INR in the last 72 hours. APTT  No results for input(s): APTT in the last 72 hours. Lactic Acid  Lab Results   Component Value Date    LACTA 1.0 10/18/2021    LACTA 1.5 09/14/2021    LACTA 2.0 09/14/2021        BNP   No results for input(s): BNP in the last 72 hours. Cultures     No results for input(s): BC in the last 72 hours. No results for input(s): Deette Lover in the last 72 hours. No results for input(s): LABURIN in the last 72 hours. Radiology   CXR  Impression   PICC line tip is in the SVC. No acute abnormality. CT Scans  Impression   1. Abscess located in left medial gluteal soft tissue measures 12 x 6.3 cm in   AP and axial dimension. 2. Smaller abscess in right medial gluteal soft tissue measures 3 x 1.5 cm.   3. No evidence of intra-abdominal or pelvic abscess. 4. No subcutaneous gas. SYSTEMS ASSESSMENT    Neuro   Neurointact, A&Ox3. Continue to monitor    Respiratory   No respiratory distress. No increased oxygen demands. Continue to monitor. Cardiovascular   Hx of HF, continue home Entresto BID, metoprolol XR 25mg daily    Gastrointestinal   GI Prophylaxis Protonix 40mg daily    Renal   RONA, resolved, Cr 0.8, continue to monitor    Hypokalemia, K 3.4, replacement given, continue to monitor    Hypophosphatemia, P 1.8, replacement given, continue to monitor     Infectious Disease   Gluteal Abscess, I&D on 2/10, surgery following, Vanc and Zosyn, ID Following, Pain mangement with Dilaudid 1mg q3H PRN, Oxycodone 5mg q4H PRN    Hematology/Oncology   Anemia, Hg 10.2, no obvious source of bleeding, continue to monitor    Endocrine   DKA, resolved with insulin drip, gap closed x2. Patient transitioned from insulin drip to subq insulin 22 units glargine BID. 15 units humalog TID with meals. Continue to monitor    Social/Spiritual/DNR/Other   Patient is a FULL Code. Patient will be downgraded    MECRED    \"Thank you for asking us to see this complex patient. \"        I personally saw, examined and provided care for the patient. Radiographs, labs and medication list were reviewed by me independently. Review of Residents documentation was conducted and revisions were made as appropriate. I agree with the above documented exam, problem list and plan of care.         CCT excluding procedures 33 minutes    Mariah Cross DO

## 2022-02-11 NOTE — PROGRESS NOTES
Nurse to nurse report called to [facility] on [facility].

## 2022-02-11 NOTE — CARE COORDINATION
Social Work / Discharge Planning :  SW  met with patient and explained role as discharge planner/ transition of care. Patient admitted with Gluteal Abscess. Patient independent from HOME with mother. Patient  Did establish with a [facility] PCP : Dr Eladio Black. Await ID plan. Currently has PICC. SW discussed HHC and patient in agreement. Referral went to Cristal Payne from [facility] per  choice. Patient has a hx with [facility] for home IV therapy. Patient is a diabetic and has glucometer and supplies. Await ID and Surgery plan. SW to follow.  Electronically signed by DOROTHY Bernard on 2/11/22 at 10:40 AM EST

## 2022-02-11 NOTE — PROGRESS NOTES
Pharmacy Consultation Note  (Antibiotic Dosing and Monitoring)    Initial consult date: 2/10  Consulting physician/provider: Dinh Garza  Drug: Vancomycin  Indication: Perirectal abscess    Age/  Gender Height Weight IBW  Allergy Information   36 y.o./male 5' 10\" (177.8 cm) 243 lb (110.2 kg)     Ideal body weight: 73 kg (160 lb 15 oz)  Adjusted ideal body weight: 88.4 kg (194 lb 14.2 oz)   Patient has no known allergies. Renal Function:  Recent Labs     02/11/22  0100 02/11/22  0540 02/11/22  1006   BUN 14 11 10   CREATININE 1.0 0.9 0.8       Intake/Output Summary (Last 24 hours) at 2/11/2022 1451  Last data filed at 2/11/2022 1300  Gross per 24 hour   Intake 6962.8 ml   Output 2615 ml   Net 4347.8 ml       Vancomycin Monitoring:  Trough:  No results for input(s): VANCOTROUGH in the last 72 hours. Random:  No results for input(s): VANCORANDOM in the last 72 hours. Vancomycin Administration Times:  Recent vancomycin administrations      No vancomycin IV orders with administrations found. Orders not given:          vancomycin 2000 mg in dextrose 5% 500 ml IVPB    vancomycin 1500 mg in dextrose 5% 300 mL IVPB                Assessment:  · Patient is a 43 y.o. male who has been initiated on vancomycin  Estimated Creatinine Clearance: 157 mL/min (based on SCr of 0.8 mg/dL).   · To dose vancomycin, pharmacy will be utilizing [employer] calculation software for goal AUC/ROBERT 400-600 mg/L-hr    Plan:  · Will continue vancomycin 1500 IV every 12 hours  · Will check vancomycin levels when appropriate  · Will continue to monitor renal function   · Clinical pharmacy to follow      Mag Lee [facility] 2/11/2022 2:51 PM

## 2022-02-11 NOTE — CONSULTS
[address] [place] [address] [facility]    Consultation Note     Admit Date: 2/10/2022  1:20 PM    Reason for Consult:   Recurrent abscesses    Attending Physician:  Nikki Erickson DO     Chief Complaint: Gluteal abscess    HISTORY OF PRESENT ILLNESS:   The patient is a 43 y.o. man known to the Infectious Diseases service. The patient is admitted through the emergency room with recurrent abscesses. Last 2 weeks left gluteal region has become patient with worsening drainage swelling and pain. In the emergency room his white count was 13.9 his BUN and creatinine 21 and 1.3. CT scan revealed an abscess located left medial gluteal soft tissue which was 12 x 6 cm. There is a right medial gluteal abscess as well though smaller 3 x 1.5. In addition work-up indicated that the patient was in the DKA on admission when the bicarb was around 15 beta H was 4.5 with blood sugar of 474 on presentation. He started on Zosyn and general surgery was called to see the patient as well. Patient is documented noncompliant he smokes and drinks alcohol. Patient was taken to the OR 2/10/2022 for I&D. Cultures are pending. Both perirectal abscesses were I&D but it appeared that there is a fistula track on the right posterior region. Surgical evaluation showed no signs of necrotizing fasciitis. The patient admits to a sedentary lifestyle especially with the cold weather.   Most the time he just sits around but apparently has good hygiene reviewing his bathing habits showering habits and clothing changes      October 2021: Seen for recurrent perirectal abscess that had been previously I&D done 9/18/2021  Wound culture grew E. coli the patient was treated with ceftriaxone and metronidazole          Past Medical History:        Diagnosis Date    CAD (coronary artery disease)     [facility]est[facility]     Diabetes mellitus ([facility]     Hyperlipidemia     Hyperosmolar hyperglycemic state (HHS) ([facility] 7/30/2021    Hypertension      Past Surgical History:        Procedure Laterality Date    ABDOMEN SURGERY N/A 9/18/2021    INCISION AND DRAINAGE OF LARGE PERIANAL ABSCESS performed by Emile Ferraro MD at [address]      buttocks    ECHO COMPL W DOP COLOR FLOW  5/5/2015         HC INSERT PICC CATH, 5/> YRS  10/20/2021         LEG SURGERY Left 2/10/2022    BILATERAL BUTTOCK INCISION AND DRAINAGE performed by Emile Ferraro MD at [address] EXTRACTION       Current Medications:   Scheduled Meds:   sodium chloride flush  5-40 mL IntraVENous 2 times per day    heparin flush  3 mL IntraVENous 2 times per day    enoxaparin  30 mg SubCUTAneous BID    pantoprazole  40 mg Oral QAM AC    piperacillin-tazobactam  3,375 mg IntraVENous Q8H    vancomycin  1,500 mg IntraVENous Q18H    acetaminophen  650 mg Oral Q6H    atorvastatin  20 mg Oral Daily    insulin glargine  22 Units SubCUTAneous BID    insulin lispro  15 Units SubCUTAneous TID WC    metoprolol succinate  25 mg Oral Daily    sacubitril-valsartan  1 tablet Oral BID     Continuous Infusions:   sodium chloride      sodium chloride 12.5 mL/hr at 02/11/22 0428    sodium chloride 250 mL/hr at 02/10/22 1941    dextrose 5 % and 0.45 % NaCl 150 mL/hr at 02/11/22 0427    insulin 3.76 Units/hr (02/11/22 1059)     PRN Meds:sodium chloride flush, sodium chloride, heparin flush, oxyCODONE **OR** oxyCODONE, potassium chloride, magnesium sulfate, sodium phosphate IVPB **OR** sodium phosphate IVPB **OR** sodium phosphate IVPB, dextrose 5 % and 0.45 % NaCl, dextrose bolus (hypoglycemia) **OR** dextrose bolus (hypoglycemia), melatonin, HYDROmorphone, diphenhydrAMINE    Allergies:  Patient has no known allergies.     Social History:   Social History     Socioeconomic History    Marital status: Single     Spouse name: None    Number of children: 1    Years of education: None    Highest education level: None   Occupational History  Occupation: unemployed   Tobacco Use    Smoking status: Former Smoker     Years: 8.00     Types: Cigars, Cigarettes     Start date: 2000    Smokeless tobacco: Never Used    Tobacco comment: Smokes 1 cigar weekly/ cigs 2-3 a week   Vaping Use    Vaping Use: Every day    Start date: 6/1/2021    Substances: Nicotine   Substance and Sexual Activity    Alcohol use: Yes     Comment: social drinker    Drug use: No    Sexual activity: None   Other Topics Concern    None   Social History Narrative    occasional coffee      Social Determinants of Health     Financial Resource Strain: High Risk    Difficulty of Paying Living Expenses: Hard   Food Insecurity: Food Insecurity Present    Worried About Running Out of Food in the Last Year: Often true    Karan of Food in the Last Year: Often true   Transportation Needs:     Lack of Transportation (Medical): Not on file    Lack of Transportation (Non-Medical):  Not on file   Physical Activity:     Days of Exercise per Week: Not on file    Minutes of Exercise per Session: Not on file   Stress:     Feeling of Stress : Not on file   Social Connections:     Frequency of Communication with Friends and Family: Not on file    Frequency of Social Gatherings with Friends and Family: Not on file    Attends Yarsanism Services: Not on file    Active Member of [address] [place] [address] [employer] or Organizations: Not on file    Attends Club or Organization Meetings: Not on file    Marital Status: Not on file   Intimate Partner Violence:     Fear of Current or Ex-Partner: Not on file    Emotionally Abused: Not on file    Physically Abused: Not on file    Sexually Abused: Not on file   Housing Stability:     Unable to Pay for Housing in the Last Year: Not on file    Number of [place] in the Last Year: Not on file    Unstable Housing in the Last Year: Not on file     Tobacco: No  Alcohol: No  Pets: No  Travel: No    Family History:       Problem Relation Age of Onset    High Blood Pressure Father     Heart Disease Father     High Blood Pressure Paternal Grandmother     Heart Attack Paternal Grandfather     High Blood Pressure Mother     Heart Disease Mother    . Otherwise non-pertinent to the chief complaint. REVIEW OF SYSTEMS:    CONSTITUTIONAL:  No chills, fevers or night sweats. No loss of weight. EYES:  No double vision or drainage from eyes, ears or throat. HEENT:  No neck stiffness. No dysphagia. No drainage from eyes, ears or throat  RESPIRATORY:  No cough, productive sputum or hemoptysis. CARDIOVASCULAR:  No chest pain, palpitations, orthopnea or dyspnea on exertion. GASTROINTESTINAL:  No nausea, vomiting, diarrhea or constipation or hematochezia   GENITOURINARY:  No frequency burning dysuria or hematuria. INTEGUMENT/BREAST:  No rash or breast masses. HEMATOLOGIC/LYMPHATIC:  No lymphadenopathy or blood dyscrasics. ALLERGIC/IMMUNOLOGIC:  No anaphylaxis. ENDOCRINE:  No polyuria or polydipsia or temperature intolerance. MUSCULOSKELETAL:  No myalgia or arthralgia. Full ROM. See history of present illness reference to the perirectal abscess  NEUROLOGICAL:  No focal motor sensory deficit. BEHAVIOR/PSYCH:  No psychosis. PHYSICAL EXAM:    Vitals:    /62   Pulse 67   Temp 98.5 °F (36.9 °C) (Oral)   Resp 21   Ht 5' 10\" (1.778 m)   Wt 245 lb 13 oz (111.5 kg)   SpO2 98%   BMI 35.27 kg/m²   Constitutional: The patient is awake, alert, and oriented. Skin: Warm and dry. No rashes were noted. No jaundice. Status post I&D of bilateral perirectal gluteal abscesses now dressed and packed  HEENT: Eyes show round, and reactive pupils. Moist mucous membranes, no ulcerations, no thrush. Neck: Supple to movements. No lymphadenopathy. Chest: No use of accessory muscles to breathe. Symmetrical expansion. Auscultation reveals no wheezing, crackles, or rhonchi. Cardiovascular: S1 and S2 are rhythmic and regular. No murmurs appreciated.    Abdomen: Positive bowel sounds to auscultation. Benign to palpation. No masses felt. No hepatosplenomegaly. Genitourinary: Male  Extremities: No clubbing, no cyanosis, no edema. Musculoskeletal: Equal and symmetrical  Neurological: No focal  Lines: peripheral      CBC+dif:  Recent Labs     02/10/22  1439 02/10/22  1439 02/11/22  0540   WBC 13.9*  --  9.9   HGB 12.3*   < > 10.2*   HCT 35.8*   < > 30.3*   MCV 92.0   < > 94.1      < > 315   NEUTROABS 10.53*   < > 6.86    < > = values in this interval not displayed.      Lab Results   Component Value Date    CRP 0.3 10/29/2021    CRP 0.5 (H) 10/26/2021    CRP 1.8 (H) 10/22/2021     No results found for: CRP  Lab Results   Component Value Date    SEDRATE 80 (H) 02/10/2022    SEDRATE 62 (H) 10/29/2021    SEDRATE >150 (H) 10/26/2021     Lab Results   Component Value Date    ALT 8 02/10/2022    AST 6 02/10/2022    ALKPHOS 93 02/10/2022    BILITOT 0.4 02/10/2022     Lab Results   Component Value Date     02/11/2022    K 3.4 02/11/2022    K 4.4 02/10/2022    CL 97 02/11/2022    CO2 23 02/11/2022    BUN 10 02/11/2022    CREATININE 0.8 02/11/2022    GFRAA >60 02/11/2022    LABGLOM >60 02/11/2022    GLUCOSE 151 02/11/2022    PROT 7.7 02/10/2022    LABALBU 3.9 02/10/2022    CALCIUM 8.4 02/11/2022    BILITOT 0.4 02/10/2022    ALKPHOS 93 02/10/2022    AST 6 02/10/2022    ALT 8 02/10/2022       Lab Results   Component Value Date    PROTIME 14.2 12/24/2020    INR 1.3 12/24/2020       Lab Results   Component Value Date    TSH 1.240 09/17/2021       Lab Results   Component Value Date    NITRITE neg 01/10/2022    COLORU yellow 01/10/2022    COLORU Yellow 10/16/2021    PHUR 5.5 01/10/2022    PHUR 6.0 10/16/2021    WBCUA 0-1 10/16/2021    RBCUA 5-10 10/16/2021    BACTERIA RARE 10/16/2021    CLARITYU clear 01/10/2022    CLARITYU Clear 10/16/2021    SPECGRAV 1.010 01/10/2022    SPECGRAV 1.010 10/16/2021    LEUKOCYTESUR neg 01/10/2022    LEUKOCYTESUR Negative 10/16/2021    UROBILINOGEN 0.2 10/16/2021    BILIRUBINUR neg 01/10/2022    BLOODU trace 01/10/2022    BLOODU SMALL 10/16/2021    GLUCOSEU 500 01/10/2022    GLUCOSEU >=1000 10/16/2021       No results found for: GQA1ZIT, BEART, Z9SDZFXO, PHART, THGBART, PCU2ZOR, PO2ART, HMO7LIJ  Radiology:  XR CHEST PORTABLE   Final Result   PICC line tip is in the SVC. No acute abnormality. CT ABDOMEN PELVIS W IV CONTRAST Additional Contrast? None   Final Result   1. Abscess located in left medial gluteal soft tissue measures 12 x 6.3 cm in   AP and axial dimension. 2. Smaller abscess in right medial gluteal soft tissue measures 3 x 1.5 cm.   3. No evidence of intra-abdominal or pelvic abscess. 4. No subcutaneous gas. Microbiology:  Pending  No results for input(s): BC in the last 72 hours. No results for input(s): ORG in the last 72 hours. No results for input(s): Luis Palmer in the last 72 hours. No results for input(s): STREPNEUMAGU in the last 72 hours. No results for input(s): LP1UAG in the last 72 hours. No results for input(s): ASO in the last 72 hours. No results for input(s): CULTRESP in the last 72 hours. Assessment:  · Recurrent perirectal abscess-status post I&D    Plan:    · Cont Zosyn and adjust according to culture results  · Check cultures  · Baseline ESR, CRP  · Monitor labs  · Will follow with you    Thank you for having us see this patient in consultation. I will be discussing this case with the treating physicians.       Electronically signed by Beckie Marc MD on 2/11/2022 at 11:23 AM

## 2022-02-11 NOTE — H&P
[facility] History and Physical        Chief Complaint: acute on chronic buttock abscess  History of Present Illness   The patient is a 43 y.o. male      Patient hx of ?compliance-- insulin diabetic - claims took long acting insulin up till yesterday  And regularly takes short acting insuliin-- till 1-2 days proir to admission bc of progressive pain and no appetitie  + ischemic cardiomyopathy and chronic systolic congestive heart failure    For months chronic buttock/posterior thigh abscess. - present for months, then worsening in last  couple weeks  Noting increasing in size and has been having more pain from it. No chills/Denies any fevers. acute on chronic buttock abscess- now sp I and D b/l - done earlier this evening  . Patient has had this abscess drained multiple times over the past 3 months-9/18 and 10/18/2021. Seen in ER and sent to OR right away then I evluated him in MICU  op note:  \" Upon inspection a large left-sided and a small right-sided perirectal abscess were noted. No apparent communication. No signs of necrotizing fasciitis. Patient has many scars from previous I&D's but definitely had a fistula tract on the right posterior region. Left-sided abscess . ... And large amount of pus was immediately expressed and sent for culture. All loculations were broken up and hemostasis was assured final measurements of abscess were 13 x 8 x 6 cm. Next a small 3 x 1 cm right-sided abscess was incised . .and pus was expressed. \"      - hx taken from the patient and records  REVIEW OF SYSTEMS:  no fevers, chills, cp, sob, n/v, ha, vision/hearing changes, wt changes, hot/cold flashes, other open skin lesions, diarrhea, constipation, dysuria/hematuria unless noted in HPI. Complete ROS performed with the patient and is otherwise negative.     Past Medical History:      Diagnosis Date    CAD (coronary artery disease)     CHF (congestive heart failure) ([address]     Diabetes mellitus ([facility] [address]     Hyperlipidemia     Hyperosmolar hyperglycemic state (HHS) ([facility] [address] 7/30/2021    Hypertension        Past Surgical History:        Procedure Laterality Date    ABDOMEN SURGERY N/A 9/18/2021    INCISION AND DRAINAGE OF LARGE PERIANAL ABSCESS performed by Huang Stanford MD at [address]      buttocks    ECHO COMPL W DOP COLOR FLOW  5/5/2015         HC INSERT PICC CATH, 5/> YRS  10/20/2021         WISDOM TOOTH EXTRACTION         Home Medications:  Prior to Admission medications    Medication Sig Start Date End Date Taking?  Authorizing Provider   metoprolol succinate (TOPROL XL) 100 MG extended release tablet 1 tab twice daily 2/9/22   Sandy Ruvalcaba MD   Continuous Blood Gluc Sensor (FREESTYLE CHRISTIAN 2 SENSOR) MISC Change sensor every 14 days 2/3/22   Kaylan Johnson MD   vitamin D (ERGOCALCIFEROL) 1.25 MG (31291 UT) CAPS capsule Take 1 capsule by mouth once a week 1/10/22   Ling Lopez MD   melatonin (RA MELATONIN) 3 MG TABS tablet Take 1 tablet by mouth nightly as needed (sleep) 1/10/22   Ling Lopez MD   triamcinolone (KENALOG) 0.1 % cream Apply topically as needed (rash) Apply topically 2 times daily x 10 days prn 1/10/22   Ling Lopez MD   Folinic Acid-Vit B6-Vit B12 (FOLINIC-PLUS) 4-50-2 MG TABS Take 4-50 mg by mouth 2 times daily  Patient not taking: Reported on 2/3/2022 12/1/21   Ling Lopez MD   Continuous Blood Gluc  (FREESTYLE CHRISTIAN 14 DAY READER) PEDRO As directed 12/1/21   Ling Lopez MD   Insulin Pen Needle (PEN NEEDLES) 31G X 6 MM MISC 1 each by Does not apply route daily 12/1/21   Ling Lopez MD   insulin glargine (LANTUS SOLOSTAR) 100 UNIT/ML injection pen Inject 22 Units into the skin 2 times daily 12/1/21   Ling Lopez MD   spironolactone (ALDACTONE) 25 MG tablet TAKE 1 TABLET BY MOUTH DAILY  Patient not taking: Reported on 2/9/2022 11/29/21   Sandy Ruvalcaba MD   potassium chloride (KLOR-CON M) 20 MEQ extended release tablet Take 2 tablets by mouth daily Pt only taking one tablet daily unless he takes Bumex BID then he takes 2 tabs 10/28/21   Una Collazo MD   bumetanide (BUMEX) 2 MG tablet Take 1 tablet by mouth See Admin Instructions Take 1 tab 8am daily. On days you gain weight more than 2 lbs or have worse swelling or short of breath then take a 2pm dose that day. 10/27/21   Otoniel Branch MD   atorvastatin (LIPITOR) 20 MG tablet Take 1 tablet by mouth daily  Patient not taking: Reported on 2/9/2022 10/27/21   Otoniel Branch MD   sacubitril-valsartan (ENTRESTO) 49-51 MG per tablet Take 1 tablet by mouth 2 times daily  Patient taking differently: Take 1 tablet by mouth 2 times daily taking  mg 9/30/21   Ghassan Chatterjee APRN - CNP   Insulin Pen Needle (BD PEN NEEDLE MICRO U/F) 32G X 6 MM MISC Uses with insulin 4 times a day 9/20/21   Ben Tyson MD   insulin lispro, 1 Unit Dial, (HUMALOG KWIKPEN) 100 UNIT/ML SOPN Inject 15 units with meals + following sliding scale. -200 add 3U, -250 add 6U, -300 add 9U, -350 add 12U, -400 add 15U, BS over 400 add 18U. MAX 75U/day 9/20/21   Ben Tyson MD   Lancets MISC Test 4 times/day before meals and at bedtime and as needed for symptoms of irregular blood glucose. 9/20/21   Jairo Perez MD   [employer] Lancets MISC 1 each by Does not apply route daily To check sugar 4 x a day and if feeling ill 7/31/21   Aleah Bound, DO   blood glucose monitor strips Test **4* times a day & as needed for symptoms of irregular blood glucose. Dispense sufficient amount for indicated testing frequency plus additional to accommodate PRN testing needs. 7/31/21   Laeah Bound, DO       Allergies:  Patient has no known allergies. Social History:   TOBACCO:   reports that he has quit smoking. His smoking use included cigars and cigarettes. He started smoking about 22 years ago. He quit after 8.00 years of use. He has never used smokeless tobacco.  ETOH:   reports current alcohol use. Family History:       Problem Relation Age of Onset    High Blood Pressure Father     Heart Disease Father     High Blood Pressure Paternal Grandmother     Heart Attack Paternal Grandfather     High Blood Pressure Mother     Heart Disease Mother       Or deferred/otherwise considered non contributory to current admission  PHYSICAL EXAM:    VS: BP (!) 96/50   Pulse 74   Temp 97.8 °F (36.6 °C) (Temporal)   Resp 18   Ht 5' 10\" (1.778 m)   Wt 243 lb (110.2 kg)   SpO2 97%   BMI 34.87 kg/m²     General Appearance:     ++painful acute distress. Psych:  HEENT:    A.O. As per HPI details  NC/AT, PERRL, no pallor no icterus, lips/ext mucous membrane grossly dry      Neck:   Supple, trachea midline, no obvious JVD   Resp:     Dec bs No wheezes, No rhonchi   Chest wall:    No tenderness or deformity   Heart:    Regular rate and rhythm, S1 and S2 normal, no rub or gallop. Abdomen:     Soft, non-tender, bowel sounds active    no suspicious obvious masses/organomegaly   Genitalia & Rectal:    Deferred.    Extremities x4:   Extremities normal, atraumatic, no cyanosis, no clubbing   Musculoskeletal:      NO active synovitis or swollen b/l wrists, 2-5 MCPs examined   Skin: Per surg note in ER:  \"Male Rectal: Large perhaps 10 cm x 8 cm fluctuance in the left buttock/perianal region\"  Now post op and dressed   Lymph nodes:   Cervical nodes grossly normal   Neurologic:  .Grossly symmetric  strength in UEs and LEs with symmetric grossly dec to light touch sensation     LABS:  CBC:   Lab Results   Component Value Date    WBC 13.9 02/10/2022    RBC 3.89 02/10/2022    HGB 12.3 02/10/2022    HCT 35.8 02/10/2022     02/10/2022    MCV 92.0 02/10/2022     BMP:    Lab Results   Component Value Date     02/10/2022    K 4.4 02/10/2022    CL 81 02/10/2022    CO2 15 02/10/2022    BUN 21 02/10/2022    CREATININE 1.3 02/10/2022    GLUCOSE 474 02/10/2022    CALCIUM 9.6 02/10/2022     Hepatic Function Panel:    Lab Results   Component Value Date    ALKPHOS 93 02/10/2022    AST 6 02/10/2022    ALT 8 02/10/2022    PROT 7.7 02/10/2022    LABALBU 3.9 02/10/2022    BILITOT 0.4 02/10/2022     Magnesium:    Lab Results   Component Value Date    MG 2.3 10/16/2021       PT/INR:    Lab Results   Component Value Date    PROTIME 14.2 12/24/2020    INR 1.3 12/24/2020     U/A:   Lab Results   Component Value Date    NITRITE neg 01/10/2022    LEUKOCYTESUR neg 01/10/2022    LEUKOCYTESUR Negative 10/16/2021    PHUR 5.5 01/10/2022    PHUR 6.0 10/16/2021    WBCUA 0-1 10/16/2021    RBCUA 5-10 10/16/2021    BACTERIA RARE 10/16/2021    SPECGRAV 1.010 01/10/2022    SPECGRAV 1.010 10/16/2021    BLOODU trace 01/10/2022    BLOODU SMALL 10/16/2021    GLUCOSEU 500 01/10/2022    GLUCOSEU >=1000 10/16/2021     ABG:  No results found for: PHART, YFT0KVL, PO2ART, L3YKGUOL, AGP3ELQ, BEART  TSH:    Lab Results   Component Value Date    TSH 1.240 09/17/2021     Cardiac Enzymes:   Lab Results   Component Value Date    TEARFKZ 783 (H) 02/04/2014    CKMB 2.5 05/05/2015    CKMB 3.4 02/04/2014    TROPONINI <0.01 12/22/2020    TROPONINI 0.01 03/09/2020    TROPONINI <0.01 05/05/2015       Radiology: CT ABDOMEN PELVIS W IV CONTRAST Additional Contrast? None    Result Date: 2/10/2022  EXAMINATION: CT OF THE ABDOMEN AND PELVIS WITH CONTRAST 2/10/2022 3:45 pm TECHNIQUE: CT of the abdomen and pelvis was performed with the administration of intravenous contrast. Multiplanar reformatted images are provided for review. Dose modulation, iterative reconstruction, and/or weight based adjustment of the mA/kV was utilized to reduce the radiation dose to as low as reasonably achievable.  COMPARISON: October 26, 2021 HISTORY: ORDERING SYSTEM PROVIDED HISTORY: perianal abscess, concern for NSTI TECHNOLOGIST PROVIDED HISTORY: Additional Contrast?->None Reason for exam:->perianal abscess, concern for NSTI Decision Support Exception - unselect if not a suspected or confirmed emergency medical condition->Emergency Medical Condition (MA) FINDINGS: Lobular area of fluid attenuation involving left gluteal soft tissue with thin surrounding enhancement measures 12 x 6.3 cm in AP and axial dimension. There is surrounding edema. Additional loculated fluid collection located in right medial gluteal soft tissue measures 3 x 1.5 cm also with surrounding in inflammation. No loculated fluid collections in the pelvis. No bony erosive changes at level of the sacrum. No free air or free fluid. The appendix is unremarkable. Liver is unremarkable. Gallbladder is contracted. Pancreas and spleen are unremarkable. No hydronephrosis or perinephric edema. Adrenal glands are unremarkable. Urinary bladder is grossly normal in contour. No pneumonia in the lung bases. Redemonstration of multiple prominent right and left groin lymph nodes measuring up to 2.1 x 1.3 cm on the right and 1.9 x 1.4 cm on the left. 1. Abscess located in left medial gluteal soft tissue measures 12 x 6.3 cm in AP and axial dimension. 2. Smaller abscess in right medial gluteal soft tissue measures 3 x 1.5 cm. 3. No evidence of intra-abdominal or pelvic abscess. 4. No subcutaneous gas. EKG:  Sinus  Rhythm   Low voltage in limb leads.    -  Nonspecific T-abnormality. Assessment & Plan   ACTIVE hospital problems being addressed/reassessed for this admission:  Principal Problem:    DKA, type 1, not at goal [facility])  Active Problems:    Essential hypertension    Diabetic polyneuropathy associated with type 2 diabetes mellitus ([facility] [address]    Septic shock (HCC)    Gluteal abscess  Resolved Problems:    * No resolved hospital problems.  *    Code status/DVT prophylaxis and PLAN --see orders   Note extensive time spent coordinating care between ER docs, ER and floor nurses, and transitioning care over to day providers  Plan of care/ clinical impressions/communication specifics detailed below:    43 y.o. male      Patient hx of ?compliance-- insulin diabetic - claims took long acting insulin up till yesterday  And regularly takes short acting insuliin-- till 1-2 days proir to admission bc of progressive pain and no appetitie  + ischemic cardiomyopathy and chronic systolic congestive heart failure    +RONA cr. 1.3  Now DKA  diabetic ketoacidosis most likely secondary to a large left-sided perianal abscess  For months chronic buttock/posterior thigh abscess. - present for months, then worsening in last  couple weeks  Noting increasing in size and has been having more pain from it. No chills/Denies any fevers. acute on chronic buttock abscess- now sp I and D b/l - done earlier this evening  . Patient has had this abscess drained multiple times over the past 3 months-9/18 and 10/18/2021. Seen in ER and sent to OR right away then I evluated him in MICU  op note:  \" Upon inspection a large left-sided and a small right-sided perirectal abscess were noted. No apparent communication. No signs of necrotizing fasciitis. Patient has many scars from previous I&D's but definitely had a fistula tract on the right posterior region. Left-sided abscess . ... And large amount of pus was immediately expressed and sent for culture. All loculations were broken up and hemostasis was assured final measurements of abscess were 13 x 8 x 6 cm. Next a small 3 x 1 cm right-sided abscess was incised . .and pus was expressed. \"    Hypotensive in ER  Discussed with staff, continued fluid boluses  Then levophed in need  Abx +wound care  Benadryl for itch, pain meds    Sai Wright MD   Night Officer, overnight admitting doctor at [facility] call day time doctor   for questions after 7:30am    Covering for [address] [facility]  If Qs please call [phone]  Electronically signed by Azael Olmedo MD on 2/10/2022 at 7:51 PM  ]

## 2022-02-11 NOTE — PROGRESS NOTES
.  Intensive Care Daily Quality Rounding Checklist      ICU Team Members: Dr. Percell Lesches, resident, charge nurse, bedside nurse, respiratory therapy and clinical pharmacist     ICU Day #: NUMBER: [id number]    Intubation Date:  N/A    Ventilator Day #: N/A    Central Line Insertion Date:  N/A        Day #: N/A     Arterial Line Insertion Date: N/A       Day #: N/A    Temporary Hemodialysis Catheter Insertion Date:  N/A      Day # N/A    DVT Prophylaxis: SCD's    GI Prophylaxis: N/A    [place] Catheter Insertion Date:  N/A       Day #: N/A      Continued need (if yes, reason documented and discussed with physician): N/A    Skin Issues/ Wounds and ordered treatment discussed on rounds: surgical buttocks wounds, surgery following    Goals/ Plans for the Day: monitor labs and vitals,wean off insulin drip, continue antibiotics and continue critical care management

## 2022-02-11 NOTE — PROGRESS NOTES
Patient's mother called for updates on patient's condition.  All questions and concerns discussed and answered

## 2022-02-11 NOTE — PROGRESS NOTES
GENERAL SURGERY  DAILY PROGRESS NOTE  2/11/2022    CHIEF COMPLAINT:  Chief Complaint   Patient presents with    Abscess     left butt, traveling down legs       SUBJECTIVE:  Patient went to OR yesterday for incision and drainage of multiple buttock abscesses. Post-operatively, admitted to the SICU. OBJECTIVE:  /65   Pulse 83   Temp 98.6 °F (37 °C) (Oral)   Resp (!) 46   Ht 5' 10\" (1.778 m)   Wt 245 lb 13 oz (111.5 kg)   SpO2 97%   BMI 35.27 kg/m²     GENERAL:  NAD. A&Ox3. LUNGS:  No increased work of breathing. CARDIOVASCULAR: RR  ABDOMEN:  Soft, non-distended, non-tender. No guarding, rigidity, rebound. EXT: warm and well perfused    ASSESSMENT/PLAN:  43 y.o. male with poorly controlled DM who presented with multiple buttock abscesses now s/p OR I&D 2/10.     - continue local wound care  - prn pain control  - optimize glucose control    Gasper Mancilla MD  Surgery Resident PGY-2  2/11/2022  4:54 AM     The patient was seen and examined and the chart was reviewed. I agree with the assessment and plan. Continue local wound care with a dry dressing. Monitor the wounds on a daily basis.

## 2022-02-11 NOTE — PROGRESS NOTES
PICC Placement 2/11/2022    Product number: [id number]   Lot Number: [id number]      Ultrasound: yes   Left Brachial vein:                Upper Arm Circumference: 34cm    Size: 5.5f    Exposed Length: 3cm    Internal Length: 41cm   Cut: 11cm   Vein Measurement: .60cm    Francisco Lopes RN  2/11/2022  5:12 AM

## 2022-02-11 NOTE — PLAN OF CARE
Problem: Falls - Risk of:  Goal: Will remain free from falls  Description: Will remain free from falls  Outcome: Met This Shift  Goal: Absence of physical injury  Description: Absence of physical injury  Outcome: Met This Shift     Problem: Infection - Surgical Site:  Goal: Will show no infection signs and symptoms  Description: Will show no infection signs and symptoms  Outcome: Met This Shift

## 2022-02-12 LAB
ALBUMIN SERPL-MCNC: 3.5 G/DL (ref 3.5–5.2)
ALP BLD-CCNC: 68 U/L (ref 40–129)
ALT SERPL-CCNC: 9 U/L (ref 0–40)
ANION GAP SERPL CALCULATED.3IONS-SCNC: 12 MMOL/L (ref 7–16)
AST SERPL-CCNC: 11 U/L (ref 0–39)
BASOPHILS ABSOLUTE: 0.05 E9/L (ref 0–0.2)
BASOPHILS RELATIVE PERCENT: 0.8 % (ref 0–2)
BILIRUB SERPL-MCNC: <0.2 MG/DL (ref 0–1.2)
BILIRUBIN DIRECT: <0.2 MG/DL (ref 0–0.3)
BILIRUBIN, INDIRECT: NORMAL MG/DL (ref 0–1)
BUN BLDV-MCNC: 6 MG/DL (ref 6–20)
CALCIUM SERPL-MCNC: 8.9 MG/DL (ref 8.6–10.2)
CHLORIDE BLD-SCNC: 98 MMOL/L (ref 98–107)
CO2: 23 MMOL/L (ref 22–29)
CREAT SERPL-MCNC: 0.7 MG/DL (ref 0.7–1.2)
EOSINOPHILS ABSOLUTE: 0.27 E9/L (ref 0.05–0.5)
EOSINOPHILS RELATIVE PERCENT: 4.5 % (ref 0–6)
GFR AFRICAN AMERICAN: >60
GFR NON-AFRICAN AMERICAN: >60 ML/MIN/1.73
GLUCOSE BLD-MCNC: 300 MG/DL (ref 74–99)
HCT VFR BLD CALC: 33.5 % (ref 37–54)
HEMOGLOBIN: 11.2 G/DL (ref 12.5–16.5)
IMMATURE GRANULOCYTES #: 0.02 E9/L
IMMATURE GRANULOCYTES %: 0.3 % (ref 0–5)
LYMPHOCYTES ABSOLUTE: 1.7 E9/L (ref 1.5–4)
LYMPHOCYTES RELATIVE PERCENT: 28.4 % (ref 20–42)
MAGNESIUM: 2.3 MG/DL (ref 1.6–2.6)
MCH RBC QN AUTO: 31.6 PG (ref 26–35)
MCHC RBC AUTO-ENTMCNC: 33.4 % (ref 32–34.5)
MCV RBC AUTO: 94.6 FL (ref 80–99.9)
METER GLUCOSE: 191 MG/DL (ref 74–99)
METER GLUCOSE: 201 MG/DL (ref 74–99)
METER GLUCOSE: 256 MG/DL (ref 74–99)
METER GLUCOSE: 283 MG/DL (ref 74–99)
MONOCYTES ABSOLUTE: 0.38 E9/L (ref 0.1–0.95)
MONOCYTES RELATIVE PERCENT: 6.3 % (ref 2–12)
MRSA CULTURE ONLY: NORMAL
NEUTROPHILS ABSOLUTE: 3.57 E9/L (ref 1.8–7.3)
NEUTROPHILS RELATIVE PERCENT: 59.7 % (ref 43–80)
PDW BLD-RTO: 12.9 FL (ref 11.5–15)
PHOSPHORUS: 2.7 MG/DL (ref 2.5–4.5)
PLATELET # BLD: 302 E9/L (ref 130–450)
PMV BLD AUTO: 10 FL (ref 7–12)
POTASSIUM SERPL-SCNC: 4.2 MMOL/L (ref 3.5–5)
RBC # BLD: 3.54 E12/L (ref 3.8–5.8)
SODIUM BLD-SCNC: 133 MMOL/L (ref 132–146)
TOTAL PROTEIN: 6.9 G/DL (ref 6.4–8.3)
WBC # BLD: 6 E9/L (ref 4.5–11.5)

## 2022-02-12 PROCEDURE — 2580000003 HC RX 258: Performed by: INTERNAL MEDICINE

## 2022-02-12 PROCEDURE — 6370000000 HC RX 637 (ALT 250 FOR IP): Performed by: INTERNAL MEDICINE

## 2022-02-12 PROCEDURE — 6360000002 HC RX W HCPCS: Performed by: STUDENT IN AN ORGANIZED HEALTH CARE EDUCATION/TRAINING PROGRAM

## 2022-02-12 PROCEDURE — 6360000002 HC RX W HCPCS: Performed by: INTERNAL MEDICINE

## 2022-02-12 PROCEDURE — APPSS30 APP SPLIT SHARED TIME 16-30 MINUTES: Performed by: PHYSICIAN ASSISTANT

## 2022-02-12 PROCEDURE — 85025 COMPLETE CBC W/AUTO DIFF WBC: CPT

## 2022-02-12 PROCEDURE — 83735 ASSAY OF MAGNESIUM: CPT

## 2022-02-12 PROCEDURE — 84100 ASSAY OF PHOSPHORUS: CPT

## 2022-02-12 PROCEDURE — 6370000000 HC RX 637 (ALT 250 FOR IP): Performed by: PHYSICIAN ASSISTANT

## 2022-02-12 PROCEDURE — 1200000000 HC SEMI PRIVATE

## 2022-02-12 PROCEDURE — 36415 COLL VENOUS BLD VENIPUNCTURE: CPT

## 2022-02-12 PROCEDURE — 82962 GLUCOSE BLOOD TEST: CPT

## 2022-02-12 PROCEDURE — 2580000003 HC RX 258: Performed by: STUDENT IN AN ORGANIZED HEALTH CARE EDUCATION/TRAINING PROGRAM

## 2022-02-12 PROCEDURE — 80048 BASIC METABOLIC PNL TOTAL CA: CPT

## 2022-02-12 PROCEDURE — 99232 SBSQ HOSP IP/OBS MODERATE 35: CPT | Performed by: INTERNAL MEDICINE

## 2022-02-12 PROCEDURE — 80076 HEPATIC FUNCTION PANEL: CPT

## 2022-02-12 RX ADMIN — ENOXAPARIN SODIUM 30 MG: 100 INJECTION SUBCUTANEOUS at 09:22

## 2022-02-12 RX ADMIN — INSULIN LISPRO 15 UNITS: 100 INJECTION, SOLUTION INTRAVENOUS; SUBCUTANEOUS at 12:32

## 2022-02-12 RX ADMIN — INSULIN LISPRO 15 UNITS: 100 INJECTION, SOLUTION INTRAVENOUS; SUBCUTANEOUS at 07:35

## 2022-02-12 RX ADMIN — ACETAMINOPHEN 650 MG: 325 TABLET ORAL at 12:29

## 2022-02-12 RX ADMIN — SACUBITRIL AND VALSARTAN 1 TABLET: 49; 51 TABLET, FILM COATED ORAL at 21:38

## 2022-02-12 RX ADMIN — Medication 300 UNITS: at 21:38

## 2022-02-12 RX ADMIN — ACETAMINOPHEN 650 MG: 325 TABLET ORAL at 17:28

## 2022-02-12 RX ADMIN — PANTOPRAZOLE SODIUM 40 MG: 40 TABLET, DELAYED RELEASE ORAL at 06:34

## 2022-02-12 RX ADMIN — TRIAMCINOLONE ACETONIDE: 1 CREAM TOPICAL at 09:25

## 2022-02-12 RX ADMIN — ENOXAPARIN SODIUM 30 MG: 100 INJECTION SUBCUTANEOUS at 21:37

## 2022-02-12 RX ADMIN — INSULIN LISPRO 15 UNITS: 100 INJECTION, SOLUTION INTRAVENOUS; SUBCUTANEOUS at 16:35

## 2022-02-12 RX ADMIN — OXYCODONE 5 MG: 5 TABLET ORAL at 00:27

## 2022-02-12 RX ADMIN — SODIUM CHLORIDE, PRESERVATIVE FREE 10 ML: 5 INJECTION INTRAVENOUS at 09:25

## 2022-02-12 RX ADMIN — METOPROLOL SUCCINATE 25 MG: 25 TABLET, EXTENDED RELEASE ORAL at 09:22

## 2022-02-12 RX ADMIN — SODIUM CHLORIDE, PRESERVATIVE FREE 10 ML: 5 INJECTION INTRAVENOUS at 21:38

## 2022-02-12 RX ADMIN — PIPERACILLIN AND TAZOBACTAM 3375 MG: 3; .375 INJECTION, POWDER, LYOPHILIZED, FOR SOLUTION INTRAVENOUS at 06:39

## 2022-02-12 RX ADMIN — Medication 300 UNITS: at 09:22

## 2022-02-12 RX ADMIN — ATORVASTATIN CALCIUM 20 MG: 20 TABLET, FILM COATED ORAL at 21:38

## 2022-02-12 RX ADMIN — PIPERACILLIN AND TAZOBACTAM 3375 MG: 3; .375 INJECTION, POWDER, LYOPHILIZED, FOR SOLUTION INTRAVENOUS at 14:30

## 2022-02-12 RX ADMIN — INSULIN LISPRO 2 UNITS: 100 INJECTION, SOLUTION INTRAVENOUS; SUBCUTANEOUS at 16:34

## 2022-02-12 RX ADMIN — INSULIN LISPRO 5 UNITS: 100 INJECTION, SOLUTION INTRAVENOUS; SUBCUTANEOUS at 21:39

## 2022-02-12 RX ADMIN — SACUBITRIL AND VALSARTAN 1 TABLET: 49; 51 TABLET, FILM COATED ORAL at 00:21

## 2022-02-12 RX ADMIN — SACUBITRIL AND VALSARTAN 1 TABLET: 49; 51 TABLET, FILM COATED ORAL at 09:21

## 2022-02-12 RX ADMIN — Medication 3 MG: at 21:38

## 2022-02-12 RX ADMIN — INSULIN LISPRO 6 UNITS: 100 INJECTION, SOLUTION INTRAVENOUS; SUBCUTANEOUS at 07:34

## 2022-02-12 RX ADMIN — INSULIN GLARGINE 22 UNITS: 100 INJECTION, SOLUTION SUBCUTANEOUS at 21:39

## 2022-02-12 RX ADMIN — VANCOMYCIN HYDROCHLORIDE 1500 MG: 500 INJECTION, POWDER, LYOPHILIZED, FOR SOLUTION INTRAVENOUS at 00:22

## 2022-02-12 RX ADMIN — PIPERACILLIN AND TAZOBACTAM 3375 MG: 3; .375 INJECTION, POWDER, LYOPHILIZED, FOR SOLUTION INTRAVENOUS at 21:47

## 2022-02-12 RX ADMIN — ACETAMINOPHEN 650 MG: 325 TABLET ORAL at 06:34

## 2022-02-12 RX ADMIN — ACETAMINOPHEN 650 MG: 325 TABLET ORAL at 00:26

## 2022-02-12 RX ADMIN — OXYCODONE 5 MG: 5 TABLET ORAL at 09:38

## 2022-02-12 RX ADMIN — HYDROMORPHONE HYDROCHLORIDE 1 MG: 1 INJECTION, SOLUTION INTRAMUSCULAR; INTRAVENOUS; SUBCUTANEOUS at 21:39

## 2022-02-12 RX ADMIN — VANCOMYCIN HYDROCHLORIDE 1500 MG: 500 INJECTION, POWDER, LYOPHILIZED, FOR SOLUTION INTRAVENOUS at 13:20

## 2022-02-12 RX ADMIN — SODIUM CHLORIDE: 9 INJECTION, SOLUTION INTRAVENOUS at 20:00

## 2022-02-12 RX ADMIN — SODIUM CHLORIDE: 9 INJECTION, SOLUTION INTRAVENOUS at 12:34

## 2022-02-12 RX ADMIN — TRIAMCINOLONE ACETONIDE: 1 CREAM TOPICAL at 21:00

## 2022-02-12 RX ADMIN — INSULIN GLARGINE 22 UNITS: 100 INJECTION, SOLUTION SUBCUTANEOUS at 07:33

## 2022-02-12 RX ADMIN — INSULIN LISPRO 4 UNITS: 100 INJECTION, SOLUTION INTRAVENOUS; SUBCUTANEOUS at 12:33

## 2022-02-12 RX ADMIN — HEPARIN 300 UNITS: 100 SYRINGE at 06:35

## 2022-02-12 RX ADMIN — OXYCODONE 5 MG: 5 TABLET ORAL at 16:35

## 2022-02-12 ASSESSMENT — PAIN DESCRIPTION - ORIENTATION
ORIENTATION: RIGHT

## 2022-02-12 ASSESSMENT — PAIN DESCRIPTION - FREQUENCY
FREQUENCY: CONTINUOUS

## 2022-02-12 ASSESSMENT — PAIN SCALES - GENERAL
PAINLEVEL_OUTOF10: 9
PAINLEVEL_OUTOF10: 6
PAINLEVEL_OUTOF10: 6
PAINLEVEL_OUTOF10: 9
PAINLEVEL_OUTOF10: 2
PAINLEVEL_OUTOF10: 9
PAINLEVEL_OUTOF10: 3
PAINLEVEL_OUTOF10: 4
PAINLEVEL_OUTOF10: 10

## 2022-02-12 ASSESSMENT — PAIN - FUNCTIONAL ASSESSMENT
PAIN_FUNCTIONAL_ASSESSMENT: ACTIVITIES ARE NOT PREVENTED

## 2022-02-12 ASSESSMENT — PAIN DESCRIPTION - PROGRESSION
CLINICAL_PROGRESSION: NOT CHANGED

## 2022-02-12 ASSESSMENT — PAIN DESCRIPTION - DESCRIPTORS
DESCRIPTORS: ACHING

## 2022-02-12 ASSESSMENT — PAIN DESCRIPTION - PAIN TYPE
TYPE: ACUTE PAIN;SURGICAL PAIN

## 2022-02-12 ASSESSMENT — PAIN DESCRIPTION - LOCATION
LOCATION: BUTTOCKS

## 2022-02-12 ASSESSMENT — PAIN DESCRIPTION - ONSET
ONSET: ON-GOING

## 2022-02-12 NOTE — PROGRESS NOTES
[facility] Progress Note    Admitting Date and Time: 2/10/2022  1:20 PM  Admit Dx: Abscess, gluteal, left [L02.31]  DKA, type 1, not at goal [facility]) [E10.10]  Diabetic ketoacidosis without coma associated with type 1 diabetes mellitus ([facility] [E10.10]    Subjective:  Patient is being followed for Abscess, gluteal, left [L02.31]  DKA, type 1, not at goal [facility]) [E10.10]  Diabetic ketoacidosis without coma associated with type 1 diabetes mellitus ([facility] [E10.10]   Patient was lying in bed in no acute distress. Continues to report rectal pain. ROS: denies fever, chills, cp, sob, n/v, HA unless stated above.      sodium chloride flush  5-40 mL IntraVENous 2 times per day    heparin flush  3 mL IntraVENous 2 times per day    enoxaparin  30 mg SubCUTAneous BID    pantoprazole  40 mg Oral QAM AC    insulin glargine  22 Units SubCUTAneous BID    insulin lispro  0-12 Units SubCUTAneous TID WC    insulin lispro  0-6 Units SubCUTAneous Nightly    triamcinolone   Topical BID    piperacillin-tazobactam  3,375 mg IntraVENous Q8H    acetaminophen  650 mg Oral Q6H    atorvastatin  20 mg Oral Daily    insulin lispro  15 Units SubCUTAneous TID WC    metoprolol succinate  25 mg Oral Daily    sacubitril-valsartan  1 tablet Oral BID     sodium chloride flush, 5-40 mL, PRN  sodium chloride, 25 mL, PRN  heparin flush, 3 mL, PRN  glucose, 15 g, PRN  glucagon (rDNA), 1 mg, PRN  dextrose, 100 mL/hr, PRN  dextrose bolus (hypoglycemia), 125 mL, PRN   Or  dextrose bolus (hypoglycemia), 250 mL, PRN  oxyCODONE, 5 mg, Q4H PRN   Or  oxyCODONE, 5 mg, Q4H PRN  melatonin, 3 mg, Nightly PRN  HYDROmorphone, 1 mg, Q3H PRN  diphenhydrAMINE, 25 mg, Q6H PRN         Objective:    /70   Pulse 80   Temp 97.1 °F (36.2 °C) (Oral)   Resp 16   Ht 5' 10\" (1.778 m)   Wt 244 lb (110.7 kg)   SpO2 98%   BMI 35.01 kg/m²   General Appearance: alert and oriented to person, place and time and in no acute distress  Skin: warm and dry,  Head: normocephalic and atraumatic  Eyes: pupils equal, round, and reactive to light, extraocular eye movements intact, conjunctivae normal  Neck: neck supple and non tender without mass   Pulmonary/Chest: clear to auscultation bilaterally- no wheezes, rales or rhonchi, normal air movement, no respiratory distress  Cardiovascular: normal rate, normal S1 and S2 and no carotid bruits  Abdomen: soft, non-tender, non-distended, normal bowel sounds, no masses or organomegaly  Extremities: no cyanosis, no clubbing and no edema, dark patches with excoriations on bilateral lower extremities   Neurologic: no cranial nerve deficit and speech normal        Recent Labs     02/11/22  0540 02/11/22  1006 02/12/22  0745   * 131* 133   K 3.7 3.4* 4.2   CL 95* 97* 98   CO2 23 23 23   BUN 11 10 6   CREATININE 0.9 0.8 0.7   GLUCOSE 167* 151* 300*   CALCIUM 8.5* 8.4* 8.9       Recent Labs     02/10/22  1439 02/11/22  0540 02/12/22  0745   WBC 13.9* 9.9 6.0   RBC 3.89 3.22* 3.54*   HGB 12.3* 10.2* 11.2*   HCT 35.8* 30.3* 33.5*   MCV 92.0 94.1 94.6   MCH 31.6 31.7 31.6   MCHC 34.4 33.7 33.4   RDW 12.8 12.8 12.9    315 302   MPV 10.6 10.5 10.0       Radiology:   EXAMINATION:  CT OF THE ABDOMEN AND PELVIS WITH CONTRAST 2/10/2022 3:45 pm     TECHNIQUE:  CT of the abdomen and pelvis was performed with the administration of  intravenous contrast. Multiplanar reformatted images are provided for review.   Dose modulation, iterative reconstruction, and/or weight based adjustment of  the mA/kV was utilized to reduce the radiation dose to as low as reasonably  achievable.     COMPARISON:  October 26, 2021     HISTORY:  ORDERING SYSTEM PROVIDED HISTORY: perianal abscess, concern for NSTI  TECHNOLOGIST PROVIDED HISTORY:  Additional Contrast?->None  Reason for exam:->perianal abscess, concern for NSTI  Decision Support Exception - unselect if not a suspected or confirmed  emergency medical condition->Emergency Medical Condition (MA)     FINDINGS:  Lobular area of fluid attenuation involving left gluteal soft tissue with  thin surrounding enhancement measures 12 x 6.3 cm in AP and axial dimension. There is surrounding edema. Additional loculated fluid collection located in  right medial gluteal soft tissue measures 3 x 1.5 cm also with surrounding in  inflammation. No loculated fluid collections in the pelvis. No bony erosive  changes at level of the sacrum. No free air or free fluid. The appendix is  unremarkable. Liver is unremarkable. Gallbladder is contracted. Pancreas  and spleen are unremarkable. No hydronephrosis or perinephric edema. Adrenal glands are unremarkable. Urinary bladder is grossly normal in  contour. No pneumonia in the lung bases. Redemonstration of multiple  prominent right and left groin lymph nodes measuring up to 2.1 x 1.3 cm on  the right and 1.9 x 1.4 cm on the left.     IMPRESSION:  1. Abscess located in left medial gluteal soft tissue measures 12 x 6.3 cm in  AP and axial dimension. 2. Smaller abscess in right medial gluteal soft tissue measures 3 x 1.5 cm.  3. No evidence of intra-abdominal or pelvic abscess. 4. No subcutaneous gas. EXAMINATION:  ONE XRAY VIEW OF THE CHEST     2/11/2022 5:36 am     COMPARISON:  09/15/2021     HISTORY:  ORDERING SYSTEM PROVIDED HISTORY: left picc  TECHNOLOGIST PROVIDED HISTORY:  Reason for exam:->left picc     FINDINGS:  The tip of the left PICC line is in the SVC. No pneumothorax seen. There is  no effusion. Heart size normal.  There is no congestion or infiltrate. Lungs are clear.     IMPRESSION:  PICC line tip is in the SVC.   No acute abnormality.       Assessment:    Principal Problem:    DKA, type 1, not at goal [facility])  Active Problems:    Essential hypertension    Diabetic polyneuropathy associated with type 2 diabetes mellitus ([facility]    Septic shock (HCC)    Gluteal abscess    Perirectal abscess    Primary hypertension    Hyperlipidemia Hypokalemia  Resolved Problems:    * No resolved hospital problems. *      Plan:  1. Sepsis: Initially with tachycardia, and leukocytosis. Resolved. 2. Recurrent perirectal abscess: S/p I&D on 2/10 per surgery. Surgical cultures positive for GNR, and strep. MRSA negative. Blood cultures negative so far. Continue Zosyn. ID following. 3. DKA: patient presented in DKA. Initially in the ICU on insulin drip, has been transferred out. 4. DMI, uncontrolled: Increase to high dose ssi. Lispro 15U. Lantus 22U BID. DM diet. 5. Hypokalemia: Resolved. Monitor. 6. Hypophosphatemia: Resolved. Monitor. 7. Hypertension: Continue Toprol    8. Rash on lower extremities: Continue Kenalog. Patient states it is a mix of eczema and psoriasis. Recommend following up with dermatology outpatient. 9. Chronic systolic CHF: Continue Entresto    10. Hyperlipidemia: Continue Lipitor       NOTE: This report was transcribed using voice recognition software. Every effort was made to ensure accuracy; however, inadvertent computerized transcription errors may be present. Electronically signed by Rock Luzma PA-C on 2/12/2022 at 4:10 PM  HOSPITALIST ATTENDING PHYSICIAN NOTE 2/12/2022 1941PM:    Details of the evaluation - subjective assessment (including medication profile, past medical, family and social history when applicable), examination, review of lab and test data, diagnostic impressions and medical decision making - performed by Rock Luzma PA-C, were discussed with me on the date of service and I agree with clinical information herein unless otherwise noted. The patient has been evaluated by me personally earlier today.   Pt reports no fevers, chills,n/v.     Exam: heart reg at rate of 84,lungs cta, abd pos bs soft nt, ext neg for le edema    I agree with the assessment and plan of Eleazar Rutledge PA-C    Sepsis  Recurrent b/l perirectal abscess s/p I&D  Dm type 2 uncontrolled  htn  Hypokalemia  hypophosphatemia  Possible eczema pt states started on kenalog by pcp and is supposed to see a dermatologist  Chronic systolic chf  hyperlipidemia      Electronically signed by Jose L Rudolph D.O.   Hospitalist  [facility]

## 2022-02-12 NOTE — PROGRESS NOTES
Pharmacy Consultation Note  (Antibiotic Dosing and Monitoring)    Initial consult date: 2/10  Consulting physician/provider: Lui Sears  Drug: Vancomycin  Indication: Perirectal abscess    Age/  Gender Height Weight IBW  Allergy Information   36 y.o./male 5' 10\" (177.8 cm) 243 lb (110.2 kg)     Ideal body weight: 73 kg (160 lb 15 oz)  Adjusted ideal body weight: 88.1 kg (194 lb 2.6 oz)   Patient has no known allergies. Renal Function:  Recent Labs     02/11/22  0540 02/11/22  1006 02/12/22  0745   BUN 11 10 6   CREATININE 0.9 0.8 0.7       Intake/Output Summary (Last 24 hours) at 2/12/2022 0929  Last data filed at 2/12/2022 0558  Gross per 24 hour   Intake 120 ml   Output 600 ml   Net -480 ml       Vancomycin Monitoring:  Trough:  No results for input(s): VANCOTROUGH in the last 72 hours. Random:  No results for input(s): VANCORANDOM in the last 72 hours. Vancomycin Administration Times:  Recent vancomycin administrations                   vancomycin 1500 mg in dextrose 5% 300 mL IVPB (mg) 1,500 mg New Bag 02/12/22 0022    vancomycin 1500 mg in dextrose 5% 300 mL IVPB (mg) 1,500 mg New Bag 02/11/22 1102    vancomycin 2000 mg in dextrose 5% 500 ml IVPB (mg) 2,000 mg Given 02/10/22 1649                Assessment:  · Patient is a 43 y.o. male who has been initiated on vancomycin  Estimated Creatinine Clearance: 178 mL/min (based on SCr of 0.7 mg/dL).   · To dose vancomycin, pharmacy will be utilizing [employer] calculation software for goal AUC/ROBERT 400-600 mg/L-hr    Plan:  · Will continue vancomycin 1500 IV every 12 hours  · Will check vancomycin levels when appropriate  · Will continue to monitor renal function   · Clinical pharmacy to follow      Inna Naraynaan, [address] [place] [address] 2/12/2022 9:29 AM     Addendum    [facility] Vancomycin has been discontinued   [address] to [facility] Physician to re-consult pharmacy if future dosing is needed    Thank you for the consult,    Inna Narayanan, PharmD, BCPS 2/12/2022 2:15 PM

## 2022-02-12 NOTE — PROGRESS NOTES
[address] [place] [address] [facility]  Progress Note    SUBJECTIVE:  Chief Complaint   Patient presents with    Abscess     left butt, traveling down legs     Patient is tolerating medications. No nausea, vomiting, diarrhea. Pain in the buttocks but controlled  No fevers or chills. Review of systems:  As stated above in the chief complaint, otherwise negative. Medications:  Scheduled Meds:   sodium chloride flush  5-40 mL IntraVENous 2 times per day    heparin flush  3 mL IntraVENous 2 times per day    enoxaparin  30 mg SubCUTAneous BID    pantoprazole  40 mg Oral QAM AC    insulin glargine  22 Units SubCUTAneous BID    insulin lispro  0-12 Units SubCUTAneous TID WC    insulin lispro  0-6 Units SubCUTAneous Nightly    vancomycin  1,500 mg IntraVENous Q12H    triamcinolone   Topical BID    piperacillin-tazobactam  3,375 mg IntraVENous Q8H    acetaminophen  650 mg Oral Q6H    atorvastatin  20 mg Oral Daily    insulin lispro  15 Units SubCUTAneous TID WC    metoprolol succinate  25 mg Oral Daily    sacubitril-valsartan  1 tablet Oral BID     Continuous Infusions:   sodium chloride      dextrose      sodium chloride 12.5 mL/hr at 22     PRN Meds:sodium chloride flush, sodium chloride, heparin flush, glucose, glucagon (rDNA), dextrose, dextrose bolus (hypoglycemia) **OR** dextrose bolus (hypoglycemia), oxyCODONE **OR** oxyCODONE, melatonin, HYDROmorphone, diphenhydrAMINE    OBJECTIVE:  /70   Pulse 80   Temp 97.1 °F (36.2 °C) (Oral)   Resp 16   Ht 5' 10\" (1.778 m)   Wt 244 lb (110.7 kg)   SpO2 98%   BMI 35.01 kg/m²   Temp  Av.1 °F (36.7 °C)  Min: 97.1 °F (36.2 °C)  Max: 98.7 °F (37.1 °C)  Constitutional: The patient is awake, alert, and oriented. In no distress, lying on right side  Skin: Warm and dry. No rashes were noted. HEENT: Round and reactive pupils. Moist mucous membranes. No ulcerations or thrush. Neck: Supple to movements.    Chest: No respiratory distress. Symmetrical expansion. No wheezing, crackles or rhonchi. Cardiovascular: S1 and S2 are rhythmic and regular. No murmurs appreciated. Abdomen: Positive bowel sounds to auscultation. Benign to palpation. No masses felt. Buttock: perirectal abscesses dressed with dry dressings  Extremities: No edema.   Lines: PICC line 2/11/2022 left arm     Laboratory and Tests Review:  Lab Results   Component Value Date    WBC 6.0 02/12/2022    WBC 9.9 02/11/2022    WBC 13.9 (H) 02/10/2022    HGB 11.2 (L) 02/12/2022    HCT 33.5 (L) 02/12/2022    MCV 94.6 02/12/2022     02/12/2022     Lab Results   Component Value Date    NEUTROABS 3.57 02/12/2022    NEUTROABS 6.86 02/11/2022    NEUTROABS 10.53 (H) 02/10/2022     No results found for: CRPHS  Lab Results   Component Value Date    ALT 9 02/12/2022    AST 11 02/12/2022    ALKPHOS 68 02/12/2022    BILITOT <0.2 02/12/2022     Lab Results   Component Value Date     02/12/2022    K 4.2 02/12/2022    K 4.4 02/10/2022    CL 98 02/12/2022    CO2 23 02/12/2022    BUN 6 02/12/2022    CREATININE 0.7 02/12/2022    CREATININE 0.8 02/11/2022    CREATININE 0.9 02/11/2022    GFRAA >60 02/12/2022    LABGLOM >60 02/12/2022    GLUCOSE 300 02/12/2022    PROT 6.9 02/12/2022    LABALBU 3.5 02/12/2022    CALCIUM 8.9 02/12/2022    BILITOT <0.2 02/12/2022    ALKPHOS 68 02/12/2022    AST 11 02/12/2022    ALT 9 02/12/2022     Lab Results   Component Value Date    CRP 0.3 10/29/2021    CRP 0.5 (H) 10/26/2021    CRP 1.8 (H) 10/22/2021     Lab Results   Component Value Date    SEDRATE 80 (H) 02/10/2022    SEDRATE 62 (H) 10/29/2021    SEDRATE >150 (H) 10/26/2021     Radiology:  Noted     Microbiology:   Surgical culture left buttock abscess: GNR, Streptococcus species   MRSA screening: negative  Blood cultures: negative so far    ASSESSMENT:  · Recurrent perirectal abscess-status post I&D  · Leukocytosis associated to the above, improved  · DM, uncontrolled PLAN:  · Continue Zosyn for now   · Check final cultures  · Monitor labs    CRIS Pisano - CNP  12:23 PM  2/12/2022     Patient seen and examined. I had a face to face encounter with the patient. Agree with exam.  Assessment and plan as outlined above and directed by me. Addition and corrections were done as deemed appropriate. My exam and plan include: The patient was transferred out of the ICU. Vancomycin was ordered unbeknownst to ID. Please note that we would like to be in control of antibiotics. Also, Vancomycin and Zosyn increased risk of RONA a, especially in diabetics.     Bora Cadena MD  2/12/2022  2:12 PM

## 2022-02-12 NOTE — PROGRESS NOTES
[facility] Hospitalist   Progress Note    Admitting Date and Time: 2/10/2022  1:20 PM  Admit Dx: Abscess, gluteal, left [L02.31]  DKA, type 1, not at goal [facility]) [E10.10]  Diabetic ketoacidosis without coma associated with type 1 diabetes mellitus ([facility] [E10.10]    Subjective:    Patient was admitted with Abscess, gluteal, left [L02.31]  DKA, type 1, not at goal [facility]) [E10.10]  Diabetic ketoacidosis without coma associated with type 1 diabetes mellitus ([facility] [E10.10]. Patient denies fever, chills, cp, sob, n/v.     sodium chloride flush  5-40 mL IntraVENous 2 times per day    heparin flush  3 mL IntraVENous 2 times per day    enoxaparin  30 mg SubCUTAneous BID    pantoprazole  40 mg Oral QAM AC    insulin glargine  22 Units SubCUTAneous BID    insulin lispro  0-12 Units SubCUTAneous TID WC    insulin lispro  0-6 Units SubCUTAneous Nightly    vancomycin  1,500 mg IntraVENous Q12H    triamcinolone   Topical BID    piperacillin-tazobactam  3,375 mg IntraVENous Q8H    acetaminophen  650 mg Oral Q6H    atorvastatin  20 mg Oral Daily    insulin lispro  15 Units SubCUTAneous TID WC    metoprolol succinate  25 mg Oral Daily    sacubitril-valsartan  1 tablet Oral BID     sodium chloride flush, 5-40 mL, PRN  sodium chloride, 25 mL, PRN  heparin flush, 3 mL, PRN  oxyCODONE, 5 mg, Q4H PRN   Or  oxyCODONE, 5 mg, Q4H PRN  dextrose bolus (hypoglycemia), 125 mL, PRN   Or  dextrose bolus (hypoglycemia), 250 mL, PRN  melatonin, 3 mg, Nightly PRN  HYDROmorphone, 1 mg, Q3H PRN  diphenhydrAMINE, 25 mg, Q6H PRN         Objective:    PHYSICAL EXAM:    Vitals:  BP (!) 111/53   Pulse 80   Temp 98.6 °F (37 °C) (Oral)   Resp 16   Ht 5' 10\" (1.778 m)   Wt 245 lb 13 oz (111.5 kg)   SpO2 96%   BMI 35.27 kg/m²     General:  Appears comfortable. Answers questions appropriately and cooperative with exam  HEENT:  Mucous membranes moist. No erythema, rhinorrhea, or post-nasal drip noted.   Neck:  No carotid bruits. Heart:  Rhythm regular at rate of 84  Lungs:  CTA. No wheeze, rales, or rhonchi  Abdomen:  Positive bowel sounds positive. Soft. Non-tender. No guarding, rebound or rigidity. Breast/Rectal/Genitourinary: not pertinent. Extremities:  Negative for lower extremity edema  Skin:  Warm and dry. Patches of erythema noted on legs  Vascular: 2/4 Dorsalis Pedis pulses bilaterally. Neuro:  Cranial nerves 2-12 grossly intact, no focal weakness or change in sensation noted. Extraocular muscles intact. Pupils equal, round, reactive to light.           Recent Labs     02/11/22  0100 02/11/22  0540 02/11/22  1006   * 131* 131*   K 3.4* 3.7 3.4*   CL 92* 95* 97*   CO2 20* 23 23   BUN 14 11 10   CREATININE 1.0 0.9 0.8   GLUCOSE 217* 167* 151*   CALCIUM 8.7 8.5* 8.4*       Recent Labs     02/10/22  1439 02/11/22  0540   WBC 13.9* 9.9   RBC 3.89 3.22*   HGB 12.3* 10.2*   HCT 35.8* 30.3*   MCV 92.0 94.1   MCH 31.6 31.7   MCHC 34.4 33.7   RDW 12.8 12.8    315   MPV 10.6 10.5       CBC with Differential:    Lab Results   Component Value Date    WBC 9.9 02/11/2022    RBC 3.22 02/11/2022    HGB 10.2 02/11/2022    HCT 30.3 02/11/2022     02/11/2022    MCV 94.1 02/11/2022    MCH 31.7 02/11/2022    MCHC 33.7 02/11/2022    RDW 12.8 02/11/2022    SEGSPCT 64 02/04/2014    LYMPHOPCT 20.6 02/11/2022    MONOPCT 7.2 02/11/2022    BASOPCT 0.4 02/11/2022    MONOSABS 0.71 02/11/2022    LYMPHSABS 2.03 02/11/2022    EOSABS 0.20 02/11/2022    BASOSABS 0.04 02/11/2022     BMP:    Lab Results   Component Value Date     02/11/2022    K 3.4 02/11/2022    K 4.4 02/10/2022    CL 97 02/11/2022    CO2 23 02/11/2022    BUN 10 02/11/2022    LABALBU 3.9 02/10/2022    CREATININE 0.8 02/11/2022    CALCIUM 8.4 02/11/2022    GFRAA >60 02/11/2022    LABGLOM >60 02/11/2022    GLUCOSE 151 02/11/2022     Magnesium:    Lab Results   Component Value Date    MG 2.1 02/11/2022     Phosphorus:    Lab Results   Component Value Date PHOS 1.8 02/11/2022        Radiology:   XR CHEST PORTABLE   Final Result   PICC line tip is in the SVC. No acute abnormality. CT ABDOMEN PELVIS W IV CONTRAST Additional Contrast? None   Final Result   1. Abscess located in left medial gluteal soft tissue measures 12 x 6.3 cm in   AP and axial dimension. 2. Smaller abscess in right medial gluteal soft tissue measures 3 x 1.5 cm.   3. No evidence of intra-abdominal or pelvic abscess. 4. No subcutaneous gas. Assessment:    Principal Problem:    DKA, type 1, not at goal [facility])  Active Problems:    Essential hypertension    Diabetic polyneuropathy associated with type 2 diabetes mellitus ([facility]    Septic shock (HCC)    Gluteal abscess  Resolved Problems:    * No resolved hospital problems. *      Plan:  1. Sepsis(tachycardia, leukocytosis, infection)POA supportive care and tx underlying infection  2. Recurrent b/l perirectal abscess s/p I&D by surgery. Continue abx. ID following. Monitor cultures  3. Dm type 2 uncontrolled monitor bs and tx with insulin  4. dka improved and now out of icu  5. htn continue med  6. Hypokalemia monitor and replace prn   7. Hypophosphatemia monitor and replace prn  8. Possible eczema kenalog started at request of pt.  9. Chronic systolic chf continue medication  10.  Hyperlipidemia continue med    Chart reviewed and updated by nursing    Time spent is 35 min    Electronically signed by Ivon Chaidez DO on 2/11/2022 at 9:30 PM

## 2022-02-13 LAB
ANAEROBIC CULTURE: NORMAL
ANION GAP SERPL CALCULATED.3IONS-SCNC: 10 MMOL/L (ref 7–16)
BASOPHILS ABSOLUTE: 0.04 E9/L (ref 0–0.2)
BASOPHILS RELATIVE PERCENT: 0.9 % (ref 0–2)
BUN BLDV-MCNC: 8 MG/DL (ref 6–20)
CALCIUM SERPL-MCNC: 9.3 MG/DL (ref 8.6–10.2)
CHLORIDE BLD-SCNC: 101 MMOL/L (ref 98–107)
CO2: 25 MMOL/L (ref 22–29)
CREAT SERPL-MCNC: 0.8 MG/DL (ref 0.7–1.2)
EOSINOPHILS ABSOLUTE: 0.18 E9/L (ref 0.05–0.5)
EOSINOPHILS RELATIVE PERCENT: 4 % (ref 0–6)
GFR AFRICAN AMERICAN: >60
GFR NON-AFRICAN AMERICAN: >60 ML/MIN/1.73
GLUCOSE BLD-MCNC: 263 MG/DL (ref 74–99)
HCT VFR BLD CALC: 31.9 % (ref 37–54)
HEMOGLOBIN: 10.7 G/DL (ref 12.5–16.5)
IMMATURE GRANULOCYTES #: 0.01 E9/L
IMMATURE GRANULOCYTES %: 0.2 % (ref 0–5)
LYMPHOCYTES ABSOLUTE: 1.6 E9/L (ref 1.5–4)
LYMPHOCYTES RELATIVE PERCENT: 35.9 % (ref 20–42)
MAGNESIUM: 2.1 MG/DL (ref 1.6–2.6)
MCH RBC QN AUTO: 31.8 PG (ref 26–35)
MCHC RBC AUTO-ENTMCNC: 33.5 % (ref 32–34.5)
MCV RBC AUTO: 94.7 FL (ref 80–99.9)
METER GLUCOSE: 157 MG/DL (ref 74–99)
METER GLUCOSE: 171 MG/DL (ref 74–99)
METER GLUCOSE: 194 MG/DL (ref 74–99)
METER GLUCOSE: 301 MG/DL (ref 74–99)
MONOCYTES ABSOLUTE: 0.26 E9/L (ref 0.1–0.95)
MONOCYTES RELATIVE PERCENT: 5.8 % (ref 2–12)
NEUTROPHILS ABSOLUTE: 2.37 E9/L (ref 1.8–7.3)
NEUTROPHILS RELATIVE PERCENT: 53.2 % (ref 43–80)
PDW BLD-RTO: 12.7 FL (ref 11.5–15)
PHOSPHORUS: 4.3 MG/DL (ref 2.5–4.5)
PLATELET # BLD: 357 E9/L (ref 130–450)
PMV BLD AUTO: 9.9 FL (ref 7–12)
POTASSIUM SERPL-SCNC: 4 MMOL/L (ref 3.5–5)
RBC # BLD: 3.37 E12/L (ref 3.8–5.8)
SODIUM BLD-SCNC: 136 MMOL/L (ref 132–146)
WBC # BLD: 4.5 E9/L (ref 4.5–11.5)

## 2022-02-13 PROCEDURE — 1200000000 HC SEMI PRIVATE

## 2022-02-13 PROCEDURE — 82962 GLUCOSE BLOOD TEST: CPT

## 2022-02-13 PROCEDURE — 84100 ASSAY OF PHOSPHORUS: CPT

## 2022-02-13 PROCEDURE — 36415 COLL VENOUS BLD VENIPUNCTURE: CPT

## 2022-02-13 PROCEDURE — 83735 ASSAY OF MAGNESIUM: CPT

## 2022-02-13 PROCEDURE — 6370000000 HC RX 637 (ALT 250 FOR IP): Performed by: PHYSICIAN ASSISTANT

## 2022-02-13 PROCEDURE — 6370000000 HC RX 637 (ALT 250 FOR IP): Performed by: INTERNAL MEDICINE

## 2022-02-13 PROCEDURE — 6360000002 HC RX W HCPCS: Performed by: INTERNAL MEDICINE

## 2022-02-13 PROCEDURE — 2580000003 HC RX 258: Performed by: INTERNAL MEDICINE

## 2022-02-13 PROCEDURE — APPSS30 APP SPLIT SHARED TIME 16-30 MINUTES: Performed by: PHYSICIAN ASSISTANT

## 2022-02-13 PROCEDURE — 80048 BASIC METABOLIC PNL TOTAL CA: CPT

## 2022-02-13 PROCEDURE — 85025 COMPLETE CBC W/AUTO DIFF WBC: CPT

## 2022-02-13 PROCEDURE — 99232 SBSQ HOSP IP/OBS MODERATE 35: CPT | Performed by: INTERNAL MEDICINE

## 2022-02-13 RX ORDER — INSULIN GLARGINE 100 [IU]/ML
24 INJECTION, SOLUTION SUBCUTANEOUS 2 TIMES DAILY
Status: DISCONTINUED | OUTPATIENT
Start: 2022-02-13 | End: 2022-02-14

## 2022-02-13 RX ADMIN — ENOXAPARIN SODIUM 30 MG: 100 INJECTION SUBCUTANEOUS at 07:57

## 2022-02-13 RX ADMIN — PIPERACILLIN AND TAZOBACTAM 3375 MG: 3; .375 INJECTION, POWDER, LYOPHILIZED, FOR SOLUTION INTRAVENOUS at 06:02

## 2022-02-13 RX ADMIN — PANTOPRAZOLE SODIUM 40 MG: 40 TABLET, DELAYED RELEASE ORAL at 06:01

## 2022-02-13 RX ADMIN — TRIAMCINOLONE ACETONIDE: 1 CREAM TOPICAL at 21:49

## 2022-02-13 RX ADMIN — ENOXAPARIN SODIUM 30 MG: 100 INJECTION SUBCUTANEOUS at 21:35

## 2022-02-13 RX ADMIN — INSULIN GLARGINE 24 UNITS: 100 INJECTION, SOLUTION SUBCUTANEOUS at 21:37

## 2022-02-13 RX ADMIN — Medication 3 MG: at 21:34

## 2022-02-13 RX ADMIN — PIPERACILLIN AND TAZOBACTAM 3375 MG: 3; .375 INJECTION, POWDER, LYOPHILIZED, FOR SOLUTION INTRAVENOUS at 21:35

## 2022-02-13 RX ADMIN — ACETAMINOPHEN 650 MG: 325 TABLET ORAL at 06:01

## 2022-02-13 RX ADMIN — INSULIN LISPRO 3 UNITS: 100 INJECTION, SOLUTION INTRAVENOUS; SUBCUTANEOUS at 12:28

## 2022-02-13 RX ADMIN — PIPERACILLIN AND TAZOBACTAM 3375 MG: 3; .375 INJECTION, POWDER, LYOPHILIZED, FOR SOLUTION INTRAVENOUS at 14:51

## 2022-02-13 RX ADMIN — OXYCODONE 5 MG: 5 TABLET ORAL at 17:01

## 2022-02-13 RX ADMIN — Medication 300 UNITS: at 21:36

## 2022-02-13 RX ADMIN — ATORVASTATIN CALCIUM 20 MG: 20 TABLET, FILM COATED ORAL at 21:34

## 2022-02-13 RX ADMIN — SODIUM CHLORIDE: 9 INJECTION, SOLUTION INTRAVENOUS at 20:00

## 2022-02-13 RX ADMIN — SODIUM CHLORIDE: 9 INJECTION, SOLUTION INTRAVENOUS at 12:29

## 2022-02-13 RX ADMIN — ACETAMINOPHEN 650 MG: 325 TABLET ORAL at 12:26

## 2022-02-13 RX ADMIN — SODIUM CHLORIDE, PRESERVATIVE FREE 10 ML: 5 INJECTION INTRAVENOUS at 21:36

## 2022-02-13 RX ADMIN — INSULIN LISPRO 2 UNITS: 100 INJECTION, SOLUTION INTRAVENOUS; SUBCUTANEOUS at 21:37

## 2022-02-13 RX ADMIN — INSULIN LISPRO 15 UNITS: 100 INJECTION, SOLUTION INTRAVENOUS; SUBCUTANEOUS at 08:02

## 2022-02-13 RX ADMIN — HYDROMORPHONE HYDROCHLORIDE 1 MG: 1 INJECTION, SOLUTION INTRAMUSCULAR; INTRAVENOUS; SUBCUTANEOUS at 06:01

## 2022-02-13 RX ADMIN — SODIUM CHLORIDE, PRESERVATIVE FREE 10 ML: 5 INJECTION INTRAVENOUS at 07:59

## 2022-02-13 RX ADMIN — TRIAMCINOLONE ACETONIDE: 1 CREAM TOPICAL at 07:59

## 2022-02-13 RX ADMIN — METOPROLOL SUCCINATE 25 MG: 25 TABLET, EXTENDED RELEASE ORAL at 07:58

## 2022-02-13 RX ADMIN — INSULIN LISPRO 12 UNITS: 100 INJECTION, SOLUTION INTRAVENOUS; SUBCUTANEOUS at 08:01

## 2022-02-13 RX ADMIN — INSULIN GLARGINE 22 UNITS: 100 INJECTION, SOLUTION SUBCUTANEOUS at 08:01

## 2022-02-13 RX ADMIN — ACETAMINOPHEN 650 MG: 325 TABLET ORAL at 18:02

## 2022-02-13 RX ADMIN — SACUBITRIL AND VALSARTAN 1 TABLET: 49; 51 TABLET, FILM COATED ORAL at 07:57

## 2022-02-13 RX ADMIN — INSULIN LISPRO 15 UNITS: 100 INJECTION, SOLUTION INTRAVENOUS; SUBCUTANEOUS at 17:03

## 2022-02-13 RX ADMIN — Medication 300 UNITS: at 07:58

## 2022-02-13 RX ADMIN — INSULIN LISPRO 15 UNITS: 100 INJECTION, SOLUTION INTRAVENOUS; SUBCUTANEOUS at 12:28

## 2022-02-13 RX ADMIN — SACUBITRIL AND VALSARTAN 1 TABLET: 49; 51 TABLET, FILM COATED ORAL at 21:34

## 2022-02-13 RX ADMIN — SODIUM CHLORIDE: 9 INJECTION, SOLUTION INTRAVENOUS at 04:00

## 2022-02-13 RX ADMIN — INSULIN LISPRO 3 UNITS: 100 INJECTION, SOLUTION INTRAVENOUS; SUBCUTANEOUS at 17:03

## 2022-02-13 ASSESSMENT — PAIN - FUNCTIONAL ASSESSMENT
PAIN_FUNCTIONAL_ASSESSMENT: ACTIVITIES ARE NOT PREVENTED

## 2022-02-13 ASSESSMENT — PAIN SCALES - GENERAL
PAINLEVEL_OUTOF10: 4
PAINLEVEL_OUTOF10: 9
PAINLEVEL_OUTOF10: 10
PAINLEVEL_OUTOF10: 2
PAINLEVEL_OUTOF10: 4
PAINLEVEL_OUTOF10: 9
PAINLEVEL_OUTOF10: 6

## 2022-02-13 ASSESSMENT — PAIN DESCRIPTION - DESCRIPTORS
DESCRIPTORS: ACHING

## 2022-02-13 ASSESSMENT — PAIN DESCRIPTION - ORIENTATION
ORIENTATION: RIGHT;LEFT

## 2022-02-13 ASSESSMENT — PAIN DESCRIPTION - PROGRESSION
CLINICAL_PROGRESSION: NOT CHANGED

## 2022-02-13 ASSESSMENT — PAIN DESCRIPTION - FREQUENCY
FREQUENCY: CONTINUOUS

## 2022-02-13 ASSESSMENT — PAIN DESCRIPTION - LOCATION
LOCATION: BUTTOCKS

## 2022-02-13 ASSESSMENT — PAIN DESCRIPTION - PAIN TYPE
TYPE: ACUTE PAIN;SURGICAL PAIN

## 2022-02-13 ASSESSMENT — PAIN DESCRIPTION - ONSET
ONSET: ON-GOING

## 2022-02-13 NOTE — PLAN OF CARE
Problem: Falls - Risk of:  Goal: Will remain free from falls  Description: Will remain free from falls  Outcome: Met This Shift  Goal: Absence of physical injury  Description: Absence of physical injury  Outcome: Met This Shift     Problem: Infection - Surgical Site:  Goal: Will show no infection signs and symptoms  Description: Will show no infection signs and symptoms  Outcome: Ongoing     Problem: Pain:  Goal: Pain level will decrease  Description: Pain level will decrease  Outcome: Ongoing  Goal: Control of acute pain  Description: Control of acute pain  Outcome: Ongoing

## 2022-02-13 NOTE — PROGRESS NOTES
[address] [place] [address] [facility]  Progress Note    SUBJECTIVE:  Chief Complaint   Patient presents with    Abscess     left butt, traveling down legs     Patient is tolerating medications. No nausea, vomiting, diarrhea. Spoke with nursing no issues reported      Review of systems:  As stated above in the chief complaint, otherwise negative. Medications:  Scheduled Meds:   insulin lispro  0-18 Units SubCUTAneous TID WC    insulin lispro  0-9 Units SubCUTAneous Nightly    sodium chloride flush  5-40 mL IntraVENous 2 times per day    heparin flush  3 mL IntraVENous 2 times per day    enoxaparin  30 mg SubCUTAneous BID    pantoprazole  40 mg Oral QAM AC    insulin glargine  22 Units SubCUTAneous BID    triamcinolone   Topical BID    piperacillin-tazobactam  3,375 mg IntraVENous Q8H    acetaminophen  650 mg Oral Q6H    atorvastatin  20 mg Oral Daily    insulin lispro  15 Units SubCUTAneous TID WC    metoprolol succinate  25 mg Oral Daily    sacubitril-valsartan  1 tablet Oral BID     Continuous Infusions:   sodium chloride      dextrose      sodium chloride 12.5 mL/hr at 22 1229     PRN Meds:sodium chloride flush, sodium chloride, heparin flush, glucose, glucagon (rDNA), dextrose, dextrose bolus (hypoglycemia) **OR** dextrose bolus (hypoglycemia), oxyCODONE **OR** oxyCODONE, melatonin, HYDROmorphone, diphenhydrAMINE    OBJECTIVE:  BP (!) 112/56   Pulse 72   Temp 97.7 °F (36.5 °C) (Oral)   Resp 16   Ht 5' 10\" (1.778 m)   Wt 244 lb (110.7 kg)   SpO2 96%   BMI 35.01 kg/m²   Temp  Av.2 °F (36.8 °C)  Min: 97.7 °F (36.5 °C)  Max: 98.7 °F (37.1 °C)  Constitutional: The patient is awake, alert, and oriented. In no distress, up to the bathroom. Skin: Warm and dry. No rashes were noted. HEENT: Round and reactive pupils. Moist mucous membranes. No ulcerations or thrush. Neck: Supple to movements. Chest: No respiratory distress. Symmetrical expansion.   No wheezing, crackles or rhonchi. Cardiovascular: S1 and S2 are rhythmic and regular. No murmurs appreciated. Abdomen: Positive bowel sounds to auscultation. Benign to palpation. No masses felt. Buttock: perirectal abscesses dressed with dry dressings  Extremities: No edema.   Lines: PICC line 2/11/2022 left arm     Laboratory and Tests Review:  Lab Results   Component Value Date    WBC 4.5 02/13/2022    WBC 6.0 02/12/2022    WBC 9.9 02/11/2022    HGB 10.7 (L) 02/13/2022    HCT 31.9 (L) 02/13/2022    MCV 94.7 02/13/2022     02/13/2022     Lab Results   Component Value Date    NEUTROABS 2.37 02/13/2022    NEUTROABS 3.57 02/12/2022    NEUTROABS 6.86 02/11/2022     No results found for: CRPHS  Lab Results   Component Value Date    ALT 9 02/12/2022    AST 11 02/12/2022    ALKPHOS 68 02/12/2022    BILITOT <0.2 02/12/2022     Lab Results   Component Value Date     02/13/2022    K 4.0 02/13/2022    K 4.4 02/10/2022     02/13/2022    CO2 25 02/13/2022    BUN 8 02/13/2022    CREATININE 0.8 02/13/2022    CREATININE 0.7 02/12/2022    CREATININE 0.8 02/11/2022    GFRAA >60 02/13/2022    LABGLOM >60 02/13/2022    GLUCOSE 263 02/13/2022    PROT 6.9 02/12/2022    LABALBU 3.5 02/12/2022    CALCIUM 9.3 02/13/2022    BILITOT <0.2 02/12/2022    ALKPHOS 68 02/12/2022    AST 11 02/12/2022    ALT 9 02/12/2022     Lab Results   Component Value Date    CRP 0.3 10/29/2021    CRP 0.5 (H) 10/26/2021    CRP 1.8 (H) 10/22/2021     Lab Results   Component Value Date    SEDRATE 80 (H) 02/10/2022    SEDRATE 62 (H) 10/29/2021    SEDRATE >150 (H) 10/26/2021     Radiology:  Noted     Microbiology:   Surgical culture left buttock abscess: E.coli (R ampicillin & Intermediate amp-sulbact), Streptococcus species   MRSA screening: negative  Blood cultures: negative so far    ASSESSMENT:  · Recurrent perirectal abscess-status post I&D  · Leukocytosis associated to the above, improved  · DM, uncontrolled     PLAN:  · Continue Zosyn for now   · Await final cultures  · Monitor labs    Radha Yadav, CRIS - CNP  1:21 PM  2/13/2022     Patient seen and examined. I had a face to face encounter with the patient. Agree with exam.  Assessment and plan as outlined above and directed by me. Addition and corrections were done as deemed appropriate. My exam and plan include: Patient is doing well. He has less pain. Tolerating current antibiotics.     Delilah Man MD  2/13/2022  2:12 PM

## 2022-02-13 NOTE — PLAN OF CARE
Problem: Falls - Risk of:  Goal: Will remain free from falls  Description: Will remain free from falls  2/13/2022 0957 by Sandy Sanz RN  Outcome: Met This Shift     Problem: Falls - Risk of:  Goal: Absence of physical injury  Description: Absence of physical injury  2/13/2022 0957 by Sandy Sanz RN  Outcome: Met This Shift     Problem: Infection - Surgical Site:  Goal: Will show no infection signs and symptoms  Description: Will show no infection signs and symptoms  2/13/2022 0957 by Sandy Sanz RN  Outcome: Met This Shift     Problem: Pain:  Goal: Pain level will decrease  Description: Pain level will decrease  2/13/2022 0957 by Sandy Sanz RN  Outcome: Met This Shift     Problem: Pain:  Goal: Control of acute pain  Description: Control of acute pain  2/13/2022 0957 by Sandy Sanz RN  Outcome: Met This Shift     Problem: Pain:  Goal: Control of chronic pain  Description: Control of chronic pain  Outcome: Met This Shift

## 2022-02-13 NOTE — PROGRESS NOTES
[facility] Progress Note    Admitting Date and Time: 2/10/2022  1:20 PM  Admit Dx: Abscess, gluteal, left [L02.31]  DKA, type 1, not at goal [facility]) [E10.10]  Diabetic ketoacidosis without coma associated with type 1 diabetes mellitus ([facility] [E10.10]    Subjective:  Patient is being followed for Abscess, gluteal, left [L02.31]  DKA, type 1, not at goal [facility]) [E10.10]  Diabetic ketoacidosis without coma associated with type 1 diabetes mellitus ([facility] [E10.10]   Patient was lying in bed in no acute distress. Reports mild improvement of pain. ROS: denies fever, chills, cp, sob, n/v, HA unless stated above.      insulin lispro  0-18 Units SubCUTAneous TID WC    insulin lispro  0-9 Units SubCUTAneous Nightly    sodium chloride flush  5-40 mL IntraVENous 2 times per day    heparin flush  3 mL IntraVENous 2 times per day    enoxaparin  30 mg SubCUTAneous BID    pantoprazole  40 mg Oral QAM AC    insulin glargine  22 Units SubCUTAneous BID    triamcinolone   Topical BID    piperacillin-tazobactam  3,375 mg IntraVENous Q8H    acetaminophen  650 mg Oral Q6H    atorvastatin  20 mg Oral Daily    insulin lispro  15 Units SubCUTAneous TID WC    metoprolol succinate  25 mg Oral Daily    sacubitril-valsartan  1 tablet Oral BID     sodium chloride flush, 5-40 mL, PRN  sodium chloride, 25 mL, PRN  heparin flush, 3 mL, PRN  glucose, 15 g, PRN  glucagon (rDNA), 1 mg, PRN  dextrose, 100 mL/hr, PRN  dextrose bolus (hypoglycemia), 125 mL, PRN   Or  dextrose bolus (hypoglycemia), 250 mL, PRN  oxyCODONE, 5 mg, Q4H PRN   Or  oxyCODONE, 5 mg, Q4H PRN  melatonin, 3 mg, Nightly PRN  HYDROmorphone, 1 mg, Q3H PRN  diphenhydrAMINE, 25 mg, Q6H PRN         Objective:    BP (!) 112/56   Pulse 72   Temp 97.7 °F (36.5 °C) (Oral)   Resp 16   Ht 5' 10\" (1.778 m)   Wt 244 lb (110.7 kg)   SpO2 96%   BMI 35.01 kg/m²   General Appearance: alert and oriented to person, place and time and in no acute distress  Skin: warm and dry,  Head: normocephalic and atraumatic  Eyes: pupils equal, round, and reactive to light, extraocular eye movements intact, conjunctivae normal  Neck: neck supple and non tender without mass   Pulmonary/Chest: clear to auscultation bilaterally- no wheezes, rales or rhonchi, normal air movement, no respiratory distress  Cardiovascular: normal rate, normal S1 and S2 and no carotid bruits  Abdomen: soft, non-tender, non-distended, normal bowel sounds, no masses or organomegaly  Extremities: no cyanosis, no clubbing and no edema, dark patches with excoriations on bilateral lower extremities   Neurologic: no cranial nerve deficit and speech normal        Recent Labs     02/11/22  1006 02/12/22  0745 02/13/22  0540   * 133 136   K 3.4* 4.2 4.0   CL 97* 98 101   CO2 23 23 25   BUN 10 6 8   CREATININE 0.8 0.7 0.8   GLUCOSE 151* 300* 263*   CALCIUM 8.4* 8.9 9.3       Recent Labs     02/11/22  0540 02/12/22  0745 02/13/22  0540   WBC 9.9 6.0 4.5   RBC 3.22* 3.54* 3.37*   HGB 10.2* 11.2* 10.7*   HCT 30.3* 33.5* 31.9*   MCV 94.1 94.6 94.7   MCH 31.7 31.6 31.8   MCHC 33.7 33.4 33.5   RDW 12.8 12.9 12.7    302 357   MPV 10.5 10.0 9.9       Radiology:   EXAMINATION:  CT OF THE ABDOMEN AND PELVIS WITH CONTRAST 2/10/2022 3:45 pm     TECHNIQUE:  CT of the abdomen and pelvis was performed with the administration of  intravenous contrast. Multiplanar reformatted images are provided for review.   Dose modulation, iterative reconstruction, and/or weight based adjustment of  the mA/kV was utilized to reduce the radiation dose to as low as reasonably  achievable.     COMPARISON:  October 26, 2021     HISTORY:  ORDERING SYSTEM PROVIDED HISTORY: perianal abscess, concern for NSTI  TECHNOLOGIST PROVIDED HISTORY:  Additional Contrast?->None  Reason for exam:->perianal abscess, concern for NSTI  Decision Support Exception - unselect if not a suspected or confirmed  emergency medical condition->Emergency Medical Condition (MA)     FINDINGS:  Lobular area of fluid attenuation involving left gluteal soft tissue with  thin surrounding enhancement measures 12 x 6.3 cm in AP and axial dimension. There is surrounding edema. Additional loculated fluid collection located in  right medial gluteal soft tissue measures 3 x 1.5 cm also with surrounding in  inflammation. No loculated fluid collections in the pelvis. No bony erosive  changes at level of the sacrum. No free air or free fluid. The appendix is  unremarkable. Liver is unremarkable. Gallbladder is contracted. Pancreas  and spleen are unremarkable. No hydronephrosis or perinephric edema. Adrenal glands are unremarkable. Urinary bladder is grossly normal in  contour. No pneumonia in the lung bases. Redemonstration of multiple  prominent right and left groin lymph nodes measuring up to 2.1 x 1.3 cm on  the right and 1.9 x 1.4 cm on the left.     IMPRESSION:  1. Abscess located in left medial gluteal soft tissue measures 12 x 6.3 cm in  AP and axial dimension. 2. Smaller abscess in right medial gluteal soft tissue measures 3 x 1.5 cm.  3. No evidence of intra-abdominal or pelvic abscess. 4. No subcutaneous gas. EXAMINATION:  ONE XRAY VIEW OF THE CHEST     2/11/2022 5:36 am     COMPARISON:  09/15/2021     HISTORY:  ORDERING SYSTEM PROVIDED HISTORY: left picc  TECHNOLOGIST PROVIDED HISTORY:  Reason for exam:->left picc     FINDINGS:  The tip of the left PICC line is in the SVC. No pneumothorax seen. There is  no effusion. Heart size normal.  There is no congestion or infiltrate. Lungs are clear.     IMPRESSION:  PICC line tip is in the SVC.   No acute abnormality.       Assessment:    Principal Problem:    DKA, type 1, not at goal [facility])  Active Problems:    Essential hypertension    Diabetic polyneuropathy associated with type 2 diabetes mellitus ([facility]    Septic shock (HCC)    Gluteal abscess    Perirectal abscess    Primary hypertension    Hyperlipidemia Hypokalemia  Resolved Problems:    * No resolved hospital problems. *      Plan:  1. Sepsis: Initially with tachycardia, and leukocytosis. Resolved. 2. Recurrent perirectal abscess: S/p I&D on 2/10 per surgery. Surgical cultures positive for GNR, and strep. Awaiting final cultures. MRSA negative. Blood cultures negative so far. Continue Zosyn. ID following. 3. DKA: patient presented in DKA. Initially in the ICU on insulin drip, has been transferred out. 4. DMI, uncontrolled: Continue high dose ssi. Lispro 15U. Increase Lantus to 24U BID. DM diet. 5. Hypokalemia: Resolved. Monitor. 6. Hypophosphatemia: Resolved. Monitor. 7. Hypertension: Continue Toprol    8. Rash on lower extremities: Continue Kenalog. Patient states it is a mix of eczema and psoriasis. Recommend following up with dermatology outpatient. 9. Chronic systolic CHF: Continue Entresto    10. Hyperlipidemia: Continue Lipitor       NOTE: This report was transcribed using voice recognition software. Every effort was made to ensure accuracy; however, inadvertent computerized transcription errors may be present. Electronically signed by Chelsea Gonzalez PA-C on 2/13/2022 at 8:48 AM   HOSPITALIST ATTENDING PHYSICIAN NOTE 2/13/2022 2111PM:    Details of the evaluation - subjective assessment (including medication profile, past medical, family and social history when applicable), examination, review of lab and test data, diagnostic impressions and medical decision making - performed by Chelsea Gonzalez PA-C, were discussed with me on the date of service and I agree with clinical information herein unless otherwise noted. The patient has been evaluated by me personally earlier today.   Pt reports no fevers, chills,n/v.     Exam: heart reg at rate of 76,lungs cta, abd pos bs soft nt, ext neg for le edema    I agree with the assessment and plan of Eleazar Rutledge PA-C    Sepsis  Recurrent b/l perirectal abscess s/p I&D  Dm type 2 uncontrolled  htn  Hypokalemia  hypophosphatemia  Possible eczema pt states started on kenalog by pcp and is supposed to see a dermatologist  Chronic systolic chf  hyperlipidemia        Electronically signed by Vandana Chase D.O.   Hospitalist  [facility]

## 2022-02-14 VITALS
DIASTOLIC BLOOD PRESSURE: 70 MMHG | WEIGHT: 244 LBS | RESPIRATION RATE: 16 BRPM | HEIGHT: 70 IN | OXYGEN SATURATION: 97 % | SYSTOLIC BLOOD PRESSURE: 126 MMHG | BODY MASS INDEX: 34.93 KG/M2 | TEMPERATURE: 98 F | HEART RATE: 71 BPM

## 2022-02-14 LAB
ANION GAP SERPL CALCULATED.3IONS-SCNC: 11 MMOL/L (ref 7–16)
BASOPHILS ABSOLUTE: 0.03 E9/L (ref 0–0.2)
BASOPHILS RELATIVE PERCENT: 0.5 % (ref 0–2)
BUN BLDV-MCNC: 7 MG/DL (ref 6–20)
CALCIUM SERPL-MCNC: 9 MG/DL (ref 8.6–10.2)
CHLORIDE BLD-SCNC: 100 MMOL/L (ref 98–107)
CO2: 24 MMOL/L (ref 22–29)
CREAT SERPL-MCNC: 0.8 MG/DL (ref 0.7–1.2)
EOSINOPHILS ABSOLUTE: 0.13 E9/L (ref 0.05–0.5)
EOSINOPHILS RELATIVE PERCENT: 2.3 % (ref 0–6)
GFR AFRICAN AMERICAN: >60
GFR NON-AFRICAN AMERICAN: >60 ML/MIN/1.73
GLUCOSE BLD-MCNC: 285 MG/DL (ref 74–99)
HCT VFR BLD CALC: 33.8 % (ref 37–54)
HEMOGLOBIN: 11.1 G/DL (ref 12.5–16.5)
IMMATURE GRANULOCYTES #: 0.01 E9/L
IMMATURE GRANULOCYTES %: 0.2 % (ref 0–5)
LYMPHOCYTES ABSOLUTE: 2.16 E9/L (ref 1.5–4)
LYMPHOCYTES RELATIVE PERCENT: 38.6 % (ref 20–42)
MAGNESIUM: 2 MG/DL (ref 1.6–2.6)
MCH RBC QN AUTO: 31.6 PG (ref 26–35)
MCHC RBC AUTO-ENTMCNC: 32.8 % (ref 32–34.5)
MCV RBC AUTO: 96.3 FL (ref 80–99.9)
METER GLUCOSE: 136 MG/DL (ref 74–99)
METER GLUCOSE: 184 MG/DL (ref 74–99)
METER GLUCOSE: 184 MG/DL (ref 74–99)
METER GLUCOSE: 198 MG/DL (ref 74–99)
METER GLUCOSE: 281 MG/DL (ref 74–99)
MONOCYTES ABSOLUTE: 0.37 E9/L (ref 0.1–0.95)
MONOCYTES RELATIVE PERCENT: 6.6 % (ref 2–12)
NEUTROPHILS ABSOLUTE: 2.89 E9/L (ref 1.8–7.3)
NEUTROPHILS RELATIVE PERCENT: 51.8 % (ref 43–80)
PDW BLD-RTO: 12.9 FL (ref 11.5–15)
PHOSPHORUS: 4.7 MG/DL (ref 2.5–4.5)
PLATELET # BLD: 411 E9/L (ref 130–450)
PMV BLD AUTO: 10 FL (ref 7–12)
POTASSIUM SERPL-SCNC: 4.1 MMOL/L (ref 3.5–5)
RBC # BLD: 3.51 E12/L (ref 3.8–5.8)
RBC # BLD: NORMAL 10*6/UL
SODIUM BLD-SCNC: 135 MMOL/L (ref 132–146)
WBC # BLD: 5.6 E9/L (ref 4.5–11.5)

## 2022-02-14 PROCEDURE — 84100 ASSAY OF PHOSPHORUS: CPT

## 2022-02-14 PROCEDURE — 6360000002 HC RX W HCPCS: Performed by: REGISTERED NURSE

## 2022-02-14 PROCEDURE — 6360000002 HC RX W HCPCS: Performed by: INTERNAL MEDICINE

## 2022-02-14 PROCEDURE — 36415 COLL VENOUS BLD VENIPUNCTURE: CPT

## 2022-02-14 PROCEDURE — 6370000000 HC RX 637 (ALT 250 FOR IP): Performed by: INTERNAL MEDICINE

## 2022-02-14 PROCEDURE — 85025 COMPLETE CBC W/AUTO DIFF WBC: CPT

## 2022-02-14 PROCEDURE — 6370000000 HC RX 637 (ALT 250 FOR IP): Performed by: PHYSICIAN ASSISTANT

## 2022-02-14 PROCEDURE — 6370000000 HC RX 637 (ALT 250 FOR IP): Performed by: REGISTERED NURSE

## 2022-02-14 PROCEDURE — 80048 BASIC METABOLIC PNL TOTAL CA: CPT

## 2022-02-14 PROCEDURE — 2580000003 HC RX 258: Performed by: INTERNAL MEDICINE

## 2022-02-14 PROCEDURE — 99239 HOSP IP/OBS DSCHRG MGMT >30: CPT | Performed by: INTERNAL MEDICINE

## 2022-02-14 PROCEDURE — 2580000003 HC RX 258: Performed by: REGISTERED NURSE

## 2022-02-14 PROCEDURE — APPSS30 APP SPLIT SHARED TIME 16-30 MINUTES: Performed by: PHYSICIAN ASSISTANT

## 2022-02-14 PROCEDURE — 82962 GLUCOSE BLOOD TEST: CPT

## 2022-02-14 PROCEDURE — 83735 ASSAY OF MAGNESIUM: CPT

## 2022-02-14 RX ORDER — METRONIDAZOLE 500 MG/1
500 TABLET ORAL EVERY 8 HOURS SCHEDULED
Status: DISCONTINUED | OUTPATIENT
Start: 2022-02-14 | End: 2022-02-15 | Stop reason: HOSPADM

## 2022-02-14 RX ORDER — INSULIN GLARGINE 100 [IU]/ML
26 INJECTION, SOLUTION SUBCUTANEOUS 2 TIMES DAILY
Status: DISCONTINUED | OUTPATIENT
Start: 2022-02-14 | End: 2022-02-15 | Stop reason: HOSPADM

## 2022-02-14 RX ORDER — METRONIDAZOLE 500 MG/1
500 TABLET ORAL EVERY 8 HOURS SCHEDULED
Qty: 30 TABLET | Refills: 0 | Status: SHIPPED | OUTPATIENT
Start: 2022-02-14 | End: 2022-02-24

## 2022-02-14 RX ADMIN — SODIUM CHLORIDE, PRESERVATIVE FREE 10 ML: 5 INJECTION INTRAVENOUS at 20:36

## 2022-02-14 RX ADMIN — INSULIN LISPRO 15 UNITS: 100 INJECTION, SOLUTION INTRAVENOUS; SUBCUTANEOUS at 09:21

## 2022-02-14 RX ADMIN — ACETAMINOPHEN 650 MG: 325 TABLET ORAL at 12:20

## 2022-02-14 RX ADMIN — ENOXAPARIN SODIUM 30 MG: 100 INJECTION SUBCUTANEOUS at 09:13

## 2022-02-14 RX ADMIN — ATORVASTATIN CALCIUM 20 MG: 20 TABLET, FILM COATED ORAL at 20:35

## 2022-02-14 RX ADMIN — ENOXAPARIN SODIUM 30 MG: 100 INJECTION SUBCUTANEOUS at 20:35

## 2022-02-14 RX ADMIN — HYDROMORPHONE HYDROCHLORIDE 1 MG: 1 INJECTION, SOLUTION INTRAMUSCULAR; INTRAVENOUS; SUBCUTANEOUS at 06:31

## 2022-02-14 RX ADMIN — INSULIN LISPRO 3 UNITS: 100 INJECTION, SOLUTION INTRAVENOUS; SUBCUTANEOUS at 17:10

## 2022-02-14 RX ADMIN — METOPROLOL SUCCINATE 25 MG: 25 TABLET, EXTENDED RELEASE ORAL at 09:13

## 2022-02-14 RX ADMIN — ACETAMINOPHEN 650 MG: 325 TABLET ORAL at 20:44

## 2022-02-14 RX ADMIN — INSULIN LISPRO 15 UNITS: 100 INJECTION, SOLUTION INTRAVENOUS; SUBCUTANEOUS at 12:17

## 2022-02-14 RX ADMIN — PIPERACILLIN AND TAZOBACTAM 3375 MG: 3; .375 INJECTION, POWDER, LYOPHILIZED, FOR SOLUTION INTRAVENOUS at 14:55

## 2022-02-14 RX ADMIN — SODIUM CHLORIDE: 9 INJECTION, SOLUTION INTRAVENOUS at 12:00

## 2022-02-14 RX ADMIN — TRIAMCINOLONE ACETONIDE: 1 CREAM TOPICAL at 20:37

## 2022-02-14 RX ADMIN — SODIUM CHLORIDE, PRESERVATIVE FREE 10 ML: 5 INJECTION INTRAVENOUS at 09:24

## 2022-02-14 RX ADMIN — WATER 2000 MG: 1 INJECTION INTRAMUSCULAR; INTRAVENOUS; SUBCUTANEOUS at 20:36

## 2022-02-14 RX ADMIN — INSULIN LISPRO 3 UNITS: 100 INJECTION, SOLUTION INTRAVENOUS; SUBCUTANEOUS at 12:15

## 2022-02-14 RX ADMIN — TRIAMCINOLONE ACETONIDE: 1 CREAM TOPICAL at 09:12

## 2022-02-14 RX ADMIN — SACUBITRIL AND VALSARTAN 1 TABLET: 49; 51 TABLET, FILM COATED ORAL at 09:49

## 2022-02-14 RX ADMIN — PIPERACILLIN AND TAZOBACTAM 3375 MG: 3; .375 INJECTION, POWDER, LYOPHILIZED, FOR SOLUTION INTRAVENOUS at 06:31

## 2022-02-14 RX ADMIN — SODIUM CHLORIDE, PRESERVATIVE FREE 10 ML: 5 INJECTION INTRAVENOUS at 14:56

## 2022-02-14 RX ADMIN — PANTOPRAZOLE SODIUM 40 MG: 40 TABLET, DELAYED RELEASE ORAL at 06:31

## 2022-02-14 RX ADMIN — INSULIN LISPRO 15 UNITS: 100 INJECTION, SOLUTION INTRAVENOUS; SUBCUTANEOUS at 17:11

## 2022-02-14 RX ADMIN — SACUBITRIL AND VALSARTAN 1 TABLET: 49; 51 TABLET, FILM COATED ORAL at 20:35

## 2022-02-14 RX ADMIN — Medication 300 UNITS: at 20:35

## 2022-02-14 RX ADMIN — ACETAMINOPHEN 650 MG: 325 TABLET ORAL at 06:31

## 2022-02-14 RX ADMIN — Medication 300 UNITS: at 09:25

## 2022-02-14 RX ADMIN — INSULIN LISPRO 9 UNITS: 100 INJECTION, SOLUTION INTRAVENOUS; SUBCUTANEOUS at 09:19

## 2022-02-14 RX ADMIN — METRONIDAZOLE 500 MG: 500 TABLET ORAL at 18:10

## 2022-02-14 RX ADMIN — SODIUM CHLORIDE: 9 INJECTION, SOLUTION INTRAVENOUS at 04:00

## 2022-02-14 RX ADMIN — INSULIN GLARGINE 24 UNITS: 100 INJECTION, SOLUTION SUBCUTANEOUS at 09:19

## 2022-02-14 RX ADMIN — INSULIN GLARGINE 26 UNITS: 100 INJECTION, SOLUTION SUBCUTANEOUS at 20:37

## 2022-02-14 RX ADMIN — METRONIDAZOLE 500 MG: 500 TABLET ORAL at 20:35

## 2022-02-14 ASSESSMENT — PAIN DESCRIPTION - PROGRESSION
CLINICAL_PROGRESSION: NOT CHANGED
CLINICAL_PROGRESSION: NOT CHANGED

## 2022-02-14 ASSESSMENT — PAIN SCALES - GENERAL
PAINLEVEL_OUTOF10: 9
PAINLEVEL_OUTOF10: 9
PAINLEVEL_OUTOF10: 0
PAINLEVEL_OUTOF10: 9

## 2022-02-14 NOTE — PLAN OF CARE

## 2022-02-14 NOTE — PATIENT CARE CONFERENCE
P Quality Flow/Interdisciplinary Rounds Progress Note        Quality Flow Rounds held on February 14, 2022    Disciplines Attending:  Bedside Nurse, ,  and Nursing Unit [place] 10 was admitted on 2/10/2022  1:20 PM    Anticipated Discharge Date:  Expected Discharge Date: 02/13/22    Disposition:    Gian Score:  Gian Scale Score: 22    Readmission Risk              Risk of Unplanned Readmission:  26           Discussed patient goal for the day, patient clinical progression, and barriers to discharge.   The following Goal(s) of the Day/Commitment(s) have been identified:  Labs - Report Results      Trupti Woodruff RN  February 14, 2022

## 2022-02-14 NOTE — PROGRESS NOTES
GENERAL SURGERY  DAILY PROGRESS NOTE  2/14/2022    CHIEF COMPLAINT:  Chief Complaint   Patient presents with    Abscess     left butt, traveling down legs       SUBJECTIVE:  Patient feeling better this morning. Minimal drainage and pain improving. OBJECTIVE:  /68   Pulse 67   Temp 98.1 °F (36.7 °C) (Oral)   Resp 16   Ht 5' 10\" (1.778 m)   Wt 244 lb (110.7 kg)   SpO2 97%   BMI 35.01 kg/m²     GENERAL:  NAD. A&Ox3. LUNGS:  No increased work of breathing. CARDIOVASCULAR: RR  ABDOMEN:  Soft, non-distended, non-tender. No guarding, rigidity, rebound.   EXT: warm and well perfused  MALE : healing well, some drainage/exudate, coverings c/d/i    ASSESSMENT/PLAN:  43 y.o. male with poorly controlled DM who presented with multiple buttock abscesses now s/p OR I&D 2/10.     - continue local wound care  - prn pain control  - optimize glucose control  - surgery will be available as needed, please do not hesitate to reach out if there are any further questions or concerns    Kylie Orellana MD  Surgery Resident PGY-2  2/14/2022  5:53 AM

## 2022-02-14 NOTE — PROGRESS NOTES
CLINICAL PHARMACY NOTE: MEDS TO BEDS    Total # of Prescriptions Filled: 1   The following medications were delivered to the patient:  · METRONIDAZOLE 500    Additional Documentation:

## 2022-02-14 NOTE — PROGRESS NOTES
[facility] Progress Note    Admitting Date and Time: 2/10/2022  1:20 PM  Admit Dx: Abscess, gluteal, left [L02.31]  DKA, type 1, not at goal [facility]) [E10.10]  Diabetic ketoacidosis without coma associated with type 1 diabetes mellitus ([facility] [E10.10]    Subjective:  Patient is being followed for Abscess, gluteal, left [L02.31]  DKA, type 1, not at goal [facility]) [E10.10]  Diabetic ketoacidosis without coma associated with type 1 diabetes mellitus ([facility] [E10.10]   Patient was lying in bed in no acute distress. Continues to have pain when sitting. ROS: denies fever, chills, cp, sob, n/v, HA unless stated above.      insulin glargine  24 Units SubCUTAneous BID    insulin lispro  0-18 Units SubCUTAneous TID WC    insulin lispro  0-9 Units SubCUTAneous Nightly    sodium chloride flush  5-40 mL IntraVENous 2 times per day    heparin flush  3 mL IntraVENous 2 times per day    enoxaparin  30 mg SubCUTAneous BID    pantoprazole  40 mg Oral QAM AC    triamcinolone   Topical BID    piperacillin-tazobactam  3,375 mg IntraVENous Q8H    acetaminophen  650 mg Oral Q6H    atorvastatin  20 mg Oral Daily    insulin lispro  15 Units SubCUTAneous TID WC    metoprolol succinate  25 mg Oral Daily    sacubitril-valsartan  1 tablet Oral BID     sodium chloride flush, 5-40 mL, PRN  sodium chloride, 25 mL, PRN  heparin flush, 3 mL, PRN  glucose, 15 g, PRN  glucagon (rDNA), 1 mg, PRN  dextrose, 100 mL/hr, PRN  dextrose bolus (hypoglycemia), 125 mL, PRN   Or  dextrose bolus (hypoglycemia), 250 mL, PRN  oxyCODONE, 5 mg, Q4H PRN   Or  oxyCODONE, 5 mg, Q4H PRN  melatonin, 3 mg, Nightly PRN  HYDROmorphone, 1 mg, Q3H PRN  diphenhydrAMINE, 25 mg, Q6H PRN         Objective:    /80   Pulse 68   Temp 98.6 °F (37 °C) (Oral)   Resp 16   Ht 5' 10\" (1.778 m)   Wt 244 lb (110.7 kg)   SpO2 98%   BMI 35.01 kg/m²   General Appearance: alert and oriented to person, place and time and in no acute distress  Skin: warm and dry,  Head: normocephalic and atraumatic  Eyes: pupils equal, round, and reactive to light, extraocular eye movements intact, conjunctivae normal  Neck: neck supple and non tender without mass   Pulmonary/Chest: clear to auscultation bilaterally- no wheezes, rales or rhonchi, normal air movement, no respiratory distress  Cardiovascular: normal rate, normal S1 and S2 and no carotid bruits  Abdomen: soft, non-tender, non-distended, normal bowel sounds, no masses or organomegaly  Extremities: no cyanosis, no clubbing and no edema, dark patches with excoriations on bilateral lower extremities   Neurologic: no cranial nerve deficit and speech normal        Recent Labs     02/11/22  1006 02/12/22  0745 02/13/22  0540   * 133 136   K 3.4* 4.2 4.0   CL 97* 98 101   CO2 23 23 25   BUN 10 6 8   CREATININE 0.8 0.7 0.8   GLUCOSE 151* 300* 263*   CALCIUM 8.4* 8.9 9.3       Recent Labs     02/12/22  0745 02/13/22  0540   WBC 6.0 4.5   RBC 3.54* 3.37*   HGB 11.2* 10.7*   HCT 33.5* 31.9*   MCV 94.6 94.7   MCH 31.6 31.8   MCHC 33.4 33.5   RDW 12.9 12.7    357   MPV 10.0 9.9       Radiology:   EXAMINATION:  CT OF THE ABDOMEN AND PELVIS WITH CONTRAST 2/10/2022 3:45 pm     TECHNIQUE:  CT of the abdomen and pelvis was performed with the administration of  intravenous contrast. Multiplanar reformatted images are provided for review.   Dose modulation, iterative reconstruction, and/or weight based adjustment of  the mA/kV was utilized to reduce the radiation dose to as low as reasonably  achievable.     COMPARISON:  October 26, 2021     HISTORY:  ORDERING SYSTEM PROVIDED HISTORY: perianal abscess, concern for NSTI  TECHNOLOGIST PROVIDED HISTORY:  Additional Contrast?->None  Reason for exam:->perianal abscess, concern for NSTI  Decision Support Exception - unselect if not a suspected or confirmed  emergency medical condition->Emergency Medical Condition (MA)     FINDINGS:  Lobular area of fluid attenuation involving left gluteal soft tissue with  thin surrounding enhancement measures 12 x 6.3 cm in AP and axial dimension. There is surrounding edema. Additional loculated fluid collection located in  right medial gluteal soft tissue measures 3 x 1.5 cm also with surrounding in  inflammation. No loculated fluid collections in the pelvis. No bony erosive  changes at level of the sacrum. No free air or free fluid. The appendix is  unremarkable. Liver is unremarkable. Gallbladder is contracted. Pancreas  and spleen are unremarkable. No hydronephrosis or perinephric edema. Adrenal glands are unremarkable. Urinary bladder is grossly normal in  contour. No pneumonia in the lung bases. Redemonstration of multiple  prominent right and left groin lymph nodes measuring up to 2.1 x 1.3 cm on  the right and 1.9 x 1.4 cm on the left.     IMPRESSION:  1. Abscess located in left medial gluteal soft tissue measures 12 x 6.3 cm in  AP and axial dimension. 2. Smaller abscess in right medial gluteal soft tissue measures 3 x 1.5 cm.  3. No evidence of intra-abdominal or pelvic abscess. 4. No subcutaneous gas. EXAMINATION:  ONE XRAY VIEW OF THE CHEST     2/11/2022 5:36 am     COMPARISON:  09/15/2021     HISTORY:  ORDERING SYSTEM PROVIDED HISTORY: left picc  TECHNOLOGIST PROVIDED HISTORY:  Reason for exam:->left picc     FINDINGS:  The tip of the left PICC line is in the SVC. No pneumothorax seen. There is  no effusion. Heart size normal.  There is no congestion or infiltrate. Lungs are clear.     IMPRESSION:  PICC line tip is in the SVC. No acute abnormality.       Assessment:    Principal Problem:    DKA, type 1, not at goal [facility])  Active Problems:    Essential hypertension    Diabetic polyneuropathy associated with type 2 diabetes mellitus ([facility]    Septic shock (HCC)    Gluteal abscess    Perirectal abscess    Primary hypertension    Hyperlipidemia    Hypokalemia  Resolved Problems:    * No resolved hospital problems. *      Plan:  1. Sepsis: Initially with tachycardia, and leukocytosis. Resolved. 2. Recurrent perirectal abscess: S/p I&D on 2/10 per surgery. Surgical cultures positive for e coli, strep, and group c strep. MRSA negative. Blood cultures negative so far. Continue Zosyn. ID following. 3. DKA: patient presented in DKA. Initially in the ICU on insulin drip, has been transferred out. 4. DMI, uncontrolled: Hemoglobin 1/10/2022 14.2. Continue high dose ssi. Lispro 15U. Increase Lantus to 26U BID. DM diet. 5. Hypokalemia: Resolved. Monitor. 6. Hypophosphatemia: Resolved. Monitor. 7. Hypertension: Continue Toprol    8. Rash on lower extremities: Continue Kenalog. Patient states it is a mix of eczema and psoriasis. Recommend following up with dermatology outpatient. 9. Chronic systolic CHF: Continue Entresto    10. Hyperlipidemia: Continue Lipitor       NOTE: This report was transcribed using voice recognition software. Every effort was made to ensure accuracy; however, inadvertent computerized transcription errors may be present.   Electronically signed by Dinesh Duffy PA-C on 2/14/2022 at 9:08 AM

## 2022-02-14 NOTE — CARE COORDINATION
Order for IV atb written by infectious disease. Same was faxed to [facility] who confirmed they can initiate services in patient's home 2/15/2022. [facility] order is written. Patient will need first dose of IV rocephin today prior to discharge  Ashwin Triana.  [place], MSN, RN  [facility] Case Management  [phone]

## 2022-02-14 NOTE — DISCHARGE SUMMARY
[L02.31]  DKA, type 1, not at goal [facility]) [E10.10]  Diabetic ketoacidosis without coma associated with type 1 diabetes mellitus ([facility] [E810]    43year old male with a past medical history of DM, asthma, chronic systolic heart failure, hypertensions, diabetic neuropathy, and hyperlipidemia presented to the  ED for gluteal abscesses. He was admitted and treated as below. 1. Sepsis: Initially with tachycardia, and leukocytosis. Resolved.      2. Recurrent perirectal abscess: S/p I&D on 2/10 per surgery. Surgical cultures positive for e coli, strep, and group c strep. MRSA negative. Blood cultures negative so far. Given Zosyn, transitioned to IV Ceftriaxone and PO Flagyl. ID following.      3. DKA: patient presented in DKA. Initially in the ICU on insulin drip, has been transferred out.      4. DMI, uncontrolled: Hemoglobin 1/10/2022 14.2. Continue high dose ssi. Lispro 15U. Continue Lantus to 22U BID. DM diet. Patient educated on the need to optimize his sugars, and the risks of elevated BG. Patient verbalized understanding.     5. Hypokalemia: Resolved. Monitor.      6. Hypophosphatemia: Resolved. Monitor.      7. Hypertension: Continue Toprol     8. Rash on lower extremities: Continue Kenalog. Patient states it is a mix of eczema and psoriasis. Recommend following up with dermatology outpatient.     9. Chronic systolic CHF: Continue Entresto     10. Hyperlipidemia: Continue Lipitor     Patient is hemodynamically stable and ready for discharge with Addy Rothman for IV antibiotics. He is to follow up with wound care, ID, and PCP.        Discharge Exam:  General Appearance: alert and oriented to person, place and time and in no acute distress  Skin: warm and dry,  Head: normocephalic and atraumatic  Eyes: pupils equal, round, and reactive to light, extraocular eye movements intact, conjunctivae normal  Neck: neck supple and non tender without mass   Pulmonary/Chest: clear to auscultation bilaterally- no wheezes, rales or rhonchi, normal air movement, no respiratory distress  Cardiovascular: normal rate, normal S1 and S2 and no carotid bruits  Abdomen: soft, non-tender, non-distended, normal bowel sounds, no masses or organomegaly  Extremities: no cyanosis, no clubbing and no edema, dark patches with excoriations on bilateral lower extremities   Neurologic: no cranial nerve deficit and speech normal    I/O last 3 completed shifts: In: 780 [P.O.:780]  Out: -   No intake/output data recorded. LABS:  Recent Labs     02/12/22 0745 02/13/22  0540 02/14/22  0853    136 135   K 4.2 4.0 4.1   CL 98 101 100   CO2 23 25 24   BUN 6 8 7   CREATININE 0.7 0.8 0.8   GLUCOSE 300* 263* 285*   CALCIUM 8.9 9.3 9.0       Recent Labs     02/12/22 0745 02/13/22  0540 02/14/22  0853   WBC 6.0 4.5 5.6   RBC 3.54* 3.37* 3.51*   HGB 11.2* 10.7* 11.1*   HCT 33.5* 31.9* 33.8*   MCV 94.6 94.7 96.3   MCH 31.6 31.8 31.6   MCHC 33.4 33.5 32.8   RDW 12.9 12.7 12.9    357 411   MPV 10.0 9.9 10.0       No results for input(s): POCGLU in the last 72 hours. Imaging:  CT ABDOMEN PELVIS W IV CONTRAST Additional Contrast? None    Result Date: 2/10/2022  EXAMINATION: CT OF THE ABDOMEN AND PELVIS WITH CONTRAST 2/10/2022 3:45 pm TECHNIQUE: CT of the abdomen and pelvis was performed with the administration of intravenous contrast. Multiplanar reformatted images are provided for review. Dose modulation, iterative reconstruction, and/or weight based adjustment of the mA/kV was utilized to reduce the radiation dose to as low as reasonably achievable.  COMPARISON: October 26, 2021 HISTORY: ORDERING SYSTEM PROVIDED HISTORY: perianal abscess, concern for NSTI TECHNOLOGIST PROVIDED HISTORY: Additional Contrast?->None Reason for exam:->perianal abscess, concern for NSTI Decision Support Exception - unselect if not a suspected or confirmed emergency medical condition->Emergency Medical Condition (MA) FINDINGS: Lobular area of fluid attenuation involving left gluteal soft tissue with thin surrounding enhancement measures 12 x 6.3 cm in AP and axial dimension. There is surrounding edema. Additional loculated fluid collection located in right medial gluteal soft tissue measures 3 x 1.5 cm also with surrounding in inflammation. No loculated fluid collections in the pelvis. No bony erosive changes at level of the sacrum. No free air or free fluid. The appendix is unremarkable. Liver is unremarkable. Gallbladder is contracted. Pancreas and spleen are unremarkable. No hydronephrosis or perinephric edema. Adrenal glands are unremarkable. Urinary bladder is grossly normal in contour. No pneumonia in the lung bases. Redemonstration of multiple prominent right and left groin lymph nodes measuring up to 2.1 x 1.3 cm on the right and 1.9 x 1.4 cm on the left. 1. Abscess located in left medial gluteal soft tissue measures 12 x 6.3 cm in AP and axial dimension. 2. Smaller abscess in right medial gluteal soft tissue measures 3 x 1.5 cm. 3. No evidence of intra-abdominal or pelvic abscess. 4. No subcutaneous gas. XR CHEST PORTABLE    Result Date: 2/11/2022  EXAMINATION: ONE XRAY VIEW OF THE CHEST 2/11/2022 5:36 am COMPARISON: 09/15/2021 HISTORY: ORDERING SYSTEM PROVIDED HISTORY: left picc TECHNOLOGIST PROVIDED HISTORY: Reason for exam:->left picc FINDINGS: The tip of the left PICC line is in the SVC. No pneumothorax seen. There is no effusion. Heart size normal.  There is no congestion or infiltrate. Lungs are clear. PICC line tip is in the SVC. No acute abnormality.        Patient Instructions:      Medication List      START taking these medications    cefTRIAXone  infusion  Commonly known as: ROCEPHIN  Infuse 2,000 mg intravenously every 24 hours for 10 days Compound per protocol     metroNIDAZOLE 500 MG tablet  Commonly known as: FLAGYL  Take 1 tablet by mouth every 8 hours for 10 days        CHANGE how you take these medications    sacubitril-valsartan daily     potassium chloride 20 MEQ extended release tablet  Commonly known as: KLOR-CON M  Take 2 tablets by mouth daily Pt only taking one tablet daily unless he takes Bumex BID then he takes 2 tabs     triamcinolone 0.1 % cream  Commonly known as: KENALOG  Apply topically as needed (rash) Apply topically 2 times daily x 10 days prn     vitamin D 1.25 MG (62969 UT) Caps capsule  Commonly known as: ERGOCALCIFEROL  Take 1 capsule by mouth once a week         * This list has 4 medication(s) that are the same as other medications prescribed for you. Read the directions carefully, and ask your doctor or other care provider to review them with you. STOP taking these medications    spironolactone 25 MG tablet  Commonly known as: ALDACTONE           Where to Get Your Medications      These medications were sent to [facility], [address] [place] [address] [place] [address]    Phone: [phone]   · metroNIDAZOLE 500 MG tablet     You can get these medications from any pharmacy    Bring a paper prescription for each of these medications  · cefTRIAXone  infusion           Note that more than 30 minutes was spent in preparing discharge papers, discussing discharge with patient, medication review, etc.    Signed:  Electronically signed by Tia Ashley PA-C on 2/14/2022 at 5:32 PM   HOSPITALIST ATTENDING PHYSICIAN NOTE 2/14/2022 2002PM:    Details of the evaluation - subjective assessment (including medication profile, past medical, family and social history when applicable), examination, review of lab and test data, diagnostic impressions and medical decision making - performed by Tia Ashley PA-C, were discussed with me on the date of service and I agree with clinical information herein unless otherwise noted. The patient has been evaluated by me personally earlier today.   Pt reports no fevers, chills,n/v.     Exam: heart reg at

## 2022-02-14 NOTE — PROGRESS NOTES
[address] [place] [address] [facility]  Progress Note    SUBJECTIVE:  Chief Complaint   Patient presents with    Abscess     left butt, traveling down legs     Patient is tolerating medications. No nausea, vomiting, diarrhea. He is feeling better, says the pain is improving   No fevers      Review of systems:  As stated above in the chief complaint, otherwise negative. Medications:  Scheduled Meds:   insulin glargine  26 Units SubCUTAneous BID    insulin lispro  0-18 Units SubCUTAneous TID WC    insulin lispro  0-9 Units SubCUTAneous Nightly    sodium chloride flush  5-40 mL IntraVENous 2 times per day    heparin flush  3 mL IntraVENous 2 times per day    enoxaparin  30 mg SubCUTAneous BID    pantoprazole  40 mg Oral QAM AC    triamcinolone   Topical BID    piperacillin-tazobactam  3,375 mg IntraVENous Q8H    acetaminophen  650 mg Oral Q6H    atorvastatin  20 mg Oral Daily    insulin lispro  15 Units SubCUTAneous TID WC    metoprolol succinate  25 mg Oral Daily    sacubitril-valsartan  1 tablet Oral BID     Continuous Infusions:   sodium chloride      dextrose      sodium chloride 12.5 mL/hr at 22 0400     PRN Meds:sodium chloride flush, sodium chloride, heparin flush, glucose, glucagon (rDNA), dextrose, dextrose bolus (hypoglycemia) **OR** dextrose bolus (hypoglycemia), oxyCODONE **OR** oxyCODONE, melatonin, HYDROmorphone, diphenhydrAMINE    OBJECTIVE:  /80   Pulse 68   Temp 98.6 °F (37 °C) (Oral)   Resp 16   Ht 5' 10\" (1.778 m)   Wt 244 lb (110.7 kg)   SpO2 98%   BMI 35.01 kg/m²   Temp  Av.4 °F (36.9 °C)  Min: 98.1 °F (36.7 °C)  Max: 98.6 °F (37 °C)  Constitutional: The patient is awake, alert, and oriented. In no distress, lying in bed. Skin: Warm and dry. No rashes were noted. HEENT: Round and reactive pupils. Moist mucous membranes. No ulcerations or thrush. Neck: Supple to movements. Chest: No respiratory distress. Symmetrical expansion.   No wheezing, crackles or rhonchi. Cardiovascular: S1 and S2 are rhythmic and regular. No murmurs appreciated. Abdomen: Positive bowel sounds to auscultation. Benign to palpation. No masses felt. Buttock: perirectal abscesses dressed with dry dressings, tenderness is improving  Extremities: No edema.   Lines: PICC line 2/11/2022 left arm     Laboratory and Tests Review:  Lab Results   Component Value Date    WBC 5.6 02/14/2022    WBC 4.5 02/13/2022    WBC 6.0 02/12/2022    HGB 11.1 (L) 02/14/2022    HCT 33.8 (L) 02/14/2022    MCV 96.3 02/14/2022     02/14/2022     Lab Results   Component Value Date    NEUTROABS 2.89 02/14/2022    NEUTROABS 2.37 02/13/2022    NEUTROABS 3.57 02/12/2022     No results found for: [facility]  Lab Results   Component Value Date    ALT 9 02/12/2022    AST 11 02/12/2022    ALKPHOS 68 02/12/2022    BILITOT <0.2 02/12/2022     Lab Results   Component Value Date     02/14/2022    K 4.1 02/14/2022    K 4.4 02/10/2022     02/14/2022    CO2 24 02/14/2022    BUN 7 02/14/2022    CREATININE 0.8 02/14/2022    CREATININE 0.8 02/13/2022    CREATININE 0.7 02/12/2022    GFRAA >60 02/14/2022    LABGLOM >60 02/14/2022    GLUCOSE 285 02/14/2022    PROT 6.9 02/12/2022    LABALBU 3.5 02/12/2022    CALCIUM 9.0 02/14/2022    BILITOT <0.2 02/12/2022    ALKPHOS 68 02/12/2022    AST 11 02/12/2022    ALT 9 02/12/2022     Lab Results   Component Value Date    CRP 0.3 10/29/2021    CRP 0.5 (H) 10/26/2021    CRP 1.8 (H) 10/22/2021     Lab Results   Component Value Date    SEDRATE 80 (H) 02/10/2022    SEDRATE 62 (H) 10/29/2021    SEDRATE >150 (H) 10/26/2021     Radiology:  Noted     Microbiology:   Surgical culture left buttock abscess: E.coli (R ampicillin & Intermediate amp-sulbact), Streptococcus species, Group C Strep   MRSA screening: negative  Blood cultures: negative so far    ASSESSMENT:  · Recurrent perirectal abscess-status post I&D  · Leukocytosis associated to the above, improved  · DM, uncontrolled     PLAN:  · Continue ceftriaxone and p.o. Flagyl  · Careful follow-up wound care  · Await final cultures  · Monitor labs    CRIS Pennington - CNP  2:40 PM  2/14/2022     Pt seen and examined. Above discussed agree with advanced practice nurse. Labs, cultures, and radiographs reviewed. Face to Face encounter occurred. Changes made as necessary.      Anand Floyd MD

## 2022-02-15 ENCOUNTER — TELEPHONE (OUTPATIENT)
Dept: FAMILY MEDICINE CLINIC | Age: 37
End: 2022-02-15

## 2022-02-15 LAB
BLOOD CULTURE, ROUTINE: NORMAL
CULTURE, BLOOD 2: NORMAL

## 2022-02-15 NOTE — PLAN OF CARE
Problem: Falls - Risk of:  Goal: Will remain free from falls  Description: Will remain free from falls  2/14/2022 2232 by Chase Moore RN  Outcome: Completed  2/14/2022 2022 by Betty Cullen RN  Outcome: Met This Shift  2/14/2022 1652 by Betty Cullen RN  Outcome: Met This Shift  Goal: Absence of physical injury  Description: Absence of physical injury  2/14/2022 2232 by Chase Moore RN  Outcome: Completed  2/14/2022 2022 by Betty Cullen RN  Outcome: Met This Shift  2/14/2022 1652 by Betty Cullen RN  Outcome: Met This Shift     Problem: Infection - Surgical Site:  Goal: Will show no infection signs and symptoms  Description: Will show no infection signs and symptoms  2/14/2022 2232 by Chase Moore RN  Outcome: Completed  2/14/2022 2022 by Betty Cullen RN  Outcome: Ongoing  2/14/2022 1652 by Betty Cullen RN  Outcome: Ongoing     Problem: Pain:  Goal: Pain level will decrease  Description: Pain level will decrease  2/14/2022 2232 by Chase Moore RN  Outcome: Completed  2/14/2022 1652 by Betty Cullen RN  Outcome: Met This Shift  Goal: Control of acute pain  Description: Control of acute pain  2/14/2022 2232 by Chase Moore RN  Outcome: Completed  2/14/2022 1652 by Betty Cullen RN  Outcome: Met This Shift  Goal: Control of chronic pain  Description: Control of chronic pain  Outcome: Completed

## 2022-02-15 NOTE — PROGRESS NOTES
Pt notified RN that his mom will hopefully  pt before midnight. RN educated pt to let him know if plans change. RN notified NP, Jah Llanes.

## 2022-02-15 NOTE — PLAN OF CARE
Problem: Falls - Risk of:  Goal: Will remain free from falls  Description: Will remain free from falls  2/14/2022 2022 by Fred Nettles RN  Outcome: Met This Shift  2/14/2022 1652 by Fred Nettles RN  Outcome: Met This Shift  Goal: Absence of physical injury  Description: Absence of physical injury  2/14/2022 2022 by Fred Nettles RN  Outcome: Met This Shift  2/14/2022 1652 by Fred Nettles RN  Outcome: Met This Shift

## 2022-02-15 NOTE — TELEPHONE ENCOUNTER
[facility] Transitions Initial Follow Up Call    Outreach made within 2 business days of discharge: Yes    Patient: Sergio García Patient : 1985   MRN: [id number]  Reason for Admission: DKA, TYPE 1   Discharge Date: 22       Spoke with: Siva Padilla     Discharge department/facility: [facility]     TCM Interactive Patient Contact:  Was patient able to fill all prescriptions: Yes  Was patient instructed to bring all medications to the follow-up visit: Yes  Is patient taking all medications as directed in the discharge summary?  Yes  Does patient understand their discharge instructions: Yes  Does patient have questions or concerns that need addressed prior to 7-14 day follow up office visit: no    Scheduled appointment with PCP within 7-14 days    Follow Up  Future Appointments   Date Time Provider Rose Kelley   2022  9:15 AM CRIS Staples - CNP [facility]   2022 11:30 AM Albaro Wright MD [facility]   2022 10:00 AM SEB US RM 2 [facility] US [facility] Radiolog   2022 10:15 AM Jairo Bustos MD [facility]       Kaylie Pineda

## 2022-02-16 LAB
CULTURE SURGICAL: ABNORMAL
ORGANISM: ABNORMAL

## 2022-02-18 ENCOUNTER — TELEPHONE (OUTPATIENT)
Dept: PRIMARY CARE CLINIC | Age: 37
End: 2022-02-18

## 2022-02-18 NOTE — TELEPHONE ENCOUNTER
Spoke with Sylvia Jim at [address] to clarify fax. Patient is taking Entresto 97-103mg BID. He was prescribed IV Ceftriaxone by ID Dr Ben Edwards. He is established with Cardiology Dr Lucía Wright last saw him 02/09/22. He was in [facility] from 02/10/22 through 02/14/222.

## 2022-02-21 ENCOUNTER — TELEPHONE (OUTPATIENT)
Dept: SLEEP MEDICINE | Age: 37
End: 2022-02-21

## 2022-02-28 ENCOUNTER — OFFICE VISIT (OUTPATIENT)
Dept: PRIMARY CARE CLINIC | Age: 37
End: 2022-02-28
Payer: COMMERCIAL

## 2022-02-28 VITALS
BODY MASS INDEX: 33.99 KG/M2 | SYSTOLIC BLOOD PRESSURE: 110 MMHG | WEIGHT: 237.4 LBS | DIASTOLIC BLOOD PRESSURE: 70 MMHG | HEIGHT: 70 IN | TEMPERATURE: 97.5 F | HEART RATE: 61 BPM | OXYGEN SATURATION: 96 %

## 2022-02-28 DIAGNOSIS — E08.65 DIABETES MELLITUS DUE TO UNDERLYING CONDITION WITH HYPERGLYCEMIA, WITH LONG-TERM CURRENT USE OF INSULIN (HCC): ICD-10-CM

## 2022-02-28 DIAGNOSIS — I10 ESSENTIAL HYPERTENSION: ICD-10-CM

## 2022-02-28 DIAGNOSIS — Z79.4 DIABETES MELLITUS DUE TO UNDERLYING CONDITION WITH HYPERGLYCEMIA, WITH LONG-TERM CURRENT USE OF INSULIN (HCC): ICD-10-CM

## 2022-02-28 DIAGNOSIS — L02.31 GLUTEAL ABSCESS: Primary | ICD-10-CM

## 2022-02-28 DIAGNOSIS — I50.22 CHRONIC SYSTOLIC HEART FAILURE (HCC): ICD-10-CM

## 2022-02-28 LAB
CHP ED QC CHECK: NORMAL
GLUCOSE BLD-MCNC: 386 MG/DL

## 2022-02-28 PROCEDURE — 99214 OFFICE O/P EST MOD 30 MIN: CPT | Performed by: FAMILY MEDICINE

## 2022-02-28 PROCEDURE — 82962 GLUCOSE BLOOD TEST: CPT | Performed by: FAMILY MEDICINE

## 2022-02-28 RX ORDER — FLASH GLUCOSE SENSOR
KIT MISCELLANEOUS
Qty: 6 EACH | Refills: 1 | Status: ON HOLD
Start: 2022-02-28 | End: 2022-04-05

## 2022-02-28 RX ORDER — FLASH GLUCOSE SCANNING READER
EACH MISCELLANEOUS
Qty: 1 EACH | Refills: 1 | Status: ON HOLD
Start: 2022-02-28 | End: 2022-04-05

## 2022-02-28 NOTE — PROGRESS NOTES
Post-Discharge Transitional Care Management Services      Funmilayo Rae   YOB: 1985    Date of Visit:  2/28/2022  30 Day Post-Discharge Date: 2/14/22    No Known Allergies  Outpatient Medications Marked as Taking for the 2/28/22 encounter (Office Visit) with Otoniel Branch MD   Medication Sig Dispense Refill    sacubitril-valsartan (ENTRESTO)  MG per tablet Take 1 tablet by mouth 2 times daily 60 tablet 1    Continuous Blood Gluc  (FREESTYLE CHRISTIAN 14 DAY READER) PEDRO As directed 1 each 1    Continuous Blood Gluc Sensor (FREESTYLE CHRISTIAN 2 SENSOR) MISC As directed 6 each 1    metoprolol succinate (TOPROL XL) 100 MG extended release tablet 1 tab twice daily 60 tablet 11    vitamin D (ERGOCALCIFEROL) 1.25 MG (80425 UT) CAPS capsule Take 1 capsule by mouth once a week 12 capsule 0    melatonin (RA MELATONIN) 3 MG TABS tablet Take 1 tablet by mouth nightly as needed (sleep) 30 tablet 1    triamcinolone (KENALOG) 0.1 % cream Apply topically as needed (rash) Apply topically 2 times daily x 10 days prn 80 g 0    Insulin Pen Needle (PEN NEEDLES) 31G X 6 MM MISC 1 each by Does not apply route daily 100 each 0    insulin glargine (LANTUS SOLOSTAR) 100 UNIT/ML injection pen Inject 22 Units into the skin 2 times daily 10 pen 3    potassium chloride (KLOR-CON M) 20 MEQ extended release tablet Take 2 tablets by mouth daily Pt only taking one tablet daily unless he takes Bumex BID then he takes 2 tabs 180 tablet 3    bumetanide (BUMEX) 2 MG tablet Take 1 tablet by mouth See Admin Instructions Take 1 tab 8am daily. On days you gain weight more than 2 lbs or have worse swelling or short of breath then take a 2pm dose that day.  60 tablet 3    atorvastatin (LIPITOR) 20 MG tablet Take 1 tablet by mouth daily 90 tablet 1    Insulin Pen Needle (BD PEN NEEDLE MICRO U/F) 32G X 6 MM MISC Uses with insulin 4 times a day 250 each 5    insulin lispro, 1 Unit Dial, (HUMALOG KWIKPEN) 100 UNIT/ML SOPN Inject 15 units with meals + following sliding scale. -200 add 3U, -250 add 6U, -300 add 9U, -350 add 12U, -400 add 15U, BS over 400 add 18U. MAX 75U/day 15 pen 3    Lancets MISC Test 4 times/day before meals and at bedtime and as needed for symptoms of irregular blood glucose. 300 each 3    [employer] Lancets MISC 1 each by Does not apply route daily To check sugar 4 x a day and if feeling ill 200 each 3    blood glucose monitor strips Test **4* times a day & as needed for symptoms of irregular blood glucose. Dispense sufficient amount for indicated testing frequency plus additional to accommodate PRN testing needs. 200 strip 2         Vitals:    02/28/22 1236   BP: 110/70   Pulse: 61   Temp: 97.5 °F (36.4 °C)   TempSrc: Temporal   SpO2: 96%   Weight: 237 lb 6.4 oz (107.7 kg)   Height: 5' 10\" (1.778 m)     Body mass index is 34.06 kg/m². Wt Readings from Last 3 Encounters:   02/28/22 237 lb 6.4 oz (107.7 kg)   02/14/22 244 lb (110.7 kg)   02/09/22 243 lb 6.4 oz (110.4 kg)     BP Readings from Last 3 Encounters:   02/28/22 110/70   02/22/22 129/84   02/14/22 126/70        Patient was admitted to [facility]) from 2/10/22 to 2/14/22 for Sepsis, Perirectal abscess, DKA, htn, and chronic systolic chf. Inpatient course: Discharge summary reviewed- see chart. Current status: fair    Review of Systems:  Constitutional: Negative for appetite change, fatigue and fever. Eyes: Negative for pain and visual disturbance. Respiratory: Negative for shortness of breath and wheezing. Cardiovascular: Negative for chest pain and palpitations. Gastrointestinal: Negative for abdominal pain, blood in stool, constipation, diarrhea, nausea and vomiting. Genitourinary: Negative for difficulty urinating, dysuria, flank pain and frequency. Musculoskeletal: Negative for arthralgias and gait problem. Skin: Positive for wound. Negative for pallor.    Neurological: Negative for dizziness, seizures, syncope, weakness, light-headedness and headaches. Physical Exam:  Constitutional:       General: He is not in acute distress. Appearance: He is well-developed. He is obese. He is not diaphoretic. Comments: Appears euvolemic    HENT:      Head: Normocephalic and atraumatic. Eyes:      Conjunctiva/sclera: Conjunctivae normal.      Pupils: Pupils are equal, round, and reactive to light. Cardiovascular:      Rate and Rhythm: Normal rate and regular rhythm. Pulmonary:      Effort: Pulmonary effort is normal.      Breath sounds: Normal breath sounds. Abdominal:      General: Bowel sounds are normal. There is no distension. Palpations: Abdomen is soft. Tenderness: There is no abdominal tenderness. Hernia: No hernia is present. Musculoskeletal:      Cervical back: Normal range of motion and neck supple. Skin:     General: Skin is warm. Neurological:      Mental Status: He is alert and oriented to person, place, and time. Initial post-discharge communication occurred between Ambrose Manzano and patient on 2/15/22- see documentation in chart: telephone encounter. Assessment/Plan:  Aram Guerrero was seen today for care management. Diagnoses and all orders for this visit:    Gluteal abscess  Patient completed antibiotic therapy per ID with Picc line removed Friday. Patient still has [facility] coming to his house. Reports gluteal abscess still present, but significantly improved. Referral placed to wound care. -     [facility] [address]    Chronic systolic heart failure [facility])  Patient follows with Cardiology. -     sacubitril-valsartan (ENTRESTO)  MG per tablet; Take 1 tablet by mouth 2 times daily    Diabetes mellitus due to underlying condition with hyperglycemia, with long-term current use of insulin ([facility])  Last a1c 14.2 1/10/22. Random glucose 386 mg/dl after per patient Ice cream and watermelon this morning.  Discussed importance of following his diabetic diet. Patient has not taken any insulin this morning. Lantus 22u bid and Humalog 15U + SS. Patient states he usually takes 18 to 21U of short acting insulin prior to meals. Freestyle Adin sent in again for patient. -     Continuous Blood Gluc  (FREESTYLE ADIN 14 DAY READER) PEDRO; As directed  -     Continuous Blood Gluc Sensor (FREESTYLE ADIN 2 SENSOR) MISC;  As directed  -     POCT Glucose    Essential hypertension    Diagnostic test results reviewed: inpatient labs, cxr, and ct abdomen/pelvis    Patient risk of morbidity and mortality: moderate    Medical Decision Making: moderate complexity     Follow up in the next 4 weeks

## 2022-03-08 ENCOUNTER — HOSPITAL ENCOUNTER (OUTPATIENT)
Dept: WOUND CARE | Age: 37
Discharge: HOME | End: 2022-03-08
Payer: COMMERCIAL

## 2022-03-08 VITALS
WEIGHT: 237 LBS | RESPIRATION RATE: 16 BRPM | TEMPERATURE: 97 F | BODY MASS INDEX: 33.93 KG/M2 | HEART RATE: 96 BPM | DIASTOLIC BLOOD PRESSURE: 70 MMHG | HEIGHT: 70 IN | SYSTOLIC BLOOD PRESSURE: 118 MMHG

## 2022-03-08 DIAGNOSIS — S31.829A BUTTOCK WOUND, LEFT, INITIAL ENCOUNTER: ICD-10-CM

## 2022-03-08 DIAGNOSIS — T81.89XA DRAINING POSTOPERATIVE WOUND, INITIAL ENCOUNTER: ICD-10-CM

## 2022-03-08 PROCEDURE — 99204 OFFICE O/P NEW MOD 45 MIN: CPT | Performed by: SURGERY

## 2022-03-08 PROCEDURE — 11042 DBRDMT SUBQ TIS 1ST 20SQCM/<: CPT | Performed by: SURGERY

## 2022-03-08 PROCEDURE — 99213 OFFICE O/P EST LOW 20 MIN: CPT

## 2022-03-08 PROCEDURE — 11042 DBRDMT SUBQ TIS 1ST 20SQCM/<: CPT

## 2022-03-08 ASSESSMENT — PAIN DESCRIPTION - DESCRIPTORS: DESCRIPTORS: ACHING

## 2022-03-08 ASSESSMENT — PAIN DESCRIPTION - ORIENTATION: ORIENTATION: LEFT

## 2022-03-08 ASSESSMENT — PAIN DESCRIPTION - LOCATION: LOCATION: BUTTOCKS

## 2022-03-08 ASSESSMENT — PAIN DESCRIPTION - ONSET: ONSET: ON-GOING

## 2022-03-08 ASSESSMENT — PAIN SCALES - GENERAL: PAINLEVEL_OUTOF10: 7

## 2022-03-08 ASSESSMENT — PAIN DESCRIPTION - FREQUENCY: FREQUENCY: CONTINUOUS

## 2022-03-08 ASSESSMENT — PAIN - FUNCTIONAL ASSESSMENT: PAIN_FUNCTIONAL_ASSESSMENT: ACTIVITIES ARE NOT PREVENTED

## 2022-03-08 NOTE — PLAN OF CARE
Problem: Wound:  Goal: Will show signs of wound healing; wound closure and no evidence of infection  Description: Will show signs of wound healing; wound closure and no evidence of infection  Outcome: Ongoing     Problem: Blood Glucose:  Goal: Ability to maintain appropriate glucose levels will improve  Description: Ability to maintain appropriate glucose levels will improve  Outcome: Ongoing     Problem: Smoking cessation:  Goal: Ability to formulate a plan to maintain a tobacco-free life will be supported  Description: Ability to formulate a plan to maintain a tobacco-free life will be supported  Outcome: Ongoing

## 2022-03-09 PROBLEM — S31.829A BUTTOCK WOUND, LEFT, INITIAL ENCOUNTER: Status: ACTIVE | Noted: 2022-03-09

## 2022-03-09 PROBLEM — L03.90 CELLULITIS: Status: RESOLVED | Noted: 2021-10-16 | Resolved: 2022-03-09

## 2022-03-09 PROBLEM — T81.89XA DRAINING POSTOPERATIVE WOUND: Status: ACTIVE | Noted: 2022-03-09

## 2022-03-09 PROBLEM — L02.31 ABSCESS OF MULTIPLE SITES OF BUTTOCK: Status: RESOLVED | Noted: 2021-10-17 | Resolved: 2022-03-09

## 2022-03-09 PROBLEM — Z79.899 MEDICATION DOSE CHANGED: Status: RESOLVED | Noted: 2021-10-04 | Resolved: 2022-03-09

## 2022-03-09 PROBLEM — E10.10 DKA, TYPE 1, NOT AT GOAL (HCC): Status: RESOLVED | Noted: 2021-09-15 | Resolved: 2022-03-09

## 2022-03-09 PROBLEM — E11.00 HYPEROSMOLAR HYPERGLYCEMIC STATE (HHS) (HCC): Status: RESOLVED | Noted: 2021-07-30 | Resolved: 2022-03-09

## 2022-03-09 PROBLEM — R65.21 SEPTIC SHOCK (HCC): Status: RESOLVED | Noted: 2022-02-10 | Resolved: 2022-03-09

## 2022-03-09 PROBLEM — A41.9 SEPTIC SHOCK (HCC): Status: RESOLVED | Noted: 2022-02-10 | Resolved: 2022-03-09

## 2022-03-09 PROBLEM — I50.1 PULMONARY EDEMA CARDIAC CAUSE (HCC): Status: RESOLVED | Noted: 2021-01-21 | Resolved: 2022-03-09

## 2022-03-09 PROBLEM — L02.31 GLUTEAL ABSCESS: Status: RESOLVED | Noted: 2022-02-10 | Resolved: 2022-03-09

## 2022-03-09 PROBLEM — B35.3 TINEA PEDIS OF BOTH FEET: Status: RESOLVED | Noted: 2021-11-11 | Resolved: 2022-03-09

## 2022-03-09 RX ORDER — BACITRACIN, NEOMYCIN, POLYMYXIN B 400; 3.5; 5 [USP'U]/G; MG/G; [USP'U]/G
OINTMENT TOPICAL ONCE
Status: CANCELLED | OUTPATIENT
Start: 2022-03-09 | End: 2022-03-09

## 2022-03-09 RX ORDER — LIDOCAINE HYDROCHLORIDE 20 MG/ML
JELLY TOPICAL ONCE
Status: CANCELLED | OUTPATIENT
Start: 2022-03-09 | End: 2022-03-09

## 2022-03-09 RX ORDER — CLOBETASOL PROPIONATE 0.5 MG/G
OINTMENT TOPICAL ONCE
Status: CANCELLED | OUTPATIENT
Start: 2022-03-09 | End: 2022-03-09

## 2022-03-09 RX ORDER — LIDOCAINE 50 MG/G
OINTMENT TOPICAL ONCE
Status: CANCELLED | OUTPATIENT
Start: 2022-03-09 | End: 2022-03-09

## 2022-03-09 RX ORDER — GENTAMICIN SULFATE 1 MG/G
OINTMENT TOPICAL ONCE
Status: CANCELLED | OUTPATIENT
Start: 2022-03-09 | End: 2022-03-09

## 2022-03-09 RX ORDER — LIDOCAINE 40 MG/G
CREAM TOPICAL ONCE
Status: CANCELLED | OUTPATIENT
Start: 2022-03-09 | End: 2022-03-09

## 2022-03-09 RX ORDER — BETAMETHASONE DIPROPIONATE 0.05 %
OINTMENT (GRAM) TOPICAL ONCE
Status: CANCELLED | OUTPATIENT
Start: 2022-03-09 | End: 2022-03-09

## 2022-03-09 RX ORDER — GINSENG 100 MG
CAPSULE ORAL ONCE
Status: CANCELLED | OUTPATIENT
Start: 2022-03-09 | End: 2022-03-09

## 2022-03-09 RX ORDER — LIDOCAINE HYDROCHLORIDE 40 MG/ML
SOLUTION TOPICAL ONCE
Status: CANCELLED | OUTPATIENT
Start: 2022-03-09 | End: 2022-03-09

## 2022-03-09 RX ORDER — BACITRACIN ZINC AND POLYMYXIN B SULFATE 500; 1000 [USP'U]/G; [USP'U]/G
OINTMENT TOPICAL ONCE
Status: CANCELLED | OUTPATIENT
Start: 2022-03-09 | End: 2022-03-09

## 2022-03-09 NOTE — PROGRESS NOTES
Wound Healing Center /Hyperbarics   History and Physical/Consultation  Vascular    Referring Physician : Hermelinda Ronquillo MD  [address] [place] [address] RECORD NUMBER:  [id number]  AGE: 43 y.o. GENDER: male  : 1985  EPISODE DATE:  3/8/2022  Subjective:     Chief Complaint   Patient presents with    Wound Check     left buttocks         HISTORY of PRESENT ILLNESS HPI     Socorro Hu is a 43 y.o. male who presents today for wound/ulcer evaluation. History of Wound Context:  The patient has had a wound of right buttock area which was first noted approximately in February of this year. This has been treated with incision drainage of bilateral perirectal abscesses on 10 February at the [facility]    Patient also tells me, last year, he underwent incision drainages, 2 different occasions, in July of last year as well as in November of last year    Patient denies any history of ulcerative colitis or Crohn's disease    Patient tells me that he did undergo colonoscopy and no definite abnormal findings were noted to explain the problems    Patient does have history of diabetes mellitus    Patient at present time states that the main wound is on the left buttock area and the ischial position, denies any history of fever or chills      . On their initial visit to the wound healing center, 22, the patient has noted that the wound has not been improving. The patient has had similar previous wounds in the past.      Pt is currently not on abx.       Wound/Ulcer Pain Timing/Severity: constant  Quality of pain: dull, aching  Severity:  3 / 10   Modifying Factors: Pain worsens with walking  Associated Signs/Symptoms: drainage and pain    Ulcer Identification:  Ulcer Type: diabetic and non-healing surgical  Contributing Factors: diabetes and smoking    Diabetic/Pressure/Non Pressure Ulcers only:  Ulcer: Diabetic ulcer, fat layer exposed    If patient has diabetic lower extremity wounds  Nicole Lancaster Classification of diabetic lower extremity wounds:    Grade Description   []  0 No open wound   []  1 Superficial ulcer involving the full skin thickness   []  2 Deep ulcer involves ligament, tendon, joint capsule, or fascia  No bone involvement or abscess presence   []  3 Deep Ulcer with abcess formation and/or osteomyelitis   []  4 Localized gangrene   []  5 Extensive gangrene of the foot     Wound: Patient does have a postoperative wound, with fat layer exposed, of the left buttock, ischial area, 1.5 cm deep, fairly clean, granulating    Other pertinent information:  · Patient had multiple procedures done, call him, in July and November last year again February of this year, this time underwent bilateral ischiorectal abscess drainages under the supervision Dr. Tayler Paredes      3/8/2022  · Discussed the patient, regarding the importance of offloading the ischial buttock area when he sitting down  · Importance of nutrition, multivitamin supplementation, Bar ablation were discussed  · Counseled regarding tobacco cessation  · The patient keeps having recurrent risk of rectal abscess, patient advised that, that she should appear my colorectal surgeon also    PAST MEDICAL HISTORY      Diagnosis Date    Buttock wound, left, initial encounter 3/9/2022    CAD (coronary artery disease)     [facility]estive [facility] failure) [facility] [address]     Diabetes mellitus ([address]     Draining postoperative wound 3/9/2022    Hyperlipidemia     Hyperosmolar hyperglycemic state (HHS) ([facility] [address] 7/30/2021    Hypertension      Past Surgical History:   Procedure Laterality Date    ABDOMEN SURGERY N/A 9/18/2021    INCISION AND DRAINAGE OF LARGE PERIANAL ABSCESS performed by Lydia Pederson MD at [address] [place], [place] [address]e      [facility]    ECHO COMPL W DOP COLOR FLOW  5/5/2015         Petra INSERT PICC CATH, 5/> YRS  10/20/2021         LEG SURGERY Left 2/10/2022    BILATERAL BUTTOCK INCISION AND DRAINAGE performed by Lydia Pederson MD at [facility] WISDOM TOOTH EXTRACTION       Family History   Problem Relation Age of Onset    High Blood Pressure Father     Heart Disease Father     High Blood Pressure Paternal Grandmother     Heart Attack Paternal Grandfather     High Blood Pressure Mother     Heart Disease Mother      Social History     Tobacco Use    Smoking status: Current Some Day Smoker     Packs/day: 0.00     Years: 8.00     Pack years: 0.00     Types: Cigars     Start date: 2000    Smokeless tobacco: Never Used    Tobacco comment: Smokes 1 cigar weekly/ cigs 2-3 a week   Vaping Use    Vaping Use: Every day    Start date: 6/1/2021    Substances: Nicotine   Substance Use Topics    Alcohol use: Yes     Comment: social drinker    Drug use: Yes     Types: Marijuana (Weed)     No Known Allergies  Current Outpatient Medications on File Prior to Encounter   Medication Sig Dispense Refill    sacubitril-valsartan (ENTRESTO)  MG per tablet Take 1 tablet by mouth 2 times daily 60 tablet 1    Continuous Blood Gluc  (FREESTYLE CHRISTIAN 14 DAY READER) PEDRO As directed 1 each 1    Continuous Blood Gluc Sensor (FREESTYLE CHRISTIAN 2 SENSOR) MISC As directed 6 each 1    metoprolol succinate (TOPROL XL) 100 MG extended release tablet 1 tab twice daily 60 tablet 11    vitamin D (ERGOCALCIFEROL) 1.25 MG (64441 UT) CAPS capsule Take 1 capsule by mouth once a week 12 capsule 0    melatonin (RA MELATONIN) 3 MG TABS tablet Take 1 tablet by mouth nightly as needed (sleep) 30 tablet 1    triamcinolone (KENALOG) 0.1 % cream Apply topically as needed (rash) Apply topically 2 times daily x 10 days prn 80 g 0    Insulin Pen Needle (PEN NEEDLES) 31G X 6 MM MISC 1 each by Does not apply route daily 100 each 0    insulin glargine (LANTUS SOLOSTAR) 100 UNIT/ML injection pen Inject 22 Units into the skin 2 times daily 10 pen 3    potassium chloride (KLOR-CON M) 20 MEQ extended release tablet Take 2 tablets by mouth daily Pt only taking one tablet daily unless he takes Bumex BID then he takes 2 tabs 180 tablet 3    bumetanide (BUMEX) 2 MG tablet Take 1 tablet by mouth See Admin Instructions Take 1 tab 8am daily. On days you gain weight more than 2 lbs or have worse swelling or short of breath then take a 2pm dose that day. (Patient taking differently: Take 2 mg by mouth 2 times daily Take 1 tab 8am daily. On days you gain weight more than 2 lbs or have worse swelling or short of breath then take a 2pm dose that day.) 60 tablet 3    atorvastatin (LIPITOR) 20 MG tablet Take 1 tablet by mouth daily 90 tablet 1    Insulin Pen Needle (BD PEN NEEDLE MICRO U/F) 32G X 6 MM MISC Uses with insulin 4 times a day 250 each 5    insulin lispro, 1 Unit Dial, (HUMALOG KWIKPEN) 100 UNIT/ML SOPN Inject 15 units with meals + following sliding scale. -200 add 3U, -250 add 6U, -300 add 9U, -350 add 12U, -400 add 15U, BS over 400 add 18U. MAX 75U/day 15 pen 3    Lancets MISC Test 4 times/day before meals and at bedtime and as needed for symptoms of irregular blood glucose. 300 each 3    [employer] Lancets MISC 1 each by Does not apply route daily To check sugar 4 x a day and if feeling ill 200 each 3    blood glucose monitor strips Test **4* times a day & as needed for symptoms of irregular blood glucose. Dispense sufficient amount for indicated testing frequency plus additional to accommodate PRN testing needs. 200 strip 2    Folinic Acid-Vit B6-Vit B12 (FOLINIC-PLUS) 4-50-2 MG TABS Take 4-50 mg by mouth 2 times daily (Patient not taking: Reported on 2/28/2022) 90 tablet 2     No current facility-administered medications on file prior to encounter.        REVIEW OF SYSTEMS   ROS : All others Negative if blank [], Positive if [x]  General Urinary   [] Fevers [] Hematuria   [] Chills [] Dysuria   [] Weight Loss Vascular   Skin [] Claudication   [x] Tissue Loss [] Rest Pain   Eyes Neurologic   [] Wears Glasses/Contacts [] Stroke/TIA   [] Vision Changes [] Focal weakness   Respiratory [] Slurred Speech    [] Shortness of breath ENT   Cardiovascular [] Difficulty swallowing   [] Chest Pain Gastrointestinal   [] Shortness of breath with exertion [] Abdominal Pain   Incision drainage, of the scrotal abscess, on 3 different occasions, last time was in February 2022 at the [facility], history of tobacco use, diabetes mellitus [] Melena       [] Hematochezia               Objective:    /70   Pulse 96   Temp 97 °F (36.1 °C) (Temporal)   Resp 16   Ht 5' 10\" (1.778 m)   Wt 237 lb (107.5 kg)   BMI 34.01 kg/m²   Wt Readings from Last 3 Encounters:   03/08/22 237 lb (107.5 kg)   02/28/22 237 lb 6.4 oz (107.7 kg)   02/14/22 244 lb (110.7 kg)       PHYSICAL EXAM  CONSTITUTIONAL:   Awake, alert, cooperative  PSYCHIATRIC :  Oriented to time, place and person      normal insight to disease process  ENT:  External ears and nose without lesions    Hearing deficits is not noted  NECK: Supple, symmetrical, trachea midline    Thyroid goiter not appreciated    Carotid bruit is not noted bilaterally  LUNGS:  No increased work of breathing                  Clear to auscultation bilaterally   CARDIOVASCULAR:  regular rate and rhythm   ABDOMEN:  soft, non-distended, non-tender    Hernias is not noted   Aorta is not palpable   Lymphatics : Cervical lymphadenopathy is not noted     Femoral lymphadenopathy is not noted  SKIN:   Skin color is normal   Texture and turgor is  normal   Induration is not noted  EXTREMITIES:   R UE Edema is not noted  L UE Edema is not noted  R LE Edema is not noted  L LE Edema is not noted  R and left femoral 2 2    DP, right and left 2 2    R posterior tibial 2 L posterior tibial 2     Assessment:     Problem List Items Addressed This Visit     Buttock wound, left, initial encounter    Draining postoperative wound        Procedure Note  Indications:  Based on my examination of this patient's wound(s)/ulcer(s) today, debridement is required to promote healing and evaluate the wound base. Performed by: Rome Hairston MD    Consent obtained:  Yes    Time out taken:  Yes    Pain Control: Anesthetic  Anesthetic: 4% Lidocaine Liquid Topical     Debridement:Excisional Debridement    Using curette the wound(s)/ulcer(s) was/were sharply debrided down through and including the removal of epidermis, dermis and subcutaneous tissue.         Devitalized Tissue Debrided:  fibrin and slough    Pre Debridement Measurements:  Are located in the [place]  Documentation Flow Sheet    Wound/Ulcer #: 1    Post Debridement Measurements:  Wound/Ulcer Descriptions are Pre Debridement except measurements:    Wound 03/08/22 Buttocks Left #1 (Active)   Wound Image   03/08/22 0941   Wound Etiology Other 03/08/22 0941   Dressing Status New dressing applied 03/08/22 1010   Wound Cleansed Cleansed with saline 03/08/22 1010   Dressing/Treatment Alginate with Ag;ABD 03/08/22 1010   Offloading for Diabetic Foot Ulcers Offloading not required 03/08/22 1010   Wound Length (cm) 2.7 cm 03/08/22 0941   Wound Width (cm) 0.9 cm 03/08/22 0941   Wound Depth (cm) 1.1 cm 03/08/22 0941   Wound Surface Area (cm^2) 2.43 cm^2 03/08/22 0941   Wound Volume (cm^3) 2.673 cm^3 03/08/22 0941   Post-Procedure Length (cm) 2.7 cm 03/08/22 1005   Post-Procedure Width (cm) 0.9 cm 03/08/22 1005   Post-Procedure Depth (cm) 1.2 cm 03/08/22 1005   Post-Procedure Surface Area (cm^2) 2.43 cm^2 03/08/22 1005   Post-Procedure Volume (cm^3) 2.916 cm^3 03/08/22 1005   Undermining Starts ___ O'Clock 12 03/08/22 0941   Undermining Ends___ O'Clock 6 03/08/22 0941   Undermining Maxium Distance (cm) 0.8@ 3 03/08/22 0941   Wound Assessment Fibrin;Pink/red 03/08/22 0941   Drainage Amount Moderate 03/08/22 0941   Drainage Description Yellow 03/08/22 0941   Odor None 03/08/22 0941   Makenna-wound Assessment Intact 03/08/22 0972   Number of days: 1       Percent of Wound/Ulcer Debrided: 100%    Total Surface Area Debrided: 2.5 sq cm     Estimated Blood Loss:  Minimal    Hemostasis Achieved:  by pressure    Procedural Pain:  4  / 10     Post Procedural Pain:  3 / 10     Response to treatment:  Well tolerated by patient. A culture was not done. Plan:     Pt is a smoker   - Discussed relationship of smoking and negative affects on wound healing   - Emphasized importance of tobacco avoidace/cessation      In my professional opinion and based off the information that is available at this time this patient has appropriate indication for HBO Therapy: No    Treatment Note please see attached Discharge Instructions    Written patient dismissal instructions given to patient and signed by patient or POA. Discharge Instructions       Visit Discharge/Physician Orders    Discharge condition: Stable    Assessment of pain at discharge:minimal    Anesthetic used: 4% lidociane    Discharge to: home    Left via:Private automobile    Accompanied by: accompanied by self    ECF/HHA: [address] ulcer cleanse with normal saline pack with alginate ag rope and dry dressing daily    Treatment Orders:  Eat foods high in protein and vitamin c    Take multivitamin daily    Stop smoking      [facility] followup visit ___________1 week______________  (Please note your next appointment above and if you are unable to keep, kindly give a 24 hour notice. Thank you.)    Physician signature:__________________________      If you experience any of the following, please call the [facility] during business hours:    * Increase in Pain  * Temperature over 101  * Increase in drainage from your wound  * Drainage with a foul odor  * Bleeding  * Increase in swelling  * Need for compression bandage changes due to slippage, breakthrough drainage. If you need medical attention outside of the business hours of the [facility] please contact your PCP or go to the nearest emergency room.         Electronically signed by Tabby Agudelo MD on 3/9/2022 at 2:06 PM

## 2022-03-14 LAB
FUNGUS (MYCOLOGY) CULTURE: NORMAL
FUNGUS STAIN: NORMAL

## 2022-03-15 ENCOUNTER — HOSPITAL ENCOUNTER (OUTPATIENT)
Dept: WOUND CARE | Age: 37
Discharge: HOME | End: 2022-03-15
Payer: COMMERCIAL

## 2022-03-15 VITALS
HEIGHT: 70 IN | SYSTOLIC BLOOD PRESSURE: 146 MMHG | TEMPERATURE: 98.1 F | HEART RATE: 100 BPM | DIASTOLIC BLOOD PRESSURE: 90 MMHG | RESPIRATION RATE: 16 BRPM | WEIGHT: 237 LBS | BODY MASS INDEX: 33.93 KG/M2

## 2022-03-15 DIAGNOSIS — S31.829D BUTTOCK WOUND, LEFT, SUBSEQUENT ENCOUNTER: ICD-10-CM

## 2022-03-15 DIAGNOSIS — S31.829A BUTTOCK WOUND, LEFT, INITIAL ENCOUNTER: Primary | ICD-10-CM

## 2022-03-15 DIAGNOSIS — T81.89XA DRAINING POSTOPERATIVE WOUND, INITIAL ENCOUNTER: ICD-10-CM

## 2022-03-15 PROCEDURE — 11042 DBRDMT SUBQ TIS 1ST 20SQCM/<: CPT

## 2022-03-15 PROCEDURE — 11042 DBRDMT SUBQ TIS 1ST 20SQCM/<: CPT | Performed by: SURGERY

## 2022-03-15 RX ORDER — LIDOCAINE HYDROCHLORIDE 40 MG/ML
SOLUTION TOPICAL ONCE
Status: DISCONTINUED | OUTPATIENT
Start: 2022-03-15 | End: 2022-03-16 | Stop reason: HOSPADM

## 2022-03-15 RX ORDER — BACITRACIN, NEOMYCIN, POLYMYXIN B 400; 3.5; 5 [USP'U]/G; MG/G; [USP'U]/G
OINTMENT TOPICAL ONCE
Status: CANCELLED | OUTPATIENT
Start: 2022-03-15 | End: 2022-03-15

## 2022-03-15 RX ORDER — GENTAMICIN SULFATE 1 MG/G
OINTMENT TOPICAL ONCE
Status: CANCELLED | OUTPATIENT
Start: 2022-03-15 | End: 2022-03-15

## 2022-03-15 RX ORDER — LIDOCAINE HYDROCHLORIDE 20 MG/ML
JELLY TOPICAL ONCE
Status: CANCELLED | OUTPATIENT
Start: 2022-03-15 | End: 2022-03-15

## 2022-03-15 RX ORDER — BETAMETHASONE DIPROPIONATE 0.05 %
OINTMENT (GRAM) TOPICAL ONCE
Status: CANCELLED | OUTPATIENT
Start: 2022-03-15 | End: 2022-03-15

## 2022-03-15 RX ORDER — LIDOCAINE HYDROCHLORIDE 40 MG/ML
SOLUTION TOPICAL ONCE
Status: CANCELLED | OUTPATIENT
Start: 2022-03-15 | End: 2022-03-15

## 2022-03-15 RX ORDER — CLOBETASOL PROPIONATE 0.5 MG/G
OINTMENT TOPICAL ONCE
Status: CANCELLED | OUTPATIENT
Start: 2022-03-15 | End: 2022-03-15

## 2022-03-15 RX ORDER — LIDOCAINE 40 MG/G
CREAM TOPICAL ONCE
Status: CANCELLED | OUTPATIENT
Start: 2022-03-15 | End: 2022-03-15

## 2022-03-15 RX ORDER — GINSENG 100 MG
CAPSULE ORAL ONCE
Status: CANCELLED | OUTPATIENT
Start: 2022-03-15 | End: 2022-03-15

## 2022-03-15 RX ORDER — LIDOCAINE 50 MG/G
OINTMENT TOPICAL ONCE
Status: CANCELLED | OUTPATIENT
Start: 2022-03-15 | End: 2022-03-15

## 2022-03-15 RX ORDER — BACITRACIN ZINC AND POLYMYXIN B SULFATE 500; 1000 [USP'U]/G; [USP'U]/G
OINTMENT TOPICAL ONCE
Status: CANCELLED | OUTPATIENT
Start: 2022-03-15 | End: 2022-03-15

## 2022-03-15 ASSESSMENT — PAIN DESCRIPTION - DESCRIPTORS: DESCRIPTORS: STABBING;SHARP

## 2022-03-15 ASSESSMENT — PAIN DESCRIPTION - PAIN TYPE: TYPE: CHRONIC PAIN

## 2022-03-15 ASSESSMENT — PAIN SCALES - GENERAL: PAINLEVEL_OUTOF10: 8

## 2022-03-15 ASSESSMENT — PAIN DESCRIPTION - PROGRESSION: CLINICAL_PROGRESSION: NOT CHANGED

## 2022-03-15 ASSESSMENT — PAIN - FUNCTIONAL ASSESSMENT: PAIN_FUNCTIONAL_ASSESSMENT: ACTIVITIES ARE NOT PREVENTED

## 2022-03-15 ASSESSMENT — PAIN DESCRIPTION - ORIENTATION: ORIENTATION: LEFT

## 2022-03-15 ASSESSMENT — PAIN DESCRIPTION - ONSET: ONSET: ON-GOING

## 2022-03-15 ASSESSMENT — PAIN DESCRIPTION - FREQUENCY: FREQUENCY: CONTINUOUS

## 2022-03-15 ASSESSMENT — PAIN DESCRIPTION - LOCATION: LOCATION: BUTTOCKS

## 2022-03-15 NOTE — PLAN OF CARE
Problem: Wound:  Goal: Will show signs of wound healing; wound closure and no evidence of infection  Description: Will show signs of wound healing; wound closure and no evidence of infection  Outcome: Ongoing     Problem: Blood Glucose:  Goal: Ability to maintain appropriate glucose levels will improve  Description: Ability to maintain appropriate glucose levels will improve  Outcome: Ongoing     Problem: Pain:  Goal: Pain level will decrease  Description: Pain level will decrease  Outcome: Ongoing  Goal: Control of acute pain  Description: Control of acute pain  Outcome: Ongoing  Goal: Control of chronic pain  Description: Control of chronic pain  Outcome: Ongoing     Problem: Smoking cessation:  Goal: Ability to formulate a plan to maintain a tobacco-free life will be supported  Description: Ability to formulate a plan to maintain a tobacco-free life will be supported  Outcome: Ongoing

## 2022-03-15 NOTE — PROGRESS NOTES
W[facility]aling Center Followup Visit Note    Referring Physician : Vee Vizcarra MD  [address] [place] [address] RECORD NUMBER:  [id number]  AGE: 43 y.o. GENDER: male  : 1985  EPISODE DATE:  3/15/2022    Subjective:     Chief Complaint   Patient presents with    Wound Check     left buttock ulcer      HISTORY of PRESENT ILLNESS HPI   Renetta Kraus is a 43 y.o. male who presents today in regards to follow up evaluation and treatment of wound/ulcer. That patient's past medical, family and social hx were reviewed and changes were made if present. History of Wound Context:  The patient has had a wound of right buttock area which was first noted approximately in February of this year. This has been treated with incision drainage of bilateral perirectal abscesses on 10 February at the [facility]     Patient also tells me, last year, he underwent incision drainages, 2 different occasions, in July of last year as well as in November of last year     Patient denies any history of ulcerative colitis or Crohn's disease     Patient tells me that he did undergo colonoscopy and no definite abnormal findings were noted to explain the problems     Patient does have history of diabetes mellitus     Patient at present time states that the main wound is on the left buttock area and the ischial position, denies any history of fever or chills        .  On their initial visit to the wound healing center, 22, the patient has noted that the wound has not been improving.   The patient has had similar previous wounds in the past.       Pt is currently not on abx.        3/15/2022  · Buttock wound looks  and improved      Wound/Ulcer Pain Timing/Severity: constant  Quality of pain: dull, aching  Severity:  3 / 10   Modifying Factors: Pain worsens with walking  Associated Signs/Symptoms: drainage and pain     Ulcer Identification:  Ulcer Type: diabetic and non-healing surgical  Contributing Factors: diabetes and smoking     Diabetic/Pressure/Non Pressure Ulcers only:  Ulcer: Diabetic ulcer, fat layer exposed     If patient has diabetic lower extremity wounds  Dia Classification of diabetic lower extremity wounds:     Grade Description   []? 0 No open wound   []? 1 Superficial ulcer involving the full skin thickness   []? 2 Deep ulcer involves ligament, tendon, joint capsule, or fascia  No bone involvement or abscess presence   []? 3 Deep Ulcer with abcess formation and/or osteomyelitis   []? 4 Localized gangrene   []?   5 Extensive gangrene of the foot      Wound: Patient does have a postoperative wound, with fat layer exposed, of the left buttock, ischial area, 1.5 cm deep, fairly clean, granulating     Other pertinent information:  · Patient had multiple procedures done, call him, in July and November last year again February of this year, this time underwent bilateral ischiorectal abscess drainages under the supervision Dr. Kathya Jackson        3/8/2022  · Discussed the patient, regarding the importance of offloading the ischial buttock area when he sitting down  · Importance of nutrition, multivitamin supplementation, Bar ablation were discussed  · Counseled regarding tobacco cessation  · The patient keeps having recurrent risk of rectal abscess, patient advised that, that she should appear my colorectal surgeon also               PAST MEDICAL HISTORY      Diagnosis Date    Buttock wound, left, initial encounter 3/9/2022    Buttock wound, left, subsequent encounter 3/15/2022    CAD (coronary artery disease)     [facility]estive [facility] failure[facility]     Diabetes mellitus ([address]     Draining postoperative wound 3/9/2022    Hyperlipidemia     Hyperosmolar hyperglycemic state (HHS) ([address] 7/30/2021    Hypertension      Past Surgical History:   Procedure Laterality Date    ABDOMEN SURGERY N/A 9/18/2021    INCISION AND DRAINAGE OF LARGE PERIANAL ABSCESS performed by Nallely Nolan MD at [place] [address] ABSCESS DRAINAGE      buttocks    ECHO COMPL W DOP COLOR FLOW  5/5/2015         HC INSERT PICC CATH, 5/> YRS  10/20/2021         LEG SURGERY Left 2/10/2022    BILATERAL BUTTOCK INCISION AND DRAINAGE performed by Maggi Anthony MD at [address] EXTRACTION       Family History   Problem Relation Age of Onset    High Blood Pressure Father     Heart Disease Father     High Blood Pressure Paternal Grandmother     Heart Attack Paternal Grandfather     High Blood Pressure Mother     Heart Disease Mother      Social History     Tobacco Use    Smoking status: Current Some Day Smoker     Packs/day: 0.00     Years: 8.00     Pack years: 0.00     Types: Cigars     Start date: 2000    Smokeless tobacco: Never Used    Tobacco comment: Smokes 1 cigar weekly/ cigs 2-3 a week   Vaping Use    Vaping Use: Every day    Start date: 6/1/2021    Substances: Nicotine   Substance Use Topics    Alcohol use: Yes     Comment: social drinker    Drug use: Yes     Types: Marijuana (Weed)     No Known Allergies  Current Outpatient Medications on File Prior to Encounter   Medication Sig Dispense Refill    sacubitril-valsartan (ENTRESTO)  MG per tablet Take 1 tablet by mouth 2 times daily 60 tablet 1    metoprolol succinate (TOPROL XL) 100 MG extended release tablet 1 tab twice daily 60 tablet 11    vitamin D (ERGOCALCIFEROL) 1.25 MG (99415 UT) CAPS capsule Take 1 capsule by mouth once a week 12 capsule 0    melatonin (RA MELATONIN) 3 MG TABS tablet Take 1 tablet by mouth nightly as needed (sleep) 30 tablet 1    triamcinolone (KENALOG) 0.1 % cream Apply topically as needed (rash) Apply topically 2 times daily x 10 days prn 80 g 0    insulin glargine (LANTUS SOLOSTAR) 100 UNIT/ML injection pen Inject 22 Units into the skin 2 times daily 10 pen 3    potassium chloride (KLOR-CON M) 20 MEQ extended release tablet Take 2 tablets by mouth daily Pt only taking one tablet daily unless he takes Bumex BID then he takes 2 tabs 180 tablet 3    bumetanide (BUMEX) 2 MG tablet Take 1 tablet by mouth See Admin Instructions Take 1 tab 8am daily. On days you gain weight more than 2 lbs or have worse swelling or short of breath then take a 2pm dose that day. (Patient taking differently: Take 2 mg by mouth 2 times daily Take 1 tab 8am daily. On days you gain weight more than 2 lbs or have worse swelling or short of breath then take a 2pm dose that day.) 60 tablet 3    atorvastatin (LIPITOR) 20 MG tablet Take 1 tablet by mouth daily 90 tablet 1    Continuous Blood Gluc  (FREESTYLE CHRISTIAN 14 DAY READER) PEDRO As directed 1 each 1    Continuous Blood Gluc Sensor (FREESTYLE CHRISTIAN 2 SENSOR) MISC As directed 6 each 1    Folinic Acid-Vit B6-Vit B12 (FOLINIC-PLUS) 4-50-2 MG TABS Take 4-50 mg by mouth 2 times daily (Patient not taking: Reported on 2/28/2022) 90 tablet 2    Insulin Pen Needle (PEN NEEDLES) 31G X 6 MM MISC 1 each by Does not apply route daily 100 each 0    Insulin Pen Needle (BD PEN NEEDLE MICRO U/F) 32G X 6 MM MISC Uses with insulin 4 times a day 250 each 5    insulin lispro, 1 Unit Dial, (HUMALOG KWIKPEN) 100 UNIT/ML SOPN Inject 15 units with meals + following sliding scale. -200 add 3U, -250 add 6U, -300 add 9U, -350 add 12U, -400 add 15U, BS over 400 add 18U. MAX 75U/day 15 pen 3    Lancets MISC Test 4 times/day before meals and at bedtime and as needed for symptoms of irregular blood glucose. 300 each 3    [employer]S Lancets MISC 1 each by Does not apply route daily To check sugar 4 x a day and if feeling ill 200 each 3    blood glucose monitor strips Test **4* times a day & as needed for symptoms of irregular blood glucose. Dispense sufficient amount for indicated testing frequency plus additional to accommodate PRN testing needs. 200 strip 2     No current facility-administered medications on file prior to encounter.        REVIEW OF SYSTEMS See HPI    Objective:    BP (!) 146/90 Pulse 100   Temp 98.1 °F (36.7 °C) (Temporal)   Resp 16   Ht 5' 10\" (1.778 m)   Wt 237 lb (107.5 kg)   BMI 34.01 kg/m²   Wt Readings from Last 3 Encounters:   03/15/22 237 lb (107.5 kg)   03/08/22 237 lb (107.5 kg)   02/28/22 237 lb 6.4 oz (107.7 kg)     PHYSICAL EXAM  CONSTITUTIONAL:   Awake, alert, cooperative   EYES:  lids and lashes normal   ENT: external ears and nose without lesions   NECK:  supple, symmetrical, trachea midline   SKIN:  Open wound Present    Assessment:     Problem List Items Addressed This Visit     Buttock wound, left, subsequent encounter    Draining postoperative wound    Relevant Medications    lidocaine (XYLOCAINE) 4 % external solution    Other Relevant Orders    Initiate Outpatient Wound Care Protocol    Buttock wound, left, initial encounter - Primary    Relevant Medications    lidocaine (XYLOCAINE) 4 % external solution    Other Relevant Orders    Initiate Outpatient Wound Care Protocol          Pre Debridement Measurements:  Are located in the [facility]  Documentation Flow Sheet  Post Debridement Measurements:  Wound/Ulcer Descriptions are Pre Debridement except measurements:     Wound 03/08/22 Buttocks Left #1 (Active)   Wound Image   03/08/22 0941   Wound Etiology Other 03/08/22 0941   Dressing Status New dressing applied 03/15/22 1004   Wound Cleansed Cleansed with saline 03/15/22 1004   Dressing/Treatment Alginate with Ag;ABD 03/15/22 1004   Offloading for Diabetic Foot Ulcers Offloading not required 03/15/22 1004   Wound Length (cm) 3 cm 03/15/22 0948   Wound Width (cm) 0.6 cm 03/15/22 0948   Wound Depth (cm) 1.5 cm 03/15/22 0948   Wound Surface Area (cm^2) 1.8 cm^2 03/15/22 0948   Change in Wound Size % (l*w) 25.93 03/15/22 0948   Wound Volume (cm^3) 2.7 cm^3 03/15/22 0948   Wound Healing % -1 03/15/22 0948   Post-Procedure Length (cm) 3 cm 03/15/22 0950   Post-Procedure Width (cm) 0.9 cm 03/15/22 0950   Post-Procedure Depth (cm) 0.4 cm 03/15/22 0950   Post-Procedure Surface Area (cm^2) 2.7 cm^2 03/15/22 0950   Post-Procedure Volume (cm^3) 1.08 cm^3 03/15/22 0950   Undermining Starts ___ O'Clock 12 03/08/22 0941   Undermining Ends___ O'Clock 6 03/08/22 0941   Undermining Maxium Distance (cm) 0 03/15/22 0948   Wound Assessment Pink/red;Fibrin 03/15/22 0948   Drainage Amount Small 03/15/22 0948   Drainage Description Serosanguinous 03/15/22 0948   Odor None 03/15/22 0948   Makenna-wound Assessment Intact 03/15/22 0948   Number of days: 7     Incision 09/18/21 Other (Comment) (Active)   Number of days: 178       Procedure Note  Indications:  Based on my examination of this patient's wound(s)/ulcer(s) today, debridement is required to promote healing and evaluate the wound base. Performed by: Hazel Lopez MD    Consent obtained:  Yes    Time out taken:  Yes    Pain Control: Anesthetic  Anesthetic: 4% Lidocaine Liquid Topical     Debridement:Excisional Debridement    Using curette the wound(s)/ulcer(s) was/were sharply debrided down through and including the removal of epidermis, dermis and subcutaneous tissue. Devitalized Tissue Debrided:  fibrin and slough to stimulate bleeding to promote healing, post debridement good bleeding base and wound edges noted    Wound/Ulcer #: 1    Percent of Wound/Ulcer Debrided: 100%    Total Surface Area Debrided:  2.5 sq cm     Estimated Blood Loss:  Minimal  Hemostasis Achieved:  by pressure    Procedural Pain:  4  / 10   Post Procedural Pain:  3 / 10     Response to treatment:  Well tolerated by patient. Plan:   Treatment Note please see attached Discharge Instructions    Written patient dismissal instructions given to patient and signed by patient or POA.          Discharge Instructions       Visit Discharge/Physician Orders     Discharge condition: Stable     Assessment of pain at discharge:minimal     Anesthetic used: 4% lidociane     Discharge to: home     Left via:Private automobile     Accompanied by: accompanied by self     ECF/H[facility]: [facility]     Dressing Orders:perirectal ulcer cleanse with normal saline pack with alginate ag rope and dry dressing daily     Treatment Orders:  Eat foods high in protein and vitamin c     Take multivitamin daily     Stop smoking        [facility] followup visit ___________1 week______________  (Please note your next appointment above and if you are unable to keep, kindly give a 24 hour notice.  Thank you.)     Physician signature:__________________________        If you experience any of the following, please call the [facility] during business hours:     * Increase in Pain  * Temperature over 101  * Increase in drainage from your wound  * Drainage with a foul odor  * Bleeding  * Increase in swelling  * Need for compression bandage changes due to slippage, breakthrough drainage.     If you need medical attention outside of the business hours of the [facility] please contact your PCP or go to the nearest emergency room.                 Electronically signed by Mireille Bartlett MD on 3/15/2022 at 12:53 PM

## 2022-03-16 ENCOUNTER — TELEPHONE (OUTPATIENT)
Dept: PRIMARY CARE CLINIC | Age: 37
End: 2022-03-16

## 2022-03-16 NOTE — TELEPHONE ENCOUNTER
Lanre Kern RN [facility] called into [facility], states that he is discharging pt from home care, goals met. Pt has family that will take care of changing bandages and pt is also following up with [address] [place] [address] office.      .Electronically signed by Odalys Ruelas on 3/16/22 at 10:39 AM EDT

## 2022-03-22 ENCOUNTER — HOSPITAL ENCOUNTER (OUTPATIENT)
Dept: WOUND CARE | Age: 37
Discharge: HOME | End: 2022-03-22

## 2022-03-29 ENCOUNTER — HOSPITAL ENCOUNTER (OUTPATIENT)
Dept: WOUND CARE | Age: 37
Discharge: HOME | End: 2022-03-29
Payer: COMMERCIAL

## 2022-03-29 VITALS
TEMPERATURE: 97.2 F | HEART RATE: 96 BPM | SYSTOLIC BLOOD PRESSURE: 118 MMHG | DIASTOLIC BLOOD PRESSURE: 78 MMHG | RESPIRATION RATE: 18 BRPM

## 2022-03-29 DIAGNOSIS — S31.829D BUTTOCK WOUND, LEFT, SUBSEQUENT ENCOUNTER: Primary | ICD-10-CM

## 2022-03-29 LAB
AFB CULTURE (MYCOBACTERIA): NORMAL
AFB SMEAR: NORMAL

## 2022-03-29 PROCEDURE — 11042 DBRDMT SUBQ TIS 1ST 20SQCM/<: CPT | Performed by: SURGERY

## 2022-03-29 PROCEDURE — 11042 DBRDMT SUBQ TIS 1ST 20SQCM/<: CPT

## 2022-03-29 RX ORDER — BETAMETHASONE DIPROPIONATE 0.05 %
OINTMENT (GRAM) TOPICAL ONCE
OUTPATIENT
Start: 2022-03-29 | End: 2022-03-29

## 2022-03-29 RX ORDER — BACITRACIN, NEOMYCIN, POLYMYXIN B 400; 3.5; 5 [USP'U]/G; MG/G; [USP'U]/G
OINTMENT TOPICAL ONCE
OUTPATIENT
Start: 2022-03-29 | End: 2022-03-29

## 2022-03-29 RX ORDER — GINSENG 100 MG
CAPSULE ORAL ONCE
OUTPATIENT
Start: 2022-03-29 | End: 2022-03-29

## 2022-03-29 RX ORDER — LIDOCAINE 50 MG/G
OINTMENT TOPICAL ONCE
OUTPATIENT
Start: 2022-03-29 | End: 2022-03-29

## 2022-03-29 RX ORDER — LIDOCAINE 40 MG/G
CREAM TOPICAL ONCE
OUTPATIENT
Start: 2022-03-29 | End: 2022-03-29

## 2022-03-29 RX ORDER — CLOBETASOL PROPIONATE 0.5 MG/G
OINTMENT TOPICAL ONCE
OUTPATIENT
Start: 2022-03-29 | End: 2022-03-29

## 2022-03-29 RX ORDER — LIDOCAINE HYDROCHLORIDE 20 MG/ML
JELLY TOPICAL ONCE
OUTPATIENT
Start: 2022-03-29 | End: 2022-03-29

## 2022-03-29 RX ORDER — LIDOCAINE HYDROCHLORIDE 40 MG/ML
SOLUTION TOPICAL ONCE
OUTPATIENT
Start: 2022-03-29 | End: 2022-03-29

## 2022-03-29 RX ORDER — GENTAMICIN SULFATE 1 MG/G
OINTMENT TOPICAL ONCE
OUTPATIENT
Start: 2022-03-29 | End: 2022-03-29

## 2022-03-29 RX ORDER — BACITRACIN ZINC AND POLYMYXIN B SULFATE 500; 1000 [USP'U]/G; [USP'U]/G
OINTMENT TOPICAL ONCE
OUTPATIENT
Start: 2022-03-29 | End: 2022-03-29

## 2022-03-29 NOTE — PROGRESS NOTES
[facility]aling Center Followup Visit Note    Referring Physician : Lolita Snachez MD  [address] [place] [address] RECORD NUMBER:  [id number]  AGE: 43 y.o. GENDER: male  : 1985  EPISODE DATE:  3/29/2022    Subjective:     Chief Complaint   Patient presents with    Wound Check     left buttock      HISTORY of PRESENT ILLNESS HPI   Maral Jimenez is a 43 y.o. male who presents today in regards to follow up evaluation and treatment of wound/ulcer. That patient's past medical, family and social hx were reviewed and changes were made if present. History of Wound Context:  The patient has had a wound of right buttock area which was first noted approximately in February of this year. This has been treated with incision drainage of bilateral perirectal abscesses on 10 February at the [facility]     Patient also tells me, last year, he underwent incision drainages, 2 different occasions, in July of last year as well as in November of last year     Patient denies any history of ulcerative colitis or Crohn's disease     Patient tells me that he did undergo colonoscopy and no definite abnormal findings were noted to explain the problems     Patient does have history of diabetes mellitus     Patient at present time states that the main wound is on the left buttock area and the ischial position, denies any history of fever or chills        .  On their initial visit to the wound healing center, 22, the patient has noted that the wound has not been improving.   The patient has had similar previous wounds in the past.       Pt is currently not on abx.        3/15/2022  · Buttock wound looks  and improved  3/29/2022  · Wound looks much improved and smaller    Wound/Ulcer Pain Timing/Severity: constant  Quality of pain: dull, aching  Severity:  3 / 10   Modifying Factors: Pain worsens with walking  Associated Signs/Symptoms: drainage and pain     Ulcer Identification:  Ulcer Type: diabetic and non-healing surgical  Contributing Factors: diabetes and smoking     Diabetic/Pressure/Non Pressure Ulcers only:  Ulcer: Diabetic ulcer, fat layer exposed     If patient has diabetic lower extremity wounds  Dia Classification of diabetic lower extremity wounds:     Grade Description   []? 0 No open wound   []? 1 Superficial ulcer involving the full skin thickness   []? 2 Deep ulcer involves ligament, tendon, joint capsule, or fascia  No bone involvement or abscess presence   []? 3 Deep Ulcer with abcess formation and/or osteomyelitis   []? 4 Localized gangrene   []?   5 Extensive gangrene of the foot      Wound: Patient does have a postoperative wound, with fat layer exposed, of the left buttock, ischial area, 1.5 cm deep, fairly clean, granulating     Other pertinent information:  · Patient had multiple procedures done, call him, in July and November last year again February of this year, this time underwent bilateral ischiorectal abscess drainages under the supervision Dr. Kathya Jackson        3/8/2022  · Discussed the patient, regarding the importance of offloading the ischial buttock area when he sitting down  · Importance of nutrition, multivitamin supplementation, Larned ablation were discussed  · Counseled regarding tobacco cessation  · The patient keeps having recurrent risk of rectal abscess, patient advised that, that she should appear my colorectal surgeon also               PAST MEDICAL HISTORY      Diagnosis Date    Buttock wound, left, initial encounter 3/9/2022    Buttock wound, left, subsequent encounter 3/15/2022    CAD (coronary artery disease)     CHF (congestive heart failure) ([address]     Diabetes mellitus ([address]     Draining postoperative wound 3/9/2022    Hyperlipidemia     Hyperosmolar hyperglycemic state (HHS) ([address] 7/30/2021    Hypertension      Past Surgical History:   Procedure Laterality Date    ABDOMEN SURGERY N/A 9/18/2021    INCISION AND DRAINAGE OF LARGE PERIANAL ABSCESS performed by Kelly Zimmerman MD at [address]      buttocks    ECHO COMPL W DOP COLOR FLOW  5/5/2015         HC INSERT PICC CATH, 5/> YRS  10/20/2021         LEG SURGERY Left 2/10/2022    BILATERAL BUTTOCK INCISION AND DRAINAGE performed by [facility] at [facility] OR    WISDOM TOOTH EXTRACTION       Family History   Problem Relation Age of Onset    High Blood Pressure Father     Heart Disease Father     High Blood Pressure Paternal Grandmother     Heart Attack Paternal Grandfather     High Blood Pressure Mother     Heart Disease Mother      Social History     Tobacco Use    Smoking status: Current Some Day Smoker     Packs/day: 0.00     Years: 8.00     Pack years: 0.00     Types: Cigars     Start date: 2000    Smokeless tobacco: Never Used    Tobacco comment: Smokes 1 cigar weekly/ cigs 2-3 a week   Vaping Use    Vaping Use: Every day    Start date: 6/1/2021    Substances: Nicotine   Substance Use Topics    Alcohol use: Yes     Comment: social drinker    Drug use: Yes     Types: Marijuana (Weed)     No Known Allergies  Current Outpatient Medications on File Prior to Encounter   Medication Sig Dispense Refill    sacubitril-valsartan (ENTRESTO)  MG per tablet Take 1 tablet by mouth 2 times daily 60 tablet 1    Continuous Blood Gluc  (FREESTYLE CHRISTIAN 14 DAY READER) PEDRO As directed 1 each 1    Continuous Blood Gluc Sensor (FREESTYLE CHRISTIAN 2 SENSOR) MISC As directed 6 each 1    metoprolol succinate (TOPROL XL) 100 MG extended release tablet 1 tab twice daily 60 tablet 11    vitamin D (ERGOCALCIFEROL) 1.25 MG (40038 UT) CAPS capsule Take 1 capsule by mouth once a week 12 capsule 0    melatonin (RA MELATONIN) 3 MG TABS tablet Take 1 tablet by mouth nightly as needed (sleep) 30 tablet 1    triamcinolone (KENALOG) 0.1 % cream Apply topically as needed (rash) Apply topically 2 times daily x 10 days prn 80 g 0    Folinic Acid-Vit B6-Vit B12 (FOLINIC-PLUS) 4-50-2 MG TABS Take 4-50 mg by mouth 2 times daily 90 tablet 2    Insulin Pen Needle (PEN NEEDLES) 31G X 6 MM MISC 1 each by Does not apply route daily 100 each 0    insulin glargine (LANTUS SOLOSTAR) 100 UNIT/ML injection pen Inject 22 Units into the skin 2 times daily 10 pen 3    potassium chloride (KLOR-CON M) 20 MEQ extended release tablet Take 2 tablets by mouth daily Pt only taking one tablet daily unless he takes Bumex BID then he takes 2 tabs 180 tablet 3    bumetanide (BUMEX) 2 MG tablet Take 1 tablet by mouth See Admin Instructions Take 1 tab 8am daily. On days you gain weight more than 2 lbs or have worse swelling or short of breath then take a 2pm dose that day. (Patient taking differently: Take 2 mg by mouth 2 times daily Take 1 tab 8am daily. On days you gain weight more than 2 lbs or have worse swelling or short of breath then take a 2pm dose that day.) 60 tablet 3    atorvastatin (LIPITOR) 20 MG tablet Take 1 tablet by mouth daily 90 tablet 1    Insulin Pen Needle (BD PEN NEEDLE MICRO U/F) 32G X 6 MM MISC Uses with insulin 4 times a day 250 each 5    insulin lispro, 1 Unit Dial, (HUMALOG KWIKPEN) 100 UNIT/ML SOPN Inject 15 units with meals + following sliding scale. -200 add 3U, -250 add 6U, -300 add 9U, -350 add 12U, -400 add 15U, BS over 400 add 18U. MAX 75U/day 15 pen 3    Lancets MISC Test 4 times/day before meals and at bedtime and as needed for symptoms of irregular blood glucose. 300 each 3    [employer]S Lancets MISC 1 each by Does not apply route daily To check sugar 4 x a day and if feeling ill 200 each 3    blood glucose monitor strips Test **4* times a day & as needed for symptoms of irregular blood glucose. Dispense sufficient amount for indicated testing frequency plus additional to accommodate PRN testing needs. 200 strip 2     No current facility-administered medications on file prior to encounter.        REVIEW OF SYSTEMS See HPI    Objective:    /78 Pulse 96   Temp 97.2 °F (36.2 °C) (Temporal)   Resp 18   Wt Readings from Last 3 Encounters:   03/15/22 237 lb (107.5 kg)   03/08/22 237 lb (107.5 kg)   02/28/22 237 lb 6.4 oz (107.7 kg)     PHYSICAL EXAM  CONSTITUTIONAL:   Awake, alert, cooperative   EYES:  lids and lashes normal   ENT: external ears and nose without lesions   NECK:  supple, symmetrical, trachea midline   SKIN:  Open wound Present    Assessment:     Problem List Items Addressed This Visit     Buttock wound, left, subsequent encounter - Primary          Pre Debridement Measurements:  Are located in the [place]  Documentation Flow Sheet  Post Debridement Measurements:  Wound/Ulcer Descriptions are Pre Debridement except measurements:     Wound 03/08/22 Buttocks Left #1 (Active)   Wound Image   03/08/22 0941   Wound Etiology Other 03/08/22 0941   Dressing Status New dressing applied 03/29/22 1122   Wound Cleansed Cleansed with saline 03/29/22 1122   Dressing/Treatment Alginate with Ag; Adhesive bandage 03/29/22 1122   Offloading for Diabetic Foot Ulcers Offloading not required 03/29/22 1122   Wound Length (cm) 0.6 cm 03/29/22 1038   Wound Width (cm) 0.1 cm 03/29/22 1038   Wound Depth (cm) 0.1 cm 03/29/22 1038   Wound Surface Area (cm^2) 0.06 cm^2 03/29/22 1038   Change in Wound Size % (l*w) 97.53 03/29/22 1038   Wound Volume (cm^3) 0.006 cm^3 03/29/22 1038   Wound Healing % 100 03/29/22 1038   Post-Procedure Length (cm) 0.7 cm 03/29/22 1058   Post-Procedure Width (cm) 0.1 cm 03/29/22 1058   Post-Procedure Depth (cm) 0.2 cm 03/29/22 1058   Post-Procedure Surface Area (cm^2) 0.07 cm^2 03/29/22 1058   Post-Procedure Volume (cm^3) 0.014 cm^3 03/29/22 1058   Undermining Starts ___ O'Clock 12 03/08/22 0941   Undermining Ends___ O'Clock 6 03/08/22 0941   Undermining Maxium Distance (cm) 0 03/15/22 0948   Wound Assessment Pink/red 03/29/22 1038   Drainage Amount Scant 03/29/22 1038   Drainage Description Serosanguinous 03/29/22 1038   Odor None 03/29/22 1038   Makenna-wound Assessment Intact 03/29/22 1038   Number of days: 21     Incision 09/18/21 Other (Comment) (Active)   Number of days: 192       Procedure Note  Indications:  Based on my examination of this patient's wound(s)/ulcer(s) today, debridement is required to promote healing and evaluate the wound base. Performed by: Diana Bazan MD    Consent obtained:  Yes    Time out taken:  Yes    Pain Control: Anesthetic  Anesthetic: 4% Lidocaine Liquid Topical     Debridement:Excisional Debridement    Using curette the wound(s)/ulcer(s) was/were sharply debrided down through and including the removal of epidermis, dermis and subcutaneous tissue. Devitalized Tissue Debrided:  fibrin and slough to stimulate bleeding to promote healing, post debridement good bleeding base and wound edges noted    Wound/Ulcer #: 1    Percent of Wound/Ulcer Debrided: 100%    Total Surface Area Debrided:  0.5 sq cm     Estimated Blood Loss:  Minimal  Hemostasis Achieved:  by pressure    Procedural Pain:  4  / 10   Post Procedural Pain:  3 / 10     Response to treatment:  Well tolerated by patient. Plan:   Treatment Note please see attached Discharge Instructions    Written patient dismissal instructions given to patient and signed by patient or POA.          Discharge Instructions       Visit Discharge/Physician Orders     Discharge condition: Stable     Assessment of pain at discharge:minimal     Anesthetic used: 4% lidociane     Discharge to: home     Left via:Private automobile     Accompanied by: accompanied by self     ECF/HHA: [address] ulcer cleanse with normal saline apply alginate ag rope and dry dressing daily     Treatment Orders:  Eat foods high in protein and vitamin c     Take multivitamin daily     Stop smoking        [facility] followup visit ___________1 week______________  (Please note your next appointment above and if you are unable to keep, kindly give a 24 hour notice.  Thank you.)     Physician signature:__________________________        If you experience any of the following, please call the [facility] [address] during business hours:     * Increase in Pain  * Temperature over 101  * Increase in drainage from your wound  * Drainage with a foul odor  * Bleeding  * Increase in swelling  * Need for compression bandage changes due to slippage, breakthrough drainage.     If you need medical attention outside of the business hours of the [address] [place] [address] Road please contact your PCP or go to the nearest emergency room.                                Electronically signed by Diana Bazan MD on 3/29/2022 at 11:59 AM

## 2022-04-04 LAB
BACTERIA: NORMAL /HPF
BASOPHILS ABSOLUTE: 0.08 E9/L (ref 0–0.2)
BASOPHILS RELATIVE PERCENT: 0.3 % (ref 0–2)
BILIRUBIN URINE: ABNORMAL
BLOOD, URINE: ABNORMAL
CLARITY: CLEAR
COLOR: YELLOW
EOSINOPHILS ABSOLUTE: 0.04 E9/L (ref 0.05–0.5)
EOSINOPHILS RELATIVE PERCENT: 0.2 % (ref 0–6)
GLUCOSE URINE: >=1000 MG/DL
HCT VFR BLD CALC: 38 % (ref 37–54)
HEMOGLOBIN: 12.7 G/DL (ref 12.5–16.5)
IMMATURE GRANULOCYTES #: 0.18 E9/L
IMMATURE GRANULOCYTES %: 0.8 % (ref 0–5)
KETONES, URINE: 40 MG/DL
LACTIC ACID: 1.4 MMOL/L (ref 0.5–2.2)
LEUKOCYTE ESTERASE, URINE: NEGATIVE
LYMPHOCYTES ABSOLUTE: 2.05 E9/L (ref 1.5–4)
LYMPHOCYTES RELATIVE PERCENT: 8.8 % (ref 20–42)
MCH RBC QN AUTO: 33 PG (ref 26–35)
MCHC RBC AUTO-ENTMCNC: 33.4 % (ref 32–34.5)
MCV RBC AUTO: 98.7 FL (ref 80–99.9)
MONOCYTES ABSOLUTE: 1.4 E9/L (ref 0.1–0.95)
MONOCYTES RELATIVE PERCENT: 6 % (ref 2–12)
NEUTROPHILS ABSOLUTE: 19.5 E9/L (ref 1.8–7.3)
NEUTROPHILS RELATIVE PERCENT: 83.9 % (ref 43–80)
NITRITE, URINE: NEGATIVE
PDW BLD-RTO: 13.4 FL (ref 11.5–15)
PH UA: 5.5 (ref 5–9)
PLATELET # BLD: 271 E9/L (ref 130–450)
PMV BLD AUTO: 10.7 FL (ref 7–12)
PROTEIN UA: NEGATIVE MG/DL
RBC # BLD: 3.85 E12/L (ref 3.8–5.8)
RBC UA: NORMAL /HPF (ref 0–2)
REASON FOR REJECTION: NORMAL
REJECTED TEST: NORMAL
SPECIFIC GRAVITY UA: 1.01 (ref 1–1.03)
UROBILINOGEN, URINE: 0.2 E.U./DL
WBC # BLD: 23.3 E9/L (ref 4.5–11.5)
WBC UA: NORMAL /HPF (ref 0–5)

## 2022-04-04 PROCEDURE — 85025 COMPLETE CBC W/AUTO DIFF WBC: CPT

## 2022-04-04 PROCEDURE — 81001 URINALYSIS AUTO W/SCOPE: CPT

## 2022-04-04 PROCEDURE — 96365 THER/PROPH/DIAG IV INF INIT: CPT

## 2022-04-04 PROCEDURE — 36556 INSERT NON-TUNNEL CV CATH: CPT

## 2022-04-04 PROCEDURE — 6370000000 HC RX 637 (ALT 250 FOR IP): Performed by: NURSE PRACTITIONER

## 2022-04-04 PROCEDURE — 96374 THER/PROPH/DIAG INJ IV PUSH: CPT

## 2022-04-04 PROCEDURE — 36415 COLL VENOUS BLD VENIPUNCTURE: CPT

## 2022-04-04 PROCEDURE — 99285 EMERGENCY DEPT VISIT HI MDM: CPT

## 2022-04-04 PROCEDURE — 96375 TX/PRO/DX INJ NEW DRUG ADDON: CPT

## 2022-04-04 PROCEDURE — 96361 HYDRATE IV INFUSION ADD-ON: CPT

## 2022-04-04 PROCEDURE — 83605 ASSAY OF LACTIC ACID: CPT

## 2022-04-04 RX ORDER — OXYCODONE HYDROCHLORIDE AND ACETAMINOPHEN 5; 325 MG/1; MG/1
1 TABLET ORAL ONCE
Status: COMPLETED | OUTPATIENT
Start: 2022-04-04 | End: 2022-04-04

## 2022-04-04 RX ADMIN — OXYCODONE HYDROCHLORIDE AND ACETAMINOPHEN 1 TABLET: 5; 325 TABLET ORAL at 20:46

## 2022-04-04 ASSESSMENT — PAIN SCALES - GENERAL: PAINLEVEL_OUTOF10: 10

## 2022-04-04 NOTE — ED NOTES
Department of Emergency Medicine  FIRST PROVIDER TRIAGE NOTE             Independent MLP           4/4/22  7:51 PM EDT    Date of Encounter: 4/4/22   MRN: [id number]      HPI: Vikki Alford is a 43 y.o. male who presents to the ED for Abscess (to left buttock was treated in [place] and healing however is back again)  Patient presents with return of abscess. Patient had surgical incision and drainage February 10 in [facility] by Dr. Kateryna Montoya . He has been following with the wound care center. States the area is returning, reports worsening pain over the last several days. Patient also is a diabetic. Concerned that his blood sugar is high. ROS: Negative for cp or sob. PE: Gen Appearance/Constitutional: alert  HEENT: NC/NT. PERRLA,  Airway patent. Initial Plan of Care: All treatment areas with department are currently occupied. Plan to order/Initiate the following while awaiting opening in ED: labs and imaging studies.   Initiate Treatment-Testing, Proceed toTreatment Area When Bed Available for ED Attending/MLP to Continue Care    Electronically signed by CRIS Barker CNP   DD: 4/4/22         CRIS Barker CNP  04/04/22 1952

## 2022-04-05 ENCOUNTER — HOSPITAL ENCOUNTER (INPATIENT)
Age: 37
LOS: 3 days | Discharge: HOME | DRG: 710 | End: 2022-04-08
Attending: EMERGENCY MEDICINE | Admitting: INTERNAL MEDICINE
Payer: COMMERCIAL

## 2022-04-05 ENCOUNTER — APPOINTMENT (OUTPATIENT)
Dept: CT IMAGING | Age: 37
DRG: 710 | End: 2022-04-05
Payer: COMMERCIAL

## 2022-04-05 ENCOUNTER — APPOINTMENT (OUTPATIENT)
Dept: GENERAL RADIOLOGY | Age: 37
DRG: 710 | End: 2022-04-05
Payer: COMMERCIAL

## 2022-04-05 ENCOUNTER — HOSPITAL ENCOUNTER (OUTPATIENT)
Dept: WOUND CARE | Age: 37
Discharge: HOME | End: 2022-04-05

## 2022-04-05 DIAGNOSIS — E13.10 DIABETIC KETOACIDOSIS WITHOUT COMA ASSOCIATED WITH OTHER SPECIFIED DIABETES MELLITUS (HCC): ICD-10-CM

## 2022-04-05 DIAGNOSIS — A41.9 SEPTICEMIA (HCC): ICD-10-CM

## 2022-04-05 DIAGNOSIS — K61.1 PERIRECTAL ABSCESS: Primary | ICD-10-CM

## 2022-04-05 DIAGNOSIS — S31.829D BUTTOCK WOUND, LEFT, SUBSEQUENT ENCOUNTER: ICD-10-CM

## 2022-04-05 LAB
ABO/RH: NORMAL
ALBUMIN SERPL-MCNC: 4.2 G/DL (ref 3.5–5.2)
ALP BLD-CCNC: 100 U/L (ref 40–129)
ALT SERPL-CCNC: 7 U/L (ref 0–40)
ANION GAP SERPL CALCULATED.3IONS-SCNC: 10 MMOL/L (ref 7–16)
ANION GAP SERPL CALCULATED.3IONS-SCNC: 11 MMOL/L (ref 7–16)
ANION GAP SERPL CALCULATED.3IONS-SCNC: 13 MMOL/L (ref 7–16)
ANION GAP SERPL CALCULATED.3IONS-SCNC: 14 MMOL/L (ref 7–16)
ANION GAP SERPL CALCULATED.3IONS-SCNC: 20 MMOL/L (ref 7–16)
ANTIBODY SCREEN: NORMAL
AST SERPL-CCNC: 10 U/L (ref 0–39)
BASOPHILS ABSOLUTE: 0.05 E9/L (ref 0–0.2)
BASOPHILS RELATIVE PERCENT: 0.3 % (ref 0–2)
BETA-HYDROXYBUTYRATE: >4.5 MMOL/L (ref 0.02–0.27)
BILIRUB SERPL-MCNC: 0.7 MG/DL (ref 0–1.2)
BUN BLDV-MCNC: 12 MG/DL (ref 6–20)
BUN BLDV-MCNC: 12 MG/DL (ref 6–20)
BUN BLDV-MCNC: 13 MG/DL (ref 6–20)
BUN BLDV-MCNC: 15 MG/DL (ref 6–20)
BUN BLDV-MCNC: 15 MG/DL (ref 6–20)
CALCIUM SERPL-MCNC: 8.4 MG/DL (ref 8.6–10.2)
CALCIUM SERPL-MCNC: 8.6 MG/DL (ref 8.6–10.2)
CALCIUM SERPL-MCNC: 8.7 MG/DL (ref 8.6–10.2)
CALCIUM SERPL-MCNC: 8.8 MG/DL (ref 8.6–10.2)
CALCIUM SERPL-MCNC: 9.6 MG/DL (ref 8.6–10.2)
CHLORIDE BLD-SCNC: 90 MMOL/L (ref 98–107)
CHLORIDE BLD-SCNC: 94 MMOL/L (ref 98–107)
CHLORIDE BLD-SCNC: 96 MMOL/L (ref 98–107)
CHLORIDE BLD-SCNC: 96 MMOL/L (ref 98–107)
CHLORIDE BLD-SCNC: 97 MMOL/L (ref 98–107)
CHOLESTEROL, TOTAL: 181 MG/DL (ref 0–199)
CO2: 17 MMOL/L (ref 22–29)
CO2: 22 MMOL/L (ref 22–29)
CO2: 23 MMOL/L (ref 22–29)
CO2: 23 MMOL/L (ref 22–29)
CO2: 24 MMOL/L (ref 22–29)
CREAT SERPL-MCNC: 0.7 MG/DL (ref 0.7–1.2)
CREAT SERPL-MCNC: 0.8 MG/DL (ref 0.7–1.2)
CREAT SERPL-MCNC: 0.8 MG/DL (ref 0.7–1.2)
CREAT SERPL-MCNC: 1 MG/DL (ref 0.7–1.2)
CREAT SERPL-MCNC: 1.1 MG/DL (ref 0.7–1.2)
EKG ATRIAL RATE: 94 BPM
EKG P AXIS: 65 DEGREES
EKG P-R INTERVAL: 142 MS
EKG Q-T INTERVAL: 386 MS
EKG QRS DURATION: 90 MS
EKG QTC CALCULATION (BAZETT): 482 MS
EKG R AXIS: 74 DEGREES
EKG T AXIS: 97 DEGREES
EKG VENTRICULAR RATE: 94 BPM
EOSINOPHILS ABSOLUTE: 0.1 E9/L (ref 0.05–0.5)
EOSINOPHILS RELATIVE PERCENT: 0.6 % (ref 0–6)
GFR AFRICAN AMERICAN: >60
GFR NON-AFRICAN AMERICAN: >60 ML/MIN/1.73
GLUCOSE BLD-MCNC: 127 MG/DL (ref 74–99)
GLUCOSE BLD-MCNC: 169 MG/DL
GLUCOSE BLD-MCNC: 181 MG/DL (ref 74–99)
GLUCOSE BLD-MCNC: 249 MG/DL (ref 74–99)
GLUCOSE BLD-MCNC: 259 MG/DL (ref 74–99)
GLUCOSE BLD-MCNC: 322 MG/DL (ref 74–99)
HBA1C MFR BLD: 13.7 % (ref 4–5.6)
HCT VFR BLD CALC: 31.1 % (ref 37–54)
HDLC SERPL-MCNC: 29 MG/DL
HEMOGLOBIN: 10.4 G/DL (ref 12.5–16.5)
IMMATURE GRANULOCYTES #: 0.13 E9/L
IMMATURE GRANULOCYTES %: 0.8 % (ref 0–5)
LACTIC ACID: 0.9 MMOL/L (ref 0.5–2.2)
LDL CHOLESTEROL CALCULATED: 72 MG/DL (ref 0–99)
LYMPHOCYTES ABSOLUTE: 2.56 E9/L (ref 1.5–4)
LYMPHOCYTES RELATIVE PERCENT: 15.9 % (ref 20–42)
MAGNESIUM: 2.1 MG/DL (ref 1.6–2.6)
MAGNESIUM: 2.2 MG/DL (ref 1.6–2.6)
MCH RBC QN AUTO: 31.9 PG (ref 26–35)
MCHC RBC AUTO-ENTMCNC: 33.4 % (ref 32–34.5)
MCV RBC AUTO: 95.4 FL (ref 80–99.9)
METER GLUCOSE: 117 MG/DL (ref 74–99)
METER GLUCOSE: 147 MG/DL (ref 74–99)
METER GLUCOSE: 169 MG/DL (ref 74–99)
METER GLUCOSE: 187 MG/DL (ref 74–99)
METER GLUCOSE: 212 MG/DL (ref 74–99)
METER GLUCOSE: 260 MG/DL (ref 74–99)
METER GLUCOSE: 270 MG/DL (ref 74–99)
METER GLUCOSE: 292 MG/DL (ref 74–99)
METER GLUCOSE: 313 MG/DL (ref 74–99)
MONOCYTES ABSOLUTE: 1.21 E9/L (ref 0.1–0.95)
MONOCYTES RELATIVE PERCENT: 7.5 % (ref 2–12)
NEUTROPHILS ABSOLUTE: 12.02 E9/L (ref 1.8–7.3)
NEUTROPHILS RELATIVE PERCENT: 74.9 % (ref 43–80)
PDW BLD-RTO: 13.2 FL (ref 11.5–15)
PH VENOUS: 7.36 (ref 7.35–7.45)
PHOSPHORUS: 2.3 MG/DL (ref 2.5–4.5)
PHOSPHORUS: 2.6 MG/DL (ref 2.5–4.5)
PLATELET # BLD: 236 E9/L (ref 130–450)
PMV BLD AUTO: 10.4 FL (ref 7–12)
POTASSIUM REFLEX MAGNESIUM: 3.6 MMOL/L (ref 3.5–5)
POTASSIUM SERPL-SCNC: 3.3 MMOL/L (ref 3.5–5)
POTASSIUM SERPL-SCNC: 3.8 MMOL/L (ref 3.5–5)
POTASSIUM SERPL-SCNC: 4 MMOL/L (ref 3.5–5)
POTASSIUM SERPL-SCNC: 4.8 MMOL/L (ref 3.5–5)
PROCALCITONIN: 0.34 NG/ML (ref 0–0.08)
RBC # BLD: 3.26 E12/L (ref 3.8–5.8)
SODIUM BLD-SCNC: 127 MMOL/L (ref 132–146)
SODIUM BLD-SCNC: 128 MMOL/L (ref 132–146)
SODIUM BLD-SCNC: 131 MMOL/L (ref 132–146)
SODIUM BLD-SCNC: 132 MMOL/L (ref 132–146)
SODIUM BLD-SCNC: 132 MMOL/L (ref 132–146)
TOTAL PROTEIN: 8.6 G/DL (ref 6.4–8.3)
TRIGL SERPL-MCNC: 398 MG/DL (ref 0–149)
TROPONIN, HIGH SENSITIVITY: 17 NG/L (ref 0–11)
VLDLC SERPL CALC-MCNC: 80 MG/DL
WBC # BLD: 16.1 E9/L (ref 4.5–11.5)

## 2022-04-05 PROCEDURE — 87075 CULTR BACTERIA EXCEPT BLOOD: CPT

## 2022-04-05 PROCEDURE — 36592 COLLECT BLOOD FROM PICC: CPT

## 2022-04-05 PROCEDURE — 82010 KETONE BODYS QUAN: CPT

## 2022-04-05 PROCEDURE — A4216 STERILE WATER/SALINE, 10 ML: HCPCS | Performed by: INTERNAL MEDICINE

## 2022-04-05 PROCEDURE — 2000000000 HC ICU R&B

## 2022-04-05 PROCEDURE — 86850 RBC ANTIBODY SCREEN: CPT

## 2022-04-05 PROCEDURE — 84145 PROCALCITONIN (PCT): CPT

## 2022-04-05 PROCEDURE — 74177 CT ABD & PELVIS W/CONTRAST: CPT

## 2022-04-05 PROCEDURE — 6370000000 HC RX 637 (ALT 250 FOR IP): Performed by: EMERGENCY MEDICINE

## 2022-04-05 PROCEDURE — 02HV33Z INSERTION OF INFUSION DEVICE INTO SUPERIOR VENA CAVA, PERCUTANEOUS APPROACH: ICD-10-PCS | Performed by: EMERGENCY MEDICINE

## 2022-04-05 PROCEDURE — 87040 BLOOD CULTURE FOR BACTERIA: CPT

## 2022-04-05 PROCEDURE — 6360000004 HC RX CONTRAST MEDICATION: Performed by: RADIOLOGY

## 2022-04-05 PROCEDURE — 93005 ELECTROCARDIOGRAM TRACING: CPT | Performed by: NURSE PRACTITIONER

## 2022-04-05 PROCEDURE — 93010 ELECTROCARDIOGRAM REPORT: CPT | Performed by: INTERNAL MEDICINE

## 2022-04-05 PROCEDURE — 80061 LIPID PANEL: CPT

## 2022-04-05 PROCEDURE — 80053 COMPREHEN METABOLIC PANEL: CPT

## 2022-04-05 PROCEDURE — 87186 SC STD MICRODIL/AGAR DIL: CPT

## 2022-04-05 PROCEDURE — 2500000003 HC RX 250 WO HCPCS

## 2022-04-05 PROCEDURE — 84484 ASSAY OF TROPONIN QUANT: CPT

## 2022-04-05 PROCEDURE — 0J9B0ZZ DRAINAGE OF PERINEUM SUBCUTANEOUS TISSUE AND FASCIA, OPEN APPROACH: ICD-10-PCS | Performed by: SURGERY

## 2022-04-05 PROCEDURE — 87077 CULTURE AEROBIC IDENTIFY: CPT

## 2022-04-05 PROCEDURE — 6360000002 HC RX W HCPCS: Performed by: STUDENT IN AN ORGANIZED HEALTH CARE EDUCATION/TRAINING PROGRAM

## 2022-04-05 PROCEDURE — 84100 ASSAY OF PHOSPHORUS: CPT

## 2022-04-05 PROCEDURE — 87081 CULTURE SCREEN ONLY: CPT

## 2022-04-05 PROCEDURE — 2580000003 HC RX 258: Performed by: STUDENT IN AN ORGANIZED HEALTH CARE EDUCATION/TRAINING PROGRAM

## 2022-04-05 PROCEDURE — 2580000003 HC RX 258: Performed by: NURSE PRACTITIONER

## 2022-04-05 PROCEDURE — 99255 IP/OBS CONSLTJ NEW/EST HI 80: CPT | Performed by: INTERNAL MEDICINE

## 2022-04-05 PROCEDURE — 87147 CULTURE TYPE IMMUNOLOGIC: CPT

## 2022-04-05 PROCEDURE — 6360000002 HC RX W HCPCS: Performed by: INTERNAL MEDICINE

## 2022-04-05 PROCEDURE — 6370000000 HC RX 637 (ALT 250 FOR IP): Performed by: STUDENT IN AN ORGANIZED HEALTH CARE EDUCATION/TRAINING PROGRAM

## 2022-04-05 PROCEDURE — 6370000000 HC RX 637 (ALT 250 FOR IP): Performed by: INTERNAL MEDICINE

## 2022-04-05 PROCEDURE — 83735 ASSAY OF MAGNESIUM: CPT

## 2022-04-05 PROCEDURE — 36415 COLL VENOUS BLD VENIPUNCTURE: CPT

## 2022-04-05 PROCEDURE — 86901 BLOOD TYPING SEROLOGIC RH(D): CPT

## 2022-04-05 PROCEDURE — 85025 COMPLETE CBC W/AUTO DIFF WBC: CPT

## 2022-04-05 PROCEDURE — S5553 INSULIN LONG ACTING 5 U: HCPCS | Performed by: INTERNAL MEDICINE

## 2022-04-05 PROCEDURE — 83605 ASSAY OF LACTIC ACID: CPT

## 2022-04-05 PROCEDURE — 83036 HEMOGLOBIN GLYCOSYLATED A1C: CPT

## 2022-04-05 PROCEDURE — 82962 GLUCOSE BLOOD TEST: CPT

## 2022-04-05 PROCEDURE — 71045 X-RAY EXAM CHEST 1 VIEW: CPT

## 2022-04-05 PROCEDURE — 2580000003 HC RX 258: Performed by: INTERNAL MEDICINE

## 2022-04-05 PROCEDURE — C9113 INJ PANTOPRAZOLE SODIUM, VIA: HCPCS | Performed by: INTERNAL MEDICINE

## 2022-04-05 PROCEDURE — 82800 BLOOD PH: CPT

## 2022-04-05 PROCEDURE — 87205 SMEAR GRAM STAIN: CPT

## 2022-04-05 PROCEDURE — 6360000002 HC RX W HCPCS: Performed by: NURSE PRACTITIONER

## 2022-04-05 PROCEDURE — 80048 BASIC METABOLIC PNL TOTAL CA: CPT

## 2022-04-05 PROCEDURE — 2580000003 HC RX 258: Performed by: EMERGENCY MEDICINE

## 2022-04-05 PROCEDURE — 87070 CULTURE OTHR SPECIMN AEROBIC: CPT

## 2022-04-05 PROCEDURE — 86900 BLOOD TYPING SEROLOGIC ABO: CPT

## 2022-04-05 RX ORDER — POTASSIUM CHLORIDE 7.45 MG/ML
10 INJECTION INTRAVENOUS PRN
Status: DISCONTINUED | OUTPATIENT
Start: 2022-04-05 | End: 2022-04-05

## 2022-04-05 RX ORDER — 0.9 % SODIUM CHLORIDE 0.9 %
1000 INTRAVENOUS SOLUTION INTRAVENOUS ONCE
Status: COMPLETED | OUTPATIENT
Start: 2022-04-05 | End: 2022-04-05

## 2022-04-05 RX ORDER — BUMETANIDE 1 MG/1
2 TABLET ORAL 2 TIMES DAILY
Status: CANCELLED | OUTPATIENT
Start: 2022-04-05

## 2022-04-05 RX ORDER — ACETAMINOPHEN 650 MG/1
650 SUPPOSITORY RECTAL EVERY 6 HOURS PRN
Status: DISCONTINUED | OUTPATIENT
Start: 2022-04-05 | End: 2022-04-08 | Stop reason: HOSPADM

## 2022-04-05 RX ORDER — SODIUM CHLORIDE 9 MG/ML
INJECTION, SOLUTION INTRAVENOUS CONTINUOUS
Status: DISCONTINUED | OUTPATIENT
Start: 2022-04-05 | End: 2022-04-05

## 2022-04-05 RX ORDER — ONDANSETRON 4 MG/1
4 TABLET, ORALLY DISINTEGRATING ORAL EVERY 8 HOURS PRN
Status: DISCONTINUED | OUTPATIENT
Start: 2022-04-05 | End: 2022-04-08 | Stop reason: HOSPADM

## 2022-04-05 RX ORDER — MAGNESIUM SULFATE 1 G/100ML
1000 INJECTION INTRAVENOUS PRN
Status: DISCONTINUED | OUTPATIENT
Start: 2022-04-05 | End: 2022-04-05

## 2022-04-05 RX ORDER — SODIUM CHLORIDE 0.9 % (FLUSH) 0.9 %
5-40 SYRINGE (ML) INJECTION EVERY 12 HOURS SCHEDULED
Status: DISCONTINUED | OUTPATIENT
Start: 2022-04-05 | End: 2022-04-08 | Stop reason: HOSPADM

## 2022-04-05 RX ORDER — DEXTROSE MONOHYDRATE 25 G/50ML
12.5 INJECTION, SOLUTION INTRAVENOUS PRN
Status: DISCONTINUED | OUTPATIENT
Start: 2022-04-05 | End: 2022-04-05

## 2022-04-05 RX ORDER — ONDANSETRON 2 MG/ML
4 INJECTION INTRAMUSCULAR; INTRAVENOUS EVERY 6 HOURS PRN
Status: DISCONTINUED | OUTPATIENT
Start: 2022-04-05 | End: 2022-04-08 | Stop reason: HOSPADM

## 2022-04-05 RX ORDER — INSULIN LISPRO 100 [IU]/ML
5 INJECTION, SOLUTION INTRAVENOUS; SUBCUTANEOUS
Status: CANCELLED | OUTPATIENT
Start: 2022-04-05

## 2022-04-05 RX ORDER — OXYCODONE HYDROCHLORIDE 5 MG/1
5 TABLET ORAL EVERY 4 HOURS PRN
Status: DISCONTINUED | OUTPATIENT
Start: 2022-04-05 | End: 2022-04-08 | Stop reason: HOSPADM

## 2022-04-05 RX ORDER — SODIUM CHLORIDE, SODIUM LACTATE, POTASSIUM CHLORIDE, AND CALCIUM CHLORIDE .6; .31; .03; .02 G/100ML; G/100ML; G/100ML; G/100ML
1000 INJECTION, SOLUTION INTRAVENOUS ONCE
Status: COMPLETED | OUTPATIENT
Start: 2022-04-05 | End: 2022-04-05

## 2022-04-05 RX ORDER — SODIUM CHLORIDE 9 MG/ML
INJECTION, SOLUTION INTRAVENOUS PRN
Status: DISCONTINUED | OUTPATIENT
Start: 2022-04-05 | End: 2022-04-08 | Stop reason: HOSPADM

## 2022-04-05 RX ORDER — 0.9 % SODIUM CHLORIDE 0.9 %
15 INTRAVENOUS SOLUTION INTRAVENOUS ONCE
Status: DISCONTINUED | OUTPATIENT
Start: 2022-04-05 | End: 2022-04-05

## 2022-04-05 RX ORDER — POLYETHYLENE GLYCOL 3350 17 G/17G
17 POWDER, FOR SOLUTION ORAL DAILY PRN
Status: DISCONTINUED | OUTPATIENT
Start: 2022-04-05 | End: 2022-04-08 | Stop reason: HOSPADM

## 2022-04-05 RX ORDER — METOPROLOL SUCCINATE 100 MG/1
100 TABLET, EXTENDED RELEASE ORAL 2 TIMES DAILY
Status: DISCONTINUED | OUTPATIENT
Start: 2022-04-05 | End: 2022-04-08 | Stop reason: HOSPADM

## 2022-04-05 RX ORDER — ACETAMINOPHEN 325 MG/1
650 TABLET ORAL EVERY 6 HOURS PRN
Status: DISCONTINUED | OUTPATIENT
Start: 2022-04-05 | End: 2022-04-08 | Stop reason: HOSPADM

## 2022-04-05 RX ORDER — DEXTROSE MONOHYDRATE 50 MG/ML
100 INJECTION, SOLUTION INTRAVENOUS PRN
Status: DISCONTINUED | OUTPATIENT
Start: 2022-04-05 | End: 2022-04-08 | Stop reason: HOSPADM

## 2022-04-05 RX ORDER — ATORVASTATIN CALCIUM 20 MG/1
20 TABLET, FILM COATED ORAL NIGHTLY
Status: DISCONTINUED | OUTPATIENT
Start: 2022-04-05 | End: 2022-04-08 | Stop reason: HOSPADM

## 2022-04-05 RX ORDER — SODIUM CHLORIDE 0.9 % (FLUSH) 0.9 %
5-40 SYRINGE (ML) INJECTION PRN
Status: DISCONTINUED | OUTPATIENT
Start: 2022-04-05 | End: 2022-04-08 | Stop reason: HOSPADM

## 2022-04-05 RX ORDER — MORPHINE SULFATE 2 MG/ML
4 INJECTION, SOLUTION INTRAMUSCULAR; INTRAVENOUS ONCE
Status: COMPLETED | OUTPATIENT
Start: 2022-04-05 | End: 2022-04-05

## 2022-04-05 RX ORDER — LIDOCAINE HYDROCHLORIDE 10 MG/ML
INJECTION, SOLUTION INFILTRATION; PERINEURAL
Status: COMPLETED
Start: 2022-04-05 | End: 2022-04-05

## 2022-04-05 RX ORDER — PANTOPRAZOLE SODIUM 40 MG/10ML
40 INJECTION, POWDER, LYOPHILIZED, FOR SOLUTION INTRAVENOUS DAILY
Status: DISCONTINUED | OUTPATIENT
Start: 2022-04-05 | End: 2022-04-05 | Stop reason: SDUPTHER

## 2022-04-05 RX ORDER — ONDANSETRON 2 MG/ML
4 INJECTION INTRAMUSCULAR; INTRAVENOUS ONCE
Status: COMPLETED | OUTPATIENT
Start: 2022-04-05 | End: 2022-04-05

## 2022-04-05 RX ORDER — POTASSIUM CHLORIDE 20 MEQ/1
40 TABLET, EXTENDED RELEASE ORAL DAILY
Status: DISCONTINUED | OUTPATIENT
Start: 2022-04-05 | End: 2022-04-08 | Stop reason: HOSPADM

## 2022-04-05 RX ORDER — NICOTINE POLACRILEX 4 MG
15 LOZENGE BUCCAL PRN
Status: DISCONTINUED | OUTPATIENT
Start: 2022-04-05 | End: 2022-04-08 | Stop reason: HOSPADM

## 2022-04-05 RX ORDER — DEXTROSE MONOHYDRATE 25 G/50ML
12.5 INJECTION, SOLUTION INTRAVENOUS PRN
Status: DISCONTINUED | OUTPATIENT
Start: 2022-04-05 | End: 2022-04-08 | Stop reason: HOSPADM

## 2022-04-05 RX ORDER — LINEZOLID 2 MG/ML
600 INJECTION, SOLUTION INTRAVENOUS ONCE
Status: COMPLETED | OUTPATIENT
Start: 2022-04-05 | End: 2022-04-05

## 2022-04-05 RX ORDER — INSULIN GLARGINE-YFGN 100 [IU]/ML
22 INJECTION, SOLUTION SUBCUTANEOUS 2 TIMES DAILY
Status: DISCONTINUED | OUTPATIENT
Start: 2022-04-05 | End: 2022-04-06

## 2022-04-05 RX ORDER — DEXTROSE AND SODIUM CHLORIDE 5; .45 G/100ML; G/100ML
INJECTION, SOLUTION INTRAVENOUS CONTINUOUS PRN
Status: DISCONTINUED | OUTPATIENT
Start: 2022-04-05 | End: 2022-04-05

## 2022-04-05 RX ORDER — LIDOCAINE HYDROCHLORIDE AND EPINEPHRINE 10; 10 MG/ML; UG/ML
20 INJECTION, SOLUTION INFILTRATION; PERINEURAL
Status: ACTIVE | OUTPATIENT
Start: 2022-04-05 | End: 2022-04-05

## 2022-04-05 RX ORDER — OXYCODONE HYDROCHLORIDE 10 MG/1
10 TABLET ORAL EVERY 4 HOURS PRN
Status: DISCONTINUED | OUTPATIENT
Start: 2022-04-05 | End: 2022-04-08 | Stop reason: HOSPADM

## 2022-04-05 RX ORDER — LANOLIN ALCOHOL/MO/W.PET/CERES
3 CREAM (GRAM) TOPICAL NIGHTLY PRN
Status: DISCONTINUED | OUTPATIENT
Start: 2022-04-05 | End: 2022-04-08 | Stop reason: HOSPADM

## 2022-04-05 RX ADMIN — INSULIN LISPRO 6 UNITS: 100 INJECTION, SOLUTION INTRAVENOUS; SUBCUTANEOUS at 11:18

## 2022-04-05 RX ADMIN — OXYCODONE HYDROCHLORIDE 10 MG: 10 TABLET ORAL at 09:45

## 2022-04-05 RX ADMIN — SODIUM CHLORIDE 1000 ML: 9 INJECTION, SOLUTION INTRAVENOUS at 02:35

## 2022-04-05 RX ADMIN — IOPAMIDOL 90 ML: 755 INJECTION, SOLUTION INTRAVENOUS at 01:40

## 2022-04-05 RX ADMIN — MORPHINE SULFATE 4 MG: 2 INJECTION, SOLUTION INTRAMUSCULAR; INTRAVENOUS at 04:26

## 2022-04-05 RX ADMIN — OXYCODONE HYDROCHLORIDE 10 MG: 10 TABLET ORAL at 20:14

## 2022-04-05 RX ADMIN — INSULIN LISPRO 9 UNITS: 100 INJECTION, SOLUTION INTRAVENOUS; SUBCUTANEOUS at 20:15

## 2022-04-05 RX ADMIN — INSULIN LISPRO 5 UNITS: 100 INJECTION, SOLUTION INTRAVENOUS; SUBCUTANEOUS at 11:19

## 2022-04-05 RX ADMIN — INSULIN LISPRO 9 UNITS: 100 INJECTION, SOLUTION INTRAVENOUS; SUBCUTANEOUS at 16:46

## 2022-04-05 RX ADMIN — SODIUM CHLORIDE, POTASSIUM CHLORIDE, SODIUM LACTATE AND CALCIUM CHLORIDE 1000 ML: 600; 310; 30; 20 INJECTION, SOLUTION INTRAVENOUS at 03:57

## 2022-04-05 RX ADMIN — SODIUM CHLORIDE 10.75 UNITS/HR: 9 INJECTION, SOLUTION INTRAVENOUS at 04:46

## 2022-04-05 RX ADMIN — LIDOCAINE HYDROCHLORIDE: 10 INJECTION, SOLUTION INFILTRATION; PERINEURAL at 09:47

## 2022-04-05 RX ADMIN — Medication 3 MG: at 23:37

## 2022-04-05 RX ADMIN — ATORVASTATIN CALCIUM 20 MG: 20 TABLET, FILM COATED ORAL at 20:14

## 2022-04-05 RX ADMIN — SODIUM CHLORIDE, PRESERVATIVE FREE 10 ML: 5 INJECTION INTRAVENOUS at 20:16

## 2022-04-05 RX ADMIN — HYDROMORPHONE HYDROCHLORIDE 0.5 MG: 1 INJECTION, SOLUTION INTRAMUSCULAR; INTRAVENOUS; SUBCUTANEOUS at 14:56

## 2022-04-05 RX ADMIN — VANCOMYCIN HYDROCHLORIDE 1250 MG: 10 INJECTION, POWDER, LYOPHILIZED, FOR SOLUTION INTRAVENOUS at 03:56

## 2022-04-05 RX ADMIN — ONDANSETRON 4 MG: 2 INJECTION INTRAMUSCULAR; INTRAVENOUS at 02:39

## 2022-04-05 RX ADMIN — PIPERACILLIN AND TAZOBACTAM 3375 MG: 3; .375 INJECTION, POWDER, LYOPHILIZED, FOR SOLUTION INTRAVENOUS at 18:03

## 2022-04-05 RX ADMIN — PIPERACILLIN AND TAZOBACTAM 3375 MG: 3; .375 INJECTION, POWDER, LYOPHILIZED, FOR SOLUTION INTRAVENOUS at 11:31

## 2022-04-05 RX ADMIN — INSULIN LISPRO 5 UNITS: 100 INJECTION, SOLUTION INTRAVENOUS; SUBCUTANEOUS at 16:49

## 2022-04-05 RX ADMIN — LINEZOLID 600 MG: 600 INJECTION, SOLUTION INTRAVENOUS at 11:32

## 2022-04-05 RX ADMIN — ENOXAPARIN SODIUM 30 MG: 100 INJECTION SUBCUTANEOUS at 20:15

## 2022-04-05 RX ADMIN — PIPERACILLIN AND TAZOBACTAM 3375 MG: 3; .375 INJECTION, POWDER, LYOPHILIZED, FOR SOLUTION INTRAVENOUS at 02:58

## 2022-04-05 RX ADMIN — SODIUM CHLORIDE, PRESERVATIVE FREE 40 MG: 5 INJECTION INTRAVENOUS at 11:24

## 2022-04-05 RX ADMIN — DEXTROSE AND SODIUM CHLORIDE: 5; 450 INJECTION, SOLUTION INTRAVENOUS at 06:50

## 2022-04-05 RX ADMIN — HYDROMORPHONE HYDROCHLORIDE 0.5 MG: 1 INJECTION, SOLUTION INTRAMUSCULAR; INTRAVENOUS; SUBCUTANEOUS at 23:37

## 2022-04-05 RX ADMIN — INSULIN GLARGINE-YFGN 22 UNITS: 100 INJECTION, SOLUTION SUBCUTANEOUS at 09:57

## 2022-04-05 RX ADMIN — ENOXAPARIN SODIUM 30 MG: 100 INJECTION SUBCUTANEOUS at 09:48

## 2022-04-05 RX ADMIN — INSULIN GLARGINE-YFGN 22 UNITS: 100 INJECTION, SOLUTION SUBCUTANEOUS at 20:17

## 2022-04-05 RX ADMIN — POTASSIUM CHLORIDE 40 MEQ: 20 TABLET, EXTENDED RELEASE ORAL at 09:56

## 2022-04-05 ASSESSMENT — PAIN DESCRIPTION - LOCATION
LOCATION: BUTTOCKS

## 2022-04-05 ASSESSMENT — PAIN DESCRIPTION - ORIENTATION
ORIENTATION: LEFT
ORIENTATION: RIGHT;LEFT
ORIENTATION: RIGHT;LEFT
ORIENTATION: LEFT

## 2022-04-05 ASSESSMENT — PAIN DESCRIPTION - PAIN TYPE
TYPE: ACUTE PAIN
TYPE: ACUTE PAIN
TYPE: ACUTE PAIN;SURGICAL PAIN
TYPE: ACUTE PAIN

## 2022-04-05 ASSESSMENT — PAIN SCALES - GENERAL
PAINLEVEL_OUTOF10: 8
PAINLEVEL_OUTOF10: 8
PAINLEVEL_OUTOF10: 10
PAINLEVEL_OUTOF10: 8
PAINLEVEL_OUTOF10: 10
PAINLEVEL_OUTOF10: 7
PAINLEVEL_OUTOF10: 10

## 2022-04-05 ASSESSMENT — ENCOUNTER SYMPTOMS
COLOR CHANGE: 0
COUGH: 0
RECTAL PAIN: 1
ABDOMINAL DISTENTION: 0
SHORTNESS OF BREATH: 1
ABDOMINAL PAIN: 0

## 2022-04-05 ASSESSMENT — PAIN DESCRIPTION - DESCRIPTORS
DESCRIPTORS: CONSTANT;DISCOMFORT;SORE
DESCRIPTORS: DISCOMFORT;SORE;CONSTANT
DESCRIPTORS: ACHING
DESCRIPTORS: ACHING;SORE
DESCRIPTORS: ACHING
DESCRIPTORS: ACHING

## 2022-04-05 ASSESSMENT — PAIN DESCRIPTION - ONSET
ONSET: ON-GOING

## 2022-04-05 ASSESSMENT — PAIN DESCRIPTION - PROGRESSION
CLINICAL_PROGRESSION: NOT CHANGED
CLINICAL_PROGRESSION: NOT CHANGED

## 2022-04-05 ASSESSMENT — PAIN DESCRIPTION - FREQUENCY
FREQUENCY: CONTINUOUS

## 2022-04-05 NOTE — PLAN OF CARE
Problem: Falls - Risk of:  Goal: Will remain free from falls  Description: Will remain free from falls  Outcome: Met This Shift     Problem: Falls - Risk of:  Goal: Absence of physical injury  Description: Absence of physical injury  Outcome: Met This Shift     Problem: Serum Glucose Level - Abnormal:  Goal: Ability to maintain appropriate glucose levels will improve  Description: Ability to maintain appropriate glucose levels will improve  Outcome: Met This Shift     Problem: Sensory Perception - Impaired:  Goal: Ability to maintain a stable neurologic state will improve  Description: Ability to maintain a stable neurologic state will improve  Outcome: Met This Shift     Problem: Metabolic:  Goal: Ability to maintain appropriate glucose levels will improve  Description: Ability to maintain appropriate glucose levels will improve  Outcome: Met This Shift     Problem: Nutritional:  Goal: Maintenance of adequate nutrition will improve  Description: Maintenance of adequate nutrition will improve  Outcome: Met This Shift

## 2022-04-05 NOTE — PROCEDURES
Incision and Drainage Procedure Note    Indication: perirectal abscess    Procedure: The patient was positioned appropriately and the skin over the incision site was prepped and draped in the usual fashion. Local anesthesia was obtained by infiltration using 1% Lidocaine without epinephrine. An incision was then made on the left buttock over the greatest area of fluctuance and approximately 10 cc of purulent material was expressed. Loculations were broken up by finger dissection. Another cruciate incision was then made on the right buttock and more purulent fluid was expressed. Both cavities were then thoroughly irrigated with sterile saline and packed with iodoform gauze. The wounds were covered in heavy drainage pack. The patient tolerated the procedure well.     Complications: None    Electronically signed by Kapil Mari MD on 4/5/22 at 9:18 AM EDT

## 2022-04-05 NOTE — CONSULTS
Medical Intensive Care Unit     Jarred Kaur [address]  Critical care consult    Date and time: 4/5/2022 9:45 AM  Patient's name:  Brian Mayen  Medical Record Number: [id number]  Patient's account/billing number: [de-identified]  Patient's YOB: 1985  Age: 43 y.o. Date of Admission: 4/5/2022  1:13 AM  Length of stay during current admission: 0    Primary Care Physician: Rainer Barlow MD  ICU Attending Physician: Dr. Carter Bounds     Code Status: Full Code    Reason for ICU admission: DKA, sepsis and perirectal abscess    History of Present Illness:     Leobardo Barcenas is a 43 y.o. male with medical history significant for insulin-dependent diabetes mellitus on Lantus 22 twice daily, 15 premeal plus sliding scale insulin, buttock wound/perirectal abscess, status post multiple I&D and hospital admission, heart failure reduced ejection fraction 35%, on Entresto metoprolol and Bumex, tobacco abuse disorder, vitamin D deficiency presented to the ED with chief concern of rectal pain, fever for the last 2 days. He did not eat nor took any insulin during this time. In the ED he was found to have hypotenisve with systolic in the 29R, blood sugar 322, beta hydroxybutyrate greater than 4.5, glucosuria and leukocytosis. Left IJ central line with he received 2.5 L fluid bolus, blood culture was sent, started on vancomycin and Zosyn, DKA protocol was initiated and surgery was consulted for possible I&D. For further care and management patient was admitted to medical ICU.        CURRENT VENTILATION STATUS:   [] Ventilator  [] BIPAP  [x] Nasal Cannula [] Room Air      IF INTUBATED, ET TUBE MARKING AT LOWER LIP:       cms    SECRETIONS   Amount:  [] Small [] Moderate  [] Large [x] None    Color:     [] White [] Colored  [] Bloody    SEDATION:  RAAS Score:  [] Propofol gtt  [] Versed gtt  [] Ativan gtt   [] No Sedation    PARALYZED:  [x] No    [] Yes    VASOPRESSORS:  [] No    [] Yes    If yes -   [] Levophed       [] Dopamine     [] Vasopressin       [] Dobutamine  [] Phenylephrine         [] Epinephrine    CENTRAL LINES:     [] No   [] Yes   (Date of Insertion:   )           If yes -     [x] Right IJ     [] Left IJ [] Right Femoral [] Left Femoral                   [] Right Subclavian [] Left Subclavian     DANIEL'S CATHETER:   [x] No   [] Yes  (Date of Insertion:   )     URINE OUTPUT:            [] Good   [] Low              [] Anuric    REVIEW OF SYSTEMS:    · Constitutional: + fever, no change in weight; dec appetite  · HEENT: No blurred vision, no ear problems, no sore throat, no rhinorrhea. · Respiratory: No cough, no sputum production, no pleuritic chest pain, no shortness of breath  · Cardiology: No angina, no dyspnea on exertion, no paroxysmal nocturnal dyspnea, no orthopnea, no palpitation, no leg swelling. · Gastroenterology: No dysphagia, no reflux; no abdominal pain, no nausea or vomiting; no constipation or diarrhea.  No hematochezia   · Genitourinary: No dysuria, no frequency, hesitancy; no hematuria  · Musculoskeletal: no joint pain, no myalgia, no change in range of movement  · Neurology: no focal weakness in extremities, no slurred speech, no double vision, no tingling or numbness sensation  · Endocrinology: no temperature intolerance, no polyphagia, polydipsia or polyuria  · Hematology: no increased bleeding, no bruising, no lymphadenopathy  · Skin: no skin changes noticed by patient  · Psychology: no depressed mood, no suicidal ideation    OBJECTIVE:     VITAL SIGNS:  BP (!) 109/59   Pulse 78   Temp 98.4 °F (36.9 °C) (Oral)   Resp 19   Wt 237 lb (107.5 kg)   SpO2 97%   BMI 34.01 kg/m²   Tmax over 24 hours:  Temp (24hrs), Av.5 °F (36.9 °C), Min:97.3 °F (36.3 °C), Max:99.8 °F (37.7 °C)      Patient Vitals for the past 6 hrs:   BP Temp Temp src Pulse Resp SpO2   22 0800 (!) 109/59 98.4 °F (36.9 °C) Oral 78 19 97 %   22 0745 101/61 -- -- -- 15 --   22 0730 102/61 -- -- -- 14 --   04/05/22 0715 (!) 102/58 -- -- -- 19 --   04/05/22 0710 (!) 92/51 -- -- -- 19 --   04/05/22 0705 (!) 96/54 -- -- -- 21 --   04/05/22 0658 (!) 94/50 -- -- 79 16 --   04/05/22 0655 (!) 84/44 -- -- -- 20 --   04/05/22 0645 (!) 91/46 -- -- -- 24 --   04/05/22 0640 (!) 74/39 -- -- -- 20 --   04/05/22 0630 (!) 73/31 -- -- -- 22 --   04/05/22 0625 (!) 83/49 -- -- 78 21 --   04/05/22 0615 (!) 90/37 -- -- 77 19 --   04/05/22 0600 (!) 75/33 -- -- 88 16 --   04/05/22 0547 (!) 82/42 -- -- 82 17 --   04/05/22 0530 (!) 88/44 -- -- 70 20 --   04/05/22 0515 (!) 87/38 -- -- 73 22 --   04/05/22 0500 (!) 83/46 -- -- 80 23 --   04/05/22 0445 (!) 96/52 -- -- 85 20 --   04/05/22 0430 103/63 -- -- 85 23 --   04/05/22 0415 (!) 102/58 -- -- 85 23 --   04/05/22 0400 (!) 91/54 -- -- 80 24 --   04/05/22 0347 (!) 91/50 -- -- 87 28 --         Intake/Output Summary (Last 24 hours) at 4/5/2022 0945  Last data filed at 4/5/2022 0645  Gross per 24 hour   Intake 2371.22 ml   Output --   Net 2371.22 ml     Wt Readings from Last 2 Encounters:   04/05/22 237 lb (107.5 kg)   03/15/22 237 lb (107.5 kg)     Body mass index is 34.01 kg/m².       PHYSICAL EXAMINATION:  General appearance - alert, well appearing, and in no distress  Mental status - alert, oriented to person, place, and time  Eyes - pupils equal and reactive, extraocular eye movements intact  Ears - bilateral TM's and external ear canals normal  Nose - normal and patent, no erythema, discharge or polyps  Mouth - mucous membranes moist, pharynx normal without lesions  Neck - supple, no significant adenopathy  Chest - clear to auscultation, no wheezes, rales or rhonchi, symmetric air entry  Heart - normal rate, regular rhythm, normal S1, S2, no murmurs, rubs, clicks or gallops  Abdomen - buttock wound, dry fresh dressed  Neurological - alert, oriented, normal speech, no focal findings or movement disorder noted  Extremities - peripheral pulses normal, no pedal edema, no clubbing or cyanosis  Skin - normal coloration and turgor, no rashes, no suspicious skin lesions noted      Any additional physical findings:    MEDICATIONS:  Scheduled Meds:   sodium chloride  15 mL/kg IntraVENous Once    atorvastatin  20 mg Oral Nightly    [Held by provider] metoprolol succinate  100 mg Oral BID    potassium chloride  40 mEq Oral Daily    [Held by provider] sacubitril-valsartan  1 tablet Oral BID    sodium chloride flush  5-40 mL IntraVENous 2 times per day    enoxaparin  30 mg SubCUTAneous BID    piperacillin-tazobactam  3,375 mg IntraVENous Q8H    vancomycin  1,250 mg IntraVENous Q12H    lidocaine        insulin glargine  22 Units SubCUTAneous BID    insulin lispro  0-18 Units SubCUTAneous TID WC    insulin lispro  0-9 Units SubCUTAneous Nightly    insulin lispro  5 Units SubCUTAneous TID WC     Continuous Infusions:   dextrose      norepinephrine Stopped (04/05/22 0732)    sodium chloride      dextrose 5 % and 0.45 % NaCl 150 mL/hr at 04/05/22 0650    [Held by provider] insulin 0.87 Units/hr (04/05/22 0935)    sodium chloride       PRN Meds:   glucose, 15 g, PRN  dextrose, 12.5 g, PRN  glucagon (rDNA), 1 mg, PRN  dextrose, 100 mL/hr, PRN  dextrose, 12.5 g, PRN  potassium chloride, 10 mEq, PRN  magnesium sulfate, 1,000 mg, PRN  sodium phosphate IVPB, 10 mmol, PRN   Or  sodium phosphate IVPB, 15 mmol, PRN   Or  sodium phosphate IVPB, 20 mmol, PRN  dextrose 5 % and 0.45 % NaCl, , Continuous PRN  melatonin, 3 mg, Nightly PRN  sodium chloride flush, 5-40 mL, PRN  sodium chloride, , PRN  ondansetron, 4 mg, Q8H PRN   Or  ondansetron, 4 mg, Q6H PRN  polyethylene glycol, 17 g, Daily PRN  acetaminophen, 650 mg, Q6H PRN   Or  acetaminophen, 650 mg, Q6H PRN  oxyCODONE, 5 mg, Q4H PRN   Or  oxyCODONE, 10 mg, Q4H PRN  HYDROmorphone, 0.5 mg, Q3H PRN        VENT SETTINGS (Comprehensive) (if applicable):  Vent Information  SpO2: 97 %  Additional Respiratory  Assessments  Pulse: 78  Resp: 19  SpO2: 97 %    ABGs:   No results for input(s): PH, PCO2, PO2, HCO3, BE, O2SAT in the last 72 hours. Laboratory findings:  Complete Blood Count:   Recent Labs     04/04/22 2041   WBC 23.3*   HGB 12.7   HCT 38.0           Last 3 Blood Glucose:   Recent Labs     04/05/22  0009 04/05/22 0642 04/05/22 0719   GLUCOSE 322* 169 127*        PT/INR:    Lab Results   Component Value Date    PROTIME 14.2 12/24/2020    INR 1.3 12/24/2020     PTT:    Lab Results   Component Value Date    APTT 32.4 12/24/2020       Comprehensive Metabolic Profile:   Recent Labs     04/05/22 0009 04/05/22 0642 04/05/22 0719   *  --  132   K 4.0  --  3.6   CL 90*  --  96*   CO2 17*  --  23   BUN 15  --  15   CREATININE 1.1  --  1.0   GLUCOSE 322* 169 127*   CALCIUM 9.6  --  8.8   PROT 8.6*  --   --    LABALBU 4.2  --   --    BILITOT 0.7  --   --    ALKPHOS 100  --   --    AST 10  --   --    ALT 7  --   --       Magnesium:   Lab Results   Component Value Date    MG 2.0 02/14/2022     Phosphorus:   Lab Results   Component Value Date    PHOS 4.7 02/14/2022     Ionized Calcium: No results found for: CAION   Troponin: No results for input(s): TROPONINI in the last 72 hours.   Urinalysis: Urine dipstick shows positive for   Microbiology:  Cultures during this admission:     Blood cultures:                 [] None drawn      [] Negative             []  Positive (Details:  )  Urine Culture:                   [] None drawn      [] Negative             []  Positive (Details:  )  Sputum Culture:               [] None drawn       [] Negative             []  Positive (Details:  )   Endotracheal aspirate:     [] None drawn       [] Negative             []  Positive (Details:  )     Other pertinent Labs:     Radiology/Imaging:   Chest Xray (4/5/2022):    ASSESSMENT  And PLAN:     Brief Summary: 43 y.o. male with medical history significant for insulin-dependent diabetes mellitus on Lantus 22 twice daily, 15 premeal plus sliding scale insulin, buttock wound/perirectal abscess, status post multiple I&D and hospital admission, heart failure reduced ejection fraction 35%, on Entresto metoprolol and Bumex, tobacco abuse disorder, vitamin D deficiency presented to the ED with chief concern of rectal pain, fever for the last 2 days. He did not eat nor took any insulin during this time. In the ED he was found to have hypotensive with systolic in the 17H, blood sugar 322, beta hydroxybutyrate greater than 4.5, glucosuria and leukocytosis. Left IJ central line with he received 2.5 L fluid bolus, blood culture was sent, started on vancomycin and Zosyn, DKA protocol was initiated and surgery was consulted for possible I&D. For further care and management patient was admitted to medical ICU. He is being managed for the following:    Assessment:   1. Perirectal pain secondary to abscess and wound  2. Possible sepsis from perirectal wound  3. DKA secondary to insulin noncompliance and infection  4. Uncontrolled diabetes Hb A1c 13.7  5. History of multiple I&D for buttock wound and abscess  6. Tobacco use disorder  7. History of heart failure with reduced ejection fraction 35% 2020  8. Vitamin D deficiency  9. Hx of VRE 2021    Plan:   · Checked BMP if gap less than 12 , tranfer out   · S/p DKA protocol  · Monitor Blood sugar ACHS  · Follow GS recommendation, for possible I&D  · Follow blood and wound culture  · Follow MRSA nares screening  · Pain meds per general surgery, appreciate recommendations   · Antibiotics per ID, Cesar, appreciate recommendation   · Resume Toprol- twice daily, Bumex and Entresto once out of DKA and vitally stable  · Monitor vitals   · Family updated bedside       WEAN PER PROTOCOL:  [] No   [x] Yes  [] N/A    ICU PROPHYLAXIS:  Stress ulcer:  [x] PPI Agent  [] [id number] [] Sucralfate  [] Other:  VTE:   [x] Enoxaparin  [] Unfract.  Heparin Subcut  [] EPC Cuffs    NUTRITION:  [x] NPO [] Tube Feeding (Specify: ) [] TPN  [] PO (Diet: ADULT DIET; Regular; 4 carb choices (60 gm/meal); Low Fat/Low Chol/High Fiber/2 gm Na)    INSULIN DRIP:   [x] No   [] Yes    CONSULTATION NEEDED:   [] No   [x] Yes    FAMILY UPDATED:    [] No   [x] Yes    TRANSFER OUT OF ICU:   [] No   [x] Yes    Rudi Arnold MD, MD PGY-2  Attending Physician: Dr. Jazmín Vargas  4/5/2022, 9:45 AM       I personally saw, examined and provided care for the patient. Radiographs, labs and medication list were reviewed by me independently. I spoke with bedside nursing, therapists and consultants. Critical care services and times documented are independent of procedures and multidisciplinary rounds with Residents. Additionally comprehensive, multidisciplinary rounds were conducted with the MICU team. The case was discussed in detail and plans for care were established. Review of Residents documentation was conducted and revisions were made as appropriate. I agree with the above documented exam, problem list and plan of care.     Perirectal abscess  DKA  On abx  GS following   Bedside drain 10 cc,for I&D  Already change to insulin by primary   ,check BMP this noon if negative DKA resolved completely ,transfer  On zyvox ,VRE following      Jarvis Muinz MD,[facility]  Pulmonary&Critical Care Medicine   Director of [facility] Director of [facility]    Alyce Haney

## 2022-04-05 NOTE — H&P
Hospitalist History & Physical      PCP: Butler Lanes, MD    Date of Admission: 4/5/2022    Date of Service: Pt seen/examined on 4/5/2022 and is admitted to Inpatient with expected LOS greater than two midnights due to medical therapy. Chief Complaint:  had concerns including Abscess (to left buttock was treated in [place] and healing however is back again). History Of Present Illness:      Patient, 38-year-old male with past medical history of insulin-dependent diabetes mellitus, uncontrolled, recurrent perirectal abscess, s/p I&D February 10, 2022, s/p recent antibiotic course, hypertension, nonischemic cardiomyopathy/chronic systolic CHF, EF 37%, hyperlipidemia, came to the emergency room complaining of severe perirectal pain since past 2 days. Reports associated fever/chills/nausea. Patient denies any chest pain, shortness of breath, palpitations, abdominal pain, nausea, vomiting, diarrhea or constipation. Upon arrival in the emergency room, patient was noted to be hypotensive, not responded to IV fluids, started on pressor support. Also noted to be in DKA, started on insulin drip. Started on empiric IV antibiotics. Seen by general surgery who did bedside I&D.       Past Medical History:        Diagnosis Date    Buttock wound, left, initial encounter 3/9/2022    Buttock wound, left, subsequent encounter 3/15/2022    CAD (coronary artery disease)     CHF (congestive heart failure) ([address]     Diabetes mellitus ([address]     Draining postoperative wound 3/9/2022    Hyperlipidemia     Hyperosmolar hyperglycemic state (HHS) ([address] 7/30/2021    Hypertension        Past Surgical History:        Procedure Laterality Date    ABDOMEN SURGERY N/A 9/18/2021    INCISION AND DRAINAGE OF LARGE PERIANAL ABSCESS performed by Lynsey Sandhu MD at [address]      buttocks    ECHO COMPL W DOP COLOR FLOW  5/5/2015         HC INSERT PICC CATH, 5/> YRS  10/20/2021         LEG SURGERY day.  Patient taking differently: Take 2 mg by mouth 2 times daily Take 1 tab 8am daily. On days you gain weight more than 2 lbs or have worse swelling or short of breath then take a 2pm dose that day. 10/27/21   Tip Duran MD   atorvastatin (LIPITOR) 20 MG tablet Take 1 tablet by mouth daily 10/27/21   Tip Duran MD   Insulin Pen Needle (BD PEN NEEDLE MICRO U/F) 32G X 6 MM MISC Uses with insulin 4 times a day 9/20/21   Cullen Her MD   insulin lispro, 1 Unit Dial, (HUMALOG KWIKPEN) 100 UNIT/ML SOPN Inject 15 units with meals + following sliding scale. -200 add 3U, -250 add 6U, -300 add 9U, -350 add 12U, -400 add 15U, BS over 400 add 18U. MAX 75U/day 9/20/21   Cullen Her MD   Lancets MISC Test 4 times/day before meals and at bedtime and as needed for symptoms of irregular blood glucose. 9/20/21   Jairo Arguelles MD   [facility] Lancets MISC 1 each by Does not apply route daily To check sugar 4 x a day and if feeling ill 7/31/21   Zoë Bradford, DO   blood glucose monitor strips Test **4* times a day & as needed for symptoms of irregular blood glucose. Dispense sufficient amount for indicated testing frequency plus additional to accommodate PRN testing needs. 7/31/21   Zoë Bradford DO       Allergies:  Patient has no known allergies. Social History:    TOBACCO:   reports that he has been smoking cigars. He started smoking about 22 years ago. He has been smoking about 0.00 packs per day for the past 8.00 years. He has never used smokeless tobacco.  ETOH:   reports current alcohol use. Family History:    Reviewed in detail and negative for DM, CAD, Cancer, CVA.  Positive as follows\"      Problem Relation Age of Onset    High Blood Pressure Father     Heart Disease Father     High Blood Pressure Paternal Grandmother     Heart Attack Paternal Grandfather     High Blood Pressure Mother     Heart Disease Mother        REVIEW OF SYSTEMS: Pertinent positives as noted in the HPI. All other systems reviewed and negative. PHYSICAL EXAM:  /61   Pulse 79   Temp 99.8 °F (37.7 °C) (Oral)   Resp 14   Wt 237 lb (107.5 kg)   SpO2 100%   BMI 34.01 kg/m²   General appearance: No apparent distress, appears stated age and cooperative. HEENT: Normal cephalic, atraumatic without obvious deformity. Pupils equal, round, and reactive to light. Extra ocular muscles intact. Conjunctivae/corneas clear. Neck: Supple, with full range of motion. No jugular venous distention. Trachea midline. Respiratory: Bilateral air entry fair. No obvious wheezing or crackles. Cardiovascular: S1-S2, RRR. Abdomen: Soft, nontender, nondistended, bowel sounds present  Musculoskeletal: No peripheral edema. Neurologic: AAOx3. No gross focal neurological deficits. CBC:   Recent Labs     04/04/22 2041   WBC 23.3*   RBC 3.85   HGB 12.7   HCT 38.0   MCV 98.7   RDW 13.4        BMP:   Recent Labs     04/05/22  0009   *   K 4.0   CL 90*   CO2 17*   BUN 15   CREATININE 1.1     LFT:  Recent Labs     04/05/22  0009   PROT 8.6*   ALKPHOS 100   ALT 7   AST 10   BILITOT 0.7     CE:  No results for input(s): Catherine Rodriguez in the last 72 hours. PT/INR: No results for input(s): INR, APTT in the last 72 hours. BNP: No results for input(s): BNP in the last 72 hours.   ESR:   Lab Results   Component Value Date    SEDRATE 66 (H) 02/18/2022     CRP:   Lab Results   Component Value Date    CRP 0.6 (H) 02/18/2022     D Dimer:   Lab Results   Component Value Date    DDIMER <200 07/28/2021      Folate and B12:   Lab Results   Component Value Date    ACLHOJAA88 448 11/10/2021   ,   Lab Results   Component Value Date    FOLATE 18.8 11/10/2021     Lactic Acid:   Lab Results   Component Value Date    LACTA 1.4 04/04/2022     Thyroid Studies:   Lab Results   Component Value Date    TSH 1.240 09/17/2021       Oupatient labs:  Lab Results   Component Value Date    CHOL 243 (H) 09/17/2021    TRIG 635 (H) 09/17/2021    HDL 25 09/17/2021    LDLCALC - (AA) 09/17/2021    TSH 1.240 09/17/2021    INR 1.3 12/24/2020    LABA1C 14.2 01/10/2022       Urinalysis:    Lab Results   Component Value Date    NITRU Negative 04/04/2022    WBCUA NONE 04/04/2022    BACTERIA NONE SEEN 04/04/2022    RBCUA 0-1 04/04/2022    BLOODU MODERATE 04/04/2022    SPECGRAV 1.010 04/04/2022    GLUCOSEU >=1000 04/04/2022       Imaging:  CT ABDOMEN PELVIS W IV CONTRAST Additional Contrast? None    Result Date: 4/5/2022  EXAMINATION: CT OF THE ABDOMEN AND PELVIS WITH CONTRAST 4/5/2022 1:35 am TECHNIQUE: CT of the abdomen and pelvis was performed with the administration of intravenous contrast. Multiplanar reformatted images are provided for review. Dose modulation, iterative reconstruction, and/or weight based adjustment of the mA/kV was utilized to reduce the radiation dose to as low as reasonably achievable. COMPARISON: 02/10/2022 HISTORY: ORDERING SYSTEM PROVIDED HISTORY: Buttock, Abscess where pervious surgical area was TECHNOLOGIST PROVIDED HISTORY: Reason for exam:->Buttock, Abscess where pervious surgical area was Additional Contrast?->None Decision Support Exception - unselect if not a suspected or confirmed emergency medical condition->Emergency Medical Condition (MA) What reading provider will be dictating this exam?->CRC FINDINGS: Gallbladder is unremarkable. Low-attenuation in the central liver is favored to represent focal fatty infiltration, similar to previous. Other etiologies including small cyst in this region thought less likely. Spleen is normal in size. Pancreas is unremarkable. No adrenal mass. No hydronephrosis. Scattered vascular calcifications. Assessment of bowel is limited without oral contrast.  No bowel obstruction. Appendix is normal.  Moderate diffuse thickening of the rectum and anorectal junction with some presacral soft tissue thickening and fat stranding.   These findings are new/increased from previous. There also several bilateral pelvic lymph nodes, more numerous than often seen and some are borderline prominent. These are favored to be reactive. Prostate is normal in size. Urinary bladder is largely decompressed but significantly thickened. Within the perineum, there are areas of soft tissue thickening and fat stranding asymmetric to the left with ill-defined soft tissue thickening about the gluteal fold also asymmetric to the left. These findings may represent postoperative change and ill-defined phlegmon. Developing abscesses are not excluded but no definite measurable fluid collection identified. No soft tissue gas. Questionable small bilateral hydroceles. Lung bases are clear. Degenerative changes are scattered in the spine. Areas of soft tissue thickening and fat stranding in the perineum with more localized soft tissue thickening along the left greater than right side of the gluteal cleft. Findings may be represent postoperative changes and reactive edema but areas of ill-defined phlegmon and cellulitis are not excluded. No distinct drainable abscess. Short-term follow-up recommended if symptoms persist. Thickening of the rectum and anorectal junction with some presacral soft tissue thickening. Findings are concerning for infectious/inflammatory proctitis. Neoplasia unlikely but should be followed. Mildly prominent pelvic lymph nodes likely reactive but could also be followed. Urinary bladder wall thickening which could be partially due to underdistention but cystitis not excluded. Probable small bilateral hydroceles.  RECOMMENDATIONS: Unavailable       Assessment and plan    Patient, 78-year-old male with past medical history of insulin-dependent diabetes mellitus, uncontrolled, recurrent perirectal abscess, s/p I&D February 10, 2022, s/p recent antibiotic course, hypertension, nonischemic cardiomyopathy/chronic systolic CHF, EF 52%, hyperlipidemia, came to the emergency room complaining of severe perirectal pain since past 2 days. Reports associated fever/chills/nausea. Upon arrival in the emergency room, patient was noted to be hypotensive, not responded to IV fluids, started on pressor support. Also noted to be in DKA, started on insulin drip. Started on empiric IV antibiotics. Seen by general surgery who did bedside I&D. ASSESSMENT:    Septic shock likely secondary to perirectal abscess  Uncontrolled insulin-dependent diabetes mellitus with DKA  Nonischemic cardiomyopathy/chronic systolic CHF-appears compensated  Hyperlipidemia  Hypertension      PLAN:    Continue empiric IV antibiotics Zosyn. Patient did receive 1 dose of IV vancomycin/linezolid. ID consulted. General surgery management appreciated. S/p bedside I&D. Follow-up culture data. Patient on pressor support. Wean as able. Critical care consulted  At this time, DKA appears to have resolved. Discontinue insulin drip. Resume Lantus/insulin sliding scale. Monitor BMP/electrolytes  Given hypotensive episode, hold home antihypertensives/Entresto  Continue Lipitor  DVT prophylaxis-Lovenox subcu    Okay to transfer out of ICU once off of insulin drip/pressor support. Diet: ADULT DIET; Regular; 4 carb choices (60 gm/meal); Low Fat/Low Chol/High Fiber/2 gm Na  Code Status: Full Code    Surrogate decision maker confirmed with patient:  Primary Emergency Contact: Tino Willis, Home Phone: [phone]    DVT Prophylaxis: [x]Lovenox []Heparin []PCD [] [address] []Encouraged ambulation    Disposition: []Med/Surg [] Intermediate [x] ICU/CCU  Admit status: [] Observation [x] Inpatient     +++++++++++++++++++++++++++++++++++++++++++++++++  Karely Jackson MD  [address]  +++++++++++++++++++++++++++++++++++++++++++++++++  NOTE: This report was transcribed using voice recognition software.  Every effort was made to ensure accuracy; however, inadvertent computerized transcription errors may be present.

## 2022-04-05 NOTE — CONSULTS
[address] [place] [address] [facility]    Consultation Note     Admit Date: 4/5/2022  1:13 AM    Reason for Consult:   Sepsis, gluteal abscess    Attending Physician:  Tabby Troy MD     Chief Complaint: left gluteal pain    HISTORY OF PRESENT ILLNESS:   The patient is a 43 y.o.  male known to the Infectious Diseases service. The patient has hx of uncontrolled DM, recurrent left gluteal abscess at the same spot where he had it 2 months ago. He had it developed a week ago but he thought it would go away but pain gotten worse so he came to ER. He got multiple abscess in the groin region and at the axilla over yrs. No fever but felt hot. No cough or SOB or abdominal pain or diarrhea or other skin rash. No urinary complaints. Since admission, he was afebrile. BP was low on admission, but normal now. Not on any pressors. Saturating well on RA, white count was 23 improved to 16 today his labs were suggestive of DKA, now gap is closed with treatment. hgb a1c is 13.7, renal and liver functions are normal. CT abdomen/pelvis showed areas of soft tissue thickening and fat stranding in perineum with more localised soft tissue thickening along left >right gluteal cleft., gen surgery was consulted and I&D was performed this morning. Patient is on vanc/zosyn and I got consulted for antibiotics management. 2/2022: was admitted [facility] , had left gluteal abscess was initially treated with vanc/zosyn and I&D cx showed E.coli, Strep species, so he was d/mikie on Ceftriaxone/flagyl for 2 weeks     10/2021: there was concern for nec. fascitis at the gluteal region. Was initially treated with vanc/zosyn, ID&D showed Strep species, was d/mikie on ceftriaxone/flagyl for 2 weeks.         Past Medical History:        Diagnosis Date    Buttock wound, left, initial encounter 3/9/2022    Buttock wound, left, subsequent encounter 3/15/2022    CAD (coronary artery disease)     [facility]  Diabetes mellitus [facility]     Draining postoperative wound 3/9/2022    Hyperlipidemia     Hyperosmolar hyperglycemic state (HHS) ([facility] 7/30/2021    Hypertension      Past Surgical History:        Procedure Laterality Date    ABDOMEN SURGERY N/A 9/18/2021    INCISION AND DRAINAGE OF LARGE PERIANAL ABSCESS performed by Lynsey Sandhu MD at [address]      buttocks    ECHO COMPL W DOP COLOR FLOW  5/5/2015         HC INSERT PICC CATH, 5/> YRS  10/20/2021         LEG SURGERY Left 2/10/2022    BILATERAL BUTTOCK INCISION AND DRAINAGE performed [facility] MD [facility]       Current Medications:   Scheduled Meds:   atorvastatin  20 mg Oral Nightly    [Held by provider] metoprolol succinate  100 mg Oral BID    potassium chloride  40 mEq Oral Daily    [Held by provider] sacubitril-valsartan  1 tablet Oral BID    sodium chloride flush  5-40 mL IntraVENous 2 times per day    enoxaparin  30 mg SubCUTAneous BID    piperacillin-tazobactam  3,375 mg IntraVENous Q8H    insulin glargine  22 Units SubCUTAneous BID    insulin lispro  0-18 Units SubCUTAneous TID WC    insulin lispro  0-9 Units SubCUTAneous Nightly    insulin lispro  5 Units SubCUTAneous TID WC    pantoprazole (PROTONIX) 40 mg injection  40 mg IntraVENous Daily     Continuous Infusions:   dextrose      sodium chloride       PRN Meds:glucose, dextrose, glucagon (rDNA), dextrose, melatonin, sodium chloride flush, sodium chloride, ondansetron **OR** ondansetron, polyethylene glycol, acetaminophen **OR** acetaminophen, oxyCODONE **OR** oxyCODONE, HYDROmorphone    Allergies:  Patient has no known allergies.     Social History:   Social History     Socioeconomic History    Marital status: Single     Spouse name: Not on file    Number of children: 1    Years of education: Not on file    Highest education level: Not on file   Occupational History    Occupation: unemployed   Tobacco Use    Smoking status: Current Some Day Smoker     Packs/day: 0.00     Years: 8.00     Pack years: 0.00     Types: Cigars     Start date: 2000    Smokeless tobacco: Never Used    Tobacco comment: Smokes 1 cigar weekly/ cigs 2-3 a week   Vaping Use    Vaping Use: Every day    Start date: 6/1/2021    Substances: Nicotine   Substance and Sexual Activity    Alcohol use: Yes     Comment: social drinker    Drug use: Yes     Types: Marijuana [place])    Sexual activity: Not on file   Other Topics Concern    Not on file   Social History Narrative    occasional coffee      Social Determinants of Health     Financial Resource Strain: High Risk    Difficulty of Paying Living Expenses: Hard   Food Insecurity: Food Insecurity Present    Worried About Running Out of Food in the Last Year: Often true    Karan of Food in the Last Year: Often true   Transportation Needs:     Lack of Transportation (Medical): Not on file    Lack of Transportation (Non-Medical):  Not on file   Physical Activity:     Days of Exercise per Week: Not on file    Minutes of Exercise per Session: Not on file   Stress:     Feeling of Stress : Not on file   Social Connections:     Frequency of Communication with Friends and Family: Not on file    Frequency of Social Gatherings with Friends and Family: Not on file    Attends Taoist Services: Not on file    Active Member of [address] [place] [address] [employer] or Organizations: Not on file    Attends Club or Organization Meetings: Not on file    Marital Status: Not on file   Intimate Partner Violence:     Fear of Current or Ex-Partner: Not on file    Emotionally Abused: Not on file    Physically Abused: Not on file    Sexually Abused: Not on file   Housing Stability:     Unable to Pay for Housing in the Last Year: Not on file    Number of [place] in the Last Year: Not on file    Unstable Housing in the Last Year: Not on file     Family History:       Problem Relation Age of Onset    High Blood Pressure Father     Heart Disease Father     High Blood Pressure Paternal Grandmother     Heart Attack Paternal Grandfather     High Blood Pressure Mother     Heart Disease Mother    . Otherwise non-pertinent to the chief complaint. REVIEW OF SYSTEMS:    As mentioned in HPI, all other systems negative. PHYSICAL EXAM:    Vitals:    BP (!) 141/76   Pulse 76   Temp 98.4 °F (36.9 °C) (Oral)   Resp 18   Wt 237 lb (107.5 kg)   SpO2 99%   BMI 34.01 kg/m²   Constitutional: The patient is awake, alert, and oriented. Skin: Warm and dry. Gluteal cleft cavity with packing in side and some bleeding +. HEENT: Eyes show round, and reactive pupils. Moist mucous membranes, no ulcerations, no thrush. Neck: Supple to movements. No lymphadenopathy. Chest: clear to auscultation. Cardiovascular: S1 and S2 are rhythmic and regular. Abdomen: soft non tender  Genitourinary: no groin lesions. Extremities: No clubbing, no cyanosis, no edema. Musculoskeletal: no gross focal abnormalities  Neurological: alert, oriented x 3,   Lines: right IJ triple lumen. CBC+dif:  Recent Labs     04/04/22 2041 04/04/22 2041 04/05/22  0955   WBC 23.3*  --  16.1*   HGB 12.7   < > 10.4*   HCT 38.0   < > 31.1*   MCV 98.7   < > 95.4      < > 236   NEUTROABS 19.50*   < > 12.02*    < > = values in this interval not displayed.      Lab Results   Component Value Date    CRP 0.6 (H) 02/18/2022    CRP 0.3 10/29/2021    CRP 0.5 (H) 10/26/2021     No results found for: CRPHS  Lab Results   Component Value Date    SEDRATE 66 (H) 02/18/2022    SEDRATE 80 (H) 02/10/2022    SEDRATE 62 (H) 10/29/2021     Lab Results   Component Value Date    ALT 7 04/05/2022    AST 10 04/05/2022    ALKPHOS 100 04/05/2022    BILITOT 0.7 04/05/2022     Lab Results   Component Value Date     04/05/2022    K 3.3 04/05/2022    K 3.6 04/05/2022    CL 94 04/05/2022    CO2 23 04/05/2022    BUN 13 04/05/2022    CREATININE 0.8 04/05/2022    GFRAA >60 04/05/2022    LABGLOM >60 04/05/2022    GLUCOSE 181 04/05/2022    PROT 8.6 04/05/2022    LABALBU 4.2 04/05/2022    CALCIUM 8.6 04/05/2022    BILITOT 0.7 04/05/2022    ALKPHOS 100 04/05/2022    AST 10 04/05/2022    ALT 7 04/05/2022       Lab Results   Component Value Date    PROTIME 14.2 12/24/2020    INR 1.3 12/24/2020       Lab Results   Component Value Date    TSH 1.240 09/17/2021       Lab Results   Component Value Date    NITRITE neg 01/10/2022    COLORU Yellow 04/04/2022    PHUR 5.5 04/04/2022    WBCUA NONE 04/04/2022    RBCUA 0-1 04/04/2022    BACTERIA NONE SEEN 04/04/2022    CLARITYU Clear 04/04/2022    SPECGRAV 1.010 04/04/2022    LEUKOCYTESUR Negative 04/04/2022    UROBILINOGEN 0.2 04/04/2022    BILIRUBINUR SMALL 04/04/2022    BILIRUBINUR neg 01/10/2022    BLOODU MODERATE 04/04/2022    GLUCOSEU >=1000 04/04/2022       No results found for: VRB9ENG, BEART, B9FYMUWB, PHART, THGBART, ICD9CJR, PO2ART, GZX7GOD  Radiology:  XR CHEST PORTABLE   Final Result   No pneumothorax following line placement. CT ABDOMEN PELVIS W IV CONTRAST Additional Contrast? None   Final Result   Areas of soft tissue thickening and fat stranding in the perineum with more   localized soft tissue thickening along the left greater than right side of   the gluteal cleft. Findings may be represent postoperative changes and   reactive edema but areas of ill-defined phlegmon and cellulitis are not   excluded. No distinct drainable abscess. Short-term follow-up recommended   if symptoms persist.      Thickening of the rectum and anorectal junction with some presacral soft   tissue thickening. Findings are concerning for infectious/inflammatory   proctitis. Neoplasia unlikely but should be followed. Mildly prominent pelvic lymph nodes likely reactive but could also be   followed. Urinary bladder wall thickening which could be partially due to   underdistention but cystitis not excluded. Probable small bilateral hydroceles. RECOMMENDATIONS:   Unavailable             Microbiology:  surgical cx 2/10/22: E.coli, Streptococcus alaclyticus, Strep Group C  Blood cx 4/5/22: negative so far  Surgical cx 4/5: in process    Assessment:  · Sepsis from recurrent gluteal abscess s/p I&D today: this is the 3rd time in last 6 months. · Uncontrolled DM/ DKA:   · Leucocytosis:     Plan:    · Continue IV Zosyn 3.375mg Q8 alone  · Will follow cx and adjust antibiotics  · I request surgery to eval for rectal fistula causing recurrent abscesses? · Will follow with you    Thank you for having us see this patient in consultation.      Electronically signed by Yarelis Samuels MD on 4/5/2022 at 1:43 PM

## 2022-04-05 NOTE — CONSULTS
GENERAL SURGERY  CONSULT NOTE  4/5/2022    Physician Consulted: Dr. Flavio Richard  Reason for Consult: perirectal abscess  Referring Physician: Dr. Tomás Onofre    REAL Barrow Case is a 43 y.o. male who presents to the general surgery service for evaluation of perirectal abscess. He has noticed severe perirectal pain for the past 2 days. He reports associated fevers, chills, nausea, weakness, and shortness of breath. While in the ED waiting room, he felt a bursting sensation near his rectum, followed by large amounts of clear-colored drainage. This occurred prior to his CT image. The patient has history of uncontrolled diabetes, with most recent A1c being 14.2 in January of this year. The patient has history of multiple abscesses located in his axilla, groins, and gluteal cleft. He has had multiple I&D procedures in [place], [address] [place] [address], and in the  [facility]. Most recently, has had gluteal cleft abscesses drained 3 times within the past 8 months. Medical history is also significant for CHF, with last echo on record showing EF to be 30 to 35% in 2020. The patient takes [facility] as a home med. He does not take steroids or blood thinning medications. He smokes cigars, but denies other regular alcohol, tobacco, or recreational drug use.     Past Medical History:   Diagnosis Date    Buttock wound, left, initial encounter 3/9/2022    Buttock wound, left, subsequent encounter 3/15/2022    CAD (coronary artery disease)     CHF (congestive heart failure) ([address]     Diabetes mellitus ([address]     Draining postoperative wound 3/9/2022    Hyperlipidemia     Hyperosmolar hyperglycemic state (HHS) ([address] 7/30/2021    Hypertension        Past Surgical History:   Procedure Laterality Date    ABDOMEN SURGERY N/A 9/18/2021    INCISION AND DRAINAGE OF LARGE PERIANAL ABSCESS performed by Judith Shah MD at [address]      buttocks    ECHO COMPL [address]  5/5/2015         [facility] [place]. INSERT PICC CATH, 5/> YRS  10/20/2021         LEG SURGERY Left 2/10/2022    BILATERAL BUTTOCK INCISION AND DRAINAGE performed by Lynsey Sandhu MD at [address] [facility]         Medications Prior to Admission:    Prior to Admission medications    Medication Sig Start Date End Date Taking? Authorizing Provider   sacubitril-valsartan (ENTRESTO)  MG per tablet Take 1 tablet by mouth 2 times daily 2/28/22   Butler Lanes, MD   Continuous Blood Gluc  (FREESTYLE CHRISTIAN 14 DAY READER) PEDRO As directed 2/28/22   Butler Lanes, MD   Continuous Blood Gluc Sensor (FREESTYLE CHRISTIAN 2 SENSOR) MISC As directed 2/28/22   Butler Lanes, MD   metoprolol succinate (TOPROL XL) 100 MG extended release tablet 1 tab twice daily 2/9/22   Nidhi Donato MD   vitamin D (ERGOCALCIFEROL) 1.25 MG (29023 UT) CAPS capsule Take 1 capsule by mouth once a week 1/10/22   Butler Lanes, MD   melatonin (RA MELATONIN) 3 MG TABS tablet Take 1 tablet by mouth nightly as needed (sleep) 1/10/22   Butler Lanes, MD   triamcinolone (KENALOG) 0.1 % cream Apply topically as needed (rash) Apply topically 2 times daily x 10 days prn 1/10/22   Butler Lanes, MD   Folinic Acid-Vit B6-Vit B12 (FOLINIC-PLUS) 4-50-2 MG TABS Take 4-50 mg by mouth 2 times daily 12/1/21   Butler Lanes, MD   Insulin Pen Needle (PEN NEEDLES) 31G X 6 MM MISC 1 each by Does not apply route daily 12/1/21   Butler Lanes, MD   insulin glargine (LANTUS SOLOSTAR) 100 UNIT/ML injection pen Inject 22 Units into the skin 2 times daily 12/1/21   Butler Lanes, MD   potassium chloride (KLOR-CON M) 20 MEQ extended release tablet Take 2 tablets by mouth daily Pt only taking one tablet daily unless he takes Bumex BID then he takes 2 tabs 10/28/21   Nidhi Donato MD   bumetanide (BUMEX) 2 MG tablet Take 1 tablet by mouth See Admin Instructions Take 1 tab 8am daily.  On days you gain weight more than 2 lbs or have worse swelling or short of breath then take a 2pm dose that day. Patient taking differently: Take 2 mg by mouth 2 times daily Take 1 tab 8am daily. On days you gain weight more than 2 lbs or have worse swelling or short of breath then take a 2pm dose that day. 10/27/21   Elmer Duarte MD   atorvastatin (LIPITOR) 20 MG tablet Take 1 tablet by mouth daily 10/27/21   Elmer Duarte MD   Insulin Pen Needle (BD PEN NEEDLE MICRO U/F) 32G X 6 MM MISC Uses with insulin 4 times a day 9/20/21   Connor Hinkle MD   insulin lispro, 1 Unit Dial, (HUMALOG KWIKPEN) 100 UNIT/ML SOPN Inject 15 units with meals + following sliding scale. -200 add 3U, -250 add 6U, -300 add 9U, -350 add 12U, -400 add 15U, BS over 400 add 18U. MAX 75U/day 9/20/21   Connor Hinkle MD   Lancets MISC Test 4 times/day before meals and at bedtime and as needed for symptoms of irregular blood glucose. 9/20/21   Jairo Retana MD   [facility] Lancets MISC 1 each by Does not apply route daily To check sugar 4 x a day and if feeling ill 7/31/21   Bran Aguilera, DO   blood glucose monitor strips Test **4* times a day & as needed for symptoms of irregular blood glucose. Dispense sufficient amount for indicated testing frequency plus additional to accommodate PRN testing needs.  7/31/21   Bran Aguilera, DO       No Known Allergies    Family History   Problem Relation Age of Onset    High Blood Pressure Father     Heart Disease Father     High Blood Pressure Paternal Grandmother     Heart Attack Paternal Grandfather     High Blood Pressure Mother     Heart Disease Mother        Social History     Tobacco Use    Smoking status: Current Some Day Smoker     Packs/day: 0.00     Years: 8.00     Pack years: 0.00     Types: Cigars     Start date: 2000    Smokeless tobacco: Never Used    Tobacco comment: Smokes 1 cigar weekly/ cigs 2-3 a week   Vaping Use    Vaping Use: Every day    Start date: 6/1/2021    Substances: Nicotine Substance Use Topics    Alcohol use: Yes     Comment: social drinker    Drug use: Yes     Types: Marijuana (Weed)         Review of Systems   Constitutional: Positive for chills and fever. Respiratory: Positive for shortness of breath. Negative for cough. Cardiovascular: Negative for chest pain, palpitations and leg swelling. Gastrointestinal: Positive for rectal pain. Negative for abdominal distention and abdominal pain. Genitourinary: Negative for difficulty urinating and dysuria. Skin: Positive for wound. Negative for color change. Neurological: Positive for dizziness and weakness. Psychiatric/Behavioral: Negative for agitation and behavioral problems. PHYSICAL EXAM:    Vitals:    04/04/22 1949   BP:    Pulse:    Resp:    Temp: 97.3 °F (36.3 °C)   SpO2:        General Appearance:  awake, alert, oriented, in no acute distress  Head:  NCAT. No scleral icterus or conjunctival pallor  Lungs/Chest:  Normal expansion. No chest wall tenderness  Cardiovascular:  Warm throughout. No chest pain  Abdomen:  Soft, non tender, non distended. No guarding. No palpable masses. Extremities: Extremities warm to touch. Multiple small patches of vitiligo  Rectal: Fluctuance and tenderness present bilaterally in gluteal cleft, and extending proximally adjacent to the rectum. Fluctuance is greater on the left than the right. Moderate amount of pink-tinged serous discharge present. No crepitus appreciated    LABS:    CBC  Recent Labs     04/04/22  2041   WBC 23.3*   HGB 12.7   HCT 38.0        BMP  Recent Labs     04/05/22  0009   *   K 4.0   CL 90*   CO2 17*   BUN 15   CREATININE 1.1   CALCIUM 9.6     Liver Function  Recent Labs     04/05/22  0009   BILITOT 0.7   AST 10   ALT 7   ALKPHOS 100   PROT 8.6*   LABALBU 4.2     No results for input(s): LACTATE in the last 72 hours. No results for input(s): INR, PTT in the last 72 hours.     Invalid input(s): PT    RADIOLOGY    CT ABDOMEN PELVIS W IV CONTRAST Additional Contrast? None    Result Date: 4/5/2022  EXAMINATION: CT OF THE ABDOMEN AND PELVIS WITH CONTRAST 4/5/2022 1:35 am TECHNIQUE: CT of the abdomen and pelvis was performed with the administration of intravenous contrast. Multiplanar reformatted images are provided for review. Dose modulation, iterative reconstruction, and/or weight based adjustment of the mA/kV was utilized to reduce the radiation dose to as low as reasonably achievable. COMPARISON: 02/10/2022 HISTORY: ORDERING SYSTEM PROVIDED HISTORY: Buttock, Abscess where pervious surgical area was TECHNOLOGIST PROVIDED HISTORY: Reason for exam:->Buttock, Abscess where pervious surgical area was Additional Contrast?->None Decision Support Exception - unselect if not a suspected or confirmed emergency medical condition->Emergency Medical Condition (MA) What reading provider will be dictating this exam?->CRC FINDINGS: Gallbladder is unremarkable. Low-attenuation in the central liver is favored to represent focal fatty infiltration, similar to previous. Other etiologies including small cyst in this region thought less likely. Spleen is normal in size. Pancreas is unremarkable. No adrenal mass. No hydronephrosis. Scattered vascular calcifications. Assessment of bowel is limited without oral contrast.  No bowel obstruction. Appendix is normal.  Moderate diffuse thickening of the rectum and anorectal junction with some presacral soft tissue thickening and fat stranding. These findings are new/increased from previous. There also several bilateral pelvic lymph nodes, more numerous than often seen and some are borderline prominent. These are favored to be reactive. Prostate is normal in size. Urinary bladder is largely decompressed but significantly thickened.  Within the perineum, there are areas of soft tissue thickening and fat stranding asymmetric to the left with ill-defined soft tissue thickening about the gluteal fold also asymmetric to the left. These findings may represent postoperative change and ill-defined phlegmon. Developing abscesses are not excluded but no definite measurable fluid collection identified. No soft tissue gas. Questionable small bilateral hydroceles. Lung bases are clear. Degenerative changes are scattered in the spine. Areas of soft tissue thickening and fat stranding in the perineum with more localized soft tissue thickening along the left greater than right side of the gluteal cleft. Findings may be represent postoperative changes and reactive edema but areas of ill-defined phlegmon and cellulitis are not excluded. No distinct drainable abscess. Short-term follow-up recommended if symptoms persist. Thickening of the rectum and anorectal junction with some presacral soft tissue thickening. Findings are concerning for infectious/inflammatory proctitis. Neoplasia unlikely but should be followed. Mildly prominent pelvic lymph nodes likely reactive but could also be followed. Urinary bladder wall thickening which could be partially due to underdistention but cystitis not excluded. Probable small bilateral hydroceles. RECOMMENDATIONS: Unavailable       I have personally reviewed all relevant labs and imaging. Large areas of inflammation and edema present bilaterally in the gluteal cleft, greater on the left side than the right, extending approximately adjacent to the rectum    ASSESSMENT:  43 y.o. 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   Presenting with septic shock    PLAN:  - antibiotics  - trend lactate  - IVF  - NPO  - incision and drainage in OR 4/5  - recommend blood glucose control      Electronically signed by Sanchez Kimble DO on 4/5/22 at 2:36 AM EDT

## 2022-04-05 NOTE — ED PROVIDER NOTES
HPI:  4/5/22, Time: 1:23 AM EDT         Taylor Becker is a 43 y.o. male presenting to the ED for abscess to buttocks, beginning days ago. The complaint has been persistent, moderate in severity, and worsened by nothing. Patient reporting rectal pain for the last several days. Patient reports history of abscess. Patient reports fever. Patient reporting no abdominal pain or vomiting. Patient reporting no diarrhea. Patient reports prior history of surgery to the affected area. Patient reporting no chest pain or difficulty breathing. Patient reporting no black or tarry stools or hematemesis    ROS:   Pertinent positives and negatives are stated within HPI, all other systems reviewed and are negative.  --------------------------------------------- PAST HISTORY ---------------------------------------------  Past Medical History:  has a past medical history of Buttock wound, left, initial encounter, Buttock wound, left, subsequent encounter, CAD (coronary artery disease), CHF (congestive heart failure) ([facility] [address] Diabetes mellitus ([facility] [address] Draining postoperative wound, Hyperlipidemia, Hyperosmolar hyperglycemic state (HHS) ([facility] [address] and Hypertension. Past Surgical History:  has a past surgical history that includes [place] tooth extraction; Abscess Drainage; ECHO Compl W Dop Color Flow (5/5/2015); Abdomen surgery (N/A, 9/18/2021); Insert Picc Cath, 5/> Yrs (10/20/2021); and Leg Surgery (Left, 2/10/2022). Social History:  reports that he has been smoking cigars. He started smoking about 22 years ago. He has been smoking about 0.00 packs per day for the past 8.00 years. He has never used smokeless tobacco. He reports current alcohol use. He reports current drug use. Drug: Marijuana Elfida Brunner). Family History: family history includes Heart Attack in his paternal grandfather; Heart Disease in his father and mother; High Blood Pressure in his father, mother, and paternal grandmother.      The patients home medications have been reviewed. Allergies: Patient has no known allergies. ---------------------------------------------------PHYSICAL EXAM--------------------------------------    Constitutional/General: Alert and oriented x3,   Head: Normocephalic and atraumatic  Eyes: PERRL, EOMI  Mouth: Oropharynx clear, handling secretions, no trismus  Neck: Supple, full ROM, non tender to palpation in the midline, no stridor, no crepitus, no meningeal signs  Pulmonary: Lungs clear to auscultation bilaterally, no wheezes, rales, or rhonchi. Not in respiratory distress  Cardiovascular:  Regular rate. Regular rhythm. No murmurs, gallops, or rubs. 2+ distal pulses  Chest: no chest wall tenderness  Abdomen: Soft. Non tender. Non distended. +BS. No rebound, guarding, or rigidity. No pulsatile masses appreciated. I did do exam there was noted fluctuance and fullness to left buttock region medially. Mild redness is noted drainage to the area  Musculoskeletal: Moves all extremities x 4. Warm and well perfused, no clubbing, cyanosis, or edema. Capillary refill <3 seconds  Skin: warm and dry. No rashes. Neurologic: GCS 15, CN 2-12 grossly intact, no focal deficits, symmetric strength 5/5 in the upper and lower extremities bilaterally  Psych: Normal Affect    -------------------------------------------------- RESULTS -------------------------------------------------  I have personally reviewed all laboratory and imaging results for this patient. Results are listed below.      LABS:  Results for orders placed or performed during the hospital encounter of 04/05/22   CBC with Auto Differential   Result Value Ref Range    WBC 23.3 (H) 4.5 - 11.5 E9/L    RBC 3.85 3.80 - 5.80 E12/L    Hemoglobin 12.7 12.5 - 16.5 g/dL    Hematocrit 38.0 37.0 - 54.0 %    MCV 98.7 80.0 - 99.9 fL    MCH 33.0 26.0 - 35.0 pg    MCHC 33.4 32.0 - 34.5 %    RDW 13.4 11.5 - 15.0 fL    Platelets 340 723 - 953 E9/L    MPV 10.7 7.0 - 12.0 fL    Neutrophils % 83.9 (H) 43.0 - 80.0 %    Immature Granulocytes % 0.8 0.0 - 5.0 %    Lymphocytes % 8.8 (L) 20.0 - 42.0 %    Monocytes % 6.0 2.0 - 12.0 %    Eosinophils % 0.2 0.0 - 6.0 %    Basophils % 0.3 0.0 - 2.0 %    Neutrophils Absolute 19.50 (H) 1.80 - 7.30 E9/L    Immature Granulocytes # 0.18 E9/L    Lymphocytes Absolute 2.05 1.50 - 4.00 E9/L    Monocytes Absolute 1.40 (H) 0.10 - 0.95 E9/L    Eosinophils Absolute 0.04 (L) 0.05 - 0.50 E9/L    Basophils Absolute 0.08 0.00 - 0.20 E9/L   Lactic Acid   Result Value Ref Range    Lactic Acid 1.4 0.5 - 2.2 mmol/L   Urinalysis   Result Value Ref Range    Color, UA Yellow Straw/Yellow    Clarity, UA Clear Clear    Glucose, Ur >=1000 (A) Negative mg/dL    Bilirubin Urine SMALL (A) Negative    Ketones, Urine 40 (A) Negative mg/dL    Specific Gravity, UA 1.010 1.005 - 1.030    Blood, Urine MODERATE (A) Negative    pH, UA 5.5 5.0 - 9.0    Protein, UA Negative Negative mg/dL    Urobilinogen, Urine 0.2 <2.0 E.U./dL    Nitrite, Urine Negative Negative    Leukocyte Esterase, Urine Negative Negative   Microscopic Urinalysis   Result Value Ref Range    WBC, UA NONE 0 - 5 /HPF    RBC, UA 0-1 0 - 2 /HPF    Bacteria, UA NONE SEEN None Seen /HPF   SPECIMEN REJECTION   Result Value Ref Range    Rejected Test CMP     Reason for Rejection see below    Comprehensive Metabolic Panel   Result Value Ref Range    Sodium 127 (L) 132 - 146 mmol/L    Potassium 4.0 3.5 - 5.0 mmol/L    Chloride 90 (L) 98 - 107 mmol/L    CO2 17 (L) 22 - 29 mmol/L    Anion Gap 20 (H) 7 - 16 mmol/L    Glucose 322 (H) 74 - 99 mg/dL    BUN 15 6 - 20 mg/dL    CREATININE 1.1 0.7 - 1.2 mg/dL    GFR Non-African American >60 >=60 mL/min/1.73    GFR African American >60     Calcium 9.6 8.6 - 10.2 mg/dL    Total Protein 8.6 (H) 6.4 - 8.3 g/dL    Albumin 4.2 3.5 - 5.2 g/dL    Total Bilirubin 0.7 0.0 - 1.2 mg/dL    Alkaline Phosphatase 100 40 - 129 U/L    ALT 7 0 - 40 U/L    AST 10 0 - 39 U/L   PH, VENOUS   Result Value Ref Range    pH, Vitaliy 7.36 7.35 - 7.45 Beta-Hydroxybutyrate   Result Value Ref Range    Beta-Hydroxybutyrate >4.50 (H) 0.02 - 0.27 mmol/L   POCT Glucose   Result Value Ref Range    Glucose 169 mg/dL   POCT Glucose   Result Value Ref Range    Meter Glucose 313 (H) 74 - 99 mg/dL   POCT Glucose   Result Value Ref Range    Meter Glucose 260 (H) 74 - 99 mg/dL   POCT Glucose   Result Value Ref Range    Meter Glucose 169 (H) 74 - 99 mg/dL   EKG 12 Lead   Result Value Ref Range    Ventricular Rate 94 BPM    Atrial Rate 94 BPM    P-R Interval 142 ms    QRS Duration 90 ms    Q-T Interval 386 ms    QTc Calculation (Bazett) 482 ms    P Axis 65 degrees    R Axis 74 degrees    T Axis 97 degrees       RADIOLOGY:  Interpreted by Radiologist.  CT ABDOMEN PELVIS W IV CONTRAST Additional Contrast? None   Final Result   Areas of soft tissue thickening and fat stranding in the perineum with more   localized soft tissue thickening along the left greater than right side of   the gluteal cleft. Findings may be represent postoperative changes and   reactive edema but areas of ill-defined phlegmon and cellulitis are not   excluded. No distinct drainable abscess. Short-term follow-up recommended   if symptoms persist.      Thickening of the rectum and anorectal junction with some presacral soft   tissue thickening. Findings are concerning for infectious/inflammatory   proctitis. Neoplasia unlikely but should be followed. Mildly prominent pelvic lymph nodes likely reactive but could also be   followed. Urinary bladder wall thickening which could be partially due to   underdistention but cystitis not excluded. Probable small bilateral hydroceles. RECOMMENDATIONS:   Unavailable         XR CHEST PORTABLE    (Results Pending)           EKG: This EKG is signed and interpreted by me.     Rate: 94  Rhythm: Sinus  Interpretation: non-specific EKG  Comparison: stable as compared to patient's most recent EKG      ------------------------- NURSING NOTES AND VITALS REVIEWED ---------------------------   The nursing notes within the ED encounter and vital signs as below have been reviewed by myself. BP (!) 92/51   Pulse 79   Temp 99.8 °F (37.7 °C) (Oral)   Resp 19   Wt 237 lb (107.5 kg)   SpO2 100%   BMI 34.01 kg/m²   Oxygen Saturation Interpretation: Normal    The patients available past medical records and past encounters were reviewed.         ------------------------------ ED COURSE/MEDICAL DECISION MAKING----------------------  Medications   glucose (GLUTOSE) 40 % oral gel 15 g (has no administration in time range)   dextrose 50 % IV solution (has no administration in time range)   glucagon (rDNA) injection 1 mg (has no administration in time range)   dextrose 5 % solution (has no administration in time range)   lidocaine-EPINEPHrine 1 %-1:[id number] injection 20 mL (has no administration in time range)   norepinephrine (LEVOPHED) 16 mg in dextrose 5% 250 mL infusion (non-weight-based) (has no administration in time range)   dextrose 50 % IV solution (has no administration in time range)   potassium chloride 10 mEq/100 mL IVPB (Peripheral Line) (has no administration in time range)   magnesium sulfate 1000 mg in dextrose 5% 100 mL IVPB (has no administration in time range)   sodium phosphate 10 mmol in dextrose 5 % 250 mL IVPB (has no administration in time range)     Or   sodium phosphate 15 mmol in dextrose 5 % 250 mL IVPB (has no administration in time range)     Or   sodium phosphate 20 mmol in dextrose 5 % 500 mL IVPB (has no administration in time range)   0.9 % sodium chloride bolus (1,613 mLs IntraVENous Not Given 4/5/22 0659)     Followed by   0.9 % sodium chloride infusion (has no administration in time range)   dextrose 5 % and 0.45 % sodium chloride infusion ( IntraVENous New Bag 4/5/22 0623)   insulin regular (HUMULIN R;NOVOLIN R) 100 Units in sodium chloride 0.9 % 100 mL infusion (3.27 Units/hr IntraVENous Rate/Dose Change 4/5/22 0682) oxyCODONE-acetaminophen (PERCOCET) 5-325 MG per tablet 1 tablet (1 tablet Oral Given 4/4/22 2046)   morphine (PF) injection 4 mg (4 mg IntraVENous Given 4/5/22 0426)   ondansetron (ZOFRAN) injection 4 mg (4 mg IntraVENous Given 4/5/22 0239)   piperacillin-tazobactam (ZOSYN) 3,375 mg in dextrose 5 % 100 mL IVPB (mini-bag) (0 mg IntraVENous Stopped 4/5/22 0346)   vancomycin (VANCOCIN) 1,250 mg in dextrose 5 % 250 mL IVPB (0 mg IntraVENous Stopped 4/5/22 0540)   iopamidol (ISOVUE-370) 76 % injection 90 mL (90 mLs IntraVENous Given 4/5/22 0140)   0.9 % sodium chloride bolus (0 mLs IntraVENous Stopped 4/5/22 0418)   lactated ringers bolus (0 mLs IntraVENous Stopped 4/5/22 0605)             Medical Decision Making:      Patient presenting here because of abscess to buttocks. Patient reports prior history of surgery to the affected area. Patient does have diabetes. Patient reporting fevers. Patient reporting no abdominal pain. Patient has noted wound to left buttock it is draining. There is noted fluctuance to the area. Patient labs are reviewed white count is over 20,000. Patient afebrile. Patient initially normotensive on the waiting room became hypotensive here in the 80s is awake alert talking. Patient was given IV fluid bolus also ordered IV antibiotics. Patient made aware of findings and plan. General surgery was consulted plan will be to consult internal medicine as well. Re-Evaluations:             Re-evaluation. Patients symptoms show no change  Reevaluated and having no abdominal pain pressures in the 17F systolic. Patient was ordered IV fluids. Patient also ordered IV antibiotics. Patient made aware plan patient's heart rate below 100 here EKG noted. Patient reporting no chest pain no difficulty breathing. Patient was reassessed several times was given IV fluid bolus given over 2 L. Patient blood pressure in the 80s. I did put a central line and patient patient verbally consented. Family at bedside. Patient also ordered IV pressors. Reporting no chest pain or difficulty breathing. Patient tolerated central line  PROCEDURE  4/5/22       Time: 700 am    CENTRAL LINE INSERTION  Risks, benefits and alternatives (for applicable procedures below) described. Performed By: Lisa Moore MD.    Indication: centrally administered medications. Informed consent: Written consent obtained. The patient was counseled regarding the procedure in person, it's indications, risks, potential complications and alternatives and any questions were answered. Consent was obtained. .  Procedure: After routine sterile preparation, local anesthesia obtained by infiltration using 1% Lidocaine without epinephrine. A right 3-Lumen 7F Central Venous Catheter was placed by internal jugular vein approach and secured by standard fashion. Ultrasound Guidance:   used. Number of Attempts: 2  Post-procedure Findings: A post procedural chest x-ray  was ordered and is still pending at this time. Patient tolerated the procedure well. Consultations:             General surgery was consulted as well as internal medicine. I also spoke to intensivist.  Patient will be admitted to the ICU  Critical Care:   Please note that the withdrawal or failure to initiate urgent interventions for this patient would likely result in a life threatening deterioration or permanent disability. Accordingly this patient received 35 minutes of critical care time, excluding separately billable procedures. This patient's ED course included: a personal history and physicial eaxmination    This patient has been closely monitored during their ED course. Counseling: The emergency provider has spoken with the patient and discussed todays results, in addition to providing specific details for the plan of care and counseling regarding the diagnosis and prognosis.   Questions are answered at this time and they are agreeable with the plan.       --------------------------------- IMPRESSION AND DISPOSITION ---------------------------------    IMPRESSION  1. Perirectal abscess    2. Septicemia ([facility]    3. Diabetic ketoacidosis without coma associated with other specified diabetes mellitus ([facility]        DISPOSITION  Disposition: Admit to ICU  Patient condition is fair        NOTE: This report was transcribed using voice recognition software.  Every effort was made to ensure accuracy; however, inadvertent computerized transcription errors may be present          Heath Celaya MD  04/05/22 [address], MD  04/05/22 [address] [facility] [address], MD  04/05/22 8170

## 2022-04-05 NOTE — PROGRESS NOTES
Clinical Pharmacy Note    Pharmacy was consulted today by Dr. Sanjiv Gonzalez to dsoe vancomycin for this patient while patient was in ED. Patient transferred to MICU where vancomycin was stopped and IV linezolid was started. Clinical Pharmacy sign off.     Grayson HowellD  4/5/2022  11:50 AM

## 2022-04-06 LAB
ANION GAP SERPL CALCULATED.3IONS-SCNC: 9 MMOL/L (ref 7–16)
BASOPHILS ABSOLUTE: 0.03 E9/L (ref 0–0.2)
BASOPHILS RELATIVE PERCENT: 0.3 % (ref 0–2)
BUN BLDV-MCNC: 10 MG/DL (ref 6–20)
CALCIUM SERPL-MCNC: 8.6 MG/DL (ref 8.6–10.2)
CHLORIDE BLD-SCNC: 99 MMOL/L (ref 98–107)
CO2: 25 MMOL/L (ref 22–29)
CREAT SERPL-MCNC: 0.7 MG/DL (ref 0.7–1.2)
EOSINOPHILS ABSOLUTE: 0.13 E9/L (ref 0.05–0.5)
EOSINOPHILS RELATIVE PERCENT: 1.4 % (ref 0–6)
GFR AFRICAN AMERICAN: >60
GFR NON-AFRICAN AMERICAN: >60 ML/MIN/1.73
GLUCOSE BLD-MCNC: 237 MG/DL (ref 74–99)
HCT VFR BLD CALC: 32.7 % (ref 37–54)
HEMOGLOBIN: 11 G/DL (ref 12.5–16.5)
IMMATURE GRANULOCYTES #: 0.02 E9/L
IMMATURE GRANULOCYTES %: 0.2 % (ref 0–5)
LYMPHOCYTES ABSOLUTE: 1.88 E9/L (ref 1.5–4)
LYMPHOCYTES RELATIVE PERCENT: 20.3 % (ref 20–42)
MAGNESIUM: 2.2 MG/DL (ref 1.6–2.6)
MCH RBC QN AUTO: 32.6 PG (ref 26–35)
MCHC RBC AUTO-ENTMCNC: 33.6 % (ref 32–34.5)
MCV RBC AUTO: 97 FL (ref 80–99.9)
METER GLUCOSE: 199 MG/DL (ref 74–99)
METER GLUCOSE: 231 MG/DL (ref 74–99)
METER GLUCOSE: 278 MG/DL (ref 74–99)
METER GLUCOSE: 278 MG/DL (ref 74–99)
MONOCYTES ABSOLUTE: 0.6 E9/L (ref 0.1–0.95)
MONOCYTES RELATIVE PERCENT: 6.5 % (ref 2–12)
MRSA CULTURE ONLY: NORMAL
NEUTROPHILS ABSOLUTE: 6.6 E9/L (ref 1.8–7.3)
NEUTROPHILS RELATIVE PERCENT: 71.3 % (ref 43–80)
PDW BLD-RTO: 13.2 FL (ref 11.5–15)
PHOSPHORUS: 2.4 MG/DL (ref 2.5–4.5)
PLATELET # BLD: 258 E9/L (ref 130–450)
PMV BLD AUTO: 10 FL (ref 7–12)
POTASSIUM SERPL-SCNC: 3.7 MMOL/L (ref 3.5–5)
RBC # BLD: 3.37 E12/L (ref 3.8–5.8)
SODIUM BLD-SCNC: 133 MMOL/L (ref 132–146)
WBC # BLD: 9.3 E9/L (ref 4.5–11.5)

## 2022-04-06 PROCEDURE — 6360000002 HC RX W HCPCS: Performed by: STUDENT IN AN ORGANIZED HEALTH CARE EDUCATION/TRAINING PROGRAM

## 2022-04-06 PROCEDURE — 6370000000 HC RX 637 (ALT 250 FOR IP): Performed by: INTERNAL MEDICINE

## 2022-04-06 PROCEDURE — 84100 ASSAY OF PHOSPHORUS: CPT

## 2022-04-06 PROCEDURE — 36592 COLLECT BLOOD FROM PICC: CPT

## 2022-04-06 PROCEDURE — 2580000003 HC RX 258: Performed by: INTERNAL MEDICINE

## 2022-04-06 PROCEDURE — 6360000002 HC RX W HCPCS: Performed by: INTERNAL MEDICINE

## 2022-04-06 PROCEDURE — 99233 SBSQ HOSP IP/OBS HIGH 50: CPT | Performed by: INTERNAL MEDICINE

## 2022-04-06 PROCEDURE — S5553 INSULIN LONG ACTING 5 U: HCPCS | Performed by: INTERNAL MEDICINE

## 2022-04-06 PROCEDURE — C9113 INJ PANTOPRAZOLE SODIUM, VIA: HCPCS | Performed by: INTERNAL MEDICINE

## 2022-04-06 PROCEDURE — 2140000000 HC CCU INTERMEDIATE R&B

## 2022-04-06 PROCEDURE — 83735 ASSAY OF MAGNESIUM: CPT

## 2022-04-06 PROCEDURE — 82962 GLUCOSE BLOOD TEST: CPT

## 2022-04-06 PROCEDURE — 80048 BASIC METABOLIC PNL TOTAL CA: CPT

## 2022-04-06 PROCEDURE — 6370000000 HC RX 637 (ALT 250 FOR IP): Performed by: STUDENT IN AN ORGANIZED HEALTH CARE EDUCATION/TRAINING PROGRAM

## 2022-04-06 PROCEDURE — 2500000003 HC RX 250 WO HCPCS: Performed by: INTERNAL MEDICINE

## 2022-04-06 PROCEDURE — 85025 COMPLETE CBC W/AUTO DIFF WBC: CPT

## 2022-04-06 RX ORDER — INSULIN GLARGINE-YFGN 100 [IU]/ML
25 INJECTION, SOLUTION SUBCUTANEOUS 2 TIMES DAILY
Status: DISCONTINUED | OUTPATIENT
Start: 2022-04-06 | End: 2022-04-08

## 2022-04-06 RX ADMIN — METOPROLOL SUCCINATE 100 MG: 100 TABLET, FILM COATED, EXTENDED RELEASE ORAL at 20:48

## 2022-04-06 RX ADMIN — HYDROMORPHONE HYDROCHLORIDE 0.5 MG: 1 INJECTION, SOLUTION INTRAMUSCULAR; INTRAVENOUS; SUBCUTANEOUS at 09:12

## 2022-04-06 RX ADMIN — INSULIN LISPRO 6 UNITS: 100 INJECTION, SOLUTION INTRAVENOUS; SUBCUTANEOUS at 08:19

## 2022-04-06 RX ADMIN — INSULIN LISPRO 3 UNITS: 100 INJECTION, SOLUTION INTRAVENOUS; SUBCUTANEOUS at 20:48

## 2022-04-06 RX ADMIN — HYDROMORPHONE HYDROCHLORIDE 0.5 MG: 1 INJECTION, SOLUTION INTRAMUSCULAR; INTRAVENOUS; SUBCUTANEOUS at 05:48

## 2022-04-06 RX ADMIN — SODIUM PHOSPHATE, MONOBASIC, MONOHYDRATE 15 MMOL: 276; 142 INJECTION, SOLUTION INTRAVENOUS at 07:58

## 2022-04-06 RX ADMIN — PIPERACILLIN AND TAZOBACTAM 3375 MG: 3; .375 INJECTION, POWDER, LYOPHILIZED, FOR SOLUTION INTRAVENOUS at 19:54

## 2022-04-06 RX ADMIN — INSULIN GLARGINE-YFGN 25 UNITS: 100 INJECTION, SOLUTION SUBCUTANEOUS at 20:48

## 2022-04-06 RX ADMIN — INSULIN LISPRO 2 UNITS: 100 INJECTION, SOLUTION INTRAVENOUS; SUBCUTANEOUS at 12:29

## 2022-04-06 RX ADMIN — ENOXAPARIN SODIUM 30 MG: 100 INJECTION SUBCUTANEOUS at 08:16

## 2022-04-06 RX ADMIN — POTASSIUM CHLORIDE 40 MEQ: 20 TABLET, EXTENDED RELEASE ORAL at 08:17

## 2022-04-06 RX ADMIN — INSULIN LISPRO 10 UNITS: 100 INJECTION, SOLUTION INTRAVENOUS; SUBCUTANEOUS at 08:28

## 2022-04-06 RX ADMIN — OXYCODONE HYDROCHLORIDE 10 MG: 10 TABLET ORAL at 20:48

## 2022-04-06 RX ADMIN — SACUBITRIL AND VALSARTAN 1 TABLET: 97; 103 TABLET, FILM COATED ORAL at 22:07

## 2022-04-06 RX ADMIN — SACUBITRIL AND VALSARTAN 1 TABLET: 97; 103 TABLET, FILM COATED ORAL at 10:28

## 2022-04-06 RX ADMIN — Medication 3 MG: at 22:09

## 2022-04-06 RX ADMIN — PIPERACILLIN AND TAZOBACTAM 3375 MG: 3; .375 INJECTION, POWDER, LYOPHILIZED, FOR SOLUTION INTRAVENOUS at 02:14

## 2022-04-06 RX ADMIN — INSULIN LISPRO 4 UNITS: 100 INJECTION, SOLUTION INTRAVENOUS; SUBCUTANEOUS at 18:00

## 2022-04-06 RX ADMIN — SODIUM CHLORIDE, PRESERVATIVE FREE 10 ML: 5 INJECTION INTRAVENOUS at 08:25

## 2022-04-06 RX ADMIN — PIPERACILLIN AND TAZOBACTAM 3375 MG: 3; .375 INJECTION, POWDER, LYOPHILIZED, FOR SOLUTION INTRAVENOUS at 11:15

## 2022-04-06 RX ADMIN — INSULIN GLARGINE-YFGN 22 UNITS: 100 INJECTION, SOLUTION SUBCUTANEOUS at 08:19

## 2022-04-06 RX ADMIN — ATORVASTATIN CALCIUM 20 MG: 20 TABLET, FILM COATED ORAL at 20:48

## 2022-04-06 RX ADMIN — OXYCODONE HYDROCHLORIDE 10 MG: 10 TABLET ORAL at 02:16

## 2022-04-06 RX ADMIN — INSULIN LISPRO 2 UNITS: 100 INJECTION, SOLUTION INTRAVENOUS; SUBCUTANEOUS at 12:31

## 2022-04-06 RX ADMIN — OXYCODONE HYDROCHLORIDE 10 MG: 10 TABLET ORAL at 08:35

## 2022-04-06 RX ADMIN — SODIUM CHLORIDE, PRESERVATIVE FREE 40 MG: 5 INJECTION INTRAVENOUS at 08:16

## 2022-04-06 RX ADMIN — INSULIN LISPRO 10 UNITS: 100 INJECTION, SOLUTION INTRAVENOUS; SUBCUTANEOUS at 17:59

## 2022-04-06 ASSESSMENT — PAIN SCALES - GENERAL
PAINLEVEL_OUTOF10: 0
PAINLEVEL_OUTOF10: 7
PAINLEVEL_OUTOF10: 10
PAINLEVEL_OUTOF10: 10
PAINLEVEL_OUTOF10: 4
PAINLEVEL_OUTOF10: 6
PAINLEVEL_OUTOF10: 10
PAINLEVEL_OUTOF10: 8
PAINLEVEL_OUTOF10: 10
PAINLEVEL_OUTOF10: 10
PAINLEVEL_OUTOF10: 0

## 2022-04-06 ASSESSMENT — PAIN - FUNCTIONAL ASSESSMENT
PAIN_FUNCTIONAL_ASSESSMENT: ACTIVITIES ARE NOT PREVENTED
PAIN_FUNCTIONAL_ASSESSMENT: PREVENTS OR INTERFERES WITH MANY ACTIVE NOT PASSIVE ACTIVITIES
PAIN_FUNCTIONAL_ASSESSMENT: PREVENTS OR INTERFERES SOME ACTIVE ACTIVITIES AND ADLS
PAIN_FUNCTIONAL_ASSESSMENT: ACTIVITIES ARE NOT PREVENTED

## 2022-04-06 ASSESSMENT — PAIN DESCRIPTION - FREQUENCY
FREQUENCY: CONTINUOUS

## 2022-04-06 ASSESSMENT — PAIN DESCRIPTION - LOCATION
LOCATION: BUTTOCKS

## 2022-04-06 ASSESSMENT — PAIN DESCRIPTION - DESCRIPTORS
DESCRIPTORS: CONSTANT;DISCOMFORT;NAGGING
DESCRIPTORS: CONSTANT;DISCOMFORT;SORE
DESCRIPTORS: CONSTANT;DISCOMFORT;SORE
DESCRIPTORS: ACHING;DISCOMFORT

## 2022-04-06 ASSESSMENT — PAIN DESCRIPTION - PROGRESSION
CLINICAL_PROGRESSION: NOT CHANGED

## 2022-04-06 ASSESSMENT — PAIN DESCRIPTION - ONSET
ONSET: ON-GOING

## 2022-04-06 ASSESSMENT — PAIN DESCRIPTION - PAIN TYPE
TYPE: ACUTE PAIN

## 2022-04-06 ASSESSMENT — PAIN DESCRIPTION - ORIENTATION
ORIENTATION: MID;LEFT;RIGHT
ORIENTATION: LEFT
ORIENTATION: LEFT

## 2022-04-06 NOTE — CARE COORDINATION
4/6: Transition of care: Pt presented to the ER for a abscess to left buttock. Pt was found to be in DKA, placed on a insulin drip & transferred to MICU. Pt is on room air, Iv Protonix & Iv Zosyn. CM met with pt & his mother. Pt & his mother would be interested in diabetic education. CM advise Dr. Javed Britt to see if he can order for pt. Pt's PCP is  & uses the [facility] on [facility]. Pt lives with his mother in a house with 3 steps to enter. The bed/bathroom are on the 2nd fl with an additional 12 steps. Pt has a glucometer, lancets & insulin pens. PTA pt is independent. Pt's dc plan is to return home. Pt can drive himself home, his car is here. Unsure if pt will need long-term ATB's. KEVIN Dougherty is following pt & pending cultures, to determine course of tx. Sw/CM will continue to follow for dc planning.  Electronically signed by Edyta Vines RN on 4/6/2022 at 11:22 AM

## 2022-04-06 NOTE — PROGRESS NOTES
Hospitalist Progress Note      SYNOPSIS: Patient admitted on 2022 for Sepsis [facility])      SUBJECTIVE:    Patient seen and examined at bedside. Still reported pain at the abscess site. Out of DKA now. Off of insulin drip. Off of pressor support. Records reviewed. Stable overnight. No other overnight issues reported. Temp (24hrs), Av.3 °F (36.8 °C), Min:97.3 °F (36.3 °C), Max:98.7 °F (37.1 °C)    DIET: ADULT DIET; Regular; 4 carb choices (60 gm/meal); Low Fat/Low Chol/High Fiber/2 gm Na  CODE: Full Code    Intake/Output Summary (Last 24 hours) at 2022 1247  Last data filed at 2022 1115  Gross per 24 hour   Intake 1444 ml   Output 1325 ml   Net 119 ml       OBJECTIVE:    BP (!) 108/49   Pulse 84   Temp 97.3 °F (36.3 °C) (Oral)   Resp 19   Wt 236 lb 8.9 oz (107.3 kg)   SpO2 99%   BMI 33.94 kg/m²     General appearance: No apparent distress, appears stated age and cooperative. Respiratory: Bilateral air entry fair. No obvious wheezing or crackles. Cardiovascular: S1-S2, RRR. Abdomen: Soft, nontender, nondistended, bowel sounds present  Musculoskeletal: No peripheral edema. Neurologic: AAOx3. No gross focal neurological deficits. Assessment and plan    Patient, 40-year-old male with past medical history of insulin-dependent diabetes mellitus, uncontrolled, recurrent perirectal abscess, s/p I&D February 10, 2022, s/p recent antibiotic course, hypertension, nonischemic cardiomyopathy/chronic systolic CHF, EF 47%, hyperlipidemia, came to the emergency room complaining of severe perirectal pain since past 2 days. Reports associated fever/chills/nausea. Upon arrival in the emergency room, patient was noted to be hypotensive, not responded to IV fluids, started on pressor support. Also noted to be in DKA, started on insulin drip. Started on empiric IV antibiotics. Seen by general surgery who did bedside I&D.   Seen by ID.     ASSESSMENT:     Septic shock likely secondary to perirectal abscess-resolved  Uncontrolled insulin-dependent diabetes mellitus with DKA-resolved  Nonischemic cardiomyopathy/chronic systolic CHF-appears compensated  Hyperlipidemia  Hypertension        PLAN:     Continue empiric IV antibiotics Zosyn. Patient did receive 1 dose of IV vancomycin/linezolid. ID consult appreciated  General surgery management appreciated. S/p bedside I&D. Concern for underlying rectal fistula given recurrent episodes. Surgery team aware. Follow-up culture data. Critical care management appreciated. At this time, DKA appears to have resolved. Continue Lantus/insulin sliding scale.   Diabetic educator consult  Monitor BMP/electrolytes  Blood pressure still soft, continue to, hold home antihypertensives/Entresto  Continue Lipitor  DVT prophylaxis-Lovenox subcu      DVT Prophylaxis [x] Lovenox, []  Heparin, [] SCDs, [] Ambulation   GI Prophylaxis [] PPI,  [] H2 Blocker,  [] Carafate,  [] Diet/Tube Feeds   Disposition Patient requires continued admission due to IV antibiotic/surgery/ID management   MDM [] Low, [] Moderate,[x]  High  Patient's risk as above       Medications:  REVIEWED DAILY    Infusion Medications    dextrose      sodium chloride       Scheduled Medications    insulin lispro  10 Units SubCUTAneous TID WC    insulin lispro  0-12 Units SubCUTAneous TID WC    insulin lispro  0-6 Units SubCUTAneous Nightly    insulin glargine  25 Units SubCUTAneous BID    atorvastatin  20 mg Oral Nightly    metoprolol succinate  100 mg Oral BID    potassium chloride  40 mEq Oral Daily    sacubitril-valsartan  1 tablet Oral BID    sodium chloride flush  5-40 mL IntraVENous 2 times per day    enoxaparin  30 mg SubCUTAneous BID    piperacillin-tazobactam  3,375 mg IntraVENous Q8H    pantoprazole (PROTONIX) 40 mg injection  40 mg IntraVENous Daily     PRN Meds: glucose, dextrose, glucagon (rDNA), dextrose, melatonin, sodium chloride flush, sodium chloride, ondansetron **OR** ondansetron, polyethylene glycol, acetaminophen **OR** acetaminophen, oxyCODONE **OR** oxyCODONE, HYDROmorphone    Labs:     Recent Labs     04/04/22 2041 04/05/22  0955 04/06/22  0419   WBC 23.3* 16.1* 9.3   HGB 12.7 10.4* 11.0*   HCT 38.0 31.1* 32.7*    236 258       Recent Labs     04/05/22  0955 04/05/22  0955 04/05/22  1527 04/05/22 2023 04/06/22 0419   *   < > 128* 132 133   K 3.3*   < > 3.8 4.8 3.7   CL 94*   < > 96* 97* 99   CO2 23   < > 22 24 25   BUN 13   < > 12 12 10   CREATININE 0.8   < > 0.7 0.8 0.7   CALCIUM 8.6   < > 8.4* 8.7 8.6   PHOS 2.6  --  2.3*  --  2.4*    < > = values in this interval not displayed. Recent Labs     04/05/22  0009   PROT 8.6*   ALKPHOS 100   ALT 7   AST 10   BILITOT 0.7       No results for input(s): INR in the last 72 hours. No results for input(s): Jeff San in the last 72 hours. Chronic labs:    Lab Results   Component Value Date    CHOL 181 04/05/2022    TRIG 398 (H) 04/05/2022    HDL 29 04/05/2022    LDLCALC 72 04/05/2022    TSH 1.240 09/17/2021    INR 1.3 12/24/2020    LABA1C 13.7 (H) 04/05/2022       Radiology: REVIEWED DAILY    +++++++++++++++++++++++++++++++++++++++++++++++++  Brain MD Henrique  [employer] - [address] [place], [place]  +++++++++++++++++++++++++++++++++++++++++++++++++  NOTE: This report was transcribed using voice recognition software. Every effort was made to ensure accuracy; however, inadvertent computerized transcription errors may be present.

## 2022-04-06 NOTE — PROGRESS NOTES
Infectious Disease  Progress Note  [facility]    Chief Complaint: left gluteal abscess    Subjective:      Scheduled Meds:   insulin lispro  10 Units SubCUTAneous TID WC    insulin lispro  0-12 Units SubCUTAneous TID WC    insulin lispro  0-6 Units SubCUTAneous Nightly    sodium phosphate IVPB  15 mmol IntraVENous Once    atorvastatin  20 mg Oral Nightly    metoprolol succinate  100 mg Oral BID    potassium chloride  40 mEq Oral Daily    sacubitril-valsartan  1 tablet Oral BID    sodium chloride flush  5-40 mL IntraVENous 2 times per day    enoxaparin  30 mg SubCUTAneous BID    piperacillin-tazobactam  3,375 mg IntraVENous Q8H    insulin glargine  22 Units SubCUTAneous BID    pantoprazole (PROTONIX) 40 mg injection  40 mg IntraVENous Daily     Continuous Infusions:   dextrose      sodium chloride       PRN Meds:glucose, dextrose, glucagon (rDNA), dextrose, melatonin, sodium chloride flush, sodium chloride, ondansetron **OR** ondansetron, polyethylene glycol, acetaminophen **OR** acetaminophen, oxyCODONE **OR** oxyCODONE, HYDROmorphone    ROS:  As mentioned in subjective, all other systems negative      /66   Pulse 72   Temp 98.5 °F (36.9 °C) (Oral)   Resp 14   Wt 236 lb 8.9 oz (107.3 kg)   SpO2 97%   BMI 33.94 kg/m²     Physical Exam  Const/Neuro- unchanged, no signs of acute distress, Alert  ENMT- Within Normal Limits, Normocephalic, mucous membranes pink/moist, No thrush  Neck: Neck supple  Heart- Regular, Rate, Rhythm- no murmur appreciated. Lungs- clear to ascultation. Respirations even and nonlabored. Abdomen- Soft, bowel sounds positive, non tender  Musculo/Extremities-  Equal and symmetrical, no edema. No tenderness. Neurological- No focal motor or sensory loss. No confusion  Skin:  Warm and dry, diffuse skin rash some active and some chronic changes.      Labs, Cultures reviewed  Radiology reviewed      Microbiology:  surgical cx 2/10/22: E.coli, Streptococcus alaclyticus, Strep Group C  Blood cx 4/5/22: negative so far  Surgical cx 4/5: Group C Strep, E.coli.      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 · Uncontrolled DM/ DKA:   · Leucocytosis:   · Skin rash all over body: ? eczema     Plan:    · Continue IV Zosyn 3.375mg Q8   · Will follow cx and adjust antibiotics  · He will need to be seen by dermatology outpatient for proper diagnosis of skin disease.    · Will follow with you       Electronically signed by Rigo Valdez MD on 4/6/2022 at 9:49 AM

## 2022-04-06 NOTE — PROGRESS NOTES
[address] [facility]   Department of Internal Medicine   Internal Medicine Residency  MICU Progress Note    Patient:  Robert Butt 43 y.o. male   MRN: [id number]       Date of Service: 2022    Allergy: Patient has no known allergies. Cc follow up sepsis   Subjective:     Critical Care Daily Progress Note: 2022 5:50 PM    Patient was seen and examined in AM  Patient states feels better. Denies fever, CP, SOB, chills, and N/V. No complaints. 24 hour change: Stable overnight. No acute overnight issues reported. Objective     Vitals reviewed. I/O last 3 completed shifts: In: 3632 [P.O.:840; I.V.:43; IV Piggyback:2748.9]  Out: 1325 [Urine:1325]  I/O this shift:  In: 303.3 [IV Piggyback:303.3]  Out: -       TEMPERATURE:  Current - Temp: 98.1 °F (36.7 °C); Max - Temp  Av.2 °F (36.8 °C)  Min: 97.3 °F (36.3 °C)  Max: 98.6 °F (37 °C)  RESPIRATIONS RANGE: Resp  Av.9  Min: 12  Max: 30  PULSE RANGE: Pulse  Av.3  Min: 70  Max: 101  BLOOD PRESSURE RANGE:  Systolic (02PFH), RQW:331 , Min:84 , JBV:363   ; Diastolic (49TYP), FUN:10, Min:49, Max:83    PULSE OXIMETRY RANGE: SpO2  Av.6 %  Min: 97 %  Max: 99 %    I & O - 24hr:    Intake/Output Summary (Last 24 hours) at 2022 1750  Last data filed at 2022 1115  Gross per 24 hour   Intake 1204 ml   Output 825 ml   Net 379 ml     I/O last 3 completed shifts: In: 3632 [P.O.:840; I.V.:43; IV Piggyback:2748.9]  Out: 1325 [Urine:1325] I/O this shift:  In: 303.3 [IV Piggyback:303.3]  Out: -    Weight change: -7.1 oz (-0.202 kg)    Physical Exam:  General Appearance:    Alert, cooperative, no acute distress. HEENT:    NC/AT, mucous membranes are moist   Neck:   Supple, no jugular venous distention. Resp:     CTAB, No wheezes, No rhonchi, no use of accessory muscles   Heart:    RRR, S1 and S2 normal, no murmur, rub or gallop.     Abdomen:     Soft, non-tender, non-distended with normal bowel sounds   Extremities:   Atraumatic, no cyanosis or edema   Pulses:  Radial and pedal pulses are intact bilaterally   Neurologic:   AAOx3, No focal motor deficit       Medications:     Continuous Infusions:   dextrose      sodium chloride       Scheduled Meds:   insulin lispro  10 Units SubCUTAneous TID WC    insulin lispro  0-12 Units SubCUTAneous TID WC    insulin lispro  0-6 Units SubCUTAneous Nightly    insulin glargine  25 Units SubCUTAneous BID    atorvastatin  20 mg Oral Nightly    metoprolol succinate  100 mg Oral BID    potassium chloride  40 mEq Oral Daily    sacubitril-valsartan  1 tablet Oral BID    sodium chloride flush  5-40 mL IntraVENous 2 times per day    enoxaparin  30 mg SubCUTAneous BID    piperacillin-tazobactam  3,375 mg IntraVENous Q8H    pantoprazole (PROTONIX) 40 mg injection  40 mg IntraVENous Daily     PRN Meds: glucose, dextrose, glucagon (rDNA), dextrose, melatonin, sodium chloride flush, sodium chloride, ondansetron **OR** ondansetron, polyethylene glycol, acetaminophen **OR** acetaminophen, oxyCODONE **OR** oxyCODONE, HYDROmorphone  Nutrition:   NG/OG tube TF type: Pulmocare/Nephro/Glucerna/Jevity            Labs and Imaging Studies:     CBC:   Recent Labs     04/04/22  2041 04/05/22  0955 04/06/22  0419   WBC 23.3* 16.1* 9.3   HGB 12.7 10.4* 11.0*   HCT 38.0 31.1* 32.7*   MCV 98.7 95.4 97.0    236 258       BMP:    Recent Labs     04/05/22  1527 04/05/22 2023 04/06/22  0419   * 132 133   K 3.8 4.8 3.7   CL 96* 97* 99   CO2 22 24 25   BUN 12 12 10   CREATININE 0.7 0.8 0.7   GLUCOSE 249* 259* 237*       LIVER PROFILE:   Recent Labs     04/05/22  0009   AST 10   ALT 7   BILITOT 0.7   ALKPHOS 100       PT/INR:   No results for input(s): PROTIME, INR in the last 72 hours. APTT:   No results for input(s): APTT in the last 72 hours.     Fasting Lipid Panel:    Lab Results   Component Value Date    CHOL 181 04/05/2022    TRIG 398 04/05/2022    HDL 29 04/05/2022       Cardiac Enzymes:    Lab Results   Component Value Date    SOFGBLF 084 (H) 02/04/2014    CKMB 2.5 05/05/2015    CKMB 3.4 02/04/2014    TROPONINI <0.01 12/22/2020    TROPONINI 0.01 03/09/2020    TROPONINI <0.01 05/05/2015       Notable Cultures:      Blood cultures   Blood Culture, Routine   Date Value Ref Range Status   04/05/2022 24 Hours no growth  Preliminary     Respiratory cultures No results found for: RESPCULTURE   Gram Stain Result   Date Value Ref Range Status   04/05/2022   Final    Gram stain performed from swab, interpret results with  caution. Swab specimens of sterile fluids are inferior to  aspirate specimens for organism recovery. Moderate Polymorphonuclear leukocytes  Epithelial cells not seen  Moderate Gram positive cocci in pairs       Urine   Urine Culture, Routine   Date Value Ref Range Status   07/28/2021 Growth not present  Final     Legionella No results found for: LABLEGI  C Diff PCR No results found for: CDIFPCR  Wound culture/abscess:   Recent Labs     04/05/22  0941   WNDABS Heavy growth  Sensitivity to follow    Light growth  Identification and sensitivity to follow       Tip culture:No results for input(s): CXCATHTIP in the last 72 hours.      Antibiotic  Days  Day started                                Oxygen:     Vent Information  SpO2: 99 %  Additional Respiratory  Assessments  Pulse: 77  Resp: 14  SpO2: 99 %     Nasal cannula L/min     Face mask %     Reservoirs mask %       ABG   Vent Settings     PH   Mode      PCO2   TV      PO2   RR      HCO3   PS      Sat%   PEEP      FIO2   FIO2        P/F          Lines:  Site  Day  Date inserted     TLC              PICC              Arterial line              Peripheral line              HD cath            Brief Summary: 43 y.o. male with medical history significant for insulin-dependent diabetes mellitus on Lantus 22 twice daily, 15 premeal plus sliding scale insulin, buttock wound/perirectal abscess, status post multiple I&D and hospital admission, heart failure reduced ejection fraction 35%, on Entresto metoprolol and Bumex, tobacco abuse disorder, vitamin D deficiency presented to the ED with chief concern of rectal pain, fever for the last 2 days. He did not eat nor took any insulin during this time. In the ED he was found to have hypotensive with systolic in the 09P, blood sugar 322, beta hydroxybutyrate greater than 4.5, glucosuria and leukocytosis. Left IJ central line with he received 2.5 L fluid bolus, blood culture was sent, started on vancomycin and Zosyn, DKA protocol was initiated and surgery was consulted for possible I&D. For further care and management patient was admitted to medical ICU. He is being managed for the following:     Assessment:   1. Perirectal pain secondary to abscess and wound  2. Possible sepsis from perirectal wound  3. DKA secondary to insulin noncompliance and infection resolved   4. Uncontrolled diabetes Hb A1c 13.7  5. History of multiple I&D for buttock wound and abscess  6. Tobacco use disorder  7. History of heart failure with reduced ejection fraction 35% 2020  8. Vitamin D deficiency  9. Hx of VRE 2021     Plan:   · Gap closed x2. Okay to transfer out   · Inc lantus to 25 units tena. Inc Premeal insulin to 10 units   · Resume GDMT, BB and Entresto   · Pain meds and wound care per GS    # Next of Kin:   # Code Status: full   # Peptic ulcer prophylaxis: Protonix   # DVT Prophylaxis:  Lovenox   # Disposition: Daneen Burkitt, MD M.D., PGY-2  Internal medicine resident  Attending Physician: Dr. Sherif Milton personally saw, examined and provided care for the patient. Radiographs, labs and medication list were reviewed by me independently. I spoke with bedside nursing, therapists and consultants. Critical care services and times documented are independent of procedures and multidisciplinary rounds with Residents.  Additionally comprehensive, multidisciplinary rounds were conducted with the MICU team. The case was discussed in detail and plans for care were established. Review of Residents documentation was conducted and revisions were made as appropriate. I agree with the above documented exam, problem list and plan of care.   Transfer  Out of [facility]  GS following      Lisa Coronado  Pulmonary&Critical Care Medicine   Director of [address] [place] [address] Director of [address] [place] [address]   Professor Rod Portillo

## 2022-04-06 NOTE — PROGRESS NOTES
Comprehensive Nutrition Assessment    Type and Reason for Visit:  Initial,Wound,Positive Nutrition Screen    Nutrition Recommendations/Plan: Continue current diet and start ONS to aid in wound healing; will monitor. Nutrition Assessment:  pt adm d/t perirectal abscess (buttock); hx of CAD, CHF, DM, recurrent wounds, multiple I&D procedures; note s/p drainage 2/10 and repeat drainage on 4/5; pt taken off pressor support 4/6; pt intakes appear good so far w/ ~% recorded at meals; will start ONS to aid in wound healing. Malnutrition Assessment:  Malnutrition Status: At risk for malnutrition   Context:  Acute Illness     Findings of the 6 clinical characteristics of malnutrition:  Energy Intake:  No significant decrease in energy intake  Weight Loss:  No significant weight loss (12/1/21=244#, 4/6=236# equates to ~3.3% (8#) wt loss in ~4 mo (~2.4% in ~3 mo))     Body Fat Loss:  Unable to assess     Muscle Mass Loss:  Unable to assess    Fluid Accumulation:  No significant fluid accumulation     Strength:  Not Performed    Estimated Daily Nutrient Needs:  Energy (kcal):  16-18kcal/ikfEAZ=2082-0450; Weight Used for Energy Requirements:  Current     Protein (g):  1.2-1.4g/kgxIBW=90-105g; Weight Used for Protein Requirements:  Ideal        Fluid (ml/day):  4493-0828; Method Used for Fluid Requirements:  1 ml/kcal      Nutrition Related Findings:  +I/O; A&Ox4; abd WNL; no edema      Wounds:   (perirectal abcess;s/p multiple I&Ds)       Current Nutrition Therapies:    ADULT DIET; Regular; 4 carb choices (60 gm/meal); Low Fat/Low Chol/High Fiber/2 gm Na  ADULT ORAL NUTRITION SUPPLEMENT; Breakfast, Lunch;  Wound Healing Oral Supplement  ADULT ORAL NUTRITION SUPPLEMENT; Lunch, Dinner; Diabetic Oral Supplement    Anthropometric Measures:  · Height: 5' 10\" (177.8 cm)  · Current Body Weight: 236 lb 8.9 oz (107.3 kg) (4/6-BS)   · Admission Body Weight: 236 lb 8.9 oz (107.3 kg) (4/6; BS)    · Usual Body Weight: 244 lb 12.8 oz (111 kg) (12/1/21- actual; ~3.3% (8#) wt loss in ~4 mo (~2.4% in ~3 mo); not significant wt loss.)     · Ideal Body Weight: 166 lbs; % Ideal Body Weight 142.5 %   · BMI: 33.9  · BMI Categories: Obese Class 1 (BMI 30.0-34. 9)       Nutrition Diagnosis:   · Increased nutrient needs related to increase demand for energy/nutrients as evidenced by wounds      Nutrition Interventions:   Food and/or Nutrient Delivery:  Continue Current Diet,Start Oral Nutrition Supplement (faby BID and glucerna BID)  Nutrition Education/Counseling:  Education not indicated   Coordination of Nutrition Care:  Continue to monitor while inpatient    Goals:  Continues to consme >75% of meals and consumes >50% ONS. Nutrition Monitoring and Evaluation:   Behavioral-Environmental Outcomes:  None Identified   Food/Nutrient Intake Outcomes:  Food and Nutrient Intake,Supplement Intake  Physical Signs/Symptoms Outcomes:  Biochemical Data,Nutrition Focused Physical Findings,Weight,Skin,Chewing or Swallowing,GI Status,Fluid Status or Edema     Discharge Planning:     Too soon to determine     Electronically signed by Taj Burger RD on 4/6/22 at 1:43 PM EDT    Contact: [phone]

## 2022-04-06 NOTE — PROGRESS NOTES
GENERAL SURGERY  DAILY PROGRESS NOTE  4/6/2022    Chief Complaint   Patient presents with    Abscess     to left buttock was treated in [place] and healing however is back again       Subjective:  No acute issues. Blood pressure improving. Pain controlled.     Objective:  /66   Pulse 75   Temp 98.4 °F (36.9 °C) (Oral)   Resp 17   Wt 236 lb 8.9 oz (107.3 kg)   SpO2 97%   BMI 33.94 kg/m²     GENERAL:  Laying in bed, awake, alert, cooperative, no apparent distress  HEAD: Normocephalic, atraumatic  EYES: No sclera icterus, pupils equal  LUNGS:  No increased work of breathing  CARDIOVASCULAR:  RR  ABDOMEN:  Soft, non-tender, non-distended  EXTREMITIES: No edema or swelling  SKIN: perirectal abscess cavities with minimal drainage, packing in place    Assessment/Plan:  43 y.o. male with recurrent perirectal abscess    - local wound care daily  - antibiotics  - appreciate ICU care    Electronically signed by Gabriele Brandon MD on 4/6/2022 at 7:18 AM

## 2022-04-07 ENCOUNTER — TELEPHONE (OUTPATIENT)
Dept: ENDOCRINOLOGY | Age: 37
End: 2022-04-07

## 2022-04-07 DIAGNOSIS — Z79.4 TYPE 2 DIABETES MELLITUS WITH HYPERGLYCEMIA, WITH LONG-TERM CURRENT USE OF INSULIN (HCC): Primary | ICD-10-CM

## 2022-04-07 DIAGNOSIS — E11.65 TYPE 2 DIABETES MELLITUS WITH HYPERGLYCEMIA, WITH LONG-TERM CURRENT USE OF INSULIN (HCC): Primary | ICD-10-CM

## 2022-04-07 LAB
ANAEROBIC CULTURE: NORMAL
ANION GAP SERPL CALCULATED.3IONS-SCNC: 11 MMOL/L (ref 7–16)
BASOPHILS ABSOLUTE: 0 E9/L (ref 0–0.2)
BASOPHILS RELATIVE PERCENT: 0.7 % (ref 0–2)
BUN BLDV-MCNC: 10 MG/DL (ref 6–20)
CALCIUM SERPL-MCNC: 9.5 MG/DL (ref 8.6–10.2)
CHLORIDE BLD-SCNC: 101 MMOL/L (ref 98–107)
CO2: 27 MMOL/L (ref 22–29)
CREAT SERPL-MCNC: 0.7 MG/DL (ref 0.7–1.2)
EOSINOPHILS ABSOLUTE: 0.32 E9/L (ref 0.05–0.5)
EOSINOPHILS RELATIVE PERCENT: 4.4 % (ref 0–6)
GFR AFRICAN AMERICAN: >60
GFR NON-AFRICAN AMERICAN: >60 ML/MIN/1.73
GLUCOSE BLD-MCNC: 97 MG/DL (ref 74–99)
HCT VFR BLD CALC: 36.6 % (ref 37–54)
HEMOGLOBIN: 12.2 G/DL (ref 12.5–16.5)
LYMPHOCYTES ABSOLUTE: 2.19 E9/L (ref 1.5–4)
LYMPHOCYTES RELATIVE PERCENT: 29.8 % (ref 20–42)
MCH RBC QN AUTO: 32.3 PG (ref 26–35)
MCHC RBC AUTO-ENTMCNC: 33.3 % (ref 32–34.5)
MCV RBC AUTO: 96.8 FL (ref 80–99.9)
METER GLUCOSE: 112 MG/DL (ref 74–99)
METER GLUCOSE: 230 MG/DL (ref 74–99)
METER GLUCOSE: 259 MG/DL (ref 74–99)
METER GLUCOSE: 267 MG/DL (ref 74–99)
MONOCYTES ABSOLUTE: 0.29 E9/L (ref 0.1–0.95)
MONOCYTES RELATIVE PERCENT: 4.4 % (ref 2–12)
NEUTROPHILS ABSOLUTE: 4.45 E9/L (ref 1.8–7.3)
NEUTROPHILS RELATIVE PERCENT: 61.4 % (ref 43–80)
PDW BLD-RTO: 12.9 FL (ref 11.5–15)
PLATELET # BLD: 388 E9/L (ref 130–450)
PMV BLD AUTO: 9.7 FL (ref 7–12)
POLYCHROMASIA: ABNORMAL
POTASSIUM SERPL-SCNC: 3.8 MMOL/L (ref 3.5–5)
RBC # BLD: 3.78 E12/L (ref 3.8–5.8)
SODIUM BLD-SCNC: 139 MMOL/L (ref 132–146)
WBC # BLD: 7.3 E9/L (ref 4.5–11.5)

## 2022-04-07 PROCEDURE — 2580000003 HC RX 258: Performed by: INTERNAL MEDICINE

## 2022-04-07 PROCEDURE — S5553 INSULIN LONG ACTING 5 U: HCPCS | Performed by: INTERNAL MEDICINE

## 2022-04-07 PROCEDURE — 6370000000 HC RX 637 (ALT 250 FOR IP): Performed by: INTERNAL MEDICINE

## 2022-04-07 PROCEDURE — 85025 COMPLETE CBC W/AUTO DIFF WBC: CPT

## 2022-04-07 PROCEDURE — 36415 COLL VENOUS BLD VENIPUNCTURE: CPT

## 2022-04-07 PROCEDURE — 80048 BASIC METABOLIC PNL TOTAL CA: CPT

## 2022-04-07 PROCEDURE — 99254 IP/OBS CNSLTJ NEW/EST MOD 60: CPT | Performed by: INTERNAL MEDICINE

## 2022-04-07 PROCEDURE — 6360000002 HC RX W HCPCS: Performed by: INTERNAL MEDICINE

## 2022-04-07 PROCEDURE — 2140000000 HC CCU INTERMEDIATE R&B

## 2022-04-07 PROCEDURE — 97161 PT EVAL LOW COMPLEX 20 MIN: CPT

## 2022-04-07 PROCEDURE — A4216 STERILE WATER/SALINE, 10 ML: HCPCS | Performed by: INTERNAL MEDICINE

## 2022-04-07 PROCEDURE — 82962 GLUCOSE BLOOD TEST: CPT

## 2022-04-07 PROCEDURE — 97165 OT EVAL LOW COMPLEX 30 MIN: CPT

## 2022-04-07 PROCEDURE — C9113 INJ PANTOPRAZOLE SODIUM, VIA: HCPCS | Performed by: INTERNAL MEDICINE

## 2022-04-07 RX ORDER — TRIAMCINOLONE ACETONIDE 1 MG/G
CREAM TOPICAL 2 TIMES DAILY
Status: DISCONTINUED | OUTPATIENT
Start: 2022-04-07 | End: 2022-04-08 | Stop reason: HOSPADM

## 2022-04-07 RX ORDER — PANTOPRAZOLE SODIUM 40 MG/1
40 TABLET, DELAYED RELEASE ORAL
Status: DISCONTINUED | OUTPATIENT
Start: 2022-04-08 | End: 2022-04-08 | Stop reason: HOSPADM

## 2022-04-07 RX ORDER — FLASH GLUCOSE SENSOR
KIT MISCELLANEOUS
Qty: 6 EACH | Refills: 3 | OUTPATIENT
Start: 2022-04-07 | End: 2022-04-08 | Stop reason: SDUPTHER

## 2022-04-07 RX ORDER — FLASH GLUCOSE SCANNING READER
1 EACH MISCELLANEOUS ONCE
Qty: 1 EACH | Refills: 0 | OUTPATIENT
Start: 2022-04-07 | End: 2022-04-07

## 2022-04-07 RX ORDER — METRONIDAZOLE 500 MG/1
500 TABLET ORAL 3 TIMES DAILY
Qty: 30 TABLET | Refills: 0 | Status: SHIPPED | OUTPATIENT
Start: 2022-04-07 | End: 2022-04-17

## 2022-04-07 RX ORDER — CEFDINIR 300 MG/1
300 CAPSULE ORAL 2 TIMES DAILY
Qty: 20 CAPSULE | Refills: 0 | Status: SHIPPED | OUTPATIENT
Start: 2022-04-07 | End: 2022-04-17

## 2022-04-07 RX ORDER — BUMETANIDE 1 MG/1
1 TABLET ORAL 2 TIMES DAILY
Status: DISCONTINUED | OUTPATIENT
Start: 2022-04-07 | End: 2022-04-08 | Stop reason: HOSPADM

## 2022-04-07 RX ADMIN — BUMETANIDE 1 MG: 1 TABLET ORAL at 10:39

## 2022-04-07 RX ADMIN — INSULIN LISPRO 6 UNITS: 100 INJECTION, SOLUTION INTRAVENOUS; SUBCUTANEOUS at 08:30

## 2022-04-07 RX ADMIN — METOPROLOL SUCCINATE 100 MG: 100 TABLET, FILM COATED, EXTENDED RELEASE ORAL at 20:48

## 2022-04-07 RX ADMIN — SODIUM CHLORIDE, PRESERVATIVE FREE 40 MG: 5 INJECTION INTRAVENOUS at 08:38

## 2022-04-07 RX ADMIN — INSULIN LISPRO 15 UNITS: 100 INJECTION, SOLUTION INTRAVENOUS; SUBCUTANEOUS at 17:41

## 2022-04-07 RX ADMIN — ENOXAPARIN SODIUM 30 MG: 100 INJECTION SUBCUTANEOUS at 08:38

## 2022-04-07 RX ADMIN — OXYCODONE HYDROCHLORIDE 10 MG: 10 TABLET ORAL at 08:38

## 2022-04-07 RX ADMIN — SACUBITRIL AND VALSARTAN 1 TABLET: 97; 103 TABLET, FILM COATED ORAL at 08:41

## 2022-04-07 RX ADMIN — INSULIN LISPRO 10 UNITS: 100 INJECTION, SOLUTION INTRAVENOUS; SUBCUTANEOUS at 08:30

## 2022-04-07 RX ADMIN — ATORVASTATIN CALCIUM 20 MG: 20 TABLET, FILM COATED ORAL at 20:49

## 2022-04-07 RX ADMIN — INSULIN LISPRO 9 UNITS: 100 INJECTION, SOLUTION INTRAVENOUS; SUBCUTANEOUS at 17:41

## 2022-04-07 RX ADMIN — TRIAMCINOLONE ACETONIDE: 1 CREAM TOPICAL at 20:52

## 2022-04-07 RX ADMIN — SODIUM CHLORIDE, PRESERVATIVE FREE 10 ML: 5 INJECTION INTRAVENOUS at 08:38

## 2022-04-07 RX ADMIN — HYDROMORPHONE HYDROCHLORIDE 0.5 MG: 1 INJECTION, SOLUTION INTRAMUSCULAR; INTRAVENOUS; SUBCUTANEOUS at 01:04

## 2022-04-07 RX ADMIN — OXYCODONE HYDROCHLORIDE 10 MG: 10 TABLET ORAL at 13:34

## 2022-04-07 RX ADMIN — PIPERACILLIN AND TAZOBACTAM 3375 MG: 3; .375 INJECTION, POWDER, LYOPHILIZED, FOR SOLUTION INTRAVENOUS at 10:41

## 2022-04-07 RX ADMIN — INSULIN LISPRO 15 UNITS: 100 INJECTION, SOLUTION INTRAVENOUS; SUBCUTANEOUS at 12:23

## 2022-04-07 RX ADMIN — INSULIN LISPRO 3 UNITS: 100 INJECTION, SOLUTION INTRAVENOUS; SUBCUTANEOUS at 20:49

## 2022-04-07 RX ADMIN — POTASSIUM CHLORIDE 40 MEQ: 20 TABLET, EXTENDED RELEASE ORAL at 08:38

## 2022-04-07 RX ADMIN — SACUBITRIL AND VALSARTAN 1 TABLET: 97; 103 TABLET, FILM COATED ORAL at 20:48

## 2022-04-07 RX ADMIN — PIPERACILLIN AND TAZOBACTAM 3375 MG: 3; .375 INJECTION, POWDER, LYOPHILIZED, FOR SOLUTION INTRAVENOUS at 18:08

## 2022-04-07 RX ADMIN — SODIUM CHLORIDE, PRESERVATIVE FREE 10 ML: 5 INJECTION INTRAVENOUS at 20:50

## 2022-04-07 RX ADMIN — PIPERACILLIN AND TAZOBACTAM 3375 MG: 3; .375 INJECTION, POWDER, LYOPHILIZED, FOR SOLUTION INTRAVENOUS at 02:22

## 2022-04-07 RX ADMIN — INSULIN GLARGINE-YFGN 25 UNITS: 100 INJECTION, SOLUTION SUBCUTANEOUS at 08:30

## 2022-04-07 RX ADMIN — INSULIN GLARGINE-YFGN 25 UNITS: 100 INJECTION, SOLUTION SUBCUTANEOUS at 20:50

## 2022-04-07 RX ADMIN — OXYCODONE HYDROCHLORIDE 10 MG: 10 TABLET ORAL at 20:49

## 2022-04-07 RX ADMIN — BUMETANIDE 1 MG: 1 TABLET ORAL at 18:04

## 2022-04-07 ASSESSMENT — PAIN SCALES - GENERAL
PAINLEVEL_OUTOF10: 10
PAINLEVEL_OUTOF10: 7
PAINLEVEL_OUTOF10: 10
PAINLEVEL_OUTOF10: 10
PAINLEVEL_OUTOF10: 3
PAINLEVEL_OUTOF10: 10
PAINLEVEL_OUTOF10: 0

## 2022-04-07 ASSESSMENT — PAIN DESCRIPTION - PAIN TYPE
TYPE: ACUTE PAIN

## 2022-04-07 ASSESSMENT — PAIN DESCRIPTION - LOCATION
LOCATION: BUTTOCKS

## 2022-04-07 ASSESSMENT — PAIN DESCRIPTION - DESCRIPTORS
DESCRIPTORS: ACHING;DISCOMFORT
DESCRIPTORS: ACHING
DESCRIPTORS: ACHING

## 2022-04-07 ASSESSMENT — PAIN DESCRIPTION - FREQUENCY
FREQUENCY: CONTINUOUS

## 2022-04-07 ASSESSMENT — PAIN DESCRIPTION - ORIENTATION
ORIENTATION: LEFT

## 2022-04-07 ASSESSMENT — PAIN DESCRIPTION - DIRECTION: RADIATING_TOWARDS: NON

## 2022-04-07 ASSESSMENT — PAIN - FUNCTIONAL ASSESSMENT: PAIN_FUNCTIONAL_ASSESSMENT: PREVENTS OR INTERFERES SOME ACTIVE ACTIVITIES AND ADLS

## 2022-04-07 ASSESSMENT — PAIN DESCRIPTION - ONSET
ONSET: ON-GOING

## 2022-04-07 NOTE — PROGRESS NOTES
Occupational Therapy    Occupational Therapy  OCCUPATIONAL THERAPY INITIAL EVALUATION    [facility] [address] [place] [address], [facility], [address] [place] [address]  Patient Name: Ericka Newton  MRN: [id number]  : 1985  Room: [address] [place], [place] [address]Y    Evaluating OT: Claudettemerlene Anton OTR/L #[id number]     Referring Provider: Rodger Vaca MD  Specific Provider Orders/Date: OT eval and treat 22    AM PAC:   G-code:  [address]  Recommended Adaptive Equipment: none    Diagnosis: Acute cerebral venous sinus thrombosis [G08]     Surgery:  I&D 22     Past Medical History:   Past Medical History:   Diagnosis Date    Buttock wound, left, initial encounter 3/9/2022    Buttock wound, left, subsequent encounter 3/15/2022    CAD (coronary artery disease)     [facility]     Diabetes mellitus [facility]     Draining postoperative wound 3/9/2022    Hyperlipidemia     Hyperosmolar hyperglycemic state (HHS) ([facility] 2021    Hypertension         Home Living: Pt lives with mother in a 2 story home with 3 GINGER    Bathroom setup: shower  Equipment owned: none    Prior Level of Function: Independent with ADLs; Independent with IADLs; ambulated without AD  Driving: yes  Occupation: enjoys playing [employer] [place] and spending time on the Internet    Pain Level: pt reports abscess site pain this session (did not quantify); Therapist facilitated repositioning to address pain      Cognition: oriented x 4  Additional Comments: Pleasant and cooperative.   Followed commands     Sensory:   Hearing: wfl  Vision: wfl    Glasses: yes [] no [] reading []      UE Assessment:  Hand Dominance: Right [x]  Left []     Strength ROM Additional Info:    RUE  5/5 wfl good  and wfl FMC/dexterity noted during ADL tasks     LUE 5/5 wfl good  and wfl FMC/dexterity noted during ADL tasks   Sensation: wfl  Tone: wfl  Edema: none noted    Functional Assessment:   Current Status Comments   Feeding  indep    Grooming  indep    Upper Body Dressing indep     Lower Body Dressing indep    Bathing indep    Toileting  indep    Bed Mobility  Supine to Sit: indep  Sit to Supine:indep    Functional Transfers indep    Functional Mobility indep      Sit balance: indep  Stand balance: indep  Endurance/Activity tolerance: F-                              Comments: Upon arrival pt supine in bed and agreeable to OT Session. At end of session pt supine in bed with all devices within reach, all lines and tubes intact. Assessment of current deficits   Functional mobility []  ROM [] Strength []  Cognition []  ADLs []   IADLs [] Safety Awareness [] Endurance []  Fine Motor Coordination [] Balance [] Vision/perception [] Sensation []   Gross Motor Coordination []     Eval Complexity: low    (Evaluation time includes thorough review of current medical information, gathering information on past medical history/social history and prior level of function, completion of standardized testing/informal observation of tasks, assessment of data, and development of POC/Goals.)    Treatment frequency: evaluation only    Plan of Care:  ADL retraining []   Equipment needs []   Neuromuscular re-education [] Energy Conservation Techniques []  Functional Transfer training [] Patient and/or Family Education []  Functional Mobility training []  Environmental Modifications []  Cognitive re-training []   Compensatory techniques for ADLs []  Splinting Needs []   Positioning to improve overall function []  Other: []      Rehab Potential: Good    Patient / Family Goal: Return home    Short term goals  Time Frame: Evaluation only - Skilled OT services not indicated    Patient and/or family understands diagnosis, prognosis and plan of care: yes    [] Malnutrition indicators have been identified and nursing has been notified to ensure a dietitian consult is ordered.      Time in: 735  Time out: 745  Total Time: 10 minutes    Kwame Ricks Monroe OTR/L #[id number]

## 2022-04-07 NOTE — PLAN OF CARE
Problem: Falls - Risk of:  Goal: Will remain free from falls  Description: Will remain free from falls  Outcome: Ongoing  Goal: Absence of physical injury  Description: Absence of physical injury  Outcome: Ongoing     Problem: Discharge Planning:  Goal: Discharged to appropriate level of care  Description: Discharged to appropriate level of care  Outcome: Ongoing     Problem: Serum Glucose Level - Abnormal:  Goal: Ability to maintain appropriate glucose levels will improve  Description: Ability to maintain appropriate glucose levels will improve  Outcome: Ongoing     Problem: Sensory Perception - Impaired:  Goal: Ability to maintain a stable neurologic state will improve  Description: Ability to maintain a stable neurologic state will improve  Outcome: Ongoing     Problem:  Activity:  Goal: Risk for activity intolerance will decrease  Description: Risk for activity intolerance will decrease  Outcome: Ongoing

## 2022-04-07 NOTE — PROGRESS NOTES
Infectious Disease  Progress Note  [facility]    Chief Complaint: left gluteal abscess    Subjective:  He is doing well no new complaints. Scheduled Meds:   bumetanide  1 mg Oral BID    triamcinolone   Topical BID    [START ON 4/8/2022] pantoprazole  40 mg Oral QAM AC    insulin lispro  15 Units SubCUTAneous TID WC    insulin lispro  0-18 Units SubCUTAneous TID WC    insulin lispro  0-9 Units SubCUTAneous Nightly    insulin glargine  25 Units SubCUTAneous BID    atorvastatin  20 mg Oral Nightly    metoprolol succinate  100 mg Oral BID    potassium chloride  40 mEq Oral Daily    sacubitril-valsartan  1 tablet Oral BID    sodium chloride flush  5-40 mL IntraVENous 2 times per day    enoxaparin  30 mg SubCUTAneous BID    piperacillin-tazobactam  3,375 mg IntraVENous Q8H     Continuous Infusions:   dextrose      sodium chloride       PRN Meds:glucose, dextrose, glucagon (rDNA), dextrose, melatonin, sodium chloride flush, sodium chloride, ondansetron **OR** ondansetron, polyethylene glycol, acetaminophen **OR** acetaminophen, oxyCODONE **OR** oxyCODONE, HYDROmorphone    ROS:  As mentioned in subjective, all other systems negative      /78   Pulse 67   Temp 98 °F (36.7 °C) (Oral)   Resp 20   Ht 5' 10\" (1.778 m)   Wt 236 lb 8.9 oz (107.3 kg)   SpO2 97%   BMI 33.94 kg/m²     Physical Exam  Const/Neuro- unchanged, no signs of acute distress, Alert  ENMT- Within Normal Limits, Normocephalic, mucous membranes pink/moist, No thrush  Neck: Neck supple  Heart- Regular, Rate, Rhythm- no murmur appreciated. Lungs- clear to ascultation. Respirations even and nonlabored. Abdomen- Soft, bowel sounds positive, non tender  Musculo/Extremities-  Equal and symmetrical, no edema. No tenderness. Neurological- No focal motor or sensory loss. No confusion  Skin:  Warm and dry, diffuse skin rash some active and some chronic changes.      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 · Uncontrolled DM/ DKA:   · Leucocytosis:   · Skin rash all over body: ? eczema     Plan:    · Continue IV Zosyn in patient-tolerating well so far  · Can be discharged on p.o. cefdinir and Flagyl for total of 10 days. Prescription in chart. · He will need to be seen by dermatology outpatient for proper diagnosis of skin disease.    · Will follow with you       Discussed with primary  Electronically signed by Maye Rowland MD on 4/7/2022 at 2:00 PM

## 2022-04-07 NOTE — PROGRESS NOTES
GENERAL SURGERY  DAILY PROGRESS NOTE  4/7/2022    Chief Complaint   Patient presents with    Abscess     to left buttock was treated in [place] and healing however is back again       Subjective:  Transferred out of ICU. Feeling better, pain well controlled.  less drainage     Objective:  /72   Pulse 84   Temp 97.9 °F (36.6 °C) (Oral)   Resp 18   Ht 5' 10\" (1.778 m)   Wt 236 lb 8.9 oz (107.3 kg)   SpO2 100%   BMI 33.94 kg/m²     GENERAL:  Laying in bed, awake, alert, cooperative, no apparent distress  HEAD: Normocephalic, atraumatic  EYES: No sclera icterus, pupils equal  LUNGS:  No increased work of breathing  CARDIOVASCULAR:  RR  ABDOMEN:  Soft, non-tender, non-distended  EXTREMITIES: No edema or swelling  SKIN: perirectal abscess cavities with minimal drainage, less induration    Assessment/Plan:  43 y.o. male with recurrent perirectal abscess    - local wound care daily with packing   - ok to shower  - no further surgical plans at this time  - can follow up with Dr. Lisseth Mathew in the office     Electronically signed by Kenisha Irby MD on 4/7/2022 at 6:42 AM

## 2022-04-07 NOTE — PROGRESS NOTES
Notification of IV to PO conversion: This patient's order for pantoprazole IV has been changed to PO as approved by the Pharmacy & Therapeutics and Medical Executive Committees. If the patient should become strict NPO while on this therapy, contact the prescriber for further orders.

## 2022-04-07 NOTE — CONSULTS
ENDOCRINOLOGY INITIAL CONSULTATION NOTE  Via Tele-Health Service      Date of admission: 4/5/2022  Date of service: 4/7/2022  Admitting physician: Shameka Bowie MD   Primary Care Physician: Esteban Faria MD  Consultant physician: Meryle Laurence MD     Reason for the consultation:  Uncontrolled DM    History of Present Illness: The history is provided by the patient. Accuracy of the patient data is excellent    Eloy England is a very pleasant 43 y.o. old male with PMH of poorly controlled DM, HTN, HLD and other  listed below admitted to [facility] on 4/5/2022 because of perirectal abscess, endocrine service was consulted for diabetes management. The patient was on his usual state of health until about 2 days before presentation when started c/o perirectal pain and swelling. He has recurrent perirectal abscess, s/p I&D February 10, 2022, s/p recent antibiotic course. Up on arrival to ER, the patient was found to be in DKA. The patient was started on insulin drip and mpiric IV antibiotics. He was seen by surgery team and hey did bedside I&D  Admission labs , AG 20, Bicarb 17, Cr 1.1, K 4.0, BHB >4.5     Prior to admission  The patient was diagnosed with DM back in 2015. Prior to admission patient was on Lantus 22u BID, Humalog 15u with meals + ss. Patient admit massing insulin at home. He has had no hypoglycemic episodes. Patient has not been eating consistent carbohydrate meals, self-blood glucose monitoring has been above goal prior to admission. In addition, patient denied macrovascular or microvascular complications.     Lab Results   Component Value Date    LABA1C 13.7 04/05/2022     Inpatient diet:   Carb Restricted diet     Point of care glucose monitoring   (Independently reviewed)   Recent Labs     04/05/22  1110 04/05/22  1641 04/05/22 1958 04/06/22  0802 04/06/22  1226 04/06/22  1651 04/06/22 1959 04/07/22  0557   GLUMET 212* 292* 270* 278* 199* 231* 278* 259*     Past medical history:   Past Medical History:   Diagnosis Date    Buttock wound, left, initial encounter 3/9/2022    Buttock wound, left, subsequent encounter 3/15/2022    CAD (coronary artery disease)     CHF [facility]congestive [facility] failure) [facility]     Diabetes mellitus ([facility]     Draining postoperative wound 3/9/2022    Hyperlipidemia     Hyperosmolar hyperglycemic state (HHS) ([facility] [address] 7/30/2021    Hypertension        Past surgical history:  Past Surgical History:   Procedure Laterality Date    ABDOMEN SURGERY N/A 9/18/2021    INCISION AND DRAINAGE OF LARGE PERIANAL ABSCESS performed by Piedad Lombard, MD at [address] [place] [address]      buttocks    ECHO COMPL W DOP COLOR FLOW  5/5/2015         HC INSERT PICC CATH, 5/> YRS  10/20/2021         LEG SURGERY Left 2/10/2022    BILATERAL BUTTOCK INCISION AND DRAINAGE performed by Piedad Lombard, MD at [facility] history:   Tobacco:   reports that he has been smoking cigars. He started smoking about 22 years ago. He has been smoking about 0.00 packs per day for the past 8.00 years. He has never used smokeless tobacco.  Alcohol:   reports current alcohol use. Drugs:   reports current drug use. Drug: Marijuana Arlene Sierra).     Family history:    Family History   Problem Relation Age of Onset    High Blood Pressure Father     Heart Disease Father     High Blood Pressure Paternal Grandmother     Heart Attack Paternal Grandfather     High Blood Pressure Mother     Heart Disease Mother        Allergy and drug reactions:   No Known Allergies    Scheduled Meds:   bumetanide  1 mg Oral BID    triamcinolone   Topical BID    [START ON 4/8/2022] pantoprazole  40 mg Oral QAM AC    insulin lispro  10 Units SubCUTAneous TID WC    insulin lispro  0-12 Units SubCUTAneous TID WC    insulin lispro  0-6 Units SubCUTAneous Nightly    insulin glargine  25 Units SubCUTAneous BID    atorvastatin  20 mg Oral Nightly    metoprolol succinate  100 mg Oral BID    potassium chloride  40 mEq Oral Daily    sacubitril-valsartan  1 tablet Oral BID    sodium chloride flush  5-40 mL IntraVENous 2 times per day    enoxaparin  30 mg SubCUTAneous BID    piperacillin-tazobactam  3,375 mg IntraVENous Q8H       PRN Meds:   glucose, 15 g, PRN  dextrose, 12.5 g, PRN  glucagon (rDNA), 1 mg, PRN  dextrose, 100 mL/hr, PRN  melatonin, 3 mg, Nightly PRN  sodium chloride flush, 5-40 mL, PRN  sodium chloride, , PRN  ondansetron, 4 mg, Q8H PRN   Or  ondansetron, 4 mg, Q6H PRN  polyethylene glycol, 17 g, Daily PRN  acetaminophen, 650 mg, Q6H PRN   Or  acetaminophen, 650 mg, Q6H PRN  oxyCODONE, 5 mg, Q4H PRN   Or  oxyCODONE, 10 mg, Q4H PRN  HYDROmorphone, 0.5 mg, Q3H PRN      Continuous Infusions:   dextrose      sodium chloride         Review of Systems  All systems reviewed. All negative except for symptoms mentioned in HPI     OBJECTIVE    /78   Pulse 67   Temp 98 °F (36.7 °C) (Oral)   Resp 20   Ht 5' 10\" (1.778 m)   Wt 236 lb 8.9 oz (107.3 kg)   SpO2 97%   BMI 33.94 kg/m²     Intake/Output Summary (Last 24 hours) at 4/7/2022 1047  Last data filed at 4/6/2022 1907  Gross per 24 hour   Intake 543.26 ml   Output --   Net 543.26 ml     Physical examination:  Due to this being a TeleHealth encounter, evaluation of the following organ systems is limited: Vitals/Constitutional/EENT/Resp/CV/GI//MS/Neuro/Skin/Heme-Lymph-Imm. Modified physical exam through Telemedicine camera    General: Communicating well via camera   Neck: no obvious neck mass. No obvious neck deformity     CVS: no distress   Chest: no distress. Chest is moving with respiration    Extremities:  no visible tremor  Skin: No visible rashes as seen from camera   Musculoskeletal: no visible deformity  Neuro: Alert and oriented to person, place, and time. Psychiatric: Normal mood and affect.  Behavior is normal    Review of Laboratory Data:  I personally reviewed the following labs:   Recent Labs     04/04/22 2041 04/05/22  0955 04/06/22  0419   WBC 23.3* 16.1* 9.3   RBC 3.85 3.26* 3.37*   HGB 12.7 10.4* 11.0*   HCT 38.0 31.1* 32.7*   MCV 98.7 95.4 97.0   MCH 33.0 31.9 32.6   MCHC 33.4 33.4 33.6   RDW 13.4 13.2 13.2    236 258   MPV 10.7 10.4 10.0     Recent Labs     04/05/22  0009 04/05/22  0642 04/05/22  1527 04/05/22 2023 04/06/22 0419   *   < > 128* 132 133   K 4.0   < > 3.8 4.8 3.7   CL 90*   < > 96* 97* 99   CO2 17*   < > 22 24 25   BUN 15   < > 12 12 10   CREATININE 1.1   < > 0.7 0.8 0.7   GLUCOSE 322*   < > 249* 259* 237*   CALCIUM 9.6   < > 8.4* 8.7 8.6   PROT 8.6*  --   --   --   --    LABALBU 4.2  --   --   --   --    BILITOT 0.7  --   --   --   --    ALKPHOS 100  --   --   --   --    AST 10  --   --   --   --    ALT 7  --   --   --   --     < > = values in this interval not displayed. Beta-Hydroxybutyrate   Date Value Ref Range Status   04/05/2022 >4.50 (H) 0.02 - 0.27 mmol/L Final   02/10/2022 >4.50 (H) 0.02 - 0.27 mmol/L Final   10/16/2021 0.68 (H) 0.02 - 0.27 mmol/L Final     Lab Results   Component Value Date    LABA1C 13.7 04/05/2022    LABA1C 14.2 01/10/2022    LABA1C 15 09/30/2021     Lab Results   Component Value Date/Time    TSH 1.240 09/17/2021 05:52 AM     Lab Results   Component Value Date    LABA1C 13.7 04/05/2022    GLUCOSE 237 04/06/2022    MALBCR 10.1 11/10/2021    LABMICR 41.9 11/10/2021    LABCREA 415 11/10/2021     Lab Results   Component Value Date    TRIG 398 04/05/2022    HDL 29 04/05/2022    LDLCALC 72 04/05/2022    CHOL 181 04/05/2022       Blood culture   Lab Results   Component Value Date    BC 24 Hours no growth 04/05/2022    BC 5 Days no growth 02/10/2022       Radiology:  XR CHEST PORTABLE   Final Result   No pneumothorax following line placement.          CT ABDOMEN PELVIS W IV CONTRAST Additional Contrast? None   Final Result   Areas of soft tissue thickening and fat stranding in the perineum with more   localized soft tissue thickening along the left greater than right side of   the gluteal cleft. Findings may be represent postoperative changes and   reactive edema but areas of ill-defined phlegmon and cellulitis are not   excluded. No distinct drainable abscess. Short-term follow-up recommended   if symptoms persist.      Thickening of the rectum and anorectal junction with some presacral soft   tissue thickening. Findings are concerning for infectious/inflammatory   proctitis. Neoplasia unlikely but should be followed. Mildly prominent pelvic lymph nodes likely reactive but could also be   followed. Urinary bladder wall thickening which could be partially due to   underdistention but cystitis not excluded. Probable small bilateral hydroceles. RECOMMENDATIONS:   Unavailable           Medical Records/Labs/Images review:   I personally reviewed and summarized previous records   All labs and imaging were reviewed independently     Jennifer Mallory, a 43 y.o.-old male seen today for inpatient diabetes management     Poorly controlled Diabetes Mellitus admitted with DKA   · Patient's diabetes is uncontrolled , A1c 13.7   · With recurrent DKA, will check C-peptide and BING-65 Ab   · will change diabetes regimen to:  · Lantus 25u BID  · Humalog 15u with meals   · High dose sliding scale   · Continue glucose check with meals and at bedtime   · Will titrate insulin dose based on the blood glucose trend & insulin requirement  · Pt will follow with us after discharge. Endocrine follow up visit, Tuesday 4/19 at 11:30am      Recurrent perirectal abscess   · Management per primary and surgery service     The above issues were reviewed with the patient who understood and agreed with the plan. Thank you for allowing us to participate in the care of this patient. Please do not hesitate to contact us with any additional questions.      Magno Hampton MD  Endocrinologist, [facility] Diabetes Care and Endocrinology   [address] [facility] [address] Phone: [phone]  Fax: [phone]  --------------------------------------------  An electronic signature was used to authenticate this note.  Chasity Osborne MD on 4/7/2022 at 10:47 AM

## 2022-04-07 NOTE — PROGRESS NOTES
IV antibiotics. Seen by general surgery who did bedside I&D. Seen by ID.     ASSESSMENT:     Septic shock likely secondary to perirectal abscess-resolved  Uncontrolled insulin-dependent diabetes mellitus with DKA-resolved  Nonischemic cardiomyopathy/chronic systolic CHF-appears compensated  Hyperlipidemia  Hypertension        PLAN:     Continue empiric IV antibiotics Zosyn. Patient did receive 1 dose of IV vancomycin/linezolid. ID consult appreciated. Await further recommendations. General surgery management appreciated. S/p bedside I&D. Signed off. Concern for underlying rectal fistula given recurrent episodes. Surgery team aware. Follow-up culture data. Critical care management appreciated. At this time, DKA appears to have resolved. Continue Lantus/insulin sliding scale. Diabetic educator consult. Given uncontrolled status of his DM type I, will consult endocrinology. Patient strongly recommend to follow-up with endocrinology as outpatient. Patient verbalized understanding. Monitor BMP/electrolytes  Continue antihypertensives. Blood pressure stable.   Continue Lipitor  DVT prophylaxis-Lovenox subcu      DVT Prophylaxis [x] Lovenox, []  Heparin, [] SCDs, [] Ambulation   GI Prophylaxis [] PPI,  [] H2 Blocker,  [] Carafate,  [] Diet/Tube Feeds   Disposition Patient requires continued admission due to IV antibiotic/ID management   MDM [] Low, [] Moderate,[x]  High  Patient's risk as above       Medications:  REVIEWED DAILY    Infusion Medications    dextrose      sodium chloride       Scheduled Medications    bumetanide  1 mg Oral BID    triamcinolone   Topical BID    [START ON 4/8/2022] pantoprazole  40 mg Oral QAM AC    insulin lispro  15 Units SubCUTAneous TID WC    insulin lispro  0-18 Units SubCUTAneous TID WC    insulin lispro  0-9 Units SubCUTAneous Nightly    insulin glargine  25 Units SubCUTAneous BID    atorvastatin  20 mg Oral Nightly    metoprolol succinate  100 mg Oral BID  potassium chloride  40 mEq Oral Daily    sacubitril-valsartan  1 tablet Oral BID    sodium chloride flush  5-40 mL IntraVENous 2 times per day    enoxaparin  30 mg SubCUTAneous BID    piperacillin-tazobactam  3,375 mg IntraVENous Q8H     PRN Meds: glucose, dextrose, glucagon (rDNA), dextrose, melatonin, sodium chloride flush, sodium chloride, ondansetron **OR** ondansetron, polyethylene glycol, acetaminophen **OR** acetaminophen, oxyCODONE **OR** oxyCODONE, HYDROmorphone    Labs:     Recent Labs     04/04/22 2041 04/05/22  0955 04/06/22  0419   WBC 23.3* 16.1* 9.3   HGB 12.7 10.4* 11.0*   HCT 38.0 31.1* 32.7*    236 258       Recent Labs     04/05/22  0955 04/05/22  0955 04/05/22  1527 04/05/22 2023 04/06/22  0419   *   < > 128* 132 133   K 3.3*   < > 3.8 4.8 3.7   CL 94*   < > 96* 97* 99   CO2 23   < > 22 24 25   BUN 13   < > 12 12 10   CREATININE 0.8   < > 0.7 0.8 0.7   CALCIUM 8.6   < > 8.4* 8.7 8.6   PHOS 2.6  --  2.3*  --  2.4*    < > = values in this interval not displayed. Recent Labs     04/05/22  0009   PROT 8.6*   ALKPHOS 100   ALT 7   AST 10   BILITOT 0.7       No results for input(s): INR in the last 72 hours. No results for input(s): Kika Beat in the last 72 hours. Chronic labs:    Lab Results   Component Value Date    CHOL 181 04/05/2022    TRIG 398 (H) 04/05/2022    HDL 29 04/05/2022    LDLCALC 72 04/05/2022    TSH 1.240 09/17/2021    INR 1.3 12/24/2020    LABA1C 13.7 (H) 04/05/2022       Radiology: REVIEWED DAILY    +++++++++++++++++++++++++++++++++++++++++++++++++  Richard Torres MD  [facility] Physician - [address] [place] [address]  +++++++++++++++++++++++++++++++++++++++++++++++++  NOTE: This report was transcribed using voice recognition software. Every effort was made to ensure accuracy; however, inadvertent computerized transcription errors may be present.

## 2022-04-07 NOTE — PLAN OF CARE
Patient's chart updated to reflect:      .     - HF care plan, HF education points and HF discharge instructions.  -Orders: 2 gram sodium diet, daily weights, I/O.  -PCP and/or Cardiologist appointment to be scheduled within 7 days of hospital discharge.  -History of HF, not primary admission Dx CHF-appears compensated  -Perirectal abcess  Pool Johnson, RN RN, BSN  Heart Failure Navigator

## 2022-04-07 NOTE — PROGRESS NOTES
Physical Therapy  Physical Therapy Initial Assessment     Name: Bhavesh Taylor  : 1985  MRN: [id number]      Date of Service: 2022    Evaluating PT:  Jesse Jurado, PT, DPT [id number]     Room #:  7498/5648-E  Diagnosis:  Perirectal abscess [K61.1]  Septicemia ([facility] [address] [A41.9]  [facility] [A41.9]  Diabetic ketoacidosis without coma associated with other specified diabetes mellitus ([facility] [E13.10]  Reason for admission: buttocks abscess   Precautions:  Falls  Procedure/Surgery:   I&D abscess   Equipment Recommendations:  None     SUBJECTIVE:  Pt lives with mother in a 2 story home with 3 stair(s) to enter and 0 rail(s). Bed is on 2nd floor and bath is on 2nd floor. Pt ambulated with no AD PTA. OBJECTIVE:   Initial Evaluation  Date:    AM-PAC 6 Clicks 58/86   Was pt agreeable to Eval/treatment? yes   Does pt have pain? denies   Bed Mobility  Rolling: NT  Supine to sit:  Independent  Sit to supine: independent  Scooting: independent    Transfers Sit to stand: independent  Stand to sit: independent  Stand pivot: NT   Ambulation    50 feet with no AD independent     Stair negotiation: ascended and descended  NT   ROM BUE:  See OT eval   BLE:  WFL   Strength BUE:  See OT eval   BLE:  knee ext 5/5  Ankle DF 5/5   Balance Sitting EOB:  independent  Dynamic Standing:  independent     -Pt is A & O x 3  -Sensation:  Pt denies numbness and tingling to extremities  -Edema:  unremarkable     Therapeutic Exercises:  Functional activity     Patient education  Pt educated on safety, sequencing of transfers, and role of PT    Patient response to education:   Pt verbalized understanding Pt demonstrated skill Pt requires further education in this area   yes yes no     ASSESSMENT:  Conditions Requiring Skilled Therapeutic Intervention:  NA    Comments:  Pt received supine in bed and agreeable to PT session   Pt able to get out of bed, stand, and ambulate without difficulty or assist. Pt is at independent baseline with no acute care PT goals/needs identified. DC from PT service. Pt with all needs met and call light in reach. Treatment:  Patient practiced and was instructed in the following treatment:     Patient education provided continuously throughout session for sequencing, safety maintenance, and improving any deficits found during the evaluation. PHYSICAL THERAPY PLAN OF CARE:    PT POC is established based on physician order and patient diagnosis     Referring provider/PT Order: 04/05/22 0945   PT eval and treat Start: 04/05/22 0945, End: 04/05/22 0945, ONE TIME, Standing Count: 1 Occurrences, R    Last continued at transfer on Wed Apr 6, 2022 7:57 PM   Mildred Laurent MD     Diagnosis:  Perirectal abscess [K61.1]  Septicemia ([facility] [address] [A41.9]  Sepsis ([facility] [address] [A41.9]  Diabetic ketoacidosis without coma associated with other specified diabetes mellitus ([facility] [address] [E13.10]    Pt is at independent baseline with no acute care PT goals/needs identified. DC from PT service. Time in  0735  Time out  0745    Total Treatment Time  - minutes     Evaluation Time includes thorough review of current medical information, gathering information on past medical history/social history and prior level of function, completion of standardized testing/informal observation of tasks, assessment of data and education on plan of care and goals.     CPT codes:  [x] Low Complexity PT evaluation 14488  [] Moderate Complexity PT evaluation 46183  [] High Complexity PT evaluation 70163  [] PT Re-evaluation 94898  [] Gait training 47960 - minutes  [] Manual therapy 64519 - minutes  [] Therapeutic activities 40840 - minutes  [] Therapeutic exercises 46685 - minutes  [] Neuromuscular reeducation 97901 - minutes     Niki Edmondson, PT, DPT  [id number]

## 2022-04-08 VITALS
SYSTOLIC BLOOD PRESSURE: 103 MMHG | OXYGEN SATURATION: 98 % | WEIGHT: 228.8 LBS | RESPIRATION RATE: 18 BRPM | DIASTOLIC BLOOD PRESSURE: 65 MMHG | HEART RATE: 60 BPM | BODY MASS INDEX: 32.75 KG/M2 | HEIGHT: 70 IN | TEMPERATURE: 98.7 F

## 2022-04-08 DIAGNOSIS — Z79.4 TYPE 2 DIABETES MELLITUS WITH HYPERGLYCEMIA, WITH LONG-TERM CURRENT USE OF INSULIN (HCC): ICD-10-CM

## 2022-04-08 DIAGNOSIS — E11.65 TYPE 2 DIABETES MELLITUS WITH HYPERGLYCEMIA, WITH LONG-TERM CURRENT USE OF INSULIN (HCC): Primary | ICD-10-CM

## 2022-04-08 DIAGNOSIS — Z79.4 TYPE 2 DIABETES MELLITUS WITH HYPERGLYCEMIA, WITH LONG-TERM CURRENT USE OF INSULIN (HCC): Primary | ICD-10-CM

## 2022-04-08 DIAGNOSIS — E11.65 TYPE 2 DIABETES MELLITUS WITH HYPERGLYCEMIA, WITH LONG-TERM CURRENT USE OF INSULIN (HCC): ICD-10-CM

## 2022-04-08 LAB — METER GLUCOSE: 219 MG/DL (ref 74–99)

## 2022-04-08 PROCEDURE — 6370000000 HC RX 637 (ALT 250 FOR IP): Performed by: INTERNAL MEDICINE

## 2022-04-08 PROCEDURE — 6360000002 HC RX W HCPCS: Performed by: INTERNAL MEDICINE

## 2022-04-08 PROCEDURE — 82962 GLUCOSE BLOOD TEST: CPT

## 2022-04-08 PROCEDURE — 2580000003 HC RX 258: Performed by: INTERNAL MEDICINE

## 2022-04-08 PROCEDURE — S5553 INSULIN LONG ACTING 5 U: HCPCS | Performed by: INTERNAL MEDICINE

## 2022-04-08 RX ORDER — FLASH GLUCOSE SENSOR
KIT MISCELLANEOUS
Qty: 3 EACH | Refills: 11 | Status: SHIPPED | OUTPATIENT
Start: 2022-04-08

## 2022-04-08 RX ORDER — INSULIN GLARGINE-YFGN 100 [IU]/ML
28 INJECTION, SOLUTION SUBCUTANEOUS 2 TIMES DAILY
Status: DISCONTINUED | OUTPATIENT
Start: 2022-04-08 | End: 2022-04-08 | Stop reason: HOSPADM

## 2022-04-08 RX ORDER — FLASH GLUCOSE SCANNING READER
EACH MISCELLANEOUS
Qty: 1 EACH | Refills: 0 | Status: SHIPPED | OUTPATIENT
Start: 2022-04-08

## 2022-04-08 RX ORDER — INSULIN GLARGINE-YFGN 100 [IU]/ML
28 INJECTION, SOLUTION SUBCUTANEOUS 2 TIMES DAILY
Qty: 10 ML | Refills: 2 | Status: SHIPPED | OUTPATIENT
Start: 2022-04-08 | End: 2022-05-08

## 2022-04-08 RX ORDER — TRIAMCINOLONE ACETONIDE 1 MG/G
CREAM TOPICAL
Qty: 45 G | Refills: 0 | Status: SHIPPED | OUTPATIENT
Start: 2022-04-08

## 2022-04-08 RX ORDER — BUMETANIDE 1 MG/1
1 TABLET ORAL 2 TIMES DAILY
Qty: 30 TABLET | Refills: 0 | Status: SHIPPED | OUTPATIENT
Start: 2022-04-08

## 2022-04-08 RX ORDER — PANTOPRAZOLE SODIUM 40 MG/1
40 TABLET, DELAYED RELEASE ORAL
Qty: 30 TABLET | Refills: 0 | Status: SHIPPED | OUTPATIENT
Start: 2022-04-09

## 2022-04-08 RX ORDER — OXYCODONE HYDROCHLORIDE 5 MG/1
5 TABLET ORAL EVERY 6 HOURS PRN
Qty: 12 TABLET | Refills: 0 | Status: SHIPPED | OUTPATIENT
Start: 2022-04-08 | End: 2022-04-11

## 2022-04-08 RX ADMIN — ENOXAPARIN SODIUM 30 MG: 100 INJECTION SUBCUTANEOUS at 09:16

## 2022-04-08 RX ADMIN — TRIAMCINOLONE ACETONIDE: 1 CREAM TOPICAL at 09:19

## 2022-04-08 RX ADMIN — POTASSIUM CHLORIDE 40 MEQ: 20 TABLET, EXTENDED RELEASE ORAL at 09:15

## 2022-04-08 RX ADMIN — PIPERACILLIN AND TAZOBACTAM 3375 MG: 3; .375 INJECTION, POWDER, LYOPHILIZED, FOR SOLUTION INTRAVENOUS at 03:16

## 2022-04-08 RX ADMIN — INSULIN LISPRO 15 UNITS: 100 INJECTION, SOLUTION INTRAVENOUS; SUBCUTANEOUS at 08:30

## 2022-04-08 RX ADMIN — PANTOPRAZOLE SODIUM 40 MG: 40 TABLET, DELAYED RELEASE ORAL at 06:06

## 2022-04-08 RX ADMIN — SODIUM CHLORIDE, PRESERVATIVE FREE 10 ML: 5 INJECTION INTRAVENOUS at 09:16

## 2022-04-08 RX ADMIN — SACUBITRIL AND VALSARTAN 1 TABLET: 97; 103 TABLET, FILM COATED ORAL at 09:15

## 2022-04-08 RX ADMIN — INSULIN GLARGINE-YFGN 28 UNITS: 100 INJECTION, SOLUTION SUBCUTANEOUS at 08:30

## 2022-04-08 RX ADMIN — BUMETANIDE 1 MG: 1 TABLET ORAL at 09:16

## 2022-04-08 RX ADMIN — INSULIN LISPRO 6 UNITS: 100 INJECTION, SOLUTION INTRAVENOUS; SUBCUTANEOUS at 08:30

## 2022-04-08 RX ADMIN — HYDROMORPHONE HYDROCHLORIDE 0.5 MG: 1 INJECTION, SOLUTION INTRAMUSCULAR; INTRAVENOUS; SUBCUTANEOUS at 03:11

## 2022-04-08 ASSESSMENT — PAIN SCALES - GENERAL: PAINLEVEL_OUTOF10: 10

## 2022-04-08 NOTE — TELEPHONE ENCOUNTER
The prescriptions were bent to [facility]. His insurance requires it to be sent to a local pharmacy.

## 2022-04-08 NOTE — PROGRESS NOTES
Notified Tomasa Egan via perfect serve of Kale GLASER early this AM, dr oswald states \"continue to monitor. \" Strip on chart

## 2022-04-08 NOTE — PROGRESS NOTES
CLINICAL PHARMACY NOTE: MEDS TO BEDS    Total # of Prescriptions Filled: 5   The following medications were delivered to the patient:  · Pantoprazole 40mg  · Triamcinolone 0.1% cream  · Insulin syringes   · Bumetanide 1mg  · lantus 1000 unit/ml soln    Additional Documentation:    Delivered to [facility], RN 4/8 11:15

## 2022-04-08 NOTE — DISCHARGE SUMMARY
Hospitalist Discharge Summary    Patient ID: Rod Kitchen   Patient : 1985  Patient's PCP: Arnulfo Cueva MD    Admit Date: 2022   Admitting Physician: Charliene Bosworth, MD    Discharge Date:  2022  Discharge Physician: Charliene Bosworth, MD   Discharge Condition: Stable  Discharge Disposition: [facility] [id number] [facility] in brief:  Patient, 41-year-old male with past medical history of insulin-dependent diabetes mellitus, uncontrolled, recurrent perirectal abscess, s/p I&D February 10, 2022, s/p recent antibiotic course, hypertension, nonischemic cardiomyopathy/chronic systolic CHF, EF 27%, hyperlipidemia, came to the emergency room complaining of severe perirectal pain since past 2 days.  Reports associated fever/chills/nausea.    Upon arrival in the emergency room, patient was noted to be hypotensive, not responded to IV fluids, started on pressor support.  Also noted to be in DKA, started on insulin drip.  Started on empiric IV antibiotics.  Seen by general surgery who did bedside I&D. Seen by ID. Initially placed on IV antibiotics, subsequently changed to p.o. antibiotics. Also seen by endocrinology. Insulin regimen adjusted. Advise close follow-up with endocrinology as outpatient. Patient is currently being discharged in stable condition.     Discharge diagnosis     Septic shock likely secondary to perirectal abscess-resolved  Uncontrolled insulin-dependent diabetes mellitus with DKA-resolved  Nonischemic cardiomyopathy/chronic systolic CHF-appears compensated  Hyperlipidemia  Hypertension  Consults:   IP CONSULT TO GENERAL SURGERY  IP CONSULT TO INTERNAL MEDICINE  IP CONSULT TO INFECTIOUS DISEASES  PHARMACY TO DOSE VANCOMYCIN  IP CONSULT TO CRITICAL CARE  IP CONSULT TO DIABETES EDUCATOR  IP CONSULT TO ENDOCRINOLOGY      Discharge Instructions / Follow up:    Continued appropriate risk factor modification of blood pressure, diabetes and serum lipids will remain essential to reducing risk of future atherosclerotic development    Activity: activity as tolerated    Physical exam    General appearance: No apparent distress, appears stated age and cooperative. Respiratory: Bilateral air entry fair.  No obvious wheezing or crackles. Cardiovascular: S1-S2, RRR. Abdomen: Soft, nontender, nondistended, bowel sounds present  Musculoskeletal: No peripheral edema. Neurologic: AAOx3.  No gross focal neurological deficits. Significant labs:  CBC:   Recent Labs     04/06/22 0419 04/07/22  1158   WBC 9.3 7.3   RBC 3.37* 3.78*   HGB 11.0* 12.2*   HCT 32.7* 36.6*   MCV 97.0 96.8   RDW 13.2 12.9    388     BMP:   Recent Labs     04/05/22 2023 04/06/22  0419 04/07/22  1158    133 139   K 4.8 3.7 3.8   CL 97* 99 101   CO2 24 25 27   BUN 12 10 10   CREATININE 0.8 0.7 0.7   MG  --  2.2  --    PHOS  --  2.4*  --      LFT:  No results for input(s): PROT, ALB, ALKPHOS, ALT, AST, BILITOT, AMYLASE, LIPASE in the last 72 hours. PT/INR: No results for input(s): INR, APTT in the last 72 hours. BNP: No results for input(s): BNP in the last 72 hours.   Hgb A1C:   Lab Results   Component Value Date    LABA1C 13.7 (H) 04/05/2022     Folate and B12:   Lab Results   Component Value Date    RLBSMRVK87 906 11/10/2021   ,   Lab Results   Component Value Date    FOLATE 18.8 11/10/2021     Thyroid Studies:   Lab Results   Component Value Date    TSH 1.240 09/17/2021       Urinalysis:    Lab Results   Component Value Date    NITRU Negative 04/04/2022    WBCUA NONE 04/04/2022    BACTERIA NONE SEEN 04/04/2022    RBCUA 0-1 04/04/2022    BLOODU MODERATE 04/04/2022    SPECGRAV 1.010 04/04/2022    GLUCOSEU >=1000 04/04/2022       Imaging:  CT ABDOMEN PELVIS W IV CONTRAST Additional Contrast? None    Result Date: 4/5/2022  EXAMINATION: CT OF THE ABDOMEN AND PELVIS WITH CONTRAST 4/5/2022 1:35 am TECHNIQUE: CT of the abdomen and pelvis was performed with the administration of intravenous contrast. Multiplanar reformatted images are provided for review. Dose modulation, iterative reconstruction, and/or weight based adjustment of the mA/kV was utilized to reduce the radiation dose to as low as reasonably achievable. COMPARISON: 02/10/2022 HISTORY: ORDERING SYSTEM PROVIDED HISTORY: Buttock, Abscess where pervious surgical area was TECHNOLOGIST PROVIDED HISTORY: Reason for exam:->Buttock, Abscess where pervious surgical area was Additional Contrast?->None Decision Support Exception - unselect if not a suspected or confirmed emergency medical condition->Emergency Medical Condition (MA) What reading provider will be dictating this exam?->CRC FINDINGS: Gallbladder is unremarkable. Low-attenuation in the central liver is favored to represent focal fatty infiltration, similar to previous. Other etiologies including small cyst in this region thought less likely. Spleen is normal in size. Pancreas is unremarkable. No adrenal mass. No hydronephrosis. Scattered vascular calcifications. Assessment of bowel is limited without oral contrast.  No bowel obstruction. Appendix is normal.  Moderate diffuse thickening of the rectum and anorectal junction with some presacral soft tissue thickening and fat stranding. These findings are new/increased from previous. There also several bilateral pelvic lymph nodes, more numerous than often seen and some are borderline prominent. These are favored to be reactive. Prostate is normal in size. Urinary bladder is largely decompressed but significantly thickened. Within the perineum, there are areas of soft tissue thickening and fat stranding asymmetric to the left with ill-defined soft tissue thickening about the gluteal fold also asymmetric to the left. These findings may represent postoperative change and ill-defined phlegmon. Developing abscesses are not excluded but no definite measurable fluid collection identified. No soft tissue gas.   Questionable small bilateral hydroceles. Lung bases are clear. Degenerative changes are scattered in the spine. Areas of soft tissue thickening and fat stranding in the perineum with more localized soft tissue thickening along the left greater than right side of the gluteal cleft. Findings may be represent postoperative changes and reactive edema but areas of ill-defined phlegmon and cellulitis are not excluded. No distinct drainable abscess. Short-term follow-up recommended if symptoms persist. Thickening of the rectum and anorectal junction with some presacral soft tissue thickening. Findings are concerning for infectious/inflammatory proctitis. Neoplasia unlikely but should be followed. Mildly prominent pelvic lymph nodes likely reactive but could also be followed. Urinary bladder wall thickening which could be partially due to underdistention but cystitis not excluded. Probable small bilateral hydroceles. RECOMMENDATIONS: Unavailable     XR CHEST PORTABLE    Result Date: 4/5/2022  EXAMINATION: ONE XRAY VIEW OF THE CHEST 4/5/2022 7:48 am COMPARISON: 02/11/2022 HISTORY: ORDERING SYSTEM PROVIDED HISTORY: Central line placed TECHNOLOGIST PROVIDED HISTORY: Reason for exam:->Central line placed What reading provider will be dictating this exam?->CRC FINDINGS: EKG leads overlie the chest.  Right IJ catheter terminates in the mid superior vena cava. Heart size is normal.  No pneumothorax. No definite localized consolidation or effusion. No pneumothorax following line placement.        Discharge Medications:      Medication List      START taking these medications    cefdinir 300 MG capsule  Commonly known as: OMNICEF  Take 1 capsule by mouth 2 times daily for 10 days     insulin glargine-yfgn 100 UNIT/ML injection vial  Commonly known as: SEMGLEE-YFGN  Inject 28 Units into the skin 2 times daily     metroNIDAZOLE 500 MG tablet  Commonly known as: FLAGYL  Take 1 tablet by mouth 3 times daily for 10 days     oxyCODONE 5 MG immediate release tablet  Commonly known as: ROXICODONE  Take 1 tablet by mouth every 6 hours as needed for Pain for up to 3 days. pantoprazole 40 MG tablet  Commonly known as: PROTONIX  Take 1 tablet by mouth every morning (before breakfast)  Start taking on: April 9, 2022     triamcinolone 0.1 % cream  Commonly known as: KENALOG  Apply topically 2 times daily. CHANGE how you take these medications    bumetanide 1 MG tablet  Commonly known as: BUMEX  Take 1 tablet by mouth 2 times daily  What changed:   · medication strength  · how much to take  · when to take this  · additional instructions        CONTINUE taking these medications    atorvastatin 20 MG tablet  Commonly known as: LIPITOR  Take 1 tablet by mouth daily     Folinic-Plus 4-50-2 MG Tabs  Generic drug: Folinic Acid-Vit B6-Vit B12  Take 4-50 mg by mouth 2 times daily     insulin lispro (1 Unit Dial) 100 UNIT/ML Sopn  Commonly known as: HumaLOG KwikPen  Inject 15 units with meals + following sliding scale. -200 add 3U, -250 add 6U, -300 add 9U, -350 add 12U, -400 add 15U, BS over 400 add 18U.  MAX 75U/day     melatonin 3 MG Tabs tablet  Commonly known as: RA Melatonin  Take 1 tablet by mouth nightly as needed (sleep)     metoprolol succinate 100 MG extended release tablet  Commonly known as: TOPROL XL  1 tab twice daily     potassium chloride 20 MEQ extended release tablet  Commonly known as: KLOR-CON M  Take 2 tablets by mouth daily Pt only taking one tablet daily unless he takes Bumex BID then he takes 2 tabs     sacubitril-valsartan  MG per tablet  Commonly known as: ENTRESTO  Take 1 tablet by mouth 2 times daily     vitamin D 1.25 MG (65198 UT) Caps capsule  Commonly known as: ERGOCALCIFEROL  Take 1 capsule by mouth once a week        STOP taking these medications    Lantus SoloStar 100 UNIT/ML injection pen  Generic drug: insulin glargine        ASK your doctor about these medications    FreeStyle Adin 2 [place] [address]  1 Device by Does not apply route once for 1 dose  Ask about: Should I take this medication? Where to Get Your Medications      These medications were sent to [facility] [address]    Phone: [phone]   · bumetanide 1 MG tablet  · insulin glargine-yfgn 100 UNIT/ML injection vial  · pantoprazole 40 MG tablet  · triamcinolone 0.1 % cream     You can get these medications from any pharmacy    Bring a paper prescription for each of these medications  · cefdinir 300 MG capsule  · FreeStyle Adin 2 [place] [address]  · metroNIDAZOLE 500 MG tablet  · oxyCODONE 5 MG immediate release tablet         Time Spent on discharge is more than 35 minutes in the examination, evaluation, counseling and review of medications and discharge plan.    +++++++++++++++++++++++++++++++++++++++++++++++++  MD PRINCESS Young/ Diogenes Ortiz [address] [place] [address]  +++++++++++++++++++++++++++++++++++++++++++++++++  NOTE: This report was transcribed using voice recognition software. Every effort was made to ensure accuracy; however, inadvertent computerized transcription errors may be present.

## 2022-04-10 LAB
BLOOD CULTURE, ROUTINE: NORMAL
CULTURE, BLOOD 2: NORMAL
GRAM STAIN RESULT: ABNORMAL
ORGANISM: ABNORMAL
ORGANISM: ABNORMAL
WOUND/ABSCESS: ABNORMAL
WOUND/ABSCESS: ABNORMAL

## 2022-04-11 ENCOUNTER — CARE COORDINATION (OUTPATIENT)
Dept: CASE MANAGEMENT | Age: 37
End: 2022-04-11

## 2022-04-11 ENCOUNTER — TELEPHONE (OUTPATIENT)
Dept: ENDOCRINOLOGY | Age: 37
End: 2022-04-11

## 2022-04-11 DIAGNOSIS — A41.9 SEPSIS, DUE TO UNSPECIFIED ORGANISM, UNSPECIFIED WHETHER ACUTE ORGAN DYSFUNCTION PRESENT (HCC): Primary | ICD-10-CM

## 2022-04-11 RX ORDER — ATORVASTATIN CALCIUM 20 MG/1
20 TABLET, FILM COATED ORAL DAILY
Qty: 90 TABLET | Refills: 1 | Status: SHIPPED | OUTPATIENT
Start: 2022-04-11

## 2022-04-11 NOTE — CARE COORDINATION
[facility] Transitions Initial Follow Up Call    Call within 2 business days of discharge: Yes    Patient: Vikki Alford Patient : 1985   MRN: [id number]  Reason for Admission: sepsis  Discharge Date: 22 RARS: Readmission Risk Score: 21.4 ( )      Last Discharge [facility]       Complaint Diagnosis Description Type Department Provider    22 Abscess Perirectal abscess . .. ED to Hosp-Admission (Discharged) (ADMITTED) Charan Willis MD; Pheobe Flakes. .. Did your nurse/physician explain or give you educational material on sepsis while you were in the hospital?  No    Can you tell me why you are at risk for getting sepsis again? No    What signs and symptoms would you report to your physician? No    Would like to speak with our sepsis education nurse? Yes    Transitions of Care Initial Call        Challenges to be reviewed by the provider   Additional needs identified to be addressed with provider: Yes  medications-refill on lipitor  missed appointment today             Method of communication with provider : chart routing      Advance Care Planning:   Does patient have an Advance Directive: decision maker updated. Was this a readmission? No  Patients top risk factors for readmission: medical condition-sepsis, DM, CHF  polypharmacy    Care Transition Nurse (CTN) contacted the patient by telephone to perform post hospital discharge assessment. Verified name and  with patient as identifiers. Provided introduction to self, and explanation of the CTN role. CTN reviewed discharge instructions, medical action plan and red flags with patient who verbalized understanding. Patient given an opportunity to ask questions and does not have any further questions or concerns at this time. Were discharge instructions available to patient? Yes.  Reviewed appropriate site of care based on symptoms and resources available to patient including: PCP  Specialist. The patient agrees to contact the PCP office for questions related to their healthcare. Medication reconciliation was performed with patient, who verbalizes understanding of administration of home medications. 1111F entered. Reviewed and educated patient on any new and changed medications related to discharge diagnosis. Was patient discharged with a pulse oximeter? No      Non-face-to-face services provided:  Scheduled appointment with PCP-patient states he was unaware of PCP visit today and will call office for explanation and to reschedule. CTN will route to PCP to inform and also to make PCP aware that patient needs refill on lipitor  Scheduled appointment with Stephenie Chilel endocrinology, patient to call for general surgery appointment  Obtained and reviewed discharge summary and/or continuity of care documents    -Spoke with patient for sepsis/care transition call post hospital discharge.   -Patient reports he is \"feeling okay\" and discomfort to shraddha rectal region is greatly decreased from when admitted to the hospital.  Patient taking antibiotics as prescribed, CTN advised to complete full course of therapy and also encouraged patient to take pain medication prescribed if needed. Patient states applying ABD dressing to area without difficulty and voicing no concerns.  -Patient unable to provide any blood sugar readings, will be obtaining [employer] system, aware of below endocrinology visit.  -Patient denies any needs, questions, or concerns at this time. -CTN to sign off.       Follow Up  Future Appointments   Date Time Provider Rose Kelley   4/19/2022 11:30 AM CRIS Monahan - NP [facility] ENDO [facility]   5/17/2022 10:00 AM SEB US RM 2 SEB[facility] US [facility] Radiolog   6/6/2022 10:15 AM Chacorta Rea MD [facility] ENDO [facility]       Consuelo Naik RN

## 2022-04-12 NOTE — PROGRESS NOTES
Physician Progress Note      PATIENT:               Jackie Doan  CSN #:                  [id number]  :                       1985  ADMIT DATE:       2022 1:13 AM  DISCH DATE:        2022 11:44 AM  RESPONDING  PROVIDER #:        Екатерина Valladares MD          QUERY TEXT:    Dear Dr. Roosevelt Hubbard,    Patient admitted with abscess of buttock. Per Op note dated 22   documentation of I&D. To accurately reflect the procedure performed please further specify the depth   of tissue incised and drained: The medical record reflects the following:  Risk Factors: IDDM  Clinical Indicators: Procedure note states: \"An incision was then made on the   left buttock over the greatest area of fluctuance and approximately 10 cc of   purulent material was expressed. Loculations were broken up by finger   dissection. Another cruciate incision was then made on the right buttock and   more purulent fluid was expressed. Both cavities were then thoroughly   irrigated with sterile saline and packed with iodoform gauze\"  Treatment: I&D with packing of wound    Thank you,  Rehan FAY, [email or web address]  Cell Phone: [phone]  Options provided:  -- Skin only  -- Subcutaneous tissue  -- Other - I will add my own diagnosis  -- Disagree - Not applicable / Not valid  -- Disagree - Clinically unable to determine / Unknown  -- Refer to Clinical Documentation Reviewer    PROVIDER RESPONSE TEXT:    Addendum to 22 procedure note The depth of the drainage to perirectal area   was down to and including subcutaneous tissue.     Query created by: Adalgisa Rosario on 2022 2:14 PM      Electronically signed by:  Екатерина Valladares MD 2022 1:31 PM

## 2022-04-14 NOTE — TELEPHONE ENCOUNTER
Called patient per Care Transitional Nurse for patient's request a call from Sepsis Coordinator regarding Sepsis. Did your nurse/physician explain or give you educational material on sepsis while you were in the hospital?  Yes, Patient states someone in the hospital mentioned sepsis to him, but no Sepsis Booklet was given to him. Sepsis Coordinator will mail Sepsis Booklet to patient's address per patient request.    Can you tell me why you are at risk for getting sepsis again? Yes, after Sepsis Coordinator reviewed patient's risk factors with patient, patient was able to state Diabetes, Buttock Wound, history of abscess stated by patient and CHF. What signs and symptoms would you report to your physician? Yes    Would like to speak with our sepsis education nurse? Per [facility], Sepsis Coordinator called upon patient's request.  Sepsis Coordinator spoke to patient about Sepsis for early identification risk factors, prevention measures and early treatment if Sepsis is suspected. Patient verbalized understanding. Verbalized understanding via teach back. patient

## 2022-04-15 LAB
Lab: NORMAL
REPORT: NORMAL
THIS TEST SENT TO: NORMAL

## 2022-04-18 ENCOUNTER — TELEPHONE (OUTPATIENT)
Dept: PRIMARY CARE CLINIC | Age: 37
End: 2022-04-18

## 2022-09-01 ENCOUNTER — APPOINTMENT (OUTPATIENT)
Dept: CT IMAGING | Age: 37
End: 2022-09-01
Payer: COMMERCIAL

## 2022-09-01 ENCOUNTER — HOSPITAL ENCOUNTER (EMERGENCY)
Age: 37
Discharge: HOME | End: 2022-09-01
Payer: COMMERCIAL

## 2022-09-01 VITALS
HEART RATE: 95 BPM | OXYGEN SATURATION: 98 % | DIASTOLIC BLOOD PRESSURE: 86 MMHG | TEMPERATURE: 97.4 F | RESPIRATION RATE: 18 BRPM | SYSTOLIC BLOOD PRESSURE: 139 MMHG

## 2022-09-01 DIAGNOSIS — K04.7 DENTAL ABSCESS: Primary | ICD-10-CM

## 2022-09-01 LAB
ALBUMIN SERPL-MCNC: 4.6 G/DL (ref 3.5–5.2)
ALP BLD-CCNC: 91 U/L (ref 40–129)
ALT SERPL-CCNC: 11 U/L (ref 0–40)
ANION GAP SERPL CALCULATED.3IONS-SCNC: 18 MMOL/L (ref 7–16)
AST SERPL-CCNC: 14 U/L (ref 0–39)
BASOPHILS ABSOLUTE: 0.07 E9/L (ref 0–0.2)
BASOPHILS RELATIVE PERCENT: 0.4 % (ref 0–2)
BILIRUB SERPL-MCNC: 1.4 MG/DL (ref 0–1.2)
BUN BLDV-MCNC: 10 MG/DL (ref 6–20)
CALCIUM SERPL-MCNC: 9.5 MG/DL (ref 8.6–10.2)
CHLORIDE BLD-SCNC: 100 MMOL/L (ref 98–107)
CO2: 22 MMOL/L (ref 22–29)
CREAT SERPL-MCNC: 1.2 MG/DL (ref 0.7–1.2)
EOSINOPHILS ABSOLUTE: 0.11 E9/L (ref 0.05–0.5)
EOSINOPHILS RELATIVE PERCENT: 0.6 % (ref 0–6)
GFR AFRICAN AMERICAN: >60
GFR NON-AFRICAN AMERICAN: >60 ML/MIN/1.73
GLUCOSE BLD-MCNC: 197 MG/DL (ref 74–99)
HCT VFR BLD CALC: 41.8 % (ref 37–54)
HEMOGLOBIN: 15.1 G/DL (ref 12.5–16.5)
IMMATURE GRANULOCYTES #: 0.09 E9/L
IMMATURE GRANULOCYTES %: 0.5 % (ref 0–5)
LACTIC ACID: 1.5 MMOL/L (ref 0.5–2.2)
LACTIC ACID: 2.4 MMOL/L (ref 0.5–2.2)
LYMPHOCYTES ABSOLUTE: 3.82 E9/L (ref 1.5–4)
LYMPHOCYTES RELATIVE PERCENT: 19.4 % (ref 20–42)
MCH RBC QN AUTO: 33.1 PG (ref 26–35)
MCHC RBC AUTO-ENTMCNC: 36.1 % (ref 32–34.5)
MCV RBC AUTO: 91.7 FL (ref 80–99.9)
MONOCYTES ABSOLUTE: 0.99 E9/L (ref 0.1–0.95)
MONOCYTES RELATIVE PERCENT: 5 % (ref 2–12)
NEUTROPHILS ABSOLUTE: 14.58 E9/L (ref 1.8–7.3)
NEUTROPHILS RELATIVE PERCENT: 74.1 % (ref 43–80)
PDW BLD-RTO: 13 FL (ref 11.5–15)
PLATELET # BLD: 391 E9/L (ref 130–450)
PMV BLD AUTO: 9.8 FL (ref 7–12)
POTASSIUM SERPL-SCNC: 3.6 MMOL/L (ref 3.5–5)
RBC # BLD: 4.56 E12/L (ref 3.8–5.8)
SODIUM BLD-SCNC: 140 MMOL/L (ref 132–146)
TOTAL PROTEIN: 8.5 G/DL (ref 6.4–8.3)
WBC # BLD: 19.7 E9/L (ref 4.5–11.5)

## 2022-09-01 PROCEDURE — 36415 COLL VENOUS BLD VENIPUNCTURE: CPT

## 2022-09-01 PROCEDURE — 99285 EMERGENCY DEPT VISIT HI MDM: CPT

## 2022-09-01 PROCEDURE — 87040 BLOOD CULTURE FOR BACTERIA: CPT

## 2022-09-01 PROCEDURE — 70487 CT MAXILLOFACIAL W/DYE: CPT

## 2022-09-01 PROCEDURE — 6360000002 HC RX W HCPCS: Performed by: PHYSICIAN ASSISTANT

## 2022-09-01 PROCEDURE — 83605 ASSAY OF LACTIC ACID: CPT

## 2022-09-01 PROCEDURE — 96365 THER/PROPH/DIAG IV INF INIT: CPT

## 2022-09-01 PROCEDURE — 80053 COMPREHEN METABOLIC PANEL: CPT

## 2022-09-01 PROCEDURE — 96375 TX/PRO/DX INJ NEW DRUG ADDON: CPT

## 2022-09-01 PROCEDURE — 2580000003 HC RX 258: Performed by: PHYSICIAN ASSISTANT

## 2022-09-01 PROCEDURE — 6360000004 HC RX CONTRAST MEDICATION: Performed by: RADIOLOGY

## 2022-09-01 PROCEDURE — 85025 COMPLETE CBC W/AUTO DIFF WBC: CPT

## 2022-09-01 RX ORDER — 0.9 % SODIUM CHLORIDE 0.9 %
1000 INTRAVENOUS SOLUTION INTRAVENOUS ONCE
Status: COMPLETED | OUTPATIENT
Start: 2022-09-01 | End: 2022-09-01

## 2022-09-01 RX ORDER — IBUPROFEN 800 MG/1
800 TABLET ORAL EVERY 8 HOURS PRN
Qty: 21 TABLET | Refills: 0 | Status: SHIPPED | OUTPATIENT
Start: 2022-09-01 | End: 2022-09-08

## 2022-09-01 RX ORDER — AMOXICILLIN AND CLAVULANATE POTASSIUM 500; 125 MG/1; MG/1
1 TABLET, FILM COATED ORAL 3 TIMES DAILY
Qty: 30 TABLET | Refills: 0 | Status: SHIPPED | OUTPATIENT
Start: 2022-09-01 | End: 2022-09-11

## 2022-09-01 RX ORDER — KETOROLAC TROMETHAMINE 30 MG/ML
30 INJECTION, SOLUTION INTRAMUSCULAR; INTRAVENOUS ONCE
Status: COMPLETED | OUTPATIENT
Start: 2022-09-01 | End: 2022-09-01

## 2022-09-01 RX ORDER — HYDROCODONE BITARTRATE AND ACETAMINOPHEN 5; 325 MG/1; MG/1
1 TABLET ORAL EVERY 6 HOURS PRN
Qty: 6 TABLET | Refills: 0 | Status: SHIPPED | OUTPATIENT
Start: 2022-09-01 | End: 2022-09-04

## 2022-09-01 RX ADMIN — SODIUM CHLORIDE 3000 MG: 900 INJECTION INTRAVENOUS at 17:38

## 2022-09-01 RX ADMIN — IOPAMIDOL 60 ML: 755 INJECTION, SOLUTION INTRAVENOUS at 16:33

## 2022-09-01 RX ADMIN — SODIUM CHLORIDE 1000 ML: 9 INJECTION, SOLUTION INTRAVENOUS at 17:20

## 2022-09-01 RX ADMIN — KETOROLAC TROMETHAMINE 30 MG: 30 INJECTION, SOLUTION INTRAMUSCULAR at 17:38

## 2022-09-01 ASSESSMENT — PAIN SCALES - GENERAL: PAINLEVEL_OUTOF10: 8

## 2022-09-01 NOTE — ED PROVIDER NOTES
Independent MLP    Independent MLP   HPI:  9/1/22, Time: 3:54 PM EDT         Josh Mcgarry is a 43 y.o. male presenting to the ED for dental pain left Jaw swelling , beginning couple months ago worse last 3 Days ago. The complaint has been persistent, moderate in severity, and worsened by chewing . Patient comes in with complaint of left jaw pain dental pain that started several months ago. He is having increasing swelling to the left jaw over the last 3 days. He states he has had fever chills. Having some trouble mouth and some difficulty with swallowing. She has history of diabetes. Review of Systems:   A complete review of systems was performed and pertinent positives and negatives are stated within HPI, all other systems reviewed and are negative.          --------------------------------------------- PAST HISTORY ---------------------------------------------  Past Medical History:  has a past medical history of Buttock wound, left, initial encounter, Buttock wound, left, subsequent encounter, CAD (coronary artery disease), CHF (congestive heart failure) ([facility] [address] Diabetes mellitus ([facility] [address] Draining postoperative wound, Hyperlipidemia, Hyperosmolar hyperglycemic state (HHS) ([facility] [address] and Hypertension. Past Surgical History:  has a past surgical history that includes [place] tooth extraction; Abscess Drainage; ECHO Compl W Dop Color Flow (5/5/2015); Abdomen surgery (N/A, 9/18/2021); Insert Picc Cath, 5/> Yrs (10/20/2021); and Leg Surgery (Left, 2/10/2022). Social History:  reports that he has been smoking cigars. He started smoking about 22 years ago. He has never used smokeless tobacco. He reports current alcohol use. He reports current drug use. Drug: Marijuana Darryle Pimple). Family History: family history includes Heart Attack in his paternal grandfather; Heart Disease in his father and mother; High Blood Pressure in his father, mother, and paternal grandmother.      The patients home medications have been reviewed. Allergies: Patient has no known allergies.     -------------------------------------------------- RESULTS -------------------------------------------------  All laboratory and radiology results have been personally reviewed by myself   LABS:  Results for orders placed or performed during the hospital encounter of 09/01/22   CBC with Auto Differential   Result Value Ref Range    WBC 19.7 (H) 4.5 - 11.5 E9/L    RBC 4.56 3.80 - 5.80 E12/L    Hemoglobin 15.1 12.5 - 16.5 g/dL    Hematocrit 41.8 37.0 - 54.0 %    MCV 91.7 80.0 - 99.9 fL    MCH 33.1 26.0 - 35.0 pg    MCHC 36.1 (H) 32.0 - 34.5 %    RDW 13.0 11.5 - 15.0 fL    Platelets 989 558 - 670 E9/L    MPV 9.8 7.0 - 12.0 fL    Neutrophils % 74.1 43.0 - 80.0 %    Immature Granulocytes % 0.5 0.0 - 5.0 %    Lymphocytes % 19.4 (L) 20.0 - 42.0 %    Monocytes % 5.0 2.0 - 12.0 %    Eosinophils % 0.6 0.0 - 6.0 %    Basophils % 0.4 0.0 - 2.0 %    Neutrophils Absolute 14.58 (H) 1.80 - 7.30 E9/L    Immature Granulocytes # 0.09 E9/L    Lymphocytes Absolute 3.82 1.50 - 4.00 E9/L    Monocytes Absolute 0.99 (H) 0.10 - 0.95 E9/L    Eosinophils Absolute 0.11 0.05 - 0.50 E9/L    Basophils Absolute 0.07 0.00 - 0.20 E9/L   Comprehensive Metabolic Panel   Result Value Ref Range    Sodium 140 132 - 146 mmol/L    Potassium 3.6 3.5 - 5.0 mmol/L    Chloride 100 98 - 107 mmol/L    CO2 22 22 - 29 mmol/L    Anion Gap 18 (H) 7 - 16 mmol/L    Glucose 197 (H) 74 - 99 mg/dL    BUN 10 6 - 20 mg/dL    Creatinine 1.2 0.7 - 1.2 mg/dL    GFR Non-African American >60 >=60 mL/min/1.73    GFR African American >60     Calcium 9.5 8.6 - 10.2 mg/dL    Total Protein 8.5 (H) 6.4 - 8.3 g/dL    Albumin 4.6 3.5 - 5.2 g/dL    Total Bilirubin 1.4 (H) 0.0 - 1.2 mg/dL    Alkaline Phosphatase 91 40 - 129 U/L    ALT 11 0 - 40 U/L    AST 14 0 - 39 U/L   Lactic Acid   Result Value Ref Range    Lactic Acid 2.4 (H) 0.5 - 2.2 mmol/L   Lactic Acid   Result Value Ref Range    Lactic Acid 1.5 0.5 - 2.2 mmol/L RADIOLOGY:  Interpreted by RadiologistGaurav Lerner 75   Final Result   1. Left facial soft tissue inflammation present with a questionable   underlying left buccal subcentimeter abscess versus phlegmon adjacent the   left lower 1st molar (associated dental cavity and periapical lucency). 2. Left cervical lymphadenopathy, likely reactive in nature.             ------------------------- NURSING NOTES AND VITALS REVIEWED ---------------------------   The nursing notes within the ED encounter and vital signs as below have been reviewed. /86   Pulse 95   Temp 97.4 °F (36.3 °C)   Resp 18   SpO2 98%   Oxygen Saturation Interpretation: Normal      ---------------------------------------------------PHYSICAL EXAM--------------------------------------      Constitutional/General: Alert and oriented x3,   Head: Normocephalic and atraumatic  Eyes: PERRL, EOMI  Mouth: Oropharynx clear, handling secretions, trismus present there is tenderness tooth number 18  Left lower jaw swelling with left-sided submandibular anterior cervical adenopathy  Neck: Supple, full ROM,   Pulmonary: Lungs clear to auscultation bilaterally, no wheezes, rales, or rhonchi. Not in respiratory distress  Cardiovascular:  Regular rate and rhythm, no murmurs, gallops, or rubs. 2+ distal pulses  Abdomen: Soft, non tender, non distended,   Extremities: Moves all extremities x 4.  Warm and well perfused  Skin: warm and dry without rash  Neurologic: GCS 15,  Psych: Normal Affect      ------------------------------ ED COURSE/MEDICAL DECISION MAKING----------------------  Medications   ampicillin-sulbactam (UNASYN) 3000 mg in 100 mL NS IVPB minibag (0 mg IntraVENous Stopped 9/1/22 1812)   0.9 % sodium chloride bolus (0 mLs IntraVENous Stopped 9/1/22 1845)   ketorolac (TORADOL) injection 30 mg (30 mg IntraVENous Given 9/1/22 1738)   iopamidol (ISOVUE-370) 76 % injection 60 mL (60 mLs IntraVENous Given 9/1/22 1633)         ED COURSE:  17 15 patient updated no further complaints at this time     1835 discussed with dental resident he will be in to evaluate patient here in the ER. Patient was updated    Medical Decision Making:    Patient had chronic dental pain that has been worse over the last 3 days with pain in the left lower jaw with swelling. Area was I&D by dental resident. He was given a dose of Unasyn. Discharged with Augmentin Motrin and Norco.  He is to follow-up with the dental clinic on Tuesday. Return to the ER any worsening symptoms    Counseling: The emergency provider has spoken with the patient and discussed todays results, in addition to providing specific details for the plan of care and counseling regarding the diagnosis and prognosis. Questions are answered at this time and they are agreeable with the plan.      --------------------------------- IMPRESSION AND DISPOSITION ---------------------------------    IMPRESSION  1. Dental abscess        DISPOSITION  Disposition: Discharge to home  Patient condition is good      NOTE: This report was transcribed using voice recognition software.  Every effort was made to ensure accuracy; however, inadvertent computerized transcription errors may be present      Scott Wright  09/01/22 1956

## 2022-09-01 NOTE — ED NOTES
Department of Emergency Medicine  FIRST PROVIDER TRIAGE NOTE             Independent MLP           9/1/22  2:30 PM EDT    Date of Encounter: 9/1/22   MRN: [id number]      HPI: Jane Horton is a 43 y.o. male who presents to the ED for Dental Pain (Left side dental pain and swelling )     Patient is a 58-year-old that states for the last 2 months has had bad teeth and noticed over the last week worsening dental pain and swelling. Patient states the pain was so bad yesterday he had to drink to take the pain away. Patient states he called his dad to try to get in some antibiotics but they are out of town. Patient is having large amount of left-sided facial    ROS: Negative for cp, sob, abd pain, back pain, cough, vomiting, or diarrhea. PE: Gen Appearance/Constitutional: alert  HEENT: NC/NT. PERRLA,  Airway patent. Neck: supple     Initial Plan of Care: All treatment areas with department are currently occupied. Plan to order/Initiate the following while awaiting opening in ED: labs and imaging studies.   Initiate Treatment-Testing, Proceed toTreatment Area When Bed Available for ED Attending/MLP to Continue Care    Electronically signed by Kinsey Yee PA-C   DD: 9/1/22       Kinsey Yee PA-C  09/01/22 2335

## 2022-09-01 NOTE — Clinical Note
Elizabeth Goff was seen and treated in our emergency department on 9/1/2022. He may return to work on 09/03/2022. If you have any questions or concerns, please don't hesitate to call.       MILEY Gallo

## 2022-09-02 DIAGNOSIS — E55.9 VITAMIN D DEFICIENCY: ICD-10-CM

## 2022-09-02 RX ORDER — ERGOCALCIFEROL 1.25 MG/1
CAPSULE ORAL
Qty: 12 CAPSULE | Refills: 0 | OUTPATIENT
Start: 2022-09-02

## 2022-09-02 NOTE — CONSULTS
daily 4/8/22 5/8/22  Alan Ashraf MD   triamcinolone (KENALOG) 0.1 % cream Apply topically 2 times daily. 4/8/22   Alan Ashraf MD   bumetanide (BUMEX) 1 MG tablet Take 1 tablet by mouth 2 times daily 4/8/22   Alan Ashraf MD   pantoprazole (PROTONIX) 40 MG tablet Take 1 tablet by mouth every morning (before breakfast) 4/9/22   Alan Ashraf MD   Continuous Blood Gluc Sensor (FREESTYLE CHRISTIAN 2 SENSOR) MISC Change sensor every 14 days 4/8/22   Kera Waed MD   Continuous Blood Gluc  (FREESTYLE CHRISTIAN 2 READER) PEDRO To use with sensors 4/8/22   Kera Wade MD   sacubitril-valsartan (ENTRESTO)  MG per tablet Take 1 tablet by mouth 2 times daily 2/28/22   Charanjit Jackson MD   metoprolol succinate (TOPROL XL) 100 MG extended release tablet 1 tab twice daily 2/9/22   Janae Jay MD   vitamin D (ERGOCALCIFEROL) 1.25 MG (89262 UT) CAPS capsule Take 1 capsule by mouth once a week  Patient taking differently: Take 50,000 Units by mouth once a week Takes on saturdays 1/10/22   Charanjit Jackson MD   melatonin (RA MELATONIN) 3 MG TABS tablet Take 1 tablet by mouth nightly as needed (sleep) 1/10/22   Charanjit Jackson MD   Folinic Acid-Vit B6-Vit B12 (FOLINIC-PLUS) 4-50-2 MG TABS Take 4-50 mg by mouth 2 times daily 12/1/21   Charanjit Jackson MD   potassium chloride (KLOR-CON M) 20 MEQ extended release tablet Take 2 tablets by mouth daily Pt only taking one tablet daily unless he takes Bumex BID then he takes 2 tabs 10/28/21   Janae Jay MD   insulin lispro, 1 Unit Dial, (HUMALOG KWIKPEN) 100 UNIT/ML SOPN Inject 15 units with meals + following sliding scale. -200 add 3U, -250 add 6U, -300 add 9U, -350 add 12U, -400 add 15U, BS over 400 add 18U.  MAX 75U/day 9/20/21   Kera Wade MD       No Known Allergies    Family History   Problem Relation Age of Onset    High Blood Pressure Father     Heart Disease Father     High Blood Pressure Paternal Grandmother     Heart Attack Paternal Grandfather     High Blood Pressure Mother     Heart Disease Mother        Social History     Socioeconomic History    Marital status: Single     Spouse name: Not on file    Number of children: 1    Years of education: Not on file    Highest education level: Not on file   Occupational History    Occupation: unemployed   Tobacco Use    Smoking status: Some Days     Packs/day: 0.00     Years: 8.00     Pack years: 0.00     Types: Cigars, Cigarettes     Start date: 2000    Smokeless tobacco: Never    Tobacco comments:     Smokes 1 cigar weekly/ cigs 2-3 a week   Vaping Use    Vaping Use: Every day    Start date: 6/1/2021    Substances: Nicotine   Substance and Sexual Activity    Alcohol use: Yes     Comment: social drinker    Drug use: Yes     Types: Marijuana Jennifer Amble)    Sexual activity: Not on file   Other Topics Concern    Not on file   Social History Narrative    occasional coffee      Social Determinants of Health     Financial Resource Strain: High Risk    Difficulty of Paying Living Expenses: Hard   Food Insecurity: Food Insecurity Present    Worried About Running Out of Food in the Last Year: Often true    Ran Out of Food in the Last Year: Often true   Transportation Needs: Not on file   Physical Activity: Not on file   Stress: Not on file   Social Connections: Not on file   Intimate Partner Violence: Not on file   Housing Stability: Not on file       Review of Systems      Vitals:    09/01/22 1426 09/01/22 1429 09/01/22 1854   BP:  130/83 139/86   Pulse:  (!) 131 95   Resp:  18 18   Temp: 97.4 °F (36.3 °C)     SpO2: 98%  98%     Physical Exam      Assessment:  Active Problems:    * No active hospital problems. *  Resolved Problems:    * No resolved hospital problems. *      Plan:    Patient examined at bedside and I&D performed after consent obtained. 2 carpules of 4% septocaine (1:100,000 epi) given via local infiltration.    Small incision made using #15 blade in buccal vestibule of #19. Purulent material from abscess drained. Patient instructed to follow-up in dental clinic tomorrow morning during walk-in hours. Recommended to ER provider to put patient on antibiotic therapy.      Electronically signed by Jo Myers DMD on 9/1/22 at 9:44 PM EDT

## 2022-09-06 LAB
BLOOD CULTURE, ROUTINE: NORMAL
CULTURE, BLOOD 2: NORMAL

## 2022-11-03 RX ORDER — METOPROLOL SUCCINATE 100 MG/1
TABLET, EXTENDED RELEASE ORAL
Qty: 180 TABLET | Refills: 3 | Status: SHIPPED
Start: 2022-11-03 | End: 2022-11-16

## 2022-11-11 DIAGNOSIS — I50.22 CHRONIC SYSTOLIC HEART FAILURE (HCC): ICD-10-CM

## 2022-11-11 RX ORDER — POTASSIUM CHLORIDE 20 MEQ/1
40 TABLET, EXTENDED RELEASE ORAL DAILY
Qty: 180 TABLET | Refills: 3 | Status: SHIPPED | OUTPATIENT
Start: 2022-11-11

## 2022-11-16 ENCOUNTER — OFFICE VISIT (OUTPATIENT)
Dept: CARDIOLOGY CLINIC | Age: 37
End: 2022-11-16
Payer: COMMERCIAL

## 2022-11-16 VITALS
BODY MASS INDEX: 36.08 KG/M2 | DIASTOLIC BLOOD PRESSURE: 78 MMHG | WEIGHT: 252 LBS | HEIGHT: 70 IN | SYSTOLIC BLOOD PRESSURE: 112 MMHG | OXYGEN SATURATION: 95 % | HEART RATE: 76 BPM | RESPIRATION RATE: 18 BRPM

## 2022-11-16 DIAGNOSIS — E66.9 OBESITY (BMI 35.0-39.9 WITHOUT COMORBIDITY): ICD-10-CM

## 2022-11-16 DIAGNOSIS — Z99.89 OSA ON CPAP: ICD-10-CM

## 2022-11-16 DIAGNOSIS — I50.22 CHRONIC SYSTOLIC HEART FAILURE (HCC): Primary | ICD-10-CM

## 2022-11-16 DIAGNOSIS — G47.33 OSA ON CPAP: ICD-10-CM

## 2022-11-16 DIAGNOSIS — I50.20 HFREF (HEART FAILURE WITH REDUCED EJECTION FRACTION) (HCC): ICD-10-CM

## 2022-11-16 PROCEDURE — 99213 OFFICE O/P EST LOW 20 MIN: CPT | Performed by: NURSE PRACTITIONER

## 2022-11-16 PROCEDURE — 93000 ELECTROCARDIOGRAM COMPLETE: CPT | Performed by: INTERNAL MEDICINE

## 2022-11-16 PROCEDURE — 3078F DIAST BP <80 MM HG: CPT | Performed by: NURSE PRACTITIONER

## 2022-11-16 PROCEDURE — G8484 FLU IMMUNIZE NO ADMIN: HCPCS | Performed by: NURSE PRACTITIONER

## 2022-11-16 PROCEDURE — 3074F SYST BP LT 130 MM HG: CPT | Performed by: NURSE PRACTITIONER

## 2022-11-16 PROCEDURE — 4004F PT TOBACCO SCREEN RCVD TLK: CPT | Performed by: NURSE PRACTITIONER

## 2022-11-16 PROCEDURE — G8427 DOCREV CUR MEDS BY ELIG CLIN: HCPCS | Performed by: NURSE PRACTITIONER

## 2022-11-16 PROCEDURE — G8417 CALC BMI ABV UP PARAM F/U: HCPCS | Performed by: NURSE PRACTITIONER

## 2022-11-16 RX ORDER — ATORVASTATIN CALCIUM 20 MG/1
20 TABLET, FILM COATED ORAL DAILY
Qty: 90 TABLET | Refills: 3 | Status: SHIPPED | OUTPATIENT
Start: 2022-11-16

## 2022-11-16 RX ORDER — METOPROLOL SUCCINATE 100 MG/1
100 TABLET, EXTENDED RELEASE ORAL 2 TIMES DAILY
Qty: 60 TABLET | Refills: 3 | Status: SHIPPED | OUTPATIENT
Start: 2022-11-16

## 2022-11-16 NOTE — PROGRESS NOTES
OFFICE VISIT        PRIMARY CARE PHYSICIAN:    Mayito Moses MD       ALLERGIES / SENSITIVITIES:    No Known Allergies       REVIEWED MEDICATIONS:      Current Outpatient Medications:     sacubitril-valsartan (ENTRESTO)  MG per tablet, Take 1 tablet by mouth 2 times daily, Disp: 60 tablet, Rfl: 3    metoprolol succinate (TOPROL XL) 100 MG extended release tablet, take 1 tablet by mouth twice a day, Disp: 180 tablet, Rfl: 3    ibuprofen (IBU) 800 MG tablet, Take 1 tablet by mouth every 8 hours as needed for Pain, Disp: 21 tablet, Rfl: 0    atorvastatin (LIPITOR) 20 MG tablet, Take 1 tablet by mouth daily, Disp: 90 tablet, Rfl: 1    bumetanide (BUMEX) 1 MG tablet, Take 1 tablet by mouth 2 times daily, Disp: 30 tablet, Rfl: 0    Continuous Blood Gluc Sensor (FREESTYLE CHRISTIAN 2 SENSOR) MISC, Change sensor every 14 days, Disp: 3 each, Rfl: 11    Continuous Blood Gluc  (FREESTYLE CHRISTIAN 2 READER) PEDRO, To use with sensors, Disp: 1 each, Rfl: 0    melatonin (RA MELATONIN) 3 MG TABS tablet, Take 1 tablet by mouth nightly as needed (sleep), Disp: 30 tablet, Rfl: 1    potassium chloride (KLOR-CON M) 20 MEQ extended release tablet, Take 2 tablets by mouth daily Pt only taking one tablet daily unless he takes Bumex BID then he takes 2 tabs (Patient not taking: Reported on 11/16/2022), Disp: 180 tablet, Rfl: 3    insulin glargine-yfgn (SEMGLEE-YFGN) 100 UNIT/ML injection vial, Inject 28 Units into the skin 2 times daily (Patient not taking: Reported on 11/16/2022), Disp: 10 mL, Rfl: 2    triamcinolone (KENALOG) 0.1 % cream, Apply topically 2 times daily.  (Patient not taking: Reported on 11/16/2022), Disp: 45 g, Rfl: 0    pantoprazole (PROTONIX) 40 MG tablet, Take 1 tablet by mouth every morning (before breakfast) (Patient not taking: Reported on 11/16/2022), Disp: 30 tablet, Rfl: 0    vitamin D (ERGOCALCIFEROL) 1.25 MG (27474 UT) CAPS capsule, Take 1 capsule by mouth once a week (Patient not taking: Reported on 11/16/2022), Disp: 12 capsule, Rfl: 0    Folinic Acid-Vit B6-Vit B12 (FOLINIC-PLUS) 4-50-2 MG TABS, Take 4-50 mg by mouth 2 times daily (Patient not taking: Reported on 11/16/2022), Disp: 90 tablet, Rfl: 2    insulin lispro, 1 Unit Dial, (HUMALOG KWIKPEN) 100 UNIT/ML SOPN, Inject 15 units with meals + following sliding scale. -200 add 3U, -250 add 6U, -300 add 9U, -350 add 12U, -400 add 15U, BS over 400 add 18U. MAX 75U/day (Patient not taking: Reported on 11/16/2022), Disp: 15 pen, Rfl: 3      S: REASON FOR VISIT:    Nonischemic cardiomyopathy with LV systolic dysfunction and chronic systolic congestive heart failure. Mr. Markie Morris is a 20-year-old male with history of nonischemic cardiomyopathy and chronic systolic congestive heart failure. He is noncompliant. He denies orthopnea, PND's, palpitations, dizziness, presyncope or syncope. He is somewhat of a poor historian and is not forthcoming. Denies CP, SOB, dizziness, or palpitations. Complains of fatigue that has been ongoing. Endorses he has ran out of some medications. REVIEW OF SYSTEMS:    CONSTITUTIONAL:  Denies fevers, chills, night sweats or fatigue. HEENT:  Denies any unusual headaches. Denies recent changes in hearing or vision. Denies dysphagia, hoarseness, hemoptysis, hematemesis or epistaxis. ENDOCRINE:  Denies polyphagia, polydipsia or polyuria. Denies heat or cold intolerance. MUSCULOSKELETAL:  Denies any significant arthralgias or myalgias. SKIN:  Denies rashes, ulcers or itching. HEME/LYMPH:  Denies any palpable lymph nodes, bleeding or easy bruisability. HEART:  As above. LUNGS:  Denies cough or sputum production. GI: Denies abdominal pain, nausea, vomiting, diarrhea, constipation, rectal bleeding or tarry stools. :  Denies hematuria or dysuria. PSYCHIATRIC:  Denies mood changes, anxiety or depression. NEUROLOGIC:  Denies memory loss, motor weakness, tremors.  Complains of numbness and tingling in his feet. PAST MEDICAL/SURGICAL HISTORY:    1.  Obesity. 2.  Hypertension. 3.  Chronic HFrEF. Nonischemic cardiomyopathy, diagnosed around  during hospitalization in [place] with normal coronary arteries on left heart catheterization per patient. No records available for review. 4.  Echocardiogram done on 2020 reported as showing severe left ventricular hypertrophy with global hypokinesis with an estimated ejection fraction of 30-35% with grade 1 diastolic dysfunction with nondilated right ventricle with moderate dysfunction and with no significant valvular abnormalities. 5.  Diabetes mellitus with neuropathy. 6.  Asthma. 7.  History of abscess drainage from buttocks. 8.  History of wisdom tooth extraction. 9.  Sleep study done on 2021 was reported as showing severe obstructive sleep apnea: Patient was started on CPAP. 10.  Hospitalized in 2021 with weakness and blood sugar elevated at 1200. 11. Hospitalized from 2021 to 2021 with DKA/hyperosmolar hyperglycemic state and had a large left perianal/gluteal abscess I&D on 2021. 12.  Hospitalized again in 10/2021 for recurrent perirectal abscess for which he underwent another I&D on 10/18/2021. 13.  Chronic kidney disease. 14.  Medical noncompliance. Admission  2/10/2022-2022 Admission for gluteal abscess and DKA  2/10/2022 Bilateral drainage of shraddha-rectal abscess     FAMILY HISTORY:  Paternal grandfather had a myocardial infarction and  at age 48. SOCIAL HISTORY:  Patient smokes a cigar occasionally. He drinks a few beers a couple times per week:  Has a history of prior heavy alcohol abuse. He denies illicit drug use. O:  COMPLETE PHYSICAL EXAM:      /78   Resp 18   Ht 5' 10\" (1.778 m)   Wt 252 lb (114.3 kg)   SpO2 95%   BMI 36.16 kg/m²      General:          Well-developed, well-nourished, obese AAM in no acute distress.    HEENT: Normocephalic and atraumatic head. No JVD. No carotid bruits. Carotid upstrokes normal bilaterally. No thyromegaly. Sclerae not icteric. No xanthelasmas. Mucous membranes moist.  Chest:              Symmetrical and nontender. No deformities. Lungs:             Clear to auscultation bilaterally. Heart:              Tachycardic. Normal S1 and S2. No S3 or S4. No murmurs or rubs. Abdomen:        Soft, nontender without organomegaly or masses. No bruits. Normal bowel sounds. Extremities:     No edema. Pulses palpable. Skin:                normal turgor. No rashes or ulcers noted. Neurologic:      Oriented x3. No motor or sensory deficits detected. REVIEW OF DIAGNOSTIC TESTS:    1. Blood tests from 9/1/2022 reviewed:  Bun/Cr 10/1.2, H&H 15.1/41/8, K+ 3.6,   2. EKG done today showed sinus rhythm with a heart rate of 76 bpm with low voltage in the limb leads and with nonspecific T wave abnormality. ASSESSMENT / DIAGNOSIS:   Chronic HFrEF. Appears compensated/euvolemic. Nonischemic cardiomyopathy with moderate-severe LV systolic function. Reported moderate RV dysfunction (no clinical findings for such). Hypertension, controlled. Obesity. Diabetes mellitus, poorly controlled with diabetic peripheral neuropathy. History of tobacco abuse and heavy alcohol abuse, but not recently. Obstructive sleep apnea needs follow up because he switched insurances does not have machine right now. S/P left gluteal abscess I&D. Chronic kidney disease. Medical noncompliance. Poor healthliteracy         TREATMENT PLAN:  Continue current cardiac medications same. The importance of compliance with medications emphasized. Refills prescribed as requested  The importance of exercise and weight loss discussed. Importance of improved blood sugar control discussed. Echocardiogram for reassessment of LV and RV function.    Follow up with Cardiology in 6 months or on a prn basis. Electronically signed by Don Steiner.  HOLA Rm on 11/16/2022 at 2:25 PM

## 2022-11-16 NOTE — PATIENT INSTRUCTIONS
Resume Entresto, Metoprolol succinate, Lipitor (electronic prescriptions sent.   Office to call and schedule follow up echo to assess your heart strength  Please take ALL your medications as prescribed  Call with any questions or concerns  Follow up with Dr Pradip Castaneda after TTE done

## 2022-12-14 ENCOUNTER — HOSPITAL ENCOUNTER (OUTPATIENT)
Dept: CARDIOLOGY | Age: 37
Discharge: HOME | End: 2022-12-14
Payer: COMMERCIAL

## 2022-12-14 PROCEDURE — 2580000003 HC RX 258: Performed by: NURSE PRACTITIONER

## 2022-12-14 PROCEDURE — C8929 TTE W OR WO FOL WCON,DOPPLER: HCPCS

## 2022-12-14 PROCEDURE — 6360000004 HC RX CONTRAST MEDICATION: Performed by: NURSE PRACTITIONER

## 2022-12-14 RX ORDER — SODIUM CHLORIDE 0.9 % (FLUSH) 0.9 %
10 SYRINGE (ML) INJECTION PRN
Status: DISCONTINUED | OUTPATIENT
Start: 2022-12-14 | End: 2022-12-15 | Stop reason: HOSPADM

## 2022-12-14 RX ORDER — SODIUM CHLORIDE 0.9 % (FLUSH) 0.9 %
5-40 SYRINGE (ML) INJECTION 2 TIMES DAILY
Status: DISCONTINUED | OUTPATIENT
Start: 2022-12-14 | End: 2022-12-14 | Stop reason: SDUPTHER

## 2022-12-14 RX ORDER — SODIUM CHLORIDE 0.9 % (FLUSH) 0.9 %
5-40 SYRINGE (ML) INJECTION 2 TIMES DAILY
Status: DISCONTINUED | OUTPATIENT
Start: 2022-12-14 | End: 2022-12-14 | Stop reason: CLARIF

## 2022-12-14 RX ADMIN — PERFLUTREN 1.1 MG: 6.52 INJECTION, SUSPENSION INTRAVENOUS at 13:58

## 2022-12-14 RX ADMIN — SODIUM CHLORIDE, PRESERVATIVE FREE 10 ML: 5 INJECTION INTRAVENOUS at 13:58

## 2022-12-14 RX ADMIN — SODIUM CHLORIDE, PRESERVATIVE FREE 10 ML: 5 INJECTION INTRAVENOUS at 14:06

## 2022-12-15 ENCOUNTER — OFFICE VISIT (OUTPATIENT)
Dept: CARDIOLOGY CLINIC | Age: 37
End: 2022-12-15
Payer: COMMERCIAL

## 2022-12-15 VITALS
HEIGHT: 70 IN | BODY MASS INDEX: 37.22 KG/M2 | SYSTOLIC BLOOD PRESSURE: 112 MMHG | DIASTOLIC BLOOD PRESSURE: 80 MMHG | HEART RATE: 69 BPM | WEIGHT: 260 LBS | RESPIRATION RATE: 16 BRPM

## 2022-12-15 DIAGNOSIS — I10 ESSENTIAL HYPERTENSION: ICD-10-CM

## 2022-12-15 DIAGNOSIS — Z86.79 H/O CHF: ICD-10-CM

## 2022-12-15 DIAGNOSIS — N18.2 CKD (CHRONIC KIDNEY DISEASE) STAGE 2, GFR 60-89 ML/MIN: ICD-10-CM

## 2022-12-15 DIAGNOSIS — Z86.79 H/O CARDIOMYOPATHY: ICD-10-CM

## 2022-12-15 DIAGNOSIS — F10.11 H/O ALCOHOL ABUSE: ICD-10-CM

## 2022-12-15 DIAGNOSIS — E11.65 POORLY CONTROLLED DIABETES MELLITUS (HCC): ICD-10-CM

## 2022-12-15 DIAGNOSIS — E66.01 MORBID OBESITY DUE TO EXCESS CALORIES (HCC): ICD-10-CM

## 2022-12-15 DIAGNOSIS — I50.22 CHRONIC SYSTOLIC HEART FAILURE (HCC): Primary | ICD-10-CM

## 2022-12-15 DIAGNOSIS — G47.33 OSA (OBSTRUCTIVE SLEEP APNEA): ICD-10-CM

## 2022-12-15 DIAGNOSIS — Z87.891 HISTORY OF TOBACCO ABUSE: ICD-10-CM

## 2022-12-15 DIAGNOSIS — E11.42 DIABETIC PERIPHERAL NEUROPATHY (HCC): ICD-10-CM

## 2022-12-15 DIAGNOSIS — Z91.199 MEDICAL NON-COMPLIANCE: ICD-10-CM

## 2022-12-15 PROCEDURE — 3074F SYST BP LT 130 MM HG: CPT | Performed by: INTERNAL MEDICINE

## 2022-12-15 PROCEDURE — 3046F HEMOGLOBIN A1C LEVEL >9.0%: CPT | Performed by: INTERNAL MEDICINE

## 2022-12-15 PROCEDURE — 3078F DIAST BP <80 MM HG: CPT | Performed by: INTERNAL MEDICINE

## 2022-12-15 PROCEDURE — G8484 FLU IMMUNIZE NO ADMIN: HCPCS | Performed by: INTERNAL MEDICINE

## 2022-12-15 PROCEDURE — 4004F PT TOBACCO SCREEN RCVD TLK: CPT | Performed by: INTERNAL MEDICINE

## 2022-12-15 PROCEDURE — 93000 ELECTROCARDIOGRAM COMPLETE: CPT | Performed by: INTERNAL MEDICINE

## 2022-12-15 PROCEDURE — G8417 CALC BMI ABV UP PARAM F/U: HCPCS | Performed by: INTERNAL MEDICINE

## 2022-12-15 PROCEDURE — 99213 OFFICE O/P EST LOW 20 MIN: CPT | Performed by: INTERNAL MEDICINE

## 2022-12-15 PROCEDURE — G8427 DOCREV CUR MEDS BY ELIG CLIN: HCPCS | Performed by: INTERNAL MEDICINE

## 2022-12-15 PROCEDURE — 2022F DILAT RTA XM EVC RTNOPTHY: CPT | Performed by: INTERNAL MEDICINE

## 2022-12-15 RX ORDER — POTASSIUM CHLORIDE 20 MEQ/1
40 TABLET, EXTENDED RELEASE ORAL DAILY
Qty: 180 TABLET | Refills: 3 | Status: SHIPPED | OUTPATIENT
Start: 2022-12-15

## 2022-12-16 NOTE — PROGRESS NOTES
OFFICE VISIT        PRIMARY CARE PHYSICIAN:    Vale Zavala MD       ALLERGIES / SENSITIVITIES:    No Known Allergies       REVIEWED MEDICATIONS:      Current Outpatient Medications:     potassium chloride (KLOR-CON M) 20 MEQ extended release tablet, Take 2 tablets by mouth daily Pt only taking one tablet daily unless he takes Bumex BID then he takes 2 tabs, Disp: 180 tablet, Rfl: 3    sacubitril-valsartan (ENTRESTO)  MG per tablet, Take 1 tablet by mouth 2 times daily, Disp: 60 tablet, Rfl: 3    metoprolol succinate (TOPROL XL) 100 MG extended release tablet, Take 1 tablet by mouth in the morning and at bedtime, Disp: 60 tablet, Rfl: 3    atorvastatin (LIPITOR) 20 MG tablet, Take 1 tablet by mouth daily, Disp: 90 tablet, Rfl: 3    triamcinolone (KENALOG) 0.1 % cream, Apply topically 2 times daily. , Disp: 45 g, Rfl: 0    bumetanide (BUMEX) 1 MG tablet, Take 1 tablet by mouth 2 times daily, Disp: 30 tablet, Rfl: 0    Continuous Blood Gluc Sensor (FREESTYLE CHRISTIAN 2 SENSOR) MISC, Change sensor every 14 days, Disp: 3 each, Rfl: 11    Continuous Blood Gluc  (FREESTYLE CHRISTIAN 2 READER) PEDRO, To use with sensors, Disp: 1 each, Rfl: 0    vitamin D (ERGOCALCIFEROL) 1.25 MG (97382 UT) CAPS capsule, Take 1 capsule by mouth once a week, Disp: 12 capsule, Rfl: 0    melatonin (RA MELATONIN) 3 MG TABS tablet, Take 1 tablet by mouth nightly as needed (sleep), Disp: 30 tablet, Rfl: 1    sacubitril-valsartan (ENTRESTO) 49-51 MG per tablet, Take 1 tablet by mouth 2 times daily (Patient not taking: Reported on 12/15/2022), Disp: 60 tablet, Rfl: 3    ibuprofen (IBU) 800 MG tablet, Take 1 tablet by mouth every 8 hours as needed for Pain, Disp: 21 tablet, Rfl: 0    insulin glargine-yfgn (SEMGLEE-YFGN) 100 UNIT/ML injection vial, Inject 28 Units into the skin 2 times daily (Patient not taking: Reported on 11/16/2022), Disp: 10 mL, Rfl: 2    pantoprazole (PROTONIX) 40 MG tablet, Take 1 tablet by mouth every morning (before breakfast) (Patient not taking: No sig reported), Disp: 30 tablet, Rfl: 0    Folinic Acid-Vit B6-Vit B12 (FOLINIC-PLUS) 4-50-2 MG TABS, Take 4-50 mg by mouth 2 times daily (Patient not taking: No sig reported), Disp: 90 tablet, Rfl: 2    insulin lispro, 1 Unit Dial, (HUMALOG KWIKPEN) 100 UNIT/ML SOPN, Inject 15 units with meals + following sliding scale. -200 add 3U, -250 add 6U, -300 add 9U, -350 add 12U, -400 add 15U, BS over 400 add 18U. MAX 75U/day (Patient not taking: No sig reported), Disp: 15 pen, Rfl: 3      S: REASON FOR VISIT:    Nonischemic cardiomyopathy with history of LV systolic dysfunction and chronic systolic congestive heart failure. Mr. Celestina Gutierrez is a 57-year-old male with history of nonischemic cardiomyopathy and chronic systolic congestive heart failure. He has a history of medical noncompliance. He was last seen in the office by our nurse practitioner, Ammon Dupont on 11/16/2022  He, according to her note, was supposed to follow up in 6 months, but was scheduled to see me today for unclear reasons. He states that he ran out of his potassium, but was not specific on when. He states that he has been taking the rest of his medications as prescribed. He denies chest pain. He denies significant dyspnea with his daily activities. He denies orthopnea, PND's, lower extremity swelling, palpitations, dizziness, presyncope or syncope. He had an echocardiogram done on 12/14/2022 as ordered by our nurse practitioner, which showed normalization of the LV systolic function (see below under past medical/surgical history). REVIEW OF SYSTEMS:    CONSTITUTIONAL:  Denies fevers, chills, night sweats or fatigue. HEENT:  Denies any unusual headaches. Denies recent changes in hearing or vision. Denies dysphagia, hoarseness, hemoptysis, hematemesis or epistaxis. ENDOCRINE:  Denies polyphagia, polydipsia or polyuria.   Denies heat or cold intolerance. MUSCULOSKELETAL:  Denies any significant arthralgias or myalgias. SKIN:  Denies rashes, ulcers or itching. HEME/LYMPH:  Denies any palpable lymph nodes, bleeding or easy bruisability. HEART:  As above. LUNGS:  Denies cough or sputum production. GI: Denies abdominal pain, nausea, vomiting, diarrhea, constipation, rectal bleeding or tarry stools. :  Denies hematuria or dysuria. PSYCHIATRIC:  Denies mood changes, anxiety or depression. NEUROLOGIC:  Denies memory loss, motor weakness, tremors. Complains of numbness and tingling in his feet. PAST MEDICAL/SURGICAL HISTORY:    1.  Obesity. 2.  Hypertension. 3.  Chronic HFrEF. Nonischemic cardiomyopathy, diagnosed around 10-11/2020 during hospitalization in [place] with normal coronary arteries on left heart catheterization per patient. No records available for review. 4.  Echocardiogram done on 12/23/2020 reported as showing severe left ventricular hypertrophy with global hypokinesis with an estimated ejection fraction of 30-35% with grade 1 diastolic dysfunction with non dilated right ventricle with moderate dysfunction and with no significant valvular abnormalities. 5.  Diabetes mellitus with neuropathy. 6.  Asthma. 7.  History of abscess drainage from buttocks. 8.  History of wisdom tooth extraction. 9.  Sleep study done on 4/5/2021 was reported as showing severe obstructive sleep apnea: Patient was started on CPAP. 10.  Hospitalized in 7/2021 with weakness and blood sugar elevated at 1200. 11. Hospitalized from 9/14/2021 to 9/20/2021 with DKA/hyperosmolar hyperglycemic state and had a large left perianal/gluteal abscess I&D on 9/18/2021. 12.  Hospitalized again in 10/2021 for recurrent perirectal abscess for which he underwent another I&D on 10/18/2021. 13.  Chronic kidney disease. 14.  Medical noncompliance.     15.  Echocardiogram done on 12/14/2022 showed normal left ventricular size with mild to moderate left ventricular hypertrophy with no regional wall motion abnormality seen with an estimated ejection fraction of 60-65% (microbubble contrast (injected to enhance left ventricular visualization) with stage 1 left ventricular diastolic dysfunction, normal right ventricular size and function, normal size atria. No valvular abnormalities. 16.  Dental abscess in 2022. FAMILY HISTORY:  Paternal grandfather had a myocardial infarction and  at age 48. SOCIAL HISTORY:  Patient smokes a cigar occasionally. He drinks a few beers a couple times per week:  Has a history of prior heavy alcohol abuse. He denies illicit drug use. O:  COMPLETE PHYSICAL EXAM:      /80   Pulse 69   Resp 16   Ht 5' 10\" (1.778 m)   Wt 260 lb (117.9 kg)   BMI 37.31 kg/m²      General:          Well-developed, well-nourished, obese male in no acute distress. HEENT:           Normocephalic and atraumatic head. No JVD. No carotid bruits. Carotid upstrokes normal bilaterally. No thyromegaly. Sclerae not icteric. No xanthelasmas. Mucous membranes moist.  Also appears euvolemic, again with normalization of the LV systolic function. Chest:              Symmetrical and nontender. No deformities. Lungs:             Clear to auscultation bilaterally. Heart:              Normal S1 and S2. No S3 or S4. No murmurs or rubs. Abdomen:        Soft, nontender without organomegaly or masses. No bruits. Normal bowel sounds. Extremities:     No edema. Pulses palpable. Skin:                normal turgor. No rashes or ulcers noted. Neurologic:      Oriented x3. No motor or sensory deficits detected. REVIEW OF DIAGNOSTIC TESTS:     Echocardiogram from 2022 as under past medical/surgical history. EKG done today showed sinus rhythm with inferior and anterolateral T wave inversions: More or less unchanged.         ASSESSMENT / DIAGNOSIS:    History of nonischemic cardiomyopathy with moderate to severe LV systolic dysfunction with normalization of the LV systolic function on the echocardiogram from 12/14/2022. History of chronic HFrEF, again with normalization of the LV systolic function. Also appears euvolemic. Hypertension, controlled. Obesity. Diabetes mellitus, poorly controlled with diabetic peripheral neuropathy. History of tobacco abuse and heavy alcohol abuse, but not recently  Obstructive sleep apnea, on CPAP. Sinus tachycardia, has been off Toprol XL. S/P left gluteal abscess I&D. Chronic kidney disease. Medical noncompliance. TREATMENT PLAN:   Reassure. Refill potassium prescription. The importance of compliance with medications again emphasize. The importance of following a heart-healthy diabetic diet, aerobic exercise and weight loss discussed. Follow up with Cardiology in 6 months or on a prn basis. Katie Benitez.  [place] [id number]  [phone]

## 2023-01-24 RX ORDER — BUMETANIDE 2 MG/1
TABLET ORAL
Qty: 240 TABLET | Refills: 3 | Status: SHIPPED | OUTPATIENT
Start: 2023-01-24

## 2023-11-04 ENCOUNTER — HOSPITAL ENCOUNTER (EMERGENCY)
Age: 38
Discharge: HOME | End: 2023-11-04
Payer: COMMERCIAL

## 2023-11-04 VITALS
OXYGEN SATURATION: 98 % | DIASTOLIC BLOOD PRESSURE: 105 MMHG | RESPIRATION RATE: 16 BRPM | HEART RATE: 72 BPM | SYSTOLIC BLOOD PRESSURE: 161 MMHG | TEMPERATURE: 97.2 F

## 2023-11-04 DIAGNOSIS — Z76.0 ENCOUNTER FOR MEDICATION REFILL: ICD-10-CM

## 2023-11-04 DIAGNOSIS — L30.9 ECZEMA, UNSPECIFIED TYPE: Primary | ICD-10-CM

## 2023-11-04 PROCEDURE — 99283 EMERGENCY DEPT VISIT LOW MDM: CPT

## 2023-11-04 RX ORDER — TRIAMCINOLONE ACETONIDE 1 MG/G
CREAM TOPICAL
Qty: 80 G | Refills: 1 | Status: SHIPPED | OUTPATIENT
Start: 2023-11-04

## 2023-11-04 NOTE — ED PROVIDER NOTES
Independent [facility] Visit. [address] [place] [address]  Department of Emergency Medicine   ED  Encounter Note  Admit Date/RoomTime: 2023  4:41 PM  ED Room: [place] [address]    NAME: Rafa Pearce  : 1985  MRN: [id number]     Chief Complaint:  Medication Refill (Pt is out of his triamcinolone cream and needs more)    History of Present Illness      Rafa Pearce is a 42 y.o. old male presents to the emergency department via by private vehicle requesting medication(s) as result of running out of multiple prescription medication(s). He is enrolled in a pain management program. he has associated symptoms of rash. States he has a history of eczema and psoriasis. Triamcinolone cream works well, but has ran out. ROS   Pertinent positives and negatives are stated within HPI, all other systems reviewed and are negative. Past Medical History:  has a past medical history of Buttock wound, left, initial encounter, Buttock wound, left, subsequent encounter, CAD (coronary artery disease), CHF (congestive heart failure) ([address]), Diabetes mellitus ([address]), Draining postoperative wound, Hyperlipidemia, Hyperosmolar hyperglycemic state (HHS) ([address]), and Hypertension. Surgical History:  has a past surgical history that includes [place] tooth extraction; Abscess Drainage; ECHO Compl W Dop Color Flow (2015); Abdomen surgery (N/A, 2021); Insert Picc Cath, 5/> Yrs (10/20/2021); and Leg Surgery (Left, 2/10/2022). Social History:  reports that he has been smoking cigars and cigarettes. He started smoking about 23 years ago. He has never used smokeless tobacco. He reports current alcohol use. He reports that he does not use drugs. Family History: family history includes Heart Attack in his paternal grandfather; Heart Disease in his father and mother; High Blood Pressure in his father, mother, and paternal grandmother. Allergies: Patient has no known allergies.     Physical Exam   Oxygen

## 2024-01-03 RX ORDER — METOPROLOL SUCCINATE 100 MG/1
TABLET, EXTENDED RELEASE ORAL
Qty: 60 TABLET | Refills: 3 | Status: SHIPPED | OUTPATIENT
Start: 2024-01-03

## 2024-01-03 RX ORDER — ATORVASTATIN CALCIUM 20 MG/1
20 TABLET, FILM COATED ORAL DAILY
Qty: 90 TABLET | Refills: 3 | Status: SHIPPED | OUTPATIENT
Start: 2024-01-03

## 2024-01-23 NOTE — PATIENT CARE CONFERENCE
P Quality Flow/Interdisciplinary Rounds Progress Note        Quality Flow Rounds held on April 5, 2022    Disciplines Attending:  Case management, pt/ot, resp therapy, bedside nurse, charge nurse, nursing management    Ariel Gotti was admitted on 4/5/2022  1:13 AM    Anticipated Discharge Date:       Disposition:    Gian Score:       Readmission Risk              Risk of Unplanned Readmission:  16           Discussed patient goal for the day, patient clinical progression, and barriers to discharge.   The following Goal(s) of the Day/Commitment(s) have been identified:  Continue dka protocol  Geoff Ulloa RN  April 5, 2022 86

## 2024-06-25 ENCOUNTER — HOSPITAL ENCOUNTER (EMERGENCY)
Age: 39
Discharge: LEFT BEFORE DISCHARGE | End: 2024-06-25
Attending: EMERGENCY MEDICINE
Payer: COMMERCIAL

## 2024-06-25 ENCOUNTER — APPOINTMENT (OUTPATIENT)
Dept: GENERAL RADIOLOGY | Age: 39
End: 2024-06-25
Payer: COMMERCIAL

## 2024-06-25 VITALS
TEMPERATURE: 97.8 F | BODY MASS INDEX: 32 KG/M2 | RESPIRATION RATE: 16 BRPM | WEIGHT: 223 LBS | DIASTOLIC BLOOD PRESSURE: 85 MMHG | SYSTOLIC BLOOD PRESSURE: 156 MMHG | HEART RATE: 76 BPM | OXYGEN SATURATION: 98 %

## 2024-06-25 DIAGNOSIS — Z91.148 NONCOMPLIANCE WITH MEDICATION REGIMEN: ICD-10-CM

## 2024-06-25 DIAGNOSIS — R07.9 CHEST PAIN, UNSPECIFIED TYPE: Primary | ICD-10-CM

## 2024-06-25 LAB
ALBUMIN SERPL-MCNC: 4.3 G/DL (ref 3.5–5.2)
ALP SERPL-CCNC: 93 U/L (ref 40–129)
ALT SERPL-CCNC: 18 U/L (ref 0–40)
ANION GAP SERPL CALCULATED.3IONS-SCNC: 15 MMOL/L (ref 7–16)
AST SERPL-CCNC: 18 U/L (ref 0–39)
BASOPHILS # BLD: 0.05 K/UL (ref 0–0.2)
BASOPHILS NFR BLD: 0 % (ref 0–2)
BILIRUB SERPL-MCNC: 0.3 MG/DL (ref 0–1.2)
BNP SERPL-MCNC: 176 PG/ML (ref 0–125)
BUN SERPL-MCNC: 16 MG/DL (ref 6–20)
CALCIUM SERPL-MCNC: 9.8 MG/DL (ref 8.6–10.2)
CHLORIDE SERPL-SCNC: 99 MMOL/L (ref 98–107)
CO2 SERPL-SCNC: 22 MMOL/L (ref 22–29)
CREAT SERPL-MCNC: 0.8 MG/DL (ref 0.7–1.2)
EOSINOPHIL # BLD: 0.24 K/UL (ref 0.05–0.5)
EOSINOPHILS RELATIVE PERCENT: 2 % (ref 0–6)
ERYTHROCYTE [DISTWIDTH] IN BLOOD BY AUTOMATED COUNT: 12 % (ref 11.5–15)
GFR, ESTIMATED: >90 ML/MIN/1.73M2
GLUCOSE SERPL-MCNC: 152 MG/DL (ref 74–99)
HCT VFR BLD AUTO: 37.3 % (ref 37–54)
HGB BLD-MCNC: 13 G/DL (ref 12.5–16.5)
IMM GRANULOCYTES # BLD AUTO: 0.04 K/UL (ref 0–0.58)
IMM GRANULOCYTES NFR BLD: 0 % (ref 0–5)
LYMPHOCYTES NFR BLD: 2.66 K/UL (ref 1.5–4)
LYMPHOCYTES RELATIVE PERCENT: 20 % (ref 20–42)
MCH RBC QN AUTO: 33.8 PG (ref 26–35)
MCHC RBC AUTO-ENTMCNC: 34.9 G/DL (ref 32–34.5)
MCV RBC AUTO: 96.9 FL (ref 80–99.9)
MONOCYTES NFR BLD: 0.81 K/UL (ref 0.1–0.95)
MONOCYTES NFR BLD: 6 % (ref 2–12)
NEUTROPHILS NFR BLD: 72 % (ref 43–80)
NEUTS SEG NFR BLD: 9.82 K/UL (ref 1.8–7.3)
PLATELET # BLD AUTO: 353 K/UL (ref 130–450)
PMV BLD AUTO: 9.3 FL (ref 7–12)
POTASSIUM SERPL-SCNC: 3.5 MMOL/L (ref 3.5–5)
PROT SERPL-MCNC: 8.3 G/DL (ref 6.4–8.3)
RBC # BLD AUTO: 3.85 M/UL (ref 3.8–5.8)
SODIUM SERPL-SCNC: 136 MMOL/L (ref 132–146)
TROPONIN I SERPL HS-MCNC: 11 NG/L (ref 0–11)
TROPONIN I SERPL HS-MCNC: 12 NG/L (ref 0–11)
WBC OTHER # BLD: 13.6 K/UL (ref 4.5–11.5)

## 2024-06-25 PROCEDURE — 99285 EMERGENCY DEPT VISIT HI MDM: CPT

## 2024-06-25 PROCEDURE — 93005 ELECTROCARDIOGRAM TRACING: CPT | Performed by: EMERGENCY MEDICINE

## 2024-06-25 PROCEDURE — 80053 COMPREHEN METABOLIC PANEL: CPT

## 2024-06-25 PROCEDURE — 71045 X-RAY EXAM CHEST 1 VIEW: CPT

## 2024-06-25 PROCEDURE — 83880 ASSAY OF NATRIURETIC PEPTIDE: CPT

## 2024-06-25 PROCEDURE — 84484 ASSAY OF TROPONIN QUANT: CPT

## 2024-06-25 PROCEDURE — 6370000000 HC RX 637 (ALT 250 FOR IP): Performed by: EMERGENCY MEDICINE

## 2024-06-25 PROCEDURE — 85025 COMPLETE CBC W/AUTO DIFF WBC: CPT

## 2024-06-25 RX ORDER — ASPIRIN 81 MG/1
324 TABLET, CHEWABLE ORAL ONCE
Status: COMPLETED | OUTPATIENT
Start: 2024-06-25 | End: 2024-06-25

## 2024-06-25 RX ADMIN — ASPIRIN 81 MG 324 MG: 81 TABLET ORAL at 22:28

## 2024-06-25 ASSESSMENT — LIFESTYLE VARIABLES
HOW OFTEN DO YOU HAVE A DRINK CONTAINING ALCOHOL: NEVER
HOW MANY STANDARD DRINKS CONTAINING ALCOHOL DO YOU HAVE ON A TYPICAL DAY: PATIENT DOES NOT DRINK

## 2024-06-26 NOTE — ED PROVIDER NOTES
Conditions: Patient has a history of CHF coronary disease diabetes history of hyperlipidemia history of hypertension history of buttock wound    Records Reviewed:     Patient was last seen for eczema on 11/4/2023  Echo from 12/14/2022 ejection fraction was 60 to 65%  Re-Evaluations:             I did speak to patient at length.  Patient was made aware of need for staying to be further evaluated due to his complaint of chest pain patient has multiple risk factors.  Patient reports no active chest pain on recheck while he was in chairs.  Patient was made aware of risk of death and disability.  Patient reports he wanted to go home to shower and he would come back.  Patient would have to sign against medical vice he was told to return at anytime for any further problems.  This patient has chosen to leave against medical advice.  I have personally explained to them that choosing to do so may result in permanent bodily harm or death.  I discussed at length that without further evaluation and monitoring there may be unforeseen circumstances and deterioration resulting in permanent bodily harm or death as a result of their choice.    They are alert, oriented, and competent at this time.  They state that they are aware of the serious risks as explained, but they continue to wish to leave against medical advice.    In light of their decision to leave against medical advice, follow-up has been arranged and they are aware of the importance of following up as instructed.  They have been advised that they should return to the ED immediately if they change their mind at any time, or if their condition begins to change or worsen.           Consultations:                 Critical Care:         This patient's ED course included: a personal history and physicial eaxmination    This patient has been closely monitored during their ED course.    Counseling:   The emergency provider has spoken with the patient and discussed today’s

## 2024-06-26 NOTE — ED NOTES
Pt wanting to leave AMA. Risks of leaving AMA including death explained to pt by physician. Pt verbalizes understanding of risks and still wants to leave AMA. Pt advised to return back to ED at any time. Pt is alert and oriented x4, GCS=15, PWD, eupneic, NAD noted. PMS intact x4. Pt exhibits clear thinking and having appropriate conversation with this writer. Pt signed AMA form and placed with pts chart. Pt ambulates off unit with steady gait in stable condition.

## 2024-06-27 LAB
EKG ATRIAL RATE: 77 BPM
EKG P AXIS: 29 DEGREES
EKG P-R INTERVAL: 148 MS
EKG Q-T INTERVAL: 428 MS
EKG QRS DURATION: 92 MS
EKG QTC CALCULATION (BAZETT): 484 MS
EKG R AXIS: 66 DEGREES
EKG T AXIS: 74 DEGREES
EKG VENTRICULAR RATE: 77 BPM

## 2024-06-27 PROCEDURE — 93010 ELECTROCARDIOGRAM REPORT: CPT | Performed by: INTERNAL MEDICINE

## 2024-06-28 ENCOUNTER — OFFICE VISIT (OUTPATIENT)
Dept: CARDIOLOGY CLINIC | Age: 39
End: 2024-06-28

## 2024-06-28 VITALS
DIASTOLIC BLOOD PRESSURE: 100 MMHG | BODY MASS INDEX: 33.3 KG/M2 | SYSTOLIC BLOOD PRESSURE: 164 MMHG | HEART RATE: 94 BPM | HEIGHT: 70 IN | RESPIRATION RATE: 18 BRPM | WEIGHT: 232.6 LBS

## 2024-06-28 DIAGNOSIS — Z87.891 HISTORY OF TOBACCO ABUSE: ICD-10-CM

## 2024-06-28 DIAGNOSIS — Z86.79 H/O CHF: ICD-10-CM

## 2024-06-28 DIAGNOSIS — E11.42 DIABETIC PERIPHERAL NEUROPATHY (HCC): ICD-10-CM

## 2024-06-28 DIAGNOSIS — E66.9 NON MORBID OBESITY: ICD-10-CM

## 2024-06-28 DIAGNOSIS — Z86.79 H/O CARDIOMYOPATHY: Primary | ICD-10-CM

## 2024-06-28 DIAGNOSIS — I10 ESSENTIAL HYPERTENSION: ICD-10-CM

## 2024-06-28 DIAGNOSIS — F10.11 H/O ALCOHOL ABUSE: ICD-10-CM

## 2024-06-28 DIAGNOSIS — G47.33 OSA (OBSTRUCTIVE SLEEP APNEA): ICD-10-CM

## 2024-06-28 DIAGNOSIS — G47.33 OSA ON CPAP: ICD-10-CM

## 2024-06-28 DIAGNOSIS — Z79.4 INSULIN-REQUIRING OR DEPENDENT TYPE II DIABETES MELLITUS (HCC): ICD-10-CM

## 2024-06-28 DIAGNOSIS — N18.2 CKD (CHRONIC KIDNEY DISEASE) STAGE 2, GFR 60-89 ML/MIN: ICD-10-CM

## 2024-06-28 DIAGNOSIS — E11.9 INSULIN-REQUIRING OR DEPENDENT TYPE II DIABETES MELLITUS (HCC): ICD-10-CM

## 2024-06-28 DIAGNOSIS — Z91.199 MEDICAL NON-COMPLIANCE: ICD-10-CM

## 2024-06-28 RX ORDER — POTASSIUM CHLORIDE 20 MEQ/1
40 TABLET, EXTENDED RELEASE ORAL DAILY
Qty: 180 TABLET | Refills: 3 | Status: SHIPPED | OUTPATIENT
Start: 2024-06-28

## 2024-06-28 NOTE — PROGRESS NOTES
OFFICE VISIT        PRIMARY CARE PHYSICIAN:    Tatiana Chinchilla MD         ALLERGIES / SENSITIVITIES:    No Known Allergies       REVIEWED MEDICATIONS:      Current Outpatient Medications:     sacubitril-valsartan (ENTRESTO)  MG per tablet, Take 1 tablet by mouth 2 times daily, Disp: 60 tablet, Rfl: 11    potassium chloride (KLOR-CON M) 20 MEQ extended release tablet, Take 2 tablets by mouth daily Pt only taking one tablet daily unless he takes Bumex BID then he takes 2 tabs, Disp: 180 tablet, Rfl: 3    atorvastatin (LIPITOR) 20 MG tablet, take 1 tablet by mouth once daily, Disp: 90 tablet, Rfl: 3    metoprolol succinate (TOPROL XL) 100 MG extended release tablet, take 1 tablet by mouth every morning and at bedtime, Disp: 60 tablet, Rfl: 3    bumetanide (BUMEX) 2 MG tablet, take 1 tablet by mouth AT 8AM once daily ON DAYS THAT YOU GAIN MORE THAN 2LBS, HAVE WORSE SWELLING, OR SHORTNESS OF BREATH take 1 tablet by mouth AT 2PM, Disp: 240 tablet, Rfl: 3    Continuous Blood Gluc Sensor (FREESTYLE CHRISTIAN 2 SENSOR) MISC, Change sensor every 14 days, Disp: 3 each, Rfl: 11    Continuous Blood Gluc  (FREESTYLE CHRISTIAN 2 READER) PEDRO, To use with sensors, Disp: 1 each, Rfl: 0        S: REASON FOR VISIT:    History of known cardiomyopathy.  History of congestive heart failure.  Medical noncompliance.  Fausto is a 38-year-old male with cardiovascular/albuterol.  He has not followed in the office since December 2022.  He was in the emergency room on 6/25/2024 because of continuous chest pain that started several days prior to his presentation.  His EKG did not show any acute changes.  His troponin level was 12.  He left AGAINST MEDICAL ADVICE.  He has not followed with a PCP in a long time.  He has not taken any medication for his diabetes: He states that he has not been on insulin for 2 years.  He is not forthcoming about the rest of his medications.  When told that his blood pressure was high he stated that he ran

## 2024-07-01 RX ORDER — BUMETANIDE 2 MG/1
TABLET ORAL
Qty: 240 TABLET | Refills: 3 | Status: SHIPPED | OUTPATIENT
Start: 2024-07-01

## 2024-07-01 RX ORDER — METOPROLOL SUCCINATE 100 MG/1
TABLET, EXTENDED RELEASE ORAL
Qty: 60 TABLET | Refills: 3 | Status: SHIPPED | OUTPATIENT
Start: 2024-07-01

## 2024-07-01 RX ORDER — ATORVASTATIN CALCIUM 20 MG/1
20 TABLET, FILM COATED ORAL DAILY
Qty: 90 TABLET | Refills: 3 | Status: SHIPPED | OUTPATIENT
Start: 2024-07-01

## 2024-09-05 ENCOUNTER — HOSPITAL ENCOUNTER (OUTPATIENT)
Age: 39
Setting detail: OBSERVATION
Discharge: HOME | End: 2024-09-06
Attending: EMERGENCY MEDICINE | Admitting: STUDENT IN AN ORGANIZED HEALTH CARE EDUCATION/TRAINING PROGRAM
Payer: COMMERCIAL

## 2024-09-05 ENCOUNTER — APPOINTMENT (OUTPATIENT)
Dept: GENERAL RADIOLOGY | Age: 39
End: 2024-09-05
Payer: COMMERCIAL

## 2024-09-05 DIAGNOSIS — K02.9 DENTAL CARIES: ICD-10-CM

## 2024-09-05 DIAGNOSIS — G47.33 OSA (OBSTRUCTIVE SLEEP APNEA): ICD-10-CM

## 2024-09-05 DIAGNOSIS — Z72.821 POOR SLEEP HYGIENE: ICD-10-CM

## 2024-09-05 DIAGNOSIS — S02.5XXA CLOSED FRACTURE OF TOOTH, INITIAL ENCOUNTER: ICD-10-CM

## 2024-09-05 DIAGNOSIS — I50.20 SYSTOLIC HEART FAILURE, UNSPECIFIED HF CHRONICITY (HCC): ICD-10-CM

## 2024-09-05 DIAGNOSIS — R07.9 ACUTE CHEST PAIN: Primary | ICD-10-CM

## 2024-09-05 DIAGNOSIS — Z91.148 NON COMPLIANCE W MEDICATION REGIMEN: ICD-10-CM

## 2024-09-05 DIAGNOSIS — E11.00 TYPE 2 DIABETES MELLITUS WITH HYPEROSMOLARITY WITHOUT COMA, WITHOUT LONG-TERM CURRENT USE OF INSULIN (HCC): ICD-10-CM

## 2024-09-05 LAB
ALBUMIN SERPL-MCNC: 3.8 G/DL (ref 3.5–5.2)
ALP SERPL-CCNC: 101 U/L (ref 40–129)
ALT SERPL-CCNC: 21 U/L (ref 0–40)
ANION GAP SERPL CALCULATED.3IONS-SCNC: 11 MMOL/L (ref 7–16)
AST SERPL-CCNC: 26 U/L (ref 0–39)
BASOPHILS # BLD: 0.06 K/UL (ref 0–0.2)
BASOPHILS NFR BLD: 1 % (ref 0–2)
BILIRUB SERPL-MCNC: 0.3 MG/DL (ref 0–1.2)
BILIRUB UR QL STRIP: NEGATIVE
BNP SERPL-MCNC: 1384 PG/ML (ref 0–125)
BUN SERPL-MCNC: 10 MG/DL (ref 6–20)
CALCIUM SERPL-MCNC: 9.1 MG/DL (ref 8.6–10.2)
CHLORIDE SERPL-SCNC: 104 MMOL/L (ref 98–107)
CLARITY UR: CLEAR
CO2 SERPL-SCNC: 25 MMOL/L (ref 22–29)
COLOR UR: YELLOW
CREAT SERPL-MCNC: 0.9 MG/DL (ref 0.7–1.2)
EOSINOPHIL # BLD: 0.29 K/UL (ref 0.05–0.5)
EOSINOPHILS RELATIVE PERCENT: 2 % (ref 0–6)
ERYTHROCYTE [DISTWIDTH] IN BLOOD BY AUTOMATED COUNT: 12.7 % (ref 11.5–15)
GFR, ESTIMATED: >90 ML/MIN/1.73M2
GLUCOSE SERPL-MCNC: 175 MG/DL (ref 74–99)
GLUCOSE UR STRIP-MCNC: >=1000 MG/DL
HCT VFR BLD AUTO: 32.9 % (ref 37–54)
HGB BLD-MCNC: 11.2 G/DL (ref 12.5–16.5)
HGB UR QL STRIP.AUTO: ABNORMAL
IMM GRANULOCYTES # BLD AUTO: 0.04 K/UL (ref 0–0.58)
IMM GRANULOCYTES NFR BLD: 0 % (ref 0–5)
KETONES UR STRIP-MCNC: NEGATIVE MG/DL
LEUKOCYTE ESTERASE UR QL STRIP: NEGATIVE
LYMPHOCYTES NFR BLD: 3.2 K/UL (ref 1.5–4)
LYMPHOCYTES RELATIVE PERCENT: 25 % (ref 20–42)
MAGNESIUM SERPL-MCNC: 1.9 MG/DL (ref 1.6–2.6)
MCH RBC QN AUTO: 32.7 PG (ref 26–35)
MCHC RBC AUTO-ENTMCNC: 34 G/DL (ref 32–34.5)
MCV RBC AUTO: 96.2 FL (ref 80–99.9)
MONOCYTES NFR BLD: 0.67 K/UL (ref 0.1–0.95)
MONOCYTES NFR BLD: 5 % (ref 2–12)
NEUTROPHILS NFR BLD: 67 % (ref 43–80)
NEUTS SEG NFR BLD: 8.78 K/UL (ref 1.8–7.3)
NITRITE UR QL STRIP: NEGATIVE
PH UR STRIP: 6 [PH] (ref 5–9)
PLATELET # BLD AUTO: 433 K/UL (ref 130–450)
PMV BLD AUTO: 9.4 FL (ref 7–12)
POTASSIUM SERPL-SCNC: 4 MMOL/L (ref 3.5–5)
PROT SERPL-MCNC: 7.9 G/DL (ref 6.4–8.3)
PROT UR STRIP-MCNC: NEGATIVE MG/DL
RBC # BLD AUTO: 3.42 M/UL (ref 3.8–5.8)
SODIUM SERPL-SCNC: 140 MMOL/L (ref 132–146)
SP GR UR STRIP: >1.03 (ref 1–1.03)
TROPONIN I SERPL HS-MCNC: 14 NG/L (ref 0–11)
UROBILINOGEN UR STRIP-ACNC: 0.2 EU/DL (ref 0–1)
WBC OTHER # BLD: 13 K/UL (ref 4.5–11.5)

## 2024-09-05 PROCEDURE — 84484 ASSAY OF TROPONIN QUANT: CPT

## 2024-09-05 PROCEDURE — 6370000000 HC RX 637 (ALT 250 FOR IP): Performed by: NURSE PRACTITIONER

## 2024-09-05 PROCEDURE — 6360000002 HC RX W HCPCS: Performed by: NURSE PRACTITIONER

## 2024-09-05 PROCEDURE — 83735 ASSAY OF MAGNESIUM: CPT

## 2024-09-05 PROCEDURE — 81001 URINALYSIS AUTO W/SCOPE: CPT

## 2024-09-05 PROCEDURE — 71045 X-RAY EXAM CHEST 1 VIEW: CPT

## 2024-09-05 PROCEDURE — 80053 COMPREHEN METABOLIC PANEL: CPT

## 2024-09-05 PROCEDURE — 96374 THER/PROPH/DIAG INJ IV PUSH: CPT

## 2024-09-05 PROCEDURE — 93005 ELECTROCARDIOGRAM TRACING: CPT | Performed by: NURSE PRACTITIONER

## 2024-09-05 PROCEDURE — 85025 COMPLETE CBC W/AUTO DIFF WBC: CPT

## 2024-09-05 PROCEDURE — 99285 EMERGENCY DEPT VISIT HI MDM: CPT

## 2024-09-05 PROCEDURE — 83880 ASSAY OF NATRIURETIC PEPTIDE: CPT

## 2024-09-05 RX ORDER — FENTANYL CITRATE 50 UG/ML
25 INJECTION, SOLUTION INTRAMUSCULAR; INTRAVENOUS ONCE
Status: COMPLETED | OUTPATIENT
Start: 2024-09-05 | End: 2024-09-05

## 2024-09-05 RX ORDER — AMOXICILLIN 250 MG/1
500 CAPSULE ORAL ONCE
Status: COMPLETED | OUTPATIENT
Start: 2024-09-05 | End: 2024-09-05

## 2024-09-05 RX ORDER — ASPIRIN 81 MG/1
324 TABLET, CHEWABLE ORAL ONCE
Status: COMPLETED | OUTPATIENT
Start: 2024-09-06 | End: 2024-09-06

## 2024-09-05 RX ADMIN — AMOXICILLIN 500 MG: 250 CAPSULE ORAL at 22:58

## 2024-09-05 RX ADMIN — FENTANYL CITRATE 25 MCG: 50 INJECTION INTRAMUSCULAR; INTRAVENOUS at 22:58

## 2024-09-05 ASSESSMENT — HEART SCORE: ECG: NON-SPECIFC REPOLARIZATION DISTURBANCE/LBTB/PM

## 2024-09-06 VITALS
RESPIRATION RATE: 21 BRPM | HEART RATE: 73 BPM | SYSTOLIC BLOOD PRESSURE: 167 MMHG | DIASTOLIC BLOOD PRESSURE: 110 MMHG | WEIGHT: 227 LBS | OXYGEN SATURATION: 97 % | BODY MASS INDEX: 32.5 KG/M2 | TEMPERATURE: 98.1 F | HEIGHT: 70 IN

## 2024-09-06 PROBLEM — R07.9 CHEST PAIN: Status: ACTIVE | Noted: 2024-09-06

## 2024-09-06 PROBLEM — R07.9 ACUTE CHEST PAIN: Status: RESOLVED | Noted: 2024-09-06 | Resolved: 2024-09-06

## 2024-09-06 LAB
ALBUMIN SERPL-MCNC: 3.7 G/DL (ref 3.5–5.2)
ALP SERPL-CCNC: 95 U/L (ref 40–129)
ALT SERPL-CCNC: 18 U/L (ref 0–40)
ANION GAP SERPL CALCULATED.3IONS-SCNC: 11 MMOL/L (ref 7–16)
AST SERPL-CCNC: 19 U/L (ref 0–39)
BACTERIA URNS QL MICRO: ABNORMAL
BILIRUB SERPL-MCNC: 0.5 MG/DL (ref 0–1.2)
BUN SERPL-MCNC: 9 MG/DL (ref 6–20)
CALCIUM SERPL-MCNC: 9.3 MG/DL (ref 8.6–10.2)
CHLORIDE SERPL-SCNC: 104 MMOL/L (ref 98–107)
CHOLEST SERPL-MCNC: 135 MG/DL
CO2 SERPL-SCNC: 25 MMOL/L (ref 22–29)
CREAT SERPL-MCNC: 0.7 MG/DL (ref 0.7–1.2)
CRP SERPL HS-MCNC: 3 MG/L (ref 0–5)
EKG ATRIAL RATE: 76 BPM
EKG P-R INTERVAL: 146 MS
EKG Q-T INTERVAL: 424 MS
EKG QRS DURATION: 90 MS
EKG QTC CALCULATION (BAZETT): 477 MS
EKG R AXIS: 80 DEGREES
EKG T AXIS: 85 DEGREES
EKG VENTRICULAR RATE: 76 BPM
EPI CELLS #/AREA URNS HPF: ABNORMAL /HPF
ERYTHROCYTE [DISTWIDTH] IN BLOOD BY AUTOMATED COUNT: 12.7 % (ref 11.5–15)
ERYTHROCYTE [SEDIMENTATION RATE] IN BLOOD BY WESTERGREN METHOD: 27 MM/HR (ref 0–15)
GFR, ESTIMATED: >90 ML/MIN/1.73M2
GLUCOSE BLD-MCNC: 169 MG/DL (ref 74–99)
GLUCOSE SERPL-MCNC: 177 MG/DL (ref 74–99)
HBA1C MFR BLD: 8.5 % (ref 4–5.6)
HCT VFR BLD AUTO: 34.4 % (ref 37–54)
HDLC SERPL-MCNC: 37 MG/DL
HGB BLD-MCNC: 11.6 G/DL (ref 12.5–16.5)
INR PPP: 1
LDLC SERPL CALC-MCNC: 42 MG/DL
MCH RBC QN AUTO: 32 PG (ref 26–35)
MCHC RBC AUTO-ENTMCNC: 33.7 G/DL (ref 32–34.5)
MCV RBC AUTO: 95 FL (ref 80–99.9)
PLATELET # BLD AUTO: 438 K/UL (ref 130–450)
PMV BLD AUTO: 9.3 FL (ref 7–12)
POTASSIUM SERPL-SCNC: 4 MMOL/L (ref 3.5–5)
PROT SERPL-MCNC: 7.4 G/DL (ref 6.4–8.3)
PROTHROMBIN TIME: 11 SEC (ref 9.3–12.4)
RBC # BLD AUTO: 3.62 M/UL (ref 3.8–5.8)
RBC #/AREA URNS HPF: ABNORMAL /HPF
SODIUM SERPL-SCNC: 140 MMOL/L (ref 132–146)
T4 FREE SERPL-MCNC: 1 NG/DL (ref 0.9–1.7)
TRIGL SERPL-MCNC: 282 MG/DL
TROPONIN I SERPL HS-MCNC: 13 NG/L (ref 0–11)
TSH SERPL DL<=0.05 MIU/L-ACNC: 1.72 UIU/ML (ref 0.27–4.2)
VLDLC SERPL CALC-MCNC: 56 MG/DL
WBC #/AREA URNS HPF: ABNORMAL /HPF
WBC OTHER # BLD: 10 K/UL (ref 4.5–11.5)

## 2024-09-06 PROCEDURE — 2580000003 HC RX 258: Performed by: INTERNAL MEDICINE

## 2024-09-06 PROCEDURE — 82962 GLUCOSE BLOOD TEST: CPT

## 2024-09-06 PROCEDURE — 86140 C-REACTIVE PROTEIN: CPT

## 2024-09-06 PROCEDURE — 83036 HEMOGLOBIN GLYCOSYLATED A1C: CPT

## 2024-09-06 PROCEDURE — APPSS60 APP SPLIT SHARED TIME 46-60 MINUTES: Performed by: NURSE PRACTITIONER

## 2024-09-06 PROCEDURE — 85027 COMPLETE CBC AUTOMATED: CPT

## 2024-09-06 PROCEDURE — 84439 ASSAY OF FREE THYROXINE: CPT

## 2024-09-06 PROCEDURE — 84484 ASSAY OF TROPONIN QUANT: CPT

## 2024-09-06 PROCEDURE — G0378 HOSPITAL OBSERVATION PER HR: HCPCS

## 2024-09-06 PROCEDURE — 85610 PROTHROMBIN TIME: CPT

## 2024-09-06 PROCEDURE — 84443 ASSAY THYROID STIM HORMONE: CPT

## 2024-09-06 PROCEDURE — 6370000000 HC RX 637 (ALT 250 FOR IP): Performed by: NURSE PRACTITIONER

## 2024-09-06 PROCEDURE — 99254 IP/OBS CNSLTJ NEW/EST MOD 60: CPT | Performed by: INTERNAL MEDICINE

## 2024-09-06 PROCEDURE — 6370000000 HC RX 637 (ALT 250 FOR IP): Performed by: INTERNAL MEDICINE

## 2024-09-06 PROCEDURE — 80061 LIPID PANEL: CPT

## 2024-09-06 PROCEDURE — 80053 COMPREHEN METABOLIC PANEL: CPT

## 2024-09-06 PROCEDURE — 93010 ELECTROCARDIOGRAM REPORT: CPT | Performed by: INTERNAL MEDICINE

## 2024-09-06 PROCEDURE — 85652 RBC SED RATE AUTOMATED: CPT

## 2024-09-06 RX ORDER — TRIAMCINOLONE ACETONIDE 1 MG/G
CREAM TOPICAL 2 TIMES DAILY
Status: DISCONTINUED | OUTPATIENT
Start: 2024-09-06 | End: 2024-09-06 | Stop reason: HOSPADM

## 2024-09-06 RX ORDER — BUMETANIDE 2 MG/1
TABLET ORAL
Qty: 240 TABLET | Refills: 3 | Status: SHIPPED | OUTPATIENT
Start: 2024-09-06 | End: 2024-09-06

## 2024-09-06 RX ORDER — POTASSIUM CHLORIDE 1500 MG/1
40 TABLET, EXTENDED RELEASE ORAL PRN
Status: DISCONTINUED | OUTPATIENT
Start: 2024-09-06 | End: 2024-09-06 | Stop reason: HOSPADM

## 2024-09-06 RX ORDER — BLOOD-GLUCOSE METER
1 KIT MISCELLANEOUS DAILY
Qty: 1 KIT | Refills: 0 | Status: SHIPPED | OUTPATIENT
Start: 2024-09-06

## 2024-09-06 RX ORDER — ENOXAPARIN SODIUM 100 MG/ML
30 INJECTION SUBCUTANEOUS 2 TIMES DAILY
Status: DISCONTINUED | OUTPATIENT
Start: 2024-09-06 | End: 2024-09-06 | Stop reason: HOSPADM

## 2024-09-06 RX ORDER — METOPROLOL SUCCINATE 100 MG/1
100 TABLET, EXTENDED RELEASE ORAL 2 TIMES DAILY
COMMUNITY
End: 2024-09-06 | Stop reason: HOSPADM

## 2024-09-06 RX ORDER — ATORVASTATIN CALCIUM 20 MG/1
20 TABLET, FILM COATED ORAL DAILY
Qty: 90 TABLET | Refills: 3 | Status: SHIPPED | OUTPATIENT
Start: 2024-09-06

## 2024-09-06 RX ORDER — POTASSIUM CHLORIDE 1500 MG/1
40 TABLET, EXTENDED RELEASE ORAL DAILY
Qty: 180 TABLET | Refills: 3 | Status: SHIPPED | OUTPATIENT
Start: 2024-09-06 | End: 2024-09-06

## 2024-09-06 RX ORDER — ONDANSETRON 2 MG/ML
4 INJECTION INTRAMUSCULAR; INTRAVENOUS EVERY 6 HOURS PRN
Status: DISCONTINUED | OUTPATIENT
Start: 2024-09-06 | End: 2024-09-06 | Stop reason: HOSPADM

## 2024-09-06 RX ORDER — ENOXAPARIN SODIUM 100 MG/ML
40 INJECTION SUBCUTANEOUS DAILY
Status: DISCONTINUED | OUTPATIENT
Start: 2024-09-06 | End: 2024-09-06 | Stop reason: SDUPTHER

## 2024-09-06 RX ORDER — ATORVASTATIN CALCIUM 20 MG/1
20 TABLET, FILM COATED ORAL DAILY
Status: DISCONTINUED | OUTPATIENT
Start: 2024-09-06 | End: 2024-09-06 | Stop reason: HOSPADM

## 2024-09-06 RX ORDER — DEXTROSE MONOHYDRATE 100 MG/ML
INJECTION, SOLUTION INTRAVENOUS CONTINUOUS PRN
Status: DISCONTINUED | OUTPATIENT
Start: 2024-09-06 | End: 2024-09-06 | Stop reason: HOSPADM

## 2024-09-06 RX ORDER — POTASSIUM CHLORIDE 7.45 MG/ML
10 INJECTION INTRAVENOUS PRN
Status: DISCONTINUED | OUTPATIENT
Start: 2024-09-06 | End: 2024-09-06 | Stop reason: HOSPADM

## 2024-09-06 RX ORDER — BUMETANIDE 2 MG/1
2 TABLET ORAL DAILY PRN
COMMUNITY
End: 2024-09-06 | Stop reason: HOSPADM

## 2024-09-06 RX ORDER — SODIUM CHLORIDE 0.9 % (FLUSH) 0.9 %
5-40 SYRINGE (ML) INJECTION EVERY 12 HOURS SCHEDULED
Status: DISCONTINUED | OUTPATIENT
Start: 2024-09-06 | End: 2024-09-06 | Stop reason: HOSPADM

## 2024-09-06 RX ORDER — MAGNESIUM SULFATE IN WATER 40 MG/ML
2000 INJECTION, SOLUTION INTRAVENOUS PRN
Status: DISCONTINUED | OUTPATIENT
Start: 2024-09-06 | End: 2024-09-06 | Stop reason: HOSPADM

## 2024-09-06 RX ORDER — POLYETHYLENE GLYCOL 3350 17 G/17G
17 POWDER, FOR SOLUTION ORAL DAILY PRN
Status: DISCONTINUED | OUTPATIENT
Start: 2024-09-06 | End: 2024-09-06 | Stop reason: HOSPADM

## 2024-09-06 RX ORDER — POTASSIUM CHLORIDE 1500 MG/1
40 TABLET, EXTENDED RELEASE ORAL DAILY PRN
COMMUNITY
End: 2024-09-06 | Stop reason: HOSPADM

## 2024-09-06 RX ORDER — DOXYCYCLINE 100 MG/1
100 CAPSULE ORAL EVERY 12 HOURS SCHEDULED
Status: DISCONTINUED | OUTPATIENT
Start: 2024-09-06 | End: 2024-09-06 | Stop reason: HOSPADM

## 2024-09-06 RX ORDER — SODIUM CHLORIDE 9 MG/ML
INJECTION, SOLUTION INTRAVENOUS PRN
Status: DISCONTINUED | OUTPATIENT
Start: 2024-09-06 | End: 2024-09-06 | Stop reason: HOSPADM

## 2024-09-06 RX ORDER — ONDANSETRON 4 MG/1
4 TABLET, ORALLY DISINTEGRATING ORAL EVERY 8 HOURS PRN
Status: DISCONTINUED | OUTPATIENT
Start: 2024-09-06 | End: 2024-09-06 | Stop reason: HOSPADM

## 2024-09-06 RX ORDER — SODIUM CHLORIDE 0.9 % (FLUSH) 0.9 %
5-40 SYRINGE (ML) INJECTION PRN
Status: DISCONTINUED | OUTPATIENT
Start: 2024-09-06 | End: 2024-09-06 | Stop reason: HOSPADM

## 2024-09-06 RX ORDER — DOXYCYCLINE 100 MG/1
100 CAPSULE ORAL 2 TIMES DAILY
Qty: 20 CAPSULE | Refills: 0 | Status: SHIPPED | OUTPATIENT
Start: 2024-09-06 | End: 2024-09-16

## 2024-09-06 RX ORDER — METOPROLOL SUCCINATE 25 MG/1
100 TABLET, EXTENDED RELEASE ORAL 2 TIMES DAILY
Status: DISCONTINUED | OUTPATIENT
Start: 2024-09-06 | End: 2024-09-06 | Stop reason: HOSPADM

## 2024-09-06 RX ORDER — ACETAMINOPHEN 325 MG/1
650 TABLET ORAL EVERY 6 HOURS PRN
Status: DISCONTINUED | OUTPATIENT
Start: 2024-09-06 | End: 2024-09-06 | Stop reason: HOSPADM

## 2024-09-06 RX ORDER — GLUCAGON 1 MG/ML
1 KIT INJECTION PRN
Status: DISCONTINUED | OUTPATIENT
Start: 2024-09-06 | End: 2024-09-06 | Stop reason: HOSPADM

## 2024-09-06 RX ORDER — INSULIN LISPRO 100 [IU]/ML
0-4 INJECTION, SOLUTION INTRAVENOUS; SUBCUTANEOUS
Status: DISCONTINUED | OUTPATIENT
Start: 2024-09-06 | End: 2024-09-06 | Stop reason: HOSPADM

## 2024-09-06 RX ORDER — BUMETANIDE 1 MG/1
1 TABLET ORAL DAILY
Status: DISCONTINUED | OUTPATIENT
Start: 2024-09-06 | End: 2024-09-06 | Stop reason: HOSPADM

## 2024-09-06 RX ORDER — ACETAMINOPHEN 650 MG/1
650 SUPPOSITORY RECTAL EVERY 6 HOURS PRN
Status: DISCONTINUED | OUTPATIENT
Start: 2024-09-06 | End: 2024-09-06 | Stop reason: HOSPADM

## 2024-09-06 RX ORDER — METOPROLOL SUCCINATE 100 MG/1
TABLET, EXTENDED RELEASE ORAL
Qty: 60 TABLET | Refills: 3 | Status: SHIPPED | OUTPATIENT
Start: 2024-09-06 | End: 2024-09-06

## 2024-09-06 RX ORDER — INSULIN LISPRO 100 [IU]/ML
0-4 INJECTION, SOLUTION INTRAVENOUS; SUBCUTANEOUS NIGHTLY
Status: DISCONTINUED | OUTPATIENT
Start: 2024-09-06 | End: 2024-09-06 | Stop reason: HOSPADM

## 2024-09-06 RX ADMIN — DOXYCYCLINE 100 MG: 100 CAPSULE ORAL at 08:52

## 2024-09-06 RX ADMIN — BUMETANIDE 1 MG: 1 TABLET ORAL at 08:51

## 2024-09-06 RX ADMIN — ATORVASTATIN CALCIUM 20 MG: 20 TABLET, FILM COATED ORAL at 08:51

## 2024-09-06 RX ADMIN — ASPIRIN 324 MG: 81 TABLET, CHEWABLE ORAL at 00:10

## 2024-09-06 RX ADMIN — SODIUM CHLORIDE, PRESERVATIVE FREE 10 ML: 5 INJECTION INTRAVENOUS at 08:53

## 2024-09-06 RX ADMIN — TRIAMCINOLONE ACETONIDE: 1 CREAM TOPICAL at 08:54

## 2024-09-06 NOTE — CONSULTS
Inpatient Cardiology Consultation      Reason for Consult:  Chest Pain    Consulting Physician: Dr Cuenca    Requesting Physician:  Dr ANNEL Jolly     Date of Consultation: 9/6/2024    HISTORY OF PRESENT ILLNESS: 39 yo obese AAM known to Dr Jo last seen in office 6/28/2024.   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      Presents to ED with dental pain(chipped tooth a couple of weeks ago)> reports abscess on L side of mouth, warm to touch  and swelling 2 days prior to admission.  Denies any fever, chills, or night sweats.   Sleeps in bed/chair  Changed insurance and C-pap was taken away after he was told he needed repeat sleep study.   Taking all medications prescribed.   Denies CP, SOB, dizziness or palpitations.   Denies orthopnea/PND. When in bed uses one pillow. No LE swelling.  Car is not working so patient walks around 1 mile each way to work with no CP/pressure, SOB.     [facility] 9/5/2024 //110, HR 60-70's SR, afebrile, and O2 saturation 96-98% on RA.    Troponin 14>13, p-BNP 1384, Bun/Cr 10/0.9, K+ 4.0, Na 140, WBC 13.0, Hgb 11.2, Plt 433, LFT's WNL, UA trace bacteria.    ED Medications: Fentanyl, Amoxil and  mg.  After getting Fentanyl L side body pain/throbbing was relieved for a short time but then returns with movement.     Lipitor 20 mg QD, Bumex 1 mg QD, Toprol  mg BID, Entresto  mg BID re-ordered from home    CXR read as no CHF or infiltrates.    Currently patient laying flat in bed.     Please note: past medical records were reviewed per electronic medical record (EMR) - see detailed reports under Past Medical/ Surgical History.   Past Medical/Surgical:  Hx NICMP  5/5/2015 TTE Dr GISELLA Steven: EF 60%. Moderate CLVH. Normal LV size. Stage II DD. Normal RV size and function. Normal atria. No VHD.   Chronic HFrEF.  Nonischemic cardiomyopathy, diagnosed around 10-11/2020 during  ER, initial /110 mmHg, HR 73 bp.  Labs with troponin 14-13.  ECG with normal sinus rhythm, no ST-T wave changes.  BNP 1384.    Physical exam:  VITAL SIGNS: BP (!) 167/110   Pulse 73   Temp 98.1 °F (36.7 °C) (Oral)   Resp 21   Ht 1.778 m (5' 10\")   Wt 103 kg (227 lb)   SpO2 97%   BMI 32.57 kg/m²     GENERAL: NAD   HEAD: Normocephalic, atraumatic.   NECK: No JVD. No carotid bruits. No neck masses.    CARDIOVASCULAR: Normal rate, regular rhythm, normal S1, S2, no MRG    LUNGS: Clear to auscultation bilaterally, no wheezing.    ABDOMEN:Abdomen is soft and not distended. No tenderness.    EXTREMITIES: There is no pedal edema.   MUSCULOSKELETAL: No joint swelling. Normal range of motion    SKIN: Skin is moist. No ulcers or lesions.   NEUROLOGICAL: No evidence of obvious neurological deficits.    PSYCHOLOGICAL: Patient is in normal mood.        Pertinent cardiac studies:    -TTE 12/14/2022:  Summary   Micro-bubble contrast injected to enhance left ventricular visualization.   Left ventricle is normal in size .   Mild to moderate concentric left ventricular hypertrophy.   No regional wall motion abnormalities seen.   Ejection fraction is visually estimated at 60-65%.   There is doppler evidence of stage I diastolic dysfunction.   Normal right ventricular size and function.   Normal size atria.   No valvular abnormalities.    -ECG 9/5/2024: Personally reviewed  Normal sinus rhythm, Q-wave in V5-V6, no ST-T wave changes.      Impression/Plan:    38-year-old male past medical history of DM (uncontrolled, history of multiple DKA admissions), HTN, HLD, NICM (diagnosed in [place] where he had a [facility] that was normal reportedly).  Patient presented to the hospital with symptoms of toothache and chest pain, left shoulder pain, left arm pain and left jaw pain.    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  Pain is worse with moving the arm

## 2024-09-06 NOTE — PROGRESS NOTES
CLINICAL PHARMACY NOTE: MEDS TO BEDS    Total # of Prescriptions Filled: 3   The following medications were delivered to the patient:  Atorvastatin 20 mg  Entresto    Doxycycline on 100 mg    Additional Documentation:  Patient  at pharmacy

## 2024-09-06 NOTE — CARE COORDINATION
Social Work/ Transition of Care:    Pt presented to the ED on 9/5 secondary to chest pain and dental pain from home.    Pt is admitted observation with chest pain.  Cardiology was consulted.    Pt has a SW consult and discharge orders.  SW met with pt who was alert and oriented x4.  Pt reports living in a 2 story home with his bedroom and bathroom on the second floor.  Pt is independent with ADLs and has a 's license but currently does not have a vehicle.  Pt reports he will walk, take the bus, or sometimes a friend/family member will provide transportation.  Pt reports he was going to "[employer] but has not been there in 2 years.  Pt reports he also has not taken any medication including his insulin in 2 years.  AKIRA set pt up to establish with PCP at [facility] clinic on 9/10/24 at 130pm.  Pt also in need of dental clinic follow up.  AKIRA informed pt that the dental clinic has walk in hours M-F.  Pt verbalized understanding.  DOROTHY Farmer at Internal Medicine aware.  Pt states he will need a new glucometer.  AKIRA informed RN that pt will need medication filled at outpatient pharmacy (meds to beds) including order for new glucometer.

## 2024-09-06 NOTE — H&P
[facility] Hospitalist Group History and Physical          PCP: No, Pcp    Date of Admission: 9/5/2024    Date of Service: Pt seen/examined on 9/5/2024 and is admitted to Inpatient with expected LOS greater than two midnights due to medical therapy.  Placed in Observation.    Chief Complaint:  had concerns including Dental Pain (Patient complaining of an abscess on the left side of his mouth.  States that he has to chew on the right side of his mouth.  States increased pain.).    History Of Present Illness:    Mr. Fausto Smith, a 38 y.o. year old male  who  has a past medical history of Buttock wound, left, initial encounter, Buttock wound, left, subsequent encounter, CAD (coronary artery disease), CHF (congestive heart failure) (HCC), Diabetes mellitus (HCC), Draining postoperative wound, Hyperlipidemia, Hyperosmolar hyperglycemic state (HHS) (HCC), and Hypertension.     Mr. Smith is a 38-year-old male with past medical history of obesity, nonischemic cardiomyopathy, HFrEF, medication noncompliance, type 2 diabetes, hypertension, hyperlipidemia, who presents emergency department with left jaw pain, chest pain.  Stated he was doing chest compressions on the medication, in preparation for his work yesterday.  Last night, and this morning he noticed left shoulder pain, constant, radiating to the left shoulder without associated diaphoresis, nausea, lightheadedness, dizziness.  Also has been dealing with left jaw and tooth pain, denies any purulence or fever or drainage.  His labs were unremarkable, EKG normal sinus rhythm, no specific ST changes, chest x-ray unremarkable.  Received amoxicillin, admitted for further evaluation for cardiac evaluation.  Full code      Past Medical History:        Diagnosis Date    Buttock wound, left, initial encounter 3/9/2022    Buttock wound, left, subsequent encounter 3/15/2022    CAD (coronary artery disease)     CHF (congestive heart failure) (HCC)     Diabetes mellitus

## 2024-09-06 NOTE — ED PROVIDER NOTES
IntraVENous Given 9/5/24 4605)   aspirin chewable tablet 324 mg (324 mg Oral Given 9/6/24 0010)         ED COURSE: Twelve-lead EKG as interpreted by the emergency room attending and myself showing a heart rate of 76, normal sinus rhythm.  No new acute ST elevation or depression noted.  Normal axis deviation.       Medical Decision Making:  Fausto Smith is a 38 y.o. male presenting to the ED for original complaints of dental pain.  Patient presents to the emergency department with complaints of dental pain primarily to tooth #20.  Patient then reported that today he also developed left-sided chest pain.  States that the chest pain has been constant and does radiate down his left arm.  He does have an extensive cardiac history including coronary artery disease, congestive heart failure, hypertension and hyperlipidemia ..  Patient does report compliance with medication.  He states that also he is a diabetic but reports that he has not taken any of his insulin for over 2 years because he changed his diet.  Patient otherwise reports normal state of health up until the dental pain in the left-sided chest pain started.  Patient otherwise denies any abdominal pain.  No unusual vomiting or diarrhea.  No blood noted in urine or stool.  No weakness or dizziness.  No fevers.  He denies any worsening shortness of breath.  He does not have any concerning bilateral lower extremity swelling.  Patient last saw cardiology, Dr. Jo on June 28, 2024.  Per medical records I also did review medication dispense summary and his meds were actually refilled by cardiology.  He has not seen his primary care physician for some time.  [place] includes acute congestive heart failure versus acute coronary syndrome versus pneumonia.  Plan be for labs and imaging.  Magnesium level normal, urinalysis essentially unremarkable, proBNP elevated at 1384.  Upon further speaking with patient patient does admit that he has not had his Bumex for

## 2024-09-06 NOTE — DISCHARGE SUMMARY
[facility] Hospitalist Discharge Summary         Fausto Smith  MRN: [id number]  Account Number: [id number]  Admitted: 9/5/2024  Discharge Date: 09/06/24    PCP Handoff    Recommended Outpatient Testing  None    Results Pending At Discharge  none        Clinical Summary  Mr. Smith is a 38-year-old male with past medical history of obesity, nonischemic cardiomyopathy, HFrEF, medication noncompliance, type 2 diabetes, hypertension, hyperlipidemia, who presents emergency department with left jaw pain, chest pain.  Per patient he broke his tooth about 2 weeks ago and feels like he may have some swelling and abscess developing inside.  Patient complained of having some chest pain and left shoulder pain.  Patient was evaluated by cardiology while in the ER who cleared patient for discharge with advised to follow-up as outpatient.  If pain is recurrent in the future may consider stress test per cardiology.    Patient has been noncompliant with his medication due to losing his insurance and not being able to refill.  Patient's medications were refilled while in the hospital before discharge.    Chest pain reproducible on palpation likely musculoskeletal  Nonischemic cardiomyopathy  Hypertension  Hyperlipidemia  Patient advised to take Tylenol as needed  Continue Lipitor, Entresto, metoprolol, Bumex.    Left tooth pain  Discharged on oral doxycycline with advised to follow-up with dentistry as outpatient.    Non-insulin-dependent diabetes mellitus  Patient states that he has been off insulin for past 2 years  Advised to follow-up with endocrinology as outpatient.        Discharge Medications     Medication List        START taking these medications      doxycycline monohydrate 100 MG capsule  Commonly known as: MONODOX  Take 1 capsule by mouth 2 times daily for 10 days            CONTINUE taking these medications      atorvastatin 20 MG tablet  Commonly known as: LIPITOR  Take 1 tablet by mouth daily      sacubitril-valsartan  MG per tablet  Commonly known as: ENTRESTO  Take 1 tablet by mouth 2 times daily            ASK your doctor about these medications      Bumex 2 MG tablet  Generic drug: bumetanide  Ask about: Which instructions should I use?     metoprolol succinate 100 MG extended release tablet  Commonly known as: TOPROL XL  Ask about: Which instructions should I use?     potassium chloride 20 MEQ extended release tablet  Commonly known as: KLOR-CON M  Ask about: Which instructions should I use?               Where to Get Your Medications        These medications were sent to [facility] [address] - P [phone] - F [phone] [address], [place] [id number]      Phone: [phone]   atorvastatin 20 MG tablet  doxycycline monohydrate 100 MG capsule  sacubitril-valsartan  MG per tablet             Physician(s) Follow Up:  Bre Fuentes DO  [address] [id number] [phone]          Jairo Rome MD  [address] [place] [address] [address]  [place] [id number] [phone]    Schedule an appointment as soon as possible for a visit in 1 month(s)        Condition at Discharge: Stable    Disposition:  Home      The patient's condition is stable.  At this time the patient is without objective evidence of an acute process requiring continuing hospitalization or inpatient management.  They are stable for discharge with outpatient follow-up.     I have spoken with the patient and discussed the results of the current hospitalization, in addition to providing specific details for the plan of care and counseling regarding the diagnosis and prognosis.  The plan has been discussed in detail and they are aware of the specific conditions for emergent return, as well as the importance of follow-up.  Their questions are answered at this time and they are agreeable with the plan for discharge    I reviewed discharge recommendations with the

## 2024-09-10 ENCOUNTER — SOCIAL WORK (OUTPATIENT)
Dept: INTERNAL MEDICINE | Age: 39
End: 2024-09-10

## 2024-09-10 ENCOUNTER — OFFICE VISIT (OUTPATIENT)
Dept: INTERNAL MEDICINE | Age: 39
End: 2024-09-10
Payer: COMMERCIAL

## 2024-09-10 VITALS
HEIGHT: 70 IN | SYSTOLIC BLOOD PRESSURE: 139 MMHG | OXYGEN SATURATION: 95 % | BODY MASS INDEX: 33.97 KG/M2 | WEIGHT: 237.3 LBS | HEART RATE: 97 BPM | DIASTOLIC BLOOD PRESSURE: 88 MMHG | RESPIRATION RATE: 18 BRPM | TEMPERATURE: 97.1 F

## 2024-09-10 DIAGNOSIS — I10 PRIMARY HYPERTENSION: ICD-10-CM

## 2024-09-10 DIAGNOSIS — I42.8 NONISCHEMIC CARDIOMYOPATHY (HCC): ICD-10-CM

## 2024-09-10 DIAGNOSIS — E11.00 TYPE 2 DIABETES MELLITUS WITH HYPEROSMOLARITY WITHOUT COMA, WITHOUT LONG-TERM CURRENT USE OF INSULIN (HCC): Primary | ICD-10-CM

## 2024-09-10 DIAGNOSIS — S43.422A SPRAIN OF LEFT ROTATOR CUFF CAPSULE, INITIAL ENCOUNTER: ICD-10-CM

## 2024-09-10 DIAGNOSIS — I50.22 CHRONIC HFREF (HEART FAILURE WITH REDUCED EJECTION FRACTION) (HCC): ICD-10-CM

## 2024-09-10 DIAGNOSIS — L25.3 CONTACT DERMATITIS DUE TO OTHER CHEMICAL PRODUCT, UNSPECIFIED CONTACT DERMATITIS TYPE: ICD-10-CM

## 2024-09-10 DIAGNOSIS — E78.2 MIXED HYPERLIPIDEMIA: ICD-10-CM

## 2024-09-10 DIAGNOSIS — Z59.89 INSURANCE COVERAGE PROBLEMS: ICD-10-CM

## 2024-09-10 DIAGNOSIS — K02.9 DENTAL CARIES: ICD-10-CM

## 2024-09-10 PROCEDURE — 3052F HG A1C>EQUAL 8.0%<EQUAL 9.0%: CPT

## 2024-09-10 PROCEDURE — 99212 OFFICE O/P EST SF 10 MIN: CPT

## 2024-09-10 PROCEDURE — 4004F PT TOBACCO SCREEN RCVD TLK: CPT

## 2024-09-10 PROCEDURE — 3079F DIAST BP 80-89 MM HG: CPT

## 2024-09-10 PROCEDURE — G8427 DOCREV CUR MEDS BY ELIG CLIN: HCPCS

## 2024-09-10 PROCEDURE — 2022F DILAT RTA XM EVC RTNOPTHY: CPT

## 2024-09-10 PROCEDURE — G8417 CALC BMI ABV UP PARAM F/U: HCPCS

## 2024-09-10 PROCEDURE — 3075F SYST BP GE 130 - 139MM HG: CPT

## 2024-09-10 PROCEDURE — 99214 OFFICE O/P EST MOD 30 MIN: CPT

## 2024-09-10 RX ORDER — HYDROCORTISONE 2.5 %
CREAM (GRAM) TOPICAL
Qty: 28 G | Refills: 2 | Status: SHIPPED | OUTPATIENT
Start: 2024-09-10

## 2024-09-10 SDOH — ECONOMIC STABILITY: FOOD INSECURITY: WITHIN THE PAST 12 MONTHS, THE FOOD YOU BOUGHT JUST DIDN'T LAST AND YOU DIDN'T HAVE MONEY TO GET MORE.: SOMETIMES TRUE

## 2024-09-10 SDOH — ECONOMIC STABILITY: INCOME INSECURITY: HOW HARD IS IT FOR YOU TO PAY FOR THE VERY BASICS LIKE FOOD, HOUSING, MEDICAL CARE, AND HEATING?: SOMEWHAT HARD

## 2024-09-10 SDOH — ECONOMIC STABILITY: FOOD INSECURITY: WITHIN THE PAST 12 MONTHS, YOU WORRIED THAT YOUR FOOD WOULD RUN OUT BEFORE YOU GOT MONEY TO BUY MORE.: SOMETIMES TRUE

## 2024-09-10 SDOH — ECONOMIC STABILITY - INCOME SECURITY: OTHER PROBLEMS RELATED TO HOUSING AND ECONOMIC CIRCUMSTANCES: Z59.89

## 2024-09-10 ASSESSMENT — PATIENT HEALTH QUESTIONNAIRE - PHQ9
5. POOR APPETITE OR OVEREATING: SEVERAL DAYS
SUM OF ALL RESPONSES TO PHQ QUESTIONS 1-9: 12
SUM OF ALL RESPONSES TO PHQ9 QUESTIONS 1 & 2: 6
9. THOUGHTS THAT YOU WOULD BE BETTER OFF DEAD, OR OF HURTING YOURSELF: NOT AT ALL
6. FEELING BAD ABOUT YOURSELF - OR THAT YOU ARE A FAILURE OR HAVE LET YOURSELF OR YOUR FAMILY DOWN: SEVERAL DAYS
1. LITTLE INTEREST OR PLEASURE IN DOING THINGS: NEARLY EVERY DAY
3. TROUBLE FALLING OR STAYING ASLEEP: NEARLY EVERY DAY
10. IF YOU CHECKED OFF ANY PROBLEMS, HOW DIFFICULT HAVE THESE PROBLEMS MADE IT FOR YOU TO DO YOUR WORK, TAKE CARE OF THINGS AT HOME, OR GET ALONG WITH OTHER PEOPLE: NOT DIFFICULT AT ALL
7. TROUBLE CONCENTRATING ON THINGS, SUCH AS READING THE NEWSPAPER OR WATCHING TELEVISION: NOT AT ALL
SUM OF ALL RESPONSES TO PHQ QUESTIONS 1-9: 12
SUM OF ALL RESPONSES TO PHQ QUESTIONS 1-9: 12
4. FEELING TIRED OR HAVING LITTLE ENERGY: NOT AT ALL
8. MOVING OR SPEAKING SO SLOWLY THAT OTHER PEOPLE COULD HAVE NOTICED. OR THE OPPOSITE, BEING SO FIGETY OR RESTLESS THAT YOU HAVE BEEN MOVING AROUND A LOT MORE THAN USUAL: SEVERAL DAYS
SUM OF ALL RESPONSES TO PHQ QUESTIONS 1-9: 12
2. FEELING DOWN, DEPRESSED OR HOPELESS: NEARLY EVERY DAY

## 2024-09-10 NOTE — PROGRESS NOTES
CLINICAL PHARMACY NOTE: MEDS TO BEDS    Total # of Prescriptions Filled: 3   The following medications were delivered to the patient:  True metrix device  Trueplus lancets  True metrix blood glucose     Additional Documentation:

## 2024-10-08 ENCOUNTER — OFFICE VISIT (OUTPATIENT)
Dept: INTERNAL MEDICINE | Age: 39
End: 2024-10-08
Payer: COMMERCIAL

## 2024-10-08 ENCOUNTER — TELEPHONE (OUTPATIENT)
Dept: INTERNAL MEDICINE | Age: 39
End: 2024-10-08

## 2024-10-08 VITALS
BODY MASS INDEX: 34.01 KG/M2 | RESPIRATION RATE: 16 BRPM | TEMPERATURE: 100.1 F | HEART RATE: 99 BPM | WEIGHT: 237.6 LBS | OXYGEN SATURATION: 98 % | SYSTOLIC BLOOD PRESSURE: 122 MMHG | DIASTOLIC BLOOD PRESSURE: 76 MMHG | HEIGHT: 70 IN

## 2024-10-08 DIAGNOSIS — E11.00 TYPE 2 DIABETES MELLITUS WITH HYPEROSMOLARITY WITHOUT COMA, WITHOUT LONG-TERM CURRENT USE OF INSULIN (HCC): ICD-10-CM

## 2024-10-08 DIAGNOSIS — E78.2 MIXED HYPERLIPIDEMIA: ICD-10-CM

## 2024-10-08 DIAGNOSIS — I10 ESSENTIAL HYPERTENSION: Primary | ICD-10-CM

## 2024-10-08 DIAGNOSIS — Z28.21 FLU VACCINE REFUSED: ICD-10-CM

## 2024-10-08 DIAGNOSIS — I50.22 CHRONIC SYSTOLIC HEART FAILURE (HCC): ICD-10-CM

## 2024-10-08 DIAGNOSIS — L85.3 XEROSIS CUTIS: ICD-10-CM

## 2024-10-08 DIAGNOSIS — R50.9 LOW GRADE FEVER: ICD-10-CM

## 2024-10-08 PROCEDURE — G8484 FLU IMMUNIZE NO ADMIN: HCPCS | Performed by: STUDENT IN AN ORGANIZED HEALTH CARE EDUCATION/TRAINING PROGRAM

## 2024-10-08 PROCEDURE — 3078F DIAST BP <80 MM HG: CPT | Performed by: STUDENT IN AN ORGANIZED HEALTH CARE EDUCATION/TRAINING PROGRAM

## 2024-10-08 PROCEDURE — 99213 OFFICE O/P EST LOW 20 MIN: CPT | Performed by: STUDENT IN AN ORGANIZED HEALTH CARE EDUCATION/TRAINING PROGRAM

## 2024-10-08 PROCEDURE — 4004F PT TOBACCO SCREEN RCVD TLK: CPT | Performed by: STUDENT IN AN ORGANIZED HEALTH CARE EDUCATION/TRAINING PROGRAM

## 2024-10-08 PROCEDURE — G8427 DOCREV CUR MEDS BY ELIG CLIN: HCPCS | Performed by: STUDENT IN AN ORGANIZED HEALTH CARE EDUCATION/TRAINING PROGRAM

## 2024-10-08 PROCEDURE — 3052F HG A1C>EQUAL 8.0%<EQUAL 9.0%: CPT | Performed by: STUDENT IN AN ORGANIZED HEALTH CARE EDUCATION/TRAINING PROGRAM

## 2024-10-08 PROCEDURE — 2022F DILAT RTA XM EVC RTNOPTHY: CPT | Performed by: STUDENT IN AN ORGANIZED HEALTH CARE EDUCATION/TRAINING PROGRAM

## 2024-10-08 PROCEDURE — 99212 OFFICE O/P EST SF 10 MIN: CPT | Performed by: STUDENT IN AN ORGANIZED HEALTH CARE EDUCATION/TRAINING PROGRAM

## 2024-10-08 PROCEDURE — 3074F SYST BP LT 130 MM HG: CPT | Performed by: STUDENT IN AN ORGANIZED HEALTH CARE EDUCATION/TRAINING PROGRAM

## 2024-10-08 PROCEDURE — G8417 CALC BMI ABV UP PARAM F/U: HCPCS | Performed by: STUDENT IN AN ORGANIZED HEALTH CARE EDUCATION/TRAINING PROGRAM

## 2024-10-08 RX ORDER — AMMONIUM LACTATE 12 G/100G
LOTION TOPICAL
Qty: 225 G | Refills: 5 | Status: SHIPPED | OUTPATIENT
Start: 2024-10-08

## 2024-10-08 RX ORDER — GLUCOSAM/CHON-MSM1/C/MANG/BOSW 500-416.6
TABLET ORAL
COMMUNITY
Start: 2024-09-06

## 2024-10-08 RX ORDER — TRIAMCINOLONE ACETONIDE 0.25 MG/G
OINTMENT TOPICAL
Qty: 15 G | Refills: 4 | Status: SHIPPED | OUTPATIENT
Start: 2024-10-08 | End: 2024-10-15

## 2024-10-08 RX ORDER — CALCIUM CITRATE/VITAMIN D3 200MG-6.25
TABLET ORAL
COMMUNITY
Start: 2024-09-06

## 2024-10-08 NOTE — ASSESSMENT & PLAN NOTE
Orders:    triamcinolone (KENALOG) 0.025 % ointment; Apply topically 2 times daily.    ammonium lactate (LAC-HYDRIN) 12 % lotion; Apply topically as needed.

## 2024-10-08 NOTE — PROGRESS NOTES
Fausto Smith (:  1985) is a 38 y.o. male,Established patient, here for evaluation of the following chief complaint(s):  Diabetes Mellitus (Never started the jardiance), Rash (Wants triamicinolne ordered), and Shortness of Breath (Thinks that he is holding fluid)     Assessment & Plan  Type 2 diabetes mellitus with hyperosmolarity without coma, without long-term current use of insulin (HCC)   24 A1C 8.5 (decreasing trend from 15%)  Continue Jardiance 10 mg daily and Lipitor 20 mg  Start taking metformin 1,000 mg bid  Diabetic foot exam done today. No wounds. Passed microfilament testing  Orders:    Diabetic Foot Exam    empagliflozin (JARDIANCE) 10 MG tablet; Take 1 tablet by mouth daily    metFORMIN (GLUCOPHAGE) 1000 MG tablet; Take 1 tablet by mouth 2 times daily (with meals)    Essential hypertension  BP during clinic visit 122/76  BP trend 160-170/100-110  24 K 4, Cr 0.7   Continue Jardiance 10 mg daily, entresto  mg bid   Continue low salt intake and lifestyle changes  Orders:    MICROALBUMIN, RANDOM URINE (W/O CREATININE); Future    Creatinine, Random Urine; Future    Xerosis cutis   Orders:    triamcinolone (KENALOG) 0.025 % ointment; Apply topically 2 times daily.    ammonium lactate (LAC-HYDRIN) 12 % lotion; Apply topically as needed.    Low grade fever  T 100.1 deg F today during clinic visit  Denied fevers and chills at home  Has been feeling like he has a cold for the past 2 days  Offered to evaluate for covid and influenza vaccine but he declined  Instructed to take tylenol as need for fever and to stay hydrated. Instructed to schedule an appointment in clinic if his symptoms get worse.    Orders:    CBC with Auto Differential; Future    Flu vaccine refused     Mixed hyperlipidemia  24 K 4, Cr 0.7, , HDL 37, LDL 42, , LFTs wnl  Continue Lipitor 20 mg  Start aspirin     The 10-year ASCVD risk score (Taylor VILLANUEVA, et al., 2019) is: 8.9%    Values used to calculate

## 2024-10-08 NOTE — ASSESSMENT & PLAN NOTE
9/6/24 A1C 8.5 (decreasing trend from 15%)  Continue Jardiance 10 mg daily and Lipitor 20 mg  Start taking metformin 1,000 mg bid  Diabetic foot exam done today. No wounds. Passed microfilament testing  Orders:    Diabetic Foot Exam    empagliflozin (JARDIANCE) 10 MG tablet; Take 1 tablet by mouth daily    metFORMIN (GLUCOPHAGE) 1000 MG tablet; Take 1 tablet by mouth 2 times daily (with meals)

## 2024-10-08 NOTE — PROGRESS NOTES
[facility]  Internal Medicine Residency Clinic    Attending Physician Statement  I have discussed the case, including pertinent history and exam findings with the resident physician.  I agree with the assessment, plan and orders as documented by the resident. I have reviewed the relevant PMHx, PSHx, FamHx, SocialHx, medications, and allergies and updated history as appropriate.    Patient presents for routine follow up of medical problems.     Non complinace to medication and A1C was down to 8.5, agreeable to start Metformin and recent Echocardiogram showed improvement and to start ASA.  LDL was 42, check urine microalbumin.  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 Incidentally noted pt has running a low grade fever, but determined to observe at home and declined COVID and strep test today.    Remainder of medical problems as per resident note.    Shreyas Maya MD  10/8/2024 1:45 PM

## 2024-10-08 NOTE — TELEPHONE ENCOUNTER
Phone call to pt to determine if issue with Jardiance/Entresto since last visit .  Pt states he never took them; states was waiting for pharmacy to call; and then got busy; advised to call pharmacy and to contact LSW direct number if issue. Pt verbalized understanding

## 2024-10-08 NOTE — PATIENT INSTRUCTIONS
[facility] Internal Medicine Resident Service    Activity as tolerated  Diet: low sodium and diabetic  Be compliant with your medications and take them as prescribed.    Special Instructions:   Please have your blood work done and your urine study done.   Please start taking metformin 1,000 mg twice a day.   Please continue taking your medications daily as prescribed.   Please apply ammonium lactate lotion as needed on dry itchy skin. You may also apply triamcinolone ointment twice a day for a maximum of 2 weeks at a time. After using it everyday for 2 weeks, please take a 1 week break before using it again.   If your fever persists, please call the clinic for an appointment.     Hospital Follow up: [facility] with House Team   Call to confirm appointment Tel: [phone] ([facility] Internal Medicine Clinic)     Other Follow-Ups:    Future Appointments   Date Time Provider Department Center   12/23/2024 10:30 AM Tammie Jo MD [facility]       Other than any new prescriptions given to you today, the list of home going meds on this After Visit Summary are based on information provided to us from you. This information, including the list, dose, and frequency of medications is only as accurate as the information you provided. If you have any questions or concerns about your home medications, please contact your Primary Care Physician for further clarification.      Keily Ramirez MD PGY-3  10/8/2024  1:48 PM

## 2024-10-08 NOTE — ASSESSMENT & PLAN NOTE
BP during clinic visit 122/76  BP trend 160-170/100-110  9/6/24 K 4, Cr 0.7   Continue Jardiance 10 mg daily, entresto  mg bid   Continue low salt intake and lifestyle changes  Orders:    MICROALBUMIN, RANDOM URINE (W/O CREATININE); Future    Creatinine, Random Urine; Future

## 2024-10-09 NOTE — ASSESSMENT & PLAN NOTE
9/6/24 K 4, Cr 0.7, , HDL 37, LDL 42, , LFTs wnl  Continue Lipitor 20 mg  Start aspirin     The 10-year ASCVD risk score (Taylor VILLANUEVA, et al., 2019) is: 8.9%    Values used to calculate the score:      Age: 38 years      Sex: Male      Is Non- : Yes      Diabetic: Yes      Tobacco smoker: Yes      Systolic Blood Pressure: 122 mmHg      Is BP treated: No      HDL Cholesterol: 37 mg/dL      Total Cholesterol: 135 mg/dL

## 2024-10-09 NOTE — ASSESSMENT & PLAN NOTE
HFrEF with improved EF ECHO 2022 EF 60-65%   9/6/24 K 4, Cr 0.7   Continue Jardiance 10 mg daily, entresto  mg bid, bumex daily prn    Euvolemic on exam today, no rales or leg edema

## 2025-01-10 ENCOUNTER — OFFICE VISIT (OUTPATIENT)
Dept: INTERNAL MEDICINE | Age: 40
End: 2025-01-10
Payer: COMMERCIAL

## 2025-01-10 VITALS
WEIGHT: 214 LBS | OXYGEN SATURATION: 97 % | SYSTOLIC BLOOD PRESSURE: 166 MMHG | TEMPERATURE: 97.2 F | DIASTOLIC BLOOD PRESSURE: 103 MMHG | RESPIRATION RATE: 18 BRPM | HEIGHT: 70 IN | BODY MASS INDEX: 30.64 KG/M2 | HEART RATE: 92 BPM

## 2025-01-10 DIAGNOSIS — I10 ESSENTIAL HYPERTENSION: ICD-10-CM

## 2025-01-10 DIAGNOSIS — L85.3 XEROSIS CUTIS: ICD-10-CM

## 2025-01-10 DIAGNOSIS — I50.22 CHRONIC SYSTOLIC HEART FAILURE (HCC): ICD-10-CM

## 2025-01-10 DIAGNOSIS — E11.00 TYPE 2 DIABETES MELLITUS WITH HYPEROSMOLARITY WITHOUT COMA, WITHOUT LONG-TERM CURRENT USE OF INSULIN (HCC): Primary | ICD-10-CM

## 2025-01-10 DIAGNOSIS — L73.2 HIDRADENITIS SUPPURATIVA: ICD-10-CM

## 2025-01-10 LAB — HBA1C MFR BLD: 15 %

## 2025-01-10 PROCEDURE — 83036 HEMOGLOBIN GLYCOSYLATED A1C: CPT

## 2025-01-10 PROCEDURE — 99213 OFFICE O/P EST LOW 20 MIN: CPT

## 2025-01-10 RX ORDER — METOPROLOL SUCCINATE 100 MG/1
TABLET, EXTENDED RELEASE ORAL
Qty: 60 TABLET | Refills: 3 | Status: CANCELLED | OUTPATIENT
Start: 2025-01-10

## 2025-01-10 RX ORDER — DOXYCYCLINE HYCLATE 100 MG
100 TABLET ORAL DAILY
Qty: 90 TABLET | Refills: 0 | Status: SHIPPED | OUTPATIENT
Start: 2025-01-10 | End: 2025-04-10

## 2025-01-10 RX ORDER — BUMETANIDE 2 MG/1
TABLET ORAL
Qty: 240 TABLET | Refills: 3 | Status: CANCELLED | OUTPATIENT
Start: 2025-01-10

## 2025-01-10 RX ORDER — TRIAMCINOLONE ACETONIDE 0.25 MG/G
OINTMENT TOPICAL
Qty: 454 G | Refills: 4 | Status: SHIPPED | OUTPATIENT
Start: 2025-01-10 | End: 2025-01-17

## 2025-01-10 RX ORDER — ATORVASTATIN CALCIUM 20 MG/1
20 TABLET, FILM COATED ORAL DAILY
Qty: 90 TABLET | Refills: 3 | Status: SHIPPED | OUTPATIENT
Start: 2025-01-10

## 2025-01-10 SDOH — ECONOMIC STABILITY: FOOD INSECURITY: WITHIN THE PAST 12 MONTHS, YOU WORRIED THAT YOUR FOOD WOULD RUN OUT BEFORE YOU GOT MONEY TO BUY MORE.: SOMETIMES TRUE

## 2025-01-10 ASSESSMENT — PATIENT HEALTH QUESTIONNAIRE - PHQ9
SUM OF ALL RESPONSES TO PHQ QUESTIONS 1-9: 0
2. FEELING DOWN, DEPRESSED OR HOPELESS: NOT AT ALL
SUM OF ALL RESPONSES TO PHQ QUESTIONS 1-9: 0
SUM OF ALL RESPONSES TO PHQ QUESTIONS 1-9: 0
SUM OF ALL RESPONSES TO PHQ9 QUESTIONS 1 & 2: 0
1. LITTLE INTEREST OR PLEASURE IN DOING THINGS: NOT AT ALL
SUM OF ALL RESPONSES TO PHQ QUESTIONS 1-9: 0

## 2025-01-10 NOTE — PATIENT INSTRUCTIONS
Dear Fausto Smith,        Thank you for coming to your appointment today. I hope we have addressed all of your needs.       Please make sure to do the following:  - Continue your medications as listed.  - Get labs drawn before our next follow up. We will call you with the results   - Referrals have been made to Dermatology:  If you do not hear from the office in 1 week, please call the number listed.  - We will see each other again in 2 weeks    Call for a sooner appointment if you develop worsening of symptoms    Have a great day!        Sincerely,  John Pinon MD  1/10/2025  2:01 PM

## 2025-01-10 NOTE — ASSESSMENT & PLAN NOTE
Orders:    C-Peptide; Future    CBC with Auto Differential; Future    Comprehensive Metabolic Panel with Bilirubin; Future    Magnesium; Future    Phosphorus; Future    empagliflozin (JARDIANCE) 25 MG tablet; Take 1 tablet by mouth daily    metFORMIN (GLUCOPHAGE) 1000 MG tablet; Take 1 tablet by mouth 2 times daily (with meals)    atorvastatin (LIPITOR) 20 MG tablet; Take 1 tablet by mouth daily    POCT glycosylated hemoglobin (Hb A1C)

## 2025-01-10 NOTE — PROGRESS NOTES
[facility]  Internal Medicine Residency Clinic    Attending Physician Statement  I have discussed the case, including pertinent history and exam findings with the resident physician.  I agree with the assessment, plan and orders as documented by the resident. I have reviewed all pertinent PMHx, PSHx, FamHx, SocialHx, medications, and allergies and updated history as appropriate.    Patient here for routine follow up of medical problems.     NIDDM2  -A1c dramatically increased to >15%; rechecked A1c twice to confirm diagnosis   -patient not willing to use insulin at this time   -discussed dietary and exercise modifications patient has been eating more fruits and drinking more fruit juice during the last few months   -blood work and c peptide to evlauate for insulin production; patient ammenable to insulin based on blood work     Hydradentitis Suppurtiva   -doxycycline and shampoos; referral to dermatology     HTN  -elevated 2/2 patient not taking his medications       Remainder of medical problems as per resident note.    Yogesh Nugent Jr, DO  1/10/25    
Pt screened positive for SDOH related to financial strain, transportation, housing and or food insecurity and declined further contact for assessment/resources.  
   Cardiovascular:  Negative for chest pain, palpitations and leg swelling.   Gastrointestinal:  Negative for abdominal pain, blood in stool, constipation, diarrhea, nausea and vomiting.   Musculoskeletal:  Negative for back pain and myalgias.   Skin:  Negative for rash.   Neurological:  Negative for dizziness and headaches.   Psychiatric/Behavioral:  The patient is not nervous/anxious.           Objective   Physical Exam  Constitutional:       General: He is not in acute distress.     Appearance: He is well-developed.      Comments: Swelling in the left armpit can be noted.  Picture in media.   HENT:      Head: Normocephalic and atraumatic.   Eyes:      General: No scleral icterus.     Conjunctiva/sclera: Conjunctivae normal.   Neck:      Trachea: No tracheal deviation.   Cardiovascular:      Rate and Rhythm: Normal rate.      Heart sounds: No murmur heard.  Pulmonary:      Effort: Pulmonary effort is normal. No respiratory distress.      Breath sounds: Normal breath sounds.   Abdominal:      General: Bowel sounds are normal. There is no distension.      Palpations: Abdomen is soft.      Tenderness: There is no abdominal tenderness.   Musculoskeletal:         General: Normal range of motion.      Cervical back: Normal range of motion.   Skin:     General: Skin is warm and dry.   Neurological:      Mental Status: He is alert and oriented to person, place, and time.   Psychiatric:         Behavior: Behavior normal.         Thought Content: Thought content normal.         Judgment: Judgment normal.                  An electronic signature was used to authenticate this note.    --John Pinon MD

## 2025-01-10 NOTE — ASSESSMENT & PLAN NOTE
Orders:    sacubitril-valsartan (ENTRESTO)  MG per tablet; Take 1 tablet by mouth 2 times daily

## 2025-01-15 ENCOUNTER — TELEPHONE (OUTPATIENT)
Dept: INTERNAL MEDICINE | Age: 40
End: 2025-01-15

## 2025-01-21 ENCOUNTER — OFFICE VISIT (OUTPATIENT)
Dept: CARDIOLOGY CLINIC | Age: 40
End: 2025-01-21
Payer: COMMERCIAL

## 2025-01-21 VITALS
WEIGHT: 216 LBS | HEART RATE: 108 BPM | SYSTOLIC BLOOD PRESSURE: 120 MMHG | HEIGHT: 70 IN | DIASTOLIC BLOOD PRESSURE: 80 MMHG | RESPIRATION RATE: 18 BRPM | BODY MASS INDEX: 30.92 KG/M2

## 2025-01-21 DIAGNOSIS — R00.0 SINUS TACHYCARDIA: ICD-10-CM

## 2025-01-21 DIAGNOSIS — G47.33 OSA (OBSTRUCTIVE SLEEP APNEA): ICD-10-CM

## 2025-01-21 DIAGNOSIS — G47.33 OSA ON CPAP: ICD-10-CM

## 2025-01-21 DIAGNOSIS — I10 ESSENTIAL HYPERTENSION: ICD-10-CM

## 2025-01-21 DIAGNOSIS — F10.11 H/O ALCOHOL ABUSE: ICD-10-CM

## 2025-01-21 DIAGNOSIS — Z86.79 H/O CARDIOMYOPATHY: Primary | ICD-10-CM

## 2025-01-21 DIAGNOSIS — Z91.199 MEDICAL NON-COMPLIANCE: ICD-10-CM

## 2025-01-21 DIAGNOSIS — N18.2 CKD (CHRONIC KIDNEY DISEASE) STAGE 2, GFR 60-89 ML/MIN: ICD-10-CM

## 2025-01-21 DIAGNOSIS — Z86.79 H/O CHF: ICD-10-CM

## 2025-01-21 DIAGNOSIS — E66.9 NON MORBID OBESITY: ICD-10-CM

## 2025-01-21 DIAGNOSIS — E11.42 DIABETIC PERIPHERAL NEUROPATHY (HCC): ICD-10-CM

## 2025-01-21 DIAGNOSIS — E11.65 POORLY CONTROLLED DIABETES MELLITUS (HCC): ICD-10-CM

## 2025-01-21 DIAGNOSIS — Z87.891 HISTORY OF TOBACCO ABUSE: ICD-10-CM

## 2025-01-21 PROCEDURE — 3074F SYST BP LT 130 MM HG: CPT | Performed by: INTERNAL MEDICINE

## 2025-01-21 PROCEDURE — 93000 ELECTROCARDIOGRAM COMPLETE: CPT | Performed by: INTERNAL MEDICINE

## 2025-01-21 PROCEDURE — 99214 OFFICE O/P EST MOD 30 MIN: CPT | Performed by: INTERNAL MEDICINE

## 2025-01-21 PROCEDURE — G8417 CALC BMI ABV UP PARAM F/U: HCPCS | Performed by: INTERNAL MEDICINE

## 2025-01-21 PROCEDURE — 4004F PT TOBACCO SCREEN RCVD TLK: CPT | Performed by: INTERNAL MEDICINE

## 2025-01-21 PROCEDURE — 3079F DIAST BP 80-89 MM HG: CPT | Performed by: INTERNAL MEDICINE

## 2025-01-21 PROCEDURE — 3046F HEMOGLOBIN A1C LEVEL >9.0%: CPT | Performed by: INTERNAL MEDICINE

## 2025-01-21 PROCEDURE — G8427 DOCREV CUR MEDS BY ELIG CLIN: HCPCS | Performed by: INTERNAL MEDICINE

## 2025-01-21 PROCEDURE — 2022F DILAT RTA XM EVC RTNOPTHY: CPT | Performed by: INTERNAL MEDICINE

## 2025-01-21 RX ORDER — TRIAMCINOLONE ACETONIDE 0.25 MG/G
OINTMENT TOPICAL
COMMUNITY
Start: 2025-01-13

## 2025-01-21 RX ORDER — DOXYCYCLINE 100 MG/1
TABLET ORAL
COMMUNITY
Start: 2025-01-10

## 2025-01-21 RX ORDER — METOPROLOL SUCCINATE 100 MG/1
100 TABLET, EXTENDED RELEASE ORAL DAILY
Qty: 90 TABLET | Refills: 3 | Status: SHIPPED | OUTPATIENT
Start: 2025-01-21

## 2025-01-21 NOTE — PROGRESS NOTES
BILATERAL BUTTOCK INCISION AND DRAINAGE performed by Severiano Mata MD at [facility] OR    WISDOM TOOTH EXTRACTION          FAMILY HISTORY:  Paternal grandfather had a myocardial infarction and  at age 50.     Family History   Problem Relation Age of Onset    High Blood Pressure Father     Heart Disease Father     High Blood Pressure Paternal Grandmother     Heart Attack Paternal Grandfather     High Blood Pressure Mother     Heart Disease Mother       SOCIAL HISTORY:  Patient smokes a cigar occasionally.  He drinks a few beers a couple times per week:  Has a history of prior heavy alcohol abuse.  He denies illicit drug use.     Social History     Socioeconomic History    Marital status: Single     Spouse name: None    Number of children: 1    Years of education: None    Highest education level: None   Occupational History    Occupation: unemployed   Tobacco Use    Smoking status: Some Days     Types: Cigars, Cigarettes     Start date:     Smokeless tobacco: Never    Tobacco comments:     Smokes 1 cigar weekly/ cigs 2-3 a week   Vaping Use    Vaping status: Every Day    Start date: 2021    Substances: Nicotine   Substance and Sexual Activity    Alcohol use: Yes     Comment: social drinker    Drug use: Never   Social History Narrative    occasional coffee      Social Determinants of Health     Financial Resource Strain: Medium Risk (9/10/2024)    Overall Financial Resource Strain (CARDIA)     Difficulty of Paying Living Expenses: Somewhat hard   Food Insecurity: Food Insecurity Present (1/10/2025)    Hunger Vital Sign     Worried About Running Out of Food in the Last Year: Sometimes true     Ran Out of Food in the Last Year: Sometimes true   Transportation Needs: Unmet Transportation Needs (1/10/2025)    PRAPARE - Transportation     Lack of Transportation (Medical): Yes     Lack of Transportation (Non-Medical): Yes   Housing Stability: Low Risk  (1/10/2025)    Housing Stability Vital Sign     Unable to Pay

## 2025-01-31 NOTE — ED PROVIDER NOTES
[facility]  [address] [place] [address], [id number]  Phone: [phone]     Patient ID: Fern Booker is a 76 year old year old female.  MRN: [id number]    Chief Complaint   Patient presents with    Follow-up     Diabetes follow up visit.  Declined vaccines today.     History of Present Illness  Fern is a 76 year old female presenting to clinic for management of diabetes.    Diabetes Mellitus  -Glucose-lowering medications: METFORMIN 1000MG bid. is adherent to regimen. Never received trulicity rx since hospital discharge. Last A1c 8.7 on 12/14/24.   -is checking blood sugars, fasting BG 130s  -taking statin, due for lipid panel  -taking losartan for nephroprotection.   Microalbumin/ Creatinine Ratio (mg/g)   Date Value   08/23/2024 108.8 (H)       Neck pain  -one month  -right side with radiation to shoulder  -worse at night and with certain movements    Insomnia/Anxiety  -taking klonopin 2mg nightly from a provider in [place], stable on this dose for years  -requesting refills be started at this clinic      Review of Systems    ROS otherwise negative except that which is noted in HPI.    Patient's medications, allergies, problem list, and past medical history were reviewed and updated as appropriate.    Allergies  ALLERGIES:   Allergen Reactions    Penicillins Other (See Comments)     Medical History  Past Medical History:   Diagnosis Date    Abdominal pain 2/28/2023    Allergy     Arthritis     At risk for falls 2/28/2023    Problem added by Discern Expert per Adult Primary Care Intake form > Health Risk Screening section.    Cellulitis of lower extremity 7/29/2021    Diabetes mellitus  (CMD)     Essential (primary) hypertension     Infected abrasion of knee 2/28/2023    Malignant neoplasm  (CMD)     Occult blood in stools 2/28/2023     Patient Active Problem List   Diagnosis    Osteoarthritis    Hx of total mastectomy of right breast    Personal history of malignant neoplasm  HPI:  10/16/21, Time: 3:56 PM EDT         Ethel Carpenter is a 38 y.o. male presenting to the ED for evaluation after having buttocks abscess surgical removed by Dr. Tiara Michaels on 9-18-21 when he was hospitalized for DKA. The patient is a diabetic , not at goal and reports that since, coming home and most recently he has not checked his sugars in days. He states he was having his packing checked and dressings checked with home health but his mother currently has been helping him check his wound and she states that the area seems to be more swollen and painful and he states he has just been laying on the couch for days because the pain is just too much to bear. He is unsure of any fever, but he reports he can no longer stand the pain. He reports no fever or chills. The complaint has been constant, moderate to severe in severity, and worsened by attempts at local palpation. Review of Systems:   A complete review of systems was performed and pertinent positives and negatives are stated within HPI, all other systems reviewed and are negative.      --------------------------------------------- PAST HISTORY ---------------------------------------------  Past Medical History:  has a past medical history of CAD (coronary artery disease), CHF (congestive heart failure) ([facility] Diabetes mellitus ([facility] Hyperlipidemia, Hyperosmolar hyperglycemic state (HHS) ([facility] and Hypertension. Past Surgical History:  has a past surgical history that includes [place] tooth extraction; Abscess Drainage; ECHO Compl W Dop Color Flow (5/5/2015); and Abdomen surgery (N/A, 9/18/2021). Social History:  reports that he has quit smoking. His smoking use included cigars and cigarettes. He started smoking about 21 years ago. He quit after 8.00 years of use. He has never used smokeless tobacco. He reports current alcohol use. He reports that he does not use drugs.     Family History: family history includes Heart Attack in his paternal grandfather; Heart Disease in his father and mother; High Blood Pressure in his father, mother, and paternal grandmother. The patients home medications have been reviewed. Allergies: Patient has no known allergies.     -------------------------------------------------- RESULTS -------------------------------------------------  All laboratory and radiology results have been personally reviewed by myself   LABS:  Results for orders placed or performed during the hospital encounter of 10/16/21   Lactate, Sepsis   Result Value Ref Range    Lactic Acid, Sepsis 1.3 0.5 - 1.9 mmol/L   CBC Auto Differential   Result Value Ref Range    WBC 17.7 (H) 4.5 - 11.5 E9/L    RBC 3.83 3.80 - 5.80 E12/L    Hemoglobin 12.3 (L) 12.5 - 16.5 g/dL    Hematocrit 36.1 (L) 37.0 - 54.0 %    MCV 94.3 80.0 - 99.9 fL    MCH 32.1 26.0 - 35.0 pg    MCHC 34.1 32.0 - 34.5 %    RDW 13.2 11.5 - 15.0 fL    Platelets 240 876 - 731 E9/L    MPV 10.1 7.0 - 12.0 fL    Neutrophils % 82.6 (H) 43.0 - 80.0 %    Immature Granulocytes % 0.6 0.0 - 5.0 %    Lymphocytes % 9.7 (L) 20.0 - 42.0 %    Monocytes % 6.8 2.0 - 12.0 %    Eosinophils % 0.1 0.0 - 6.0 %    Basophils % 0.2 0.0 - 2.0 %    Neutrophils Absolute 14.60 (H) 1.80 - 7.30 E9/L    Immature Granulocytes # 0.11 E9/L    Lymphocytes Absolute 1.72 1.50 - 4.00 E9/L    Monocytes Absolute 1.21 (H) 0.10 - 0.95 E9/L    Eosinophils Absolute 0.02 (L) 0.05 - 0.50 E9/L    Basophils Absolute 0.04 0.00 - 0.20 E9/L   Comprehensive Metabolic Panel   Result Value Ref Range    Sodium 128 (L) 132 - 146 mmol/L    Potassium 4.1 3.5 - 5.0 mmol/L    Chloride 91 (L) 98 - 107 mmol/L    CO2 22 22 - 29 mmol/L    Anion Gap 15 7 - 16 mmol/L    Glucose 480 (H) 74 - 99 mg/dL    BUN 23 (H) 6 - 20 mg/dL    CREATININE 1.5 (H) 0.7 - 1.2 mg/dL    GFR Non-African American >60 >=60 mL/min/1.73    GFR African American >60     Calcium 9.3 8.6 - 10.2 mg/dL    Total Protein 7.7 6.4 - 8.3 g/dL    Albumin 3.7 3.5 - 5.2 g/dL    Total Bilirubin of breast    Osteoporosis    Urinary incontinence    Type 2 diabetes mellitus with hyperglycemia, without long-term current use of insulin (CMD)    Essential hypertension    Gastroesophageal reflux disease without esophagitis    Immunization due    Health maintenance examination    Dyslipidemia    Debility    Decreased hearing of both ears    Fall at home    Encounter for Medicare annual wellness exam    Vitamin B 12 deficiency    Essential thrombocytosis (CMD)    Frailty    Health care maintenance    Diabetes mellitus, type 2  (CMD)    Frequent urination    Melena    Hypercalcemia    Easy bruising    Screening for colon cancer    Iron deficiency anemia due to chronic blood loss    Vaginitis, atrophic    Pressure injury of sacral region, stage 1    Hypomagnesemia    Bradycardia    Metabolic acidosis with resspiratory acidosis    Hyperglycemia    Primary hypertension    Hyperlipemia    Acute cystitis without hematuria    Age related osteoporosis    Hematoma of right foot    Hospital discharge follow-up    Wound infection    Anxiety    Open wound    Trapezius muscle spasm    Insomnia    Lower extremity edema     Surgical History  Past Surgical History:   Procedure Laterality Date    Breast surgery      Right side     section, low transverse Right 1978    x 2-  &     Mastectomy       Family History  Family History   Problem Relation Age of Onset    Patient is unaware of any medical problems Mother     Patient is unaware of any medical problems Father      Social History  Social History     Socioeconomic History    Marital status: /Civil Union     Spouse name: Not on file    Number of children: Not on file    Years of education: Not on file    Highest education level: Not on file   Occupational History    Not on file   Tobacco Use    Smoking status: Never     Passive exposure: Never    Smokeless tobacco: Never   Vaping Use    Vaping status: never used   Substance and Sexual Activity    Alcohol  0.3 0.0 - 1.2 mg/dL    Alkaline Phosphatase 96 40 - 129 U/L    ALT 5 0 - 40 U/L    AST 6 0 - 39 U/L   Magnesium   Result Value Ref Range    Magnesium 2.3 1.6 - 2.6 mg/dL   Brain Natriuretic Peptide   Result Value Ref Range    Pro- (H) 0 - 125 pg/mL   Urinalysis   Result Value Ref Range    Color, UA Yellow Straw/Yellow    Clarity, UA Clear Clear    Glucose, Ur >=1000 (A) Negative mg/dL    Bilirubin Urine Negative Negative    Ketones, Urine Negative Negative mg/dL    Specific Gravity, UA 1.010 1.005 - 1.030    Blood, Urine SMALL (A) Negative    pH, UA 6.0 5.0 - 9.0    Protein, UA Negative Negative mg/dL    Urobilinogen, Urine 0.2 <2.0 E.U./dL    Nitrite, Urine Negative Negative    Leukocyte Esterase, Urine Negative Negative   Beta-Hydroxybutyrate   Result Value Ref Range    Beta-Hydroxybutyrate 0.68 (H) 0.02 - 0.27 mmol/L   Lipase   Result Value Ref Range    Lipase 34 13 - 60 U/L   Procalcitonin   Result Value Ref Range    Procalcitonin 0.22 (H) 0.00 - 0.08 ng/mL   Microscopic Urinalysis   Result Value Ref Range    WBC, UA 0-1 0 - 5 /HPF    RBC, UA 5-10 (A) 0 - 2 /HPF    Epithelial Cells, UA FEW /HPF    Bacteria, UA RARE (A) None Seen /HPF   POCT Glucose   Result Value Ref Range    Meter Glucose 283 (H) 74 - 99 mg/dL   POCT blood gases   Result Value Ref Range    Sample Type Arterial     POC pH 7.494 (H) 7.350 - 7.450    POC pCO2 29.8 (L) 35.0 - 45.0 mmHg    POC PO2 87.3 80.0 - 100.0 mmHg    POC HCO3 23.0 22.0 - 26.0 mmol/L    POC Base Excess 0.5 -3.0 - 3.0 mmol/L    POC O2 SAT 97.6 92.0 - 98.5 %    POC CPB No     POC  ,072     POC Device ID [id number]     POC Delivery System RoomAir    EKG 12 Lead   Result Value Ref Range    Ventricular Rate 91 BPM    Atrial Rate 91 BPM    P-R Interval 148 ms    QRS Duration 92 ms    Q-T Interval 374 ms    QTc Calculation (Bazett) 460 ms    P Axis 67 degrees    R Axis 66 degrees    T Axis 93 degrees       RADIOLOGY:  Interpreted by Radiologist.  [id number] Cenzic use: Never    Drug use: Never    Sexual activity: Not Currently   Other Topics Concern    Not on file   Social History Narrative    Not on file     Social Determinants of Health     Financial Resource Strain: Low Risk  (1/23/2025)    Financial Resource Strain     Unable to Get: None   Food Insecurity: Low Risk  (1/23/2025)    Food Insecurity     Worried about Food: Never true     Food is Gone: Never true   Transportation Needs: At Risk (1/23/2025)    Transportation Needs     Lack of Reliable Transportation: Yes   Physical Activity: High Risk (1/23/2025)    Exercise Vital Sign     Days of Exercise per Week: 0 days     Minutes of Exercise per Session: 0 min   Stress: Low Risk  (1/23/2025)    Stress     How Stressed: A little bit   Social Connections: Low Risk  (1/23/2025)    Social Connections     Social Connectivity: 5 or more times a week   Interpersonal Safety: Low Risk  (1/23/2025)    Interpersonal Safety     How often physically hurt: Never     How often insulted or talked down to: Never     How often threatened with harm: Never     How often scream or curse at: Never     Current Medications  Current Outpatient Medications   Medication Sig Dispense Refill    atorvastatin (LIPITOR) 40 MG tablet Take 1 tablet by mouth daily. 90 tablet 3    diclofenac (VOLTAREN) 1 % gel Apply 4 g topically 4 times daily as needed (pain). 500 g 1    metformin (GLUCOPHAGE) 1000 MG tablet Take 1 tablet by mouth 2 times daily (with meals). 180 tablet 3    pantoprazole (PROTONIX) 40 MG tablet Take 1 tablet by mouth daily. 90 tablet 1    losartan (COZAAR) 100 MG tablet Take 1 tablet by mouth daily. 90 tablet 3    amLODIPine (NORVASC) 10 MG tablet Take 0.5 tablets by mouth daily. 45 tablet 2    clonazePAM (KlonoPIN) 2 MG tablet Take 1 tablet by mouth nightly as needed for Anxiety. 30 tablet 2    silver sulfADIAZINE (THERMAZENE) 1 % cream Apply 0.0625 inches topically 2 times daily. Apply to a thickness of 1/16 inch (\"nickel thick\"). 400 g  0    hydrOXYzine (ATARAX) 25 MG tablet Take 1 tablet by mouth every 6 hours as needed for Itching. 90 tablet 3    acetaminophen (TYLENOL) 325 MG tablet Take 2 tablets by mouth every 6 hours as needed for Pain. 30 tablet 0    dulaglutide (Trulicity) 0.75 MG/0.5ML pen-injector Inject 0.75 mg into the skin every 7 days. Indications: Type 2 Diabetes 2 mL 3    Cholecalciferol (vitamin D3) 25 mcg (1,000 units) capsule Take 25 mcg by mouth daily.      cyanocobalamin 1000 MCG tablet 1 tab daily (Patient taking differently: Take 1,000 mcg by mouth daily. 1 tab daily) 90 tablet 3    Lancets 28G Misc 50 each 3 times daily. Please use to check Blood sugar up to three times daily 50 each 11    DISPENSE Glucose Test Strips for testing 3 times per day 100 each 3    zoster vaccine recomb adjuvanted (SHINGRIX) 50 MCG/0.5ML injection Inject 0.5 mLs into the muscle 1 time. Repeat dose in 2 to 6 months (unless 1 dose already given), for a total of 2 doses. 1 each 0    Blood Glucose Monitoring Suppl w/Device Kit usa para monitar el azucar diario 1 kit 0    blood glucose (Cool Blood Glucose Test Strips) test strip Once daily 1 each 5     No current facility-administered medications for this visit.       Objective  Vitals:    01/31/25 1309   BP: 122/68   BP Location: LUE - Left upper extremity   Patient Position: Sitting   Pulse: 92   Resp: 20   Temp: 97.8 °F (36.6 °C)   TempSrc: Oral   SpO2: 98%   Weight: 45.8 kg (101 lb)   Height: 4' 11\" (1.499 m)   PainSc: 6    PainLoc: Neck     Body mass index is 20.4 kg/m².    Physical Exam  Vitals reviewed.   Constitutional:       Appearance: Normal appearance.   HENT:      Head: Normocephalic and atraumatic.      Right Ear: External ear normal.      Left Ear: External ear normal.      Nose: Nose normal.      Mouth/Throat:      Mouth: Mucous membranes are moist.   Eyes:      Conjunctiva/sclera: Conjunctivae normal.   Cardiovascular:      Rate and Rhythm: Normal rate and regular rhythm.      Pulses:  ABDOMEN PELVIS W IV CONTRAST Additional Contrast? None   Final Result   No acute inflammatory process in the abdomen and pelvis. Inflammatory changes with skin thickening and phlegmon in the perirectal and   gluteal region, significantly worse on the left.             ------------------------- NURSING NOTES AND VITALS REVIEWED ---------------------------   The nursing notes within the ED encounter and vital signs as below have been reviewed. /69   Pulse 90   Temp 98.2 °F (36.8 °C) (Infrared)   Resp 16   Ht 5' 10\" (1.778 m)   Wt 230 lb (104.3 kg)   SpO2 100%   BMI 33.00 kg/m²   Oxygen Saturation Interpretation: Normal      ---------------------------------------------------PHYSICAL EXAM--------------------------------------      Constitutional/General: Alert and oriented x3, stable appearing, non toxic in NAD  Head: Normocephalic and atraumatic  Eyes: PERRL, EOMI  Mouth: Oropharynx clear, mucosa moist,  handling secretions, no trismus  Neck: Supple, full ROM  Pulmonary: Lungs clear to auscultation bilaterally,  Not in respiratory distress  Cardiovascular:  Tachycardic rate and rhythm,  2+ distal pulses  Abdomen: Soft, no local tenderness, BS active, no rebound or guarding. Buttocks area noted recent healing of surgical wound to left inner buttocks, but locally firm area noted to buttocks region with marked local tenderness to local palpation, no defined pointed abscess noted. Small less than 1 cm raised area to right inner buttocks region, no significant tenderness noted. No obvious swelling noted to perianal area and no active pus or drainage noted. Extremities: Moves all extremities x 4. Bilateral feet very calloused and dry and cracked. Distal pulses appear intact and strong. Warm and well perfused  Skin: warm and dry.    Neurologic: GCS 15,  Psych: Normal Affect      ------------------------------ ED COURSE/MEDICAL DECISION MAKING----------------------  Medications   HYDROmorphone (DILAUDID) Normal pulses.      Heart sounds: Normal heart sounds.   Pulmonary:      Effort: Pulmonary effort is normal.      Breath sounds: Normal breath sounds.   Abdominal:      Palpations: Abdomen is soft.      Tenderness: There is no abdominal tenderness.   Musculoskeletal:      Right lower le+ Pitting Edema present.      Left lower le+ Pitting Edema present.      Comments: Spasm and hypertrophy to the right trapezius, reproduced symptoms described in HPI.  Spurling negative.  No deformity of cervical spine.  No tenderness to palpation of cervical musculature   Feet:      Comments: Bilateral feet with dorsal soft tissue edema.  No erythema or fluctuance.  Bilateral 1+ pitting edema to mid shin.  Bruising to right distal toes.  Green waxy substance between toes.  Onychomycosis  Skin:     General: Skin is warm and dry.      Capillary Refill: Capillary refill takes less than 2 seconds.   Neurological:      Mental Status: She is alert.   Psychiatric:         Mood and Affect: Mood normal.         Behavior: Behavior normal.          Hemoglobin A1C (%)   Date Value   2024 8.7 (H)     LDL (mg/dL)   Date Value   2023 49     Creatinine (mg/dL)   Date Value   2025 0.43 (L)     Microalbumin/ Creatinine Ratio (mg/g)   Date Value   2024 108.8 (H)       Assessment & Plan  Fern Booker is a 76 year old female presenting to clinic for follow up management of diabetes.    Assessment & Plan   Diagnoses and associated orders for this visit:  1. Trapezius muscle spasm  Assessment & Plan:  New problem  No attempted treatments  Focal trap spasm with reproduced pain on exam. Most likely trap spasm, less likely cervical radiculopathy 2/2 no peripheral arm involvement  Counseled on heat, voltaren gel/bengay topical use  Continue tylenol prn for pain relief  RTC 3 months and consider PT if not improved with conservative measures  Orders:  -     Diclofenac Sodium  2. Hyperlipidemia, unspecified hyperlipidemia  injection 0.5 mg ( IntraVENous See Alternative 10/16/21 1841)     Or   HYDROmorphone (DILAUDID) injection 1 mg (1 mg IntraVENous Given 10/16/21 1841)   metronidazole (FLAGYL) 500 mg in NaCl 100 mL IVPB premix (has no administration in time range)   ceFEPIme (MAXIPIME) 2 g injection (  Canceled Entry 10/16/21 2017)   0.9 % sodium chloride infusion (has no administration in time range)   0.9 % sodium chloride bolus (0 mLs IntraVENous Stopped 10/16/21 2032)   iopamidol (ISOVUE-370) 76 % injection 75 mL (75 mLs IntraVENous Given 10/16/21 1747)   fentaNYL (SUBLIMAZE) injection 50 mcg (50 mcg IntraVENous Given 10/16/21 1719)   cefepime (MAXIPIME) 2000 mg IVPB minibag (0 mg IntraVENous Stopped 10/16/21 1930)         ED COURSE:  ED Course as of Oct 16 2156   Sat Oct 16, 2021   1753 EKG @ 1651: This EKG is signed and interpreted by me. Rate: 91  Rhythm: Sinus  Interpretation: Sinus rhythm, normal axis, OR is 148, QRS is 92, QTc is 460. Nonspecific ST changes that are stable compared to prior  Comparison: stable as compared to patient's most recent EKG      [ME]      ED Course User Index  [ME] Celeste Robertson DO       Medical Decision Making:    Patient to ER, complaints of severe pain to recent abscess site that was surgically opened by Dr. Elena Montana in mid September during recent hospitalization for DKA. Patient known diabetic that is not in very good control. Will check labs as well as CT imaging to look for abscess involving tissue of buttocks/anal region as well as to access patient BS and current status. Patient aware. Leukocytosis without lactic acidosis. Due to left shift, evidence of RONA. CT was obtained with significant cellulitis but no definite abscess for drainage. His compliance at home with his insulin is questionable. Blood sugar improving after IV fluids. ABG shows no acidosis. Broad-spectrum antibiotics initiated after cultures obtained.   Spoke with medicine patient will be admitted    Time: 1730  Re-evaluation. Patients symptoms are improving  Repeat physical examination is improved      Sepsis Re-examination  10/16/21   1830 PM EDT          Vital Signs:   Vitals:    10/16/21 1532 10/16/21 1850 10/16/21 1851   BP: 108/63 109/69    Pulse: 108 90    Resp: 16 16    Temp: 98.2 °F (36.8 °C)     TempSrc: Infrared     SpO2: 97% 100%    Weight:   230 lb (104.3 kg)   Height:   5' 10\" (1.778 m)     Card/Pulm:  Rhythm: normal rate. Heart Sounds: Normal S1, S2. unlabored breathing. Capillary Refill: normal.  Radial Pulse:  present 2+. Skin:  Warm. Spoke with Dr. Jennifer Monroe (Medicine). Discussed case. They will admit this patient and quest ABG, call back with results. No signs of acidosis blood sugar improving. They will accept patient to telemetry. Please note that the withdrawal or failure to initiate urgent interventions for this patient would likely result in a life threatening deterioration or permanent disability. Accordingly this patient received 32 minutes of critical care time, excluding separately billable procedures. Counseling: The emergency provider has spoken with the patient and mother and  discussed todays results, in addition to providing specific details for the plan of care and counseling regarding the diagnosis and prognosis. Questions are answered at this time and they are agreeable with the plan. Controlled Substance Monitoring:    Acute and Chronic Pain Monitoring:   RX Monitoring 10/16/2021   Periodic Controlled Substance Monitoring No signs of potential drug abuse or diversion identified.           --------------------------------- IMPRESSION AND DISPOSITION ---------------------------------    IMPRESSION  1. Cellulitis, gluteal, left    2. SIRS (systemic inflammatory response syndrome) (HCC)    3. RONA (acute kidney injury) ([facility]    4. Hyperglycemia    5. Type 1 diabetes mellitus with other specified complication ([facility]    6.  Noncompliance type  Assessment & Plan:  Continue atorvastatin 40mg qd  Lipid panel in six weeks fasting  Orders:  -     Atorvastatin Calcium  -     Lipid Panel With Reflex  3. Essential hypertension  Assessment & Plan:  Blood pressure at goal today 122/68  Continue amlodipine 5 mg daily and losartan 100 mg daily  Orders:  -     Losartan Potassium  -     amLODIPine Besylate  4. Type 2 diabetes mellitus without complication, without long-term current use of insulin  (CMD)  Assessment & Plan:  Diabetes uncontrolled  The 10-year ASCVD risk score (Maty MARTINEZ, et al., 2019) is: 43.6%  Plan  -Continue medications: metformin 1000mg BID. Nursing submitting trulicity auth from 1/16/25 rx today.  -Goal HbA1c is at least <8, aiming for less than 7% without frequent lows  -Check home blood glucose (fBG, 2h postprandial, bedtime), goal BG is .  -Low carb diet and aerobic exercise  -Eye exam & foot exam up to date  -Counseled patient on long term disease complications including blindness, neuropathy, PVD/PAD, heart disease, MI, renal failure  -Relevant labs reviewed with patient.  Recheck A1c and lipid panel in 6 weeks  Orders:  -     Glycohemoglobin  -     metFORMIN HCl  5. Gastroesophageal reflux disease without esophagitis  Assessment & Plan:  Stable  Continue protonix, refilled today  Orders:  -     Pantoprazole Sodium  6. Hypomagnesemia  Assessment & Plan:  Patient taking daily mag but having diarrhea with mag supplement. Requesting trial of dc magnesium supplement.  Will recheck mag level at bloodwork in six weeks  7. Insomnia, unspecified type  Assessment & Plan:  Continue clonazepam 2 mg nightly as needed for insomnia/anxiety  Counseled patient to increase hydroxyzine to 50 mg nightly as needed for insomnia  To complete urine drug screen and controlled substance agreement at follow-up visit  Orders:  -     clonazePAM  8. Lower extremity edema  Assessment & Plan:  Patient declining compression stockings, unable to  tolerate  Counseled on elevation of lower extremities to improve swelling  Given comorbid diabetes, encouraged to follow-up with podiatry regularly to avoid venous stasis ulcers/infection of diabetic foot ulcers      Return in about 3 months (around 4/30/2025).     Health Maintenance Summary       Diabetes Eye Exam (Yearly)  Ordered on 8/23/2024    Respiratory Syncytial Virus (RSV) Vaccine 60+ (1 - 1-dose 75+ series)  Never done    Shingles Vaccine (2 of 2)  Overdue since 4/25/2023    DTaP/Tdap/Td Vaccine (2 - Td or Tdap)  Overdue since 7/21/2024    COVID-19 Vaccine (5 - 2024-25 season)  Overdue since 9/1/2024    Diabetes A1C (Every 3 Months)  Ordered on 1/31/2025    Diabetes Foot Exam (Yearly)  Next due on 4/22/2025    Traditional Medicare- Medicare Wellness Visit (Yearly)  Next due on 8/1/2025    Microalbumin Ratio (Yearly)  Next due on 8/23/2025    GFR (Yearly)  Next due on 1/16/2026    Depression Screening (Yearly)  Next due on 1/31/2026    Pneumococcal Vaccine 50+   Completed    Hepatitis C Screening   Completed    Osteoporosis Screening   Completed    Hepatitis B Vaccine (For Physician/[facility] Discussion)   Completed    Influenza Vaccine   Completed    Hepatitis A Vaccine   Aged Out    Meningococcal Vaccine   Aged Out    Meningococcal Serogroup B Vaccine   Aged Out    HPV Vaccine   Aged Out    Colorectal Cancer Screen   Discontinued          During encounter, all questions were answered and patient expressed understanding of the plan of care. Plan of care was discussed with attending physician.    Shana Zelaya, DO  PGY-3, Family Medicine

## 2025-05-08 ENCOUNTER — APPOINTMENT (OUTPATIENT)
Dept: CT IMAGING | Age: 40
End: 2025-05-08
Payer: COMMERCIAL

## 2025-05-08 ENCOUNTER — HOSPITAL ENCOUNTER (EMERGENCY)
Age: 40
Discharge: HOME | End: 2025-05-08
Payer: COMMERCIAL

## 2025-05-08 ENCOUNTER — APPOINTMENT (OUTPATIENT)
Dept: GENERAL RADIOLOGY | Age: 40
End: 2025-05-08
Payer: COMMERCIAL

## 2025-05-08 VITALS
RESPIRATION RATE: 18 BRPM | OXYGEN SATURATION: 96 % | TEMPERATURE: 98.5 F | WEIGHT: 216 LBS | DIASTOLIC BLOOD PRESSURE: 85 MMHG | HEART RATE: 67 BPM | BODY MASS INDEX: 30.99 KG/M2 | SYSTOLIC BLOOD PRESSURE: 140 MMHG

## 2025-05-08 DIAGNOSIS — M50.30 DEGENERATIVE DISC DISEASE, CERVICAL: ICD-10-CM

## 2025-05-08 DIAGNOSIS — R07.89 ATYPICAL CHEST PAIN: Primary | ICD-10-CM

## 2025-05-08 DIAGNOSIS — R20.2 ARM PARESTHESIA, RIGHT: ICD-10-CM

## 2025-05-08 LAB
ALBUMIN SERPL-MCNC: 3.6 G/DL (ref 3.5–5.2)
ALP SERPL-CCNC: 94 U/L (ref 40–129)
ALT SERPL-CCNC: 7 U/L (ref 0–50)
ANION GAP SERPL CALCULATED.3IONS-SCNC: 12 MMOL/L (ref 7–16)
AST SERPL-CCNC: 14 U/L (ref 0–50)
BASOPHILS # BLD: 0.06 K/UL (ref 0–0.2)
BASOPHILS NFR BLD: 1 % (ref 0–2)
BILIRUB SERPL-MCNC: <0.2 MG/DL (ref 0–1.2)
BNP SERPL-MCNC: 2452 PG/ML (ref 0–125)
BUN SERPL-MCNC: 7 MG/DL (ref 6–20)
CALCIUM SERPL-MCNC: 9 MG/DL (ref 8.6–10)
CHLORIDE SERPL-SCNC: 99 MMOL/L (ref 98–107)
CO2 SERPL-SCNC: 27 MMOL/L (ref 22–29)
CREAT SERPL-MCNC: 0.8 MG/DL (ref 0.7–1.2)
EOSINOPHIL # BLD: 0.26 K/UL (ref 0.05–0.5)
EOSINOPHILS RELATIVE PERCENT: 3 % (ref 0–6)
ERYTHROCYTE [DISTWIDTH] IN BLOOD BY AUTOMATED COUNT: 11.9 % (ref 11.5–15)
GFR, ESTIMATED: >90 ML/MIN/1.73M2
GLUCOSE SERPL-MCNC: 204 MG/DL (ref 74–99)
HCT VFR BLD AUTO: 38.4 % (ref 37–54)
HGB BLD-MCNC: 13.3 G/DL (ref 12.5–16.5)
IMM GRANULOCYTES # BLD AUTO: 0.03 K/UL (ref 0–0.58)
IMM GRANULOCYTES NFR BLD: 0 % (ref 0–5)
LYMPHOCYTES NFR BLD: 2.07 K/UL (ref 1.5–4)
LYMPHOCYTES RELATIVE PERCENT: 24 % (ref 20–42)
MCH RBC QN AUTO: 33.3 PG (ref 26–35)
MCHC RBC AUTO-ENTMCNC: 34.6 G/DL (ref 32–34.5)
MCV RBC AUTO: 96 FL (ref 80–99.9)
MONOCYTES NFR BLD: 0.84 K/UL (ref 0.1–0.95)
MONOCYTES NFR BLD: 10 % (ref 2–12)
NEUTROPHILS NFR BLD: 62 % (ref 43–80)
NEUTS SEG NFR BLD: 5.22 K/UL (ref 1.8–7.3)
PLATELET # BLD AUTO: 466 K/UL (ref 130–450)
PMV BLD AUTO: 9.1 FL (ref 7–12)
POTASSIUM SERPL-SCNC: 3.7 MMOL/L (ref 3.5–5.1)
PROT SERPL-MCNC: 7.8 G/DL (ref 6.4–8.3)
RBC # BLD AUTO: 4 M/UL (ref 3.8–5.8)
SODIUM SERPL-SCNC: 138 MMOL/L (ref 136–145)
TROPONIN I SERPL HS-MCNC: 15 NG/L (ref 0–22)
TROPONIN I SERPL HS-MCNC: 17 NG/L (ref 0–22)
WBC OTHER # BLD: 8.5 K/UL (ref 4.5–11.5)

## 2025-05-08 PROCEDURE — 85025 COMPLETE CBC W/AUTO DIFF WBC: CPT

## 2025-05-08 PROCEDURE — 96374 THER/PROPH/DIAG INJ IV PUSH: CPT

## 2025-05-08 PROCEDURE — 93005 ELECTROCARDIOGRAM TRACING: CPT

## 2025-05-08 PROCEDURE — 84484 ASSAY OF TROPONIN QUANT: CPT

## 2025-05-08 PROCEDURE — 80053 COMPREHEN METABOLIC PANEL: CPT

## 2025-05-08 PROCEDURE — 71046 X-RAY EXAM CHEST 2 VIEWS: CPT

## 2025-05-08 PROCEDURE — 6370000000 HC RX 637 (ALT 250 FOR IP)

## 2025-05-08 PROCEDURE — 6360000002 HC RX W HCPCS

## 2025-05-08 PROCEDURE — 99285 EMERGENCY DEPT VISIT HI MDM: CPT

## 2025-05-08 PROCEDURE — 83880 ASSAY OF NATRIURETIC PEPTIDE: CPT

## 2025-05-08 PROCEDURE — 72125 CT NECK SPINE W/O DYE: CPT

## 2025-05-08 RX ORDER — METHOCARBAMOL 750 MG/1
750 TABLET, FILM COATED ORAL 4 TIMES DAILY
Qty: 20 TABLET | Refills: 0 | Status: SHIPPED | OUTPATIENT
Start: 2025-05-08 | End: 2025-05-13

## 2025-05-08 RX ORDER — LIDOCAINE 4 G/G
1 PATCH TOPICAL ONCE
Status: DISCONTINUED | OUTPATIENT
Start: 2025-05-08 | End: 2025-05-08 | Stop reason: HOSPADM

## 2025-05-08 RX ORDER — METHOCARBAMOL 500 MG/1
750 TABLET, FILM COATED ORAL ONCE
Status: COMPLETED | OUTPATIENT
Start: 2025-05-08 | End: 2025-05-08

## 2025-05-08 RX ORDER — ACETAMINOPHEN 500 MG
500 TABLET ORAL 4 TIMES DAILY PRN
Qty: 20 TABLET | Refills: 0 | Status: SHIPPED | OUTPATIENT
Start: 2025-05-08 | End: 2025-05-13

## 2025-05-08 RX ORDER — BUMETANIDE 1 MG/1
1 TABLET ORAL ONCE
Status: COMPLETED | OUTPATIENT
Start: 2025-05-08 | End: 2025-05-08

## 2025-05-08 RX ORDER — LIDOCAINE 4 G/G
1 PATCH TOPICAL DAILY
Qty: 5 EACH | Refills: 0 | Status: SHIPPED | OUTPATIENT
Start: 2025-05-08 | End: 2025-05-13

## 2025-05-08 RX ORDER — KETOROLAC TROMETHAMINE 30 MG/ML
15 INJECTION, SOLUTION INTRAMUSCULAR; INTRAVENOUS ONCE
Status: COMPLETED | OUTPATIENT
Start: 2025-05-08 | End: 2025-05-08

## 2025-05-08 RX ADMIN — BUMETANIDE 1 MG: 1 TABLET ORAL at 14:13

## 2025-05-08 RX ADMIN — METHOCARBAMOL 750 MG: 500 TABLET ORAL at 11:54

## 2025-05-08 RX ADMIN — KETOROLAC TROMETHAMINE 15 MG: 30 INJECTION, SOLUTION INTRAMUSCULAR at 11:55

## 2025-05-08 ASSESSMENT — ENCOUNTER SYMPTOMS
RESPIRATORY NEGATIVE: 1
EYES NEGATIVE: 1
GASTROINTESTINAL NEGATIVE: 1
ALLERGIC/IMMUNOLOGIC NEGATIVE: 1

## 2025-05-08 ASSESSMENT — HEART SCORE: ECG: NON-SPECIFC REPOLARIZATION DISTURBANCE/LBTB/PM

## 2025-05-08 ASSESSMENT — PAIN DESCRIPTION - LOCATION: LOCATION: BACK;CHEST;NECK

## 2025-05-08 ASSESSMENT — PAIN DESCRIPTION - DESCRIPTORS: DESCRIPTORS: SHARP;STABBING

## 2025-05-08 ASSESSMENT — PAIN SCALES - GENERAL: PAINLEVEL_OUTOF10: 8

## 2025-05-08 NOTE — ED PROVIDER NOTES
[facility] EMERGENCY DEPARTMENT  EMERGENCY DEPARTMENT ENCOUNTER        Pt Name: Fausto Smith  MRN: [id number]  Birthdate 1985  Date of evaluation: 5/8/2025  Provider: CRIS Guerrero - CNP  PCP: Shalonda Heard MD  Note Started: 11:34 AM EDT 5/8/25      NATALIYA. I have evaluated this patient.        CHIEF COMPLAINT       Chief Complaint   Patient presents with    Chest Pain     Right sided chest pain and pressure that radiates down the arm and shoulders. Constant for the past 10 days.        HISTORY OF PRESENT ILLNESS: 1 or more Elements     History from : Patient    Limitations to history : None    Fausto Smith is a 39 y.o. male who presents to the ED with complaints of right sided chest pain, neck pain, and tingling to the right arm x 10 days.  Patient denies any shortness of breath, lightheadedness, dizziness, numbness, weakness.  Patient is right-hand dominant.  Patient states the pain has been constant since it began.  Patient states the pain increases with movement.  Patient states he tried taking ibuprofen last night which did help relieve the pain.  Patient denies any injury.  Patient denies any abdominal pain, nausea, vomiting, diarrhea, headache, fever, chills, body aches.  Patient denies any other symptoms.  Patient has no other complaints at this time.    Nursing Notes were all reviewed and agreed with or any disagreements were addressed in the HPI.    REVIEW OF SYSTEMS :      Review of Systems   Constitutional: Negative.    HENT: Negative.     Eyes: Negative.    Respiratory: Negative.     Cardiovascular:  Positive for chest pain.   Gastrointestinal: Negative.    Endocrine: Negative.    Genitourinary: Negative.    Musculoskeletal:  Positive for neck pain.   Skin: Negative.    Allergic/Immunologic: Negative.    Neurological: Negative.    Hematological: Negative.    Psychiatric/Behavioral: Negative.         Positives and Pertinent negatives as per HPI.

## 2025-05-09 LAB
EKG ATRIAL RATE: 63 BPM
EKG P AXIS: 7 DEGREES
EKG P-R INTERVAL: 146 MS
EKG Q-T INTERVAL: 428 MS
EKG QRS DURATION: 92 MS
EKG QTC CALCULATION (BAZETT): 437 MS
EKG R AXIS: 97 DEGREES
EKG T AXIS: 78 DEGREES
EKG VENTRICULAR RATE: 63 BPM

## 2025-05-09 PROCEDURE — 93010 ELECTROCARDIOGRAM REPORT: CPT | Performed by: INTERNAL MEDICINE

## 2025-05-21 NOTE — PROGRESS NOTES
Age of Onset    High Blood Pressure Father     Heart Disease Father     High Blood Pressure Paternal Grandmother     Heart Attack Paternal Grandfather     High Blood Pressure Mother     Heart Disease Mother        Medications:     Current Outpatient Medications:     [employer] Lancets MISC, 1 each by Does not apply route daily To check sugar 4 x a day and if feeling ill, Disp: 200 each, Rfl: 3    blood glucose monitor strips, Test **4* times a day & as needed for symptoms of irregular blood glucose. Dispense sufficient amount for indicated testing frequency plus additional to accommodate PRN testing needs. , Disp: 200 strip, Rfl: 2    metoprolol succinate (TOPROL XL) 100 MG extended release tablet, Take 1 tablet by mouth every morning, Disp: 30 tablet, Rfl: 3    metoprolol succinate (TOPROL XL) 50 MG extended release tablet, Take 1 tablet by mouth nightly, Disp: 30 tablet, Rfl: 3    clobetasol (TEMOVATE) 0.05 % ointment, Apply topically 2 times daily. , Disp: 60 g, Rfl: 1    insulin glargine (LANTUS SOLOSTAR) 100 UNIT/ML injection pen, Inject 10 Units into the skin nightly, Disp: 5 pen, Rfl: 2    Insulin Pen Needle 31G X 8 MM MISC, 200, Disp: 100 each, Rfl: 3    insulin lispro, 1 Unit Dial, (HUMALOG KWIKPEN) 100 UNIT/ML SOPN, , no insulin, 140-199  3 units, 200-249  6 units, 250-299  9 units,  300-349 12 units, 350-400 15 units,  Greater than 400  18 units. BEDTIME:   no insulin,   140-199  2 units,  200-249  3 units, 250-299  5 units,  200 349   6 units,   350 400   7 units, greater than 400   9 units, Disp: 5 pen, Rfl: 2    clotrimazole-betamethasone (LOTRISONE) 1-0.05 % cream, Apply topically 2 times daily.  To feet twice a day, Disp: 60 g, Rfl: 1    bumetanide (BUMEX) 2 MG tablet, Take 1 tablet by mouth 2 times daily, Disp: 60 tablet, Rfl: 3    spironolactone (ALDACTONE) 25 MG tablet, Take 1 tablet by mouth daily, Disp: 30 tablet, Rfl: 6    sacubitril-valsartan (ENTRESTO)  MG per tablet, Take 1 tablet by mouth 2 times daily, Disp: 60 tablet, Rfl: 3    potassium chloride (KLOR-CON M) 20 MEQ extended release tablet, Take 2 tablets by mouth daily, Disp: 180 tablet, Rfl: 1    Allergies:   No Known Allergies    Social History:     Social History     Tobacco Use    Smoking status: Former Smoker     Years: 8.00     Types: Cigars, Cigarettes     Start date: 2000    Smokeless tobacco: Never Used    Tobacco comment: Smokes 1 cigar weekly/ cigs 2-3 a week   Vaping Use    Vaping Use: Every day    Start date: 6/1/2021    Substances: Nicotine   Substance Use Topics    Alcohol use: Yes     Comment: social drinker    Drug use: No       Patient lives at home. Physical Exam:     Vitals:    09/14/21 1515   BP: 132/78   Pulse: 68   Resp: 16   Temp: 97.7 °F (36.5 °C)   SpO2: 99%   Weight: 225 lb (102.1 kg)   Height: 5' 10\" (1.778 m)       Exam:  Physical Exam  Nurse's notes and vital signs reviewed. The patient is not hypoxic. ? General: Alert, no acute distress, patient resting comfortably Patient is not toxic or lethargic. Skin: Warm, intact, no pallor noted. There is no evidence of rash at this time. Head: Normocephalic, atraumatic  Eye: Normal conjunctiva  Ears, Nose, Throat: Right tympanic membrane clear, left tympanic membrane clear. No drainage or discharge noted. No pre- or post-auricular tenderness, erythema, or swelling noted. No rhinorrhea or congestion noted. Posterior oropharynx shows no erythema, tonsillar hypertrophy, or exudate. the uvula is midline. No trismus or drooling is noted. Moist mucous membranes. Neck: No anterior/posterior lymphadenopathy noted. No erythema, no masses, no fluctuance or induration noted. No meningeal signs. Cardiovascular: Regular Rate and Rhythm  Respiratory: No acute distress, no rhonchi, wheezing or crackles noted. No stridor or retractions are noted.   Neurological: A&O x4, normal speech  Psychiatric: Cooperative         Testing:     Results for orders placed or performed in visit on 09/14/21   POCT Glucose   Result Value Ref Range    Glucose      QC OK? Medical Decision Making:     Vital signs reviewed    Past medical history reviewed. Allergies reviewed. Medications reviewed. Patient on arrival does not appear to be in any apparent distress or discomfort. The patient has been seen and evaluated. The patient does not appear to be toxic or lethargic. The patient had a glucose test here that was read as high. Given the patient's weakness, fatigue. The patient does need to have the glucose brought down. The patient will be sent to the emergency department rule out possibility of DKA. Patient may ultimately need admission. We were able to arrange for follow-up if the patient is able to be discharged      Clinical Impression:   Ninfa Crespo was seen today for other. Diagnoses and all orders for this visit:    Hyperglycemia    Dizziness  -     POCT Glucose       go directly to the emergency department.      SIGNATURE: Jignesh Da Silva III, PA-C Statement Selected

## 2025-08-09 ENCOUNTER — HOSPITAL ENCOUNTER (EMERGENCY)
Age: 40
Discharge: HOME | End: 2025-08-09
Payer: MEDICAID

## 2025-08-09 VITALS
DIASTOLIC BLOOD PRESSURE: 79 MMHG | RESPIRATION RATE: 16 BRPM | SYSTOLIC BLOOD PRESSURE: 138 MMHG | TEMPERATURE: 97.1 F | HEART RATE: 107 BPM | OXYGEN SATURATION: 98 %

## 2025-08-09 DIAGNOSIS — R25.2 MUSCLE CRAMP: ICD-10-CM

## 2025-08-09 DIAGNOSIS — L30.9 ECZEMA, UNSPECIFIED TYPE: Primary | ICD-10-CM

## 2025-08-09 LAB
ALBUMIN SERPL-MCNC: 3.3 G/DL (ref 3.5–5.2)
ALP SERPL-CCNC: 97 U/L (ref 40–129)
ALT SERPL-CCNC: 12 U/L (ref 0–50)
ANION GAP SERPL CALCULATED.3IONS-SCNC: 14 MMOL/L (ref 7–16)
AST SERPL-CCNC: 24 U/L (ref 0–50)
BACTERIA URNS QL MICRO: ABNORMAL
BASOPHILS # BLD: 0.03 K/UL (ref 0–0.2)
BASOPHILS NFR BLD: 0 % (ref 0–2)
BILIRUB SERPL-MCNC: <0.2 MG/DL (ref 0–1.2)
BILIRUB UR QL STRIP: NEGATIVE
BUN SERPL-MCNC: 5 MG/DL (ref 6–20)
CALCIUM SERPL-MCNC: 8.6 MG/DL (ref 8.6–10)
CHLORIDE SERPL-SCNC: 104 MMOL/L (ref 98–107)
CK SERPL-CCNC: 139 U/L (ref 0–190)
CLARITY UR: CLEAR
CO2 SERPL-SCNC: 25 MMOL/L (ref 22–29)
COLOR UR: YELLOW
CREAT SERPL-MCNC: 0.9 MG/DL (ref 0.7–1.2)
EOSINOPHIL # BLD: 1.2 K/UL (ref 0.05–0.5)
EOSINOPHILS RELATIVE PERCENT: 12 % (ref 0–6)
EPI CELLS #/AREA URNS HPF: ABNORMAL /HPF
ERYTHROCYTE [DISTWIDTH] IN BLOOD BY AUTOMATED COUNT: 13.1 % (ref 11.5–15)
GFR, ESTIMATED: >90 ML/MIN/1.73M2
GLUCOSE SERPL-MCNC: 182 MG/DL (ref 74–99)
GLUCOSE UR STRIP-MCNC: >=1000 MG/DL
HCT VFR BLD AUTO: 38.1 % (ref 37–54)
HGB BLD-MCNC: 12.9 G/DL (ref 12.5–16.5)
HGB UR QL STRIP.AUTO: ABNORMAL
IMM GRANULOCYTES # BLD AUTO: 0.03 K/UL (ref 0–0.58)
IMM GRANULOCYTES NFR BLD: 0 % (ref 0–5)
KETONES UR STRIP-MCNC: NEGATIVE MG/DL
LEUKOCYTE ESTERASE UR QL STRIP: NEGATIVE
LYMPHOCYTES NFR BLD: 2.04 K/UL (ref 1.5–4)
LYMPHOCYTES RELATIVE PERCENT: 20 % (ref 20–42)
MAGNESIUM SERPL-MCNC: 1.8 MG/DL (ref 1.6–2.6)
MCH RBC QN AUTO: 32.2 PG (ref 26–35)
MCHC RBC AUTO-ENTMCNC: 33.9 G/DL (ref 32–34.5)
MCV RBC AUTO: 95 FL (ref 80–99.9)
MONOCYTES NFR BLD: 0.48 K/UL (ref 0.1–0.95)
MONOCYTES NFR BLD: 5 % (ref 2–12)
NEUTROPHILS NFR BLD: 63 % (ref 43–80)
NEUTS SEG NFR BLD: 6.51 K/UL (ref 1.8–7.3)
NITRITE UR QL STRIP: NEGATIVE
PH UR STRIP: 6 [PH] (ref 5–8)
PLATELET # BLD AUTO: 522 K/UL (ref 130–450)
PMV BLD AUTO: 9.1 FL (ref 7–12)
POTASSIUM SERPL-SCNC: 4.1 MMOL/L (ref 3.5–5.1)
PROT SERPL-MCNC: 6.5 G/DL (ref 6.4–8.3)
PROT UR STRIP-MCNC: NEGATIVE MG/DL
RBC # BLD AUTO: 4.01 M/UL (ref 3.8–5.8)
RBC #/AREA URNS HPF: ABNORMAL /HPF
SODIUM SERPL-SCNC: 143 MMOL/L (ref 136–145)
SP GR UR STRIP: 1.01 (ref 1–1.03)
UROBILINOGEN UR STRIP-ACNC: 0.2 EU/DL (ref 0–1)
WBC #/AREA URNS HPF: ABNORMAL /HPF
WBC OTHER # BLD: 10.3 K/UL (ref 4.5–11.5)

## 2025-08-09 PROCEDURE — 82550 ASSAY OF CK (CPK): CPT

## 2025-08-09 PROCEDURE — 99284 EMERGENCY DEPT VISIT MOD MDM: CPT

## 2025-08-09 PROCEDURE — 87077 CULTURE AEROBIC IDENTIFY: CPT

## 2025-08-09 PROCEDURE — 83735 ASSAY OF MAGNESIUM: CPT

## 2025-08-09 PROCEDURE — 6370000000 HC RX 637 (ALT 250 FOR IP): Performed by: PHYSICIAN ASSISTANT

## 2025-08-09 PROCEDURE — 80053 COMPREHEN METABOLIC PANEL: CPT

## 2025-08-09 PROCEDURE — 85025 COMPLETE CBC W/AUTO DIFF WBC: CPT

## 2025-08-09 PROCEDURE — 87086 URINE CULTURE/COLONY COUNT: CPT

## 2025-08-09 PROCEDURE — 81001 URINALYSIS AUTO W/SCOPE: CPT

## 2025-08-09 PROCEDURE — 2580000003 HC RX 258: Performed by: PHYSICIAN ASSISTANT

## 2025-08-09 RX ORDER — TRIAMCINOLONE ACETONIDE 1 MG/G
CREAM TOPICAL ONCE
Status: DISCONTINUED | OUTPATIENT
Start: 2025-08-09 | End: 2025-08-09 | Stop reason: HOSPADM

## 2025-08-09 RX ORDER — PREDNISONE 10 MG/1
TABLET ORAL
Qty: 30 TABLET | Refills: 0 | Status: SHIPPED | OUTPATIENT
Start: 2025-08-09 | End: 2025-08-19

## 2025-08-09 RX ORDER — VIT E ACET/GLY/DIMETH/WATER
LOTION (ML) TOPICAL
Qty: 473 ML | Refills: 0 | Status: SHIPPED | OUTPATIENT
Start: 2025-08-09

## 2025-08-09 RX ORDER — PREDNISONE 20 MG/1
60 TABLET ORAL ONCE
Status: COMPLETED | OUTPATIENT
Start: 2025-08-09 | End: 2025-08-09

## 2025-08-09 RX ORDER — TRIAMCINOLONE ACETONIDE 1 MG/G
CREAM TOPICAL
Qty: 453.6 G | Refills: 0 | Status: SHIPPED | OUTPATIENT
Start: 2025-08-09

## 2025-08-09 RX ORDER — 0.9 % SODIUM CHLORIDE 0.9 %
1000 INTRAVENOUS SOLUTION INTRAVENOUS ONCE
Status: COMPLETED | OUTPATIENT
Start: 2025-08-09 | End: 2025-08-09

## 2025-08-09 RX ADMIN — PREDNISONE 60 MG: 20 TABLET ORAL at 16:14

## 2025-08-09 RX ADMIN — SODIUM CHLORIDE 1000 ML: 9 INJECTION, SOLUTION INTRAVENOUS at 14:40

## 2025-08-10 LAB
MICROORGANISM SPEC CULT: ABNORMAL
SPECIMEN DESCRIPTION: ABNORMAL

## 2025-08-11 LAB
MICROORGANISM SPEC CULT: ABNORMAL
SPECIMEN DESCRIPTION: ABNORMAL

## (undated) DEVICE — TOWEL,OR,DSP,ST,BLUE,STD,6/PK,12PK/CS: Brand: MEDLINE

## (undated) DEVICE — SOLUTION IV IRRIG POUR BRL 0.9% SODIUM CHL 2F7124

## (undated) DEVICE — ELECTRODE PT RET AD L9FT HI MOIST COND ADH HYDRGEL CORDED

## (undated) DEVICE — APPLICATOR PREP 26ML 0.7% IOD POVACRYLEX 74% ISO ALC ST

## (undated) DEVICE — READY WET SKIN SCRUB TRAY-LF: Brand: MEDLINE INDUSTRIES, INC.

## (undated) DEVICE — COVER HNDL LT DISP

## (undated) DEVICE — TAPE ADH CLTH SILK H2O REPELLENT CURAD

## (undated) DEVICE — TRAY AMBULATORY REUSABLE

## (undated) DEVICE — PAD,ABDOMINAL,5"X9",ST,LF,25/BX: Brand: MEDLINE INDUSTRIES, INC.

## (undated) DEVICE — 4-PORT MANIFOLD: Brand: NEPTUNE 2

## (undated) DEVICE — DRAPE,LAPAROTOMY,PCH,STERILE: Brand: MEDLINE

## (undated) DEVICE — TRAP SPEC MUCUS FOR SUCT

## (undated) DEVICE — TUBING SUCT 12FR MAL ALUM SHFT FN CAP VENT UNIV CONN W/ OBT

## (undated) DEVICE — DOUBLE BASIN SET: Brand: MEDLINE INDUSTRIES, INC.

## (undated) DEVICE — 6 X 9  1.75MIL 4-WALL LABGUARD: Brand: MINIGRIP COMMERCIAL LLC

## (undated) DEVICE — GAUZE,SPONGE,4"X4",8PLY,STRL,LF,10/TRAY: Brand: MEDLINE

## (undated) DEVICE — NEEDLE HYPO 25GA L1.5IN BLU POLYPR HUB S STL REG BVL STR

## (undated) DEVICE — INTENDED FOR TISSUE SEPARATION, AND OTHER PROCEDURES THAT REQUIRE A SHARP SURGICAL BLADE TO PUNCTURE OR CUT.: Brand: BARD-PARKER ® STAINLESS STEEL BLADES

## (undated) DEVICE — CURITY PLAIN PACKING STRIP: Brand: CURITY

## (undated) DEVICE — BANDAGE,GAUZE,BULKEE II,4.5"X4.1YD,STRL: Brand: MEDLINE

## (undated) DEVICE — CONTAINER VACUTAINER ANAER CULTURE SWAB

## (undated) DEVICE — BLADE ES ELASTOMERIC COAT INSUL DURABLE BEND UPTO 90DEG

## (undated) DEVICE — PACK PROCEDURE SURG GEN CUST

## (undated) DEVICE — WEITLANER RETRCT 6.5 3X4 PRNG BLNT

## (undated) DEVICE — SHEET, T, LAPAROTOMY, STERILE: Brand: MEDLINE

## (undated) DEVICE — GLOVE ORANGE PI 8   MSG9080

## (undated) DEVICE — CONTROL SYRINGE LUER-LOCK TIP: Brand: MONOJECT

## (undated) DEVICE — LEGGINGS, PAIR, 31X48, STERILE: Brand: MEDLINE

## (undated) DEVICE — GOWN,SIRUS,FABRNF,XL,20/CS: Brand: MEDLINE

## (undated) DEVICE — SWAB SPEC COLL SHFT L5.25IN POLYUR FOAM TIP SFT DBL MEDIA